# Patient Record
Sex: MALE | Race: WHITE | Employment: OTHER | URBAN - METROPOLITAN AREA
[De-identification: names, ages, dates, MRNs, and addresses within clinical notes are randomized per-mention and may not be internally consistent; named-entity substitution may affect disease eponyms.]

---

## 2017-01-04 ENCOUNTER — TELEPHONE (OUTPATIENT)
Dept: INTERNAL MEDICINE CLINIC | Age: 77
End: 2017-01-04

## 2017-01-05 ENCOUNTER — CLINICAL SUPPORT (OUTPATIENT)
Dept: CARDIOLOGY CLINIC | Age: 77
End: 2017-01-05

## 2017-01-05 DIAGNOSIS — Z95.810 AICD (AUTOMATIC CARDIOVERTER/DEFIBRILLATOR) PRESENT: ICD-10-CM

## 2017-01-05 DIAGNOSIS — I47.29 PAROXYSMAL VT: Primary | ICD-10-CM

## 2017-01-05 DIAGNOSIS — I48.91 ATRIAL FIBRILLATION, UNSPECIFIED TYPE (HCC): ICD-10-CM

## 2017-01-05 NOTE — MR AVS SNAPSHOT
Visit Information Date & Time Provider Department Dept. Phone Encounter #  
 1/5/2017  9:45  [facility] [address] [phone] [id number] Your Appointments 1/11/2017 10:45 AM  
Office Visit with George Cabezas MD  
[facility] INTERNAL MEDICINE OF [facility]) Appt Note: 1 year f/u  
 [address], [address]  
  
   
 [address], [address] [place] [address] 4/12/2017  3:20 PM  
CARELINK with Mona Copeland [facility] [address] ([facility]) Appt Note: 3 mth carelink [facility] [address] [address] [address] [phone] 2300 [facility] [address] [address]  
  
    
 7/10/2017 11:20 AM  
Follow Up with Anamaria Babcock DO [facility] [address] [facility] ([facility]) Appt Note: Return in about 8 months [facility] [address] [address] [address] [phone] 2300 [facility] [address] Upcoming Health Maintenance Date Due DTaP/Tdap/Td series (1 - Tdap) 3/16/1963 FOBT Q 1 YEAR AGE 50-75 3/16/1992 ZOSTER VACCINE AGE 60> 3/16/2002 GLAUCOMA SCREENING Q2Y 3/16/2007 MEDICARE YEARLY EXAM 3/16/2007 Pneumococcal 65+ Low/Medium Risk (2 of 2 - PCV13) 10/30/2010 INFLUENZA AGE 9 TO ADULT 8/1/2016 Allergies as of 1/5/2017  Review Complete On: 11/14/2016 By: Anamaria Babcock DO Severity Noted Reaction Type Reactions Zetia [Ezetimibe] Medium 10/25/2016   Side Effect Other (comments) Back pain resolved off Zetia Lipitor [Atorvastatin]  10/12/2015    Myalgia Livalo [Pitavastatin]  09/17/2013    Other (comments) Extreme fatigue 5/29/14   PATIENT DENIES Zocor [Simvastatin]  09/17/2013    Myalgia 5/29/14 PATIENT DENIES Current Immunizations  Never Reviewed Name Date Pneumococcal Polysaccharide (PPSV-23) 10/30/2009 Not reviewed this visit Vitals Smoking Status Never Smoker Your Updated Medication List  
  
   
This list is accurate as of: 1/5/17  9:55 AM.  Always use your most recent med list. amLODIPine 5 mg tablet Commonly known as:  Alberto Alstrom Take 1 Tab by mouth daily. AZOPT 1 % ophthalmic suspension Generic drug:  brinzolamide Administer 1 Drop to both eyes two (2) times a day. BETIMOL OP Apply 1 Drop to eye two (2) times a day. cyanocobalamin 1,000 mcg/mL injection Commonly known as:  VITAMIN B12  
inject 1 milliliters intramuscularly every month  
  
 eplerenone 25 mg tablet Commonly known as:  Dior Pain TAKE 1 TABLET DAILY  
  
 furosemide 40 mg tablet Commonly known as:  LASIX TAKE 1 TABLET DAILY  
  
 metoprolol tartrate 50 mg tablet Commonly known as:  LOPRESSOR Take 1 Tab by mouth two (2) times a day. multivitamin tablet Commonly known as:  ONE A DAY Take 1 Tab by mouth daily. triamcinolone acetonide 0.1 % topical cream  
Commonly known as:  KENALOG Apply  to affected area two (2) times daily as needed. use thin layer  
  
 warfarin 5 mg tablet Commonly known as:  COUMADIN  
1 tablet daily Please provide this summary of care documentation to your next provider. Your primary care clinician is listed as Prabhakar Gerber. If you have any questions after today's visit, please call [phone].

## 2017-01-05 NOTE — TELEPHONE ENCOUNTER
I called patient to find out what dose of coumadin he is on; 5mg 4 days and 2.5mg 3 days. His INR is 1.8. I talked to Dr. Franklyn Gutierrez as Dr. Anastasiya Delarosa was out of the office. Per Dr. Franklyn Gutierrez; continue same dose and repeat PT/INR in one week.

## 2017-01-09 NOTE — PROGRESS NOTES
I have personally seen and evaluated the device findings. Interrogation reviewed and I agree with assessment.     Deepa De Souza

## 2017-01-13 ENCOUNTER — HOSPITAL ENCOUNTER (OUTPATIENT)
Dept: LAB | Age: 77
Discharge: HOME | End: 2017-01-13
Payer: MEDICARE

## 2017-01-13 ENCOUNTER — LAB ONLY (OUTPATIENT)
Dept: INTERNAL MEDICINE CLINIC | Age: 77
End: 2017-01-13

## 2017-01-13 DIAGNOSIS — Z12.5 PROSTATE CANCER SCREENING: ICD-10-CM

## 2017-01-13 DIAGNOSIS — E78.00 PURE HYPERCHOLESTEROLEMIA: ICD-10-CM

## 2017-01-13 DIAGNOSIS — I10 ESSENTIAL HYPERTENSION: ICD-10-CM

## 2017-01-13 DIAGNOSIS — I10 ESSENTIAL HYPERTENSION: Primary | ICD-10-CM

## 2017-01-13 LAB
ALBUMIN SERPL BCP-MCNC: 3.6 G/DL (ref 3.4–5)
ALBUMIN/GLOB SERPL: 1 {RATIO} (ref 0.8–1.7)
ALP SERPL-CCNC: 78 U/L (ref 45–117)
ALT SERPL-CCNC: 22 U/L (ref 16–61)
ANION GAP BLD CALC-SCNC: 9 MMOL/L (ref 3–18)
AST SERPL W P-5'-P-CCNC: 19 U/L (ref 15–37)
BASOPHILS # BLD AUTO: 0 K/UL (ref 0–0.06)
BASOPHILS # BLD: 0 % (ref 0–2)
BILIRUB SERPL-MCNC: 0.4 MG/DL (ref 0.2–1)
BUN SERPL-MCNC: 15 MG/DL (ref 7–18)
BUN/CREAT SERPL: 15 (ref 12–20)
CALCIUM SERPL-MCNC: 8.9 MG/DL (ref 8.5–10.1)
CHLORIDE SERPL-SCNC: 103 MMOL/L (ref 100–108)
CHOLEST SERPL-MCNC: 245 MG/DL
CO2 SERPL-SCNC: 26 MMOL/L (ref 21–32)
CREAT SERPL-MCNC: 1.01 MG/DL (ref 0.6–1.3)
DIFFERENTIAL METHOD BLD: ABNORMAL
EOSINOPHIL # BLD: 0.2 K/UL (ref 0–0.4)
EOSINOPHIL NFR BLD: 2 % (ref 0–5)
ERYTHROCYTE [DISTWIDTH] IN BLOOD BY AUTOMATED COUNT: 12.9 % (ref 11.6–14.5)
GLOBULIN SER CALC-MCNC: 3.7 G/DL (ref 2–4)
GLUCOSE SERPL-MCNC: 89 MG/DL (ref 74–99)
HCT VFR BLD AUTO: 46.8 % (ref 36–48)
HDLC SERPL-MCNC: 50 MG/DL (ref 40–60)
HDLC SERPL: 4.9 {RATIO} (ref 0–5)
HGB BLD-MCNC: 15.9 G/DL (ref 13–16)
LDLC SERPL CALC-MCNC: 164.4 MG/DL (ref 0–100)
LIPID PROFILE,FLP: ABNORMAL
LYMPHOCYTES # BLD AUTO: 24 % (ref 21–52)
LYMPHOCYTES # BLD: 1.7 K/UL (ref 0.9–3.6)
MCH RBC QN AUTO: 33.3 PG (ref 24–34)
MCHC RBC AUTO-ENTMCNC: 34 G/DL (ref 31–37)
MCV RBC AUTO: 97.9 FL (ref 74–97)
MONOCYTES # BLD: 0.9 K/UL (ref 0.05–1.2)
MONOCYTES NFR BLD AUTO: 12 % (ref 3–10)
NEUTS SEG # BLD: 4.4 K/UL (ref 1.8–8)
NEUTS SEG NFR BLD AUTO: 62 % (ref 40–73)
PLATELET # BLD AUTO: 274 K/UL (ref 135–420)
PMV BLD AUTO: 11.6 FL (ref 9.2–11.8)
POTASSIUM SERPL-SCNC: 4.4 MMOL/L (ref 3.5–5.5)
PROT SERPL-MCNC: 7.3 G/DL (ref 6.4–8.2)
PSA SERPL-MCNC: 0.5 NG/ML (ref 0–4)
RBC # BLD AUTO: 4.78 M/UL (ref 4.7–5.5)
SODIUM SERPL-SCNC: 138 MMOL/L (ref 136–145)
TRIGL SERPL-MCNC: 153 MG/DL (ref ?–150)
VLDLC SERPL CALC-MCNC: 30.6 MG/DL
WBC # BLD AUTO: 7.2 K/UL (ref 4.6–13.2)

## 2017-01-13 PROCEDURE — 80053 COMPREHEN METABOLIC PANEL: CPT | Performed by: INTERNAL MEDICINE

## 2017-01-13 PROCEDURE — 85025 COMPLETE CBC W/AUTO DIFF WBC: CPT | Performed by: INTERNAL MEDICINE

## 2017-01-13 PROCEDURE — 80061 LIPID PANEL: CPT | Performed by: INTERNAL MEDICINE

## 2017-01-13 PROCEDURE — 84153 ASSAY OF PSA TOTAL: CPT | Performed by: INTERNAL MEDICINE

## 2017-01-19 ENCOUNTER — OFFICE VISIT (OUTPATIENT)
Dept: INTERNAL MEDICINE CLINIC | Age: 77
End: 2017-01-19

## 2017-01-19 VITALS
BODY MASS INDEX: 28.99 KG/M2 | TEMPERATURE: 97.9 F | WEIGHT: 214 LBS | HEIGHT: 72 IN | DIASTOLIC BLOOD PRESSURE: 72 MMHG | RESPIRATION RATE: 16 BRPM | OXYGEN SATURATION: 99 % | HEART RATE: 76 BPM | SYSTOLIC BLOOD PRESSURE: 118 MMHG

## 2017-01-19 DIAGNOSIS — I10 ESSENTIAL HYPERTENSION: Primary | ICD-10-CM

## 2017-01-19 DIAGNOSIS — J06.9 VIRAL UPPER RESPIRATORY TRACT INFECTION: ICD-10-CM

## 2017-01-19 DIAGNOSIS — E78.00 PURE HYPERCHOLESTEROLEMIA: ICD-10-CM

## 2017-01-19 DIAGNOSIS — I47.29 PAROXYSMAL VT: ICD-10-CM

## 2017-01-19 DIAGNOSIS — I48.91 ATRIAL FIBRILLATION, UNSPECIFIED TYPE (HCC): ICD-10-CM

## 2017-01-19 RX ORDER — FLUTICASONE PROPIONATE 50 MCG
SPRAY, SUSPENSION (ML) NASAL
Qty: 3 BOTTLE | Refills: 0 | Status: SHIPPED | OUTPATIENT
Start: 2017-01-19 | End: 2017-07-13 | Stop reason: ALTCHOICE

## 2017-01-19 RX ORDER — WARFARIN SODIUM 5 MG/1
TABLET ORAL
Qty: 30 TAB | Refills: 6 | Status: SHIPPED | OUTPATIENT
Start: 2017-01-19 | End: 2017-10-31 | Stop reason: SDUPTHER

## 2017-01-19 RX ORDER — AMLODIPINE BESYLATE 5 MG/1
5 TABLET ORAL DAILY
Qty: 90 TAB | Refills: 3 | Status: SHIPPED | OUTPATIENT
Start: 2017-01-19 | End: 2017-04-28 | Stop reason: SDUPTHER

## 2017-01-19 RX ORDER — LORATADINE 10 MG/1
TABLET ORAL
Qty: 90 TAB | Refills: 0 | Status: SHIPPED | OUTPATIENT
Start: 2017-01-19 | End: 2017-12-27 | Stop reason: ALTCHOICE

## 2017-01-19 RX ORDER — PANTOPRAZOLE SODIUM 40 MG/1
TABLET, DELAYED RELEASE ORAL
Qty: 90 TAB | Refills: 3 | Status: SHIPPED | OUTPATIENT
Start: 2017-01-19 | End: 2019-01-07 | Stop reason: CLARIF

## 2017-01-19 NOTE — PROGRESS NOTES
The patient presents to the office today with the chief complaint of hypertension    HPI    The patient remains on medications for hypetension. he is tolerating The medications well. The patient also remains on medicatiions for hyperlipidemia and anticoagulation for atrial fibrillation. The patient has an AICD in place due to paroxysmal atrial fibrillation. The patient has complaints of congestion in his throat and mild hoarse. Review of Systems   HENT: Positive for congestion (Mild congestion). Respiratory: Negative for shortness of breath. Cardiovascular: Negative for chest pain and leg swelling. Allergies   Allergen Reactions    Zetia [Ezetimibe] Other (comments)     Back pain resolved off Zetia    Lipitor [Atorvastatin] Myalgia    Livalo [Pitavastatin] Other (comments)     Extreme fatigue    5/29/14   PATIENT DENIES    Zocor [Simvastatin] Myalgia     5/29/14 PATIENT DENIES        Current Outpatient Prescriptions   Medication Sig Dispense Refill    warfarin (COUMADIN) 5 mg tablet 1 tablet daily 30 Tab 6    amLODIPine (NORVASC) 5 mg tablet Take 1 Tab by mouth daily. 90 Tab 3    pantoprazole (PROTONIX) 40 mg tablet 1 tablet each AM 90 Tab 3    loratadine (CLARITIN) 10 mg tablet 1 tablet each evening 90 Tab 0    fluticasone (FLONASE) 50 mcg/actuation nasal spray 2 sprays up each nostril each night 3 Bottle 0    cyanocobalamin (VITAMIN B12) 1,000 mcg/mL injection inject 1 milliliters intramuscularly every month 1 mL 1    metoprolol tartrate (LOPRESSOR) 50 mg tablet Take 1 Tab by mouth two (2) times a day. 180 Tab 3    eplerenone (INSPRA) 25 mg tablet TAKE 1 TABLET DAILY 90 Tab 2    furosemide (LASIX) 40 mg tablet TAKE 1 TABLET DAILY 90 Tab 2    AZOPT 1 % ophthalmic suspension Administer 1 Drop to both eyes two (2) times a day. 3    triamcinolone acetonide (KENALOG) 0.1 % topical cream Apply  to affected area two (2) times daily as needed.  use thin layer       TIMOLOL (BETIMOL OP) Apply 1 Drop to eye two (2) times a day.  multivitamin (ONE A DAY) tablet Take 1 Tab by mouth daily. Past Medical History   Diagnosis Date    AICD (automatic cardioverter/defibrillator) present      [employer]tr[employer]  upgrade from pacer in 2007 due to VT    Atrial fibrillation ([facility] [address]     Cardiac cath 03/19/2007     LM 25% ostial.  Otherwise patent coronaries. LVEDP 20.  EF 50%. Dyssynch. mid anterior contraction. Pacemaker.  Cardiac echocardiogram 03/20/2014     A-fib. EF 65-70%. No RWMA. No RWMA. Mild conc LVH. Mild RVE. RVSP 40-45 mmHg. Mild LAE.  HERNANDEZ. Mild MR. Mod TR.  Cardiac nuclear imaging test 07/29/2014     No ischemia or prior infarction. EF 68%. Nondiagnostic EKG due to V pacing on pharm stress test.  Low risk.  Cardioversion 8/31/2011     Successful defibrillation threshold testing at 18 joules. Patient also had conversion to atrial ventricular pacing.      CHF (congestive heart failure) (HCC)     Cobalamin deficiency     Dupuytren contracture 02/08/2010     on the right ring finger     Edema     Hypertension     Hypertrophy of prostate with urinary obstruction and other lower urinary tract symptoms (LUTS)     Impotence of organic origin     Intolerance of drug      Intolerant high doses of sotalol due to prolonged QT    Mastodynia     Paroxysmal VT ([facility] [address]     Pure hypercholesterolemia     S/P ablation of atrial fibrillation 05/31/2011    S/P ablation of atrial fibrillation 12/18/2012     Dr. Nigel Delacruz at [facility] S/P ablation operation for arrhythmia      VT ablation by Dr. Armand Ortiz near [address] [place] [address] 2/3007    Sick sinus syndrome [facility])        Past Surgical History   Procedure Laterality Date    Hx cataract removal  02/08-03/08     bilateral cataract removed    Hx other surgical  6/2000     atrial lead placement    Hx tonsil and adenoidectomy      Hx pacemaker  1998     placement    Hx pacemaker  6/2000     DDD    Hx pacemaker 3/27/07     removal of pacemaker & placement of dual chamber defib generator and leads    Hx cataract removal  2/2008 & 3/2008     bilateral    Hx other surgical  6/5/2009     Cardioversion    Hx orthopaedic  2/8/10     release of Dupuytren's contraction on ring finger of right hand    Hx afib ablation  12/18/2012     [facility] by Dr. Oumou Meneses Hx other surgical  10/12/2012     cardioversion       Social History     Social History    Marital status:      Spouse name: N/A    Number of children: N/A    Years of education: N/A     Occupational History    Not on file. Social History Main Topics    Smoking status: Never Smoker    Smokeless tobacco: Never Used    Alcohol use Yes      Comment: 2-3 glasses of white wine frequently    Drug use: No    Sexual activity: Not on file     Other Topics Concern    Not on file     Social History Narrative       Patient does not have an advanced directive on file    Visit Vitals    /72 (BP 1 Location: Left arm, BP Patient Position: Sitting)    Pulse 76    Temp 97.9 °F (36.6 °C) (Tympanic)    Resp 16    Ht 6' (1.829 m)    Wt 214 lb (97.1 kg)    SpO2 99%    BMI 29.02 kg/m2       Physical Exam   HENT:   Mouth/Throat: No oropharyngeal exudate. Neck: Carotid bruit is not present. No thyromegaly present. Cardiovascular: Normal rate and regular rhythm. Exam reveals no gallop. No murmur heard. Pulmonary/Chest: He has no wheezes. He has no rales. Abdominal: Soft. Bowel sounds are normal. He exhibits no distension and no mass. There is no tenderness. Musculoskeletal: He exhibits no edema. Lymphadenopathy:     He has no cervical adenopathy.        Hospital Outpatient Visit on 01/13/2017   Component Date Value Ref Range Status    Sodium 01/13/2017 138  136 - 145 mmol/L Final    Potassium 01/13/2017 4.4  3.5 - 5.5 mmol/L Final    Chloride 01/13/2017 103  100 - 108 mmol/L Final    CO2 01/13/2017 26  21 - 32 mmol/L Final    Anion gap 01/13/2017 9  3.0 - 18 mmol/L Final    Glucose 01/13/2017 89  74 - 99 mg/dL Final    BUN 01/13/2017 15  7.0 - 18 MG/DL Final    Creatinine 01/13/2017 1.01  0.6 - 1.3 MG/DL Final    BUN/Creatinine ratio 01/13/2017 15  12 - 20   Final    GFR est AA 01/13/2017 >60  >60 ml/min/1.73m2 Final    GFR est non-AA 01/13/2017 >60  >60 ml/min/1.73m2 Final    Comment: (NOTE)  Estimated GFR is calculated using the Modification of Diet in Renal   Disease (MDRD) Study equation, reported for both  Americans   (GFRAA) and non- Americans (GFRNA), and normalized to 1.73m2   body surface area. The physician must decide which value applies to   the patient. The MDRD study equation should only be used in   individuals age 25 or older. It has not been validated for the   following: pregnant women, patients with serious comorbid conditions,   or on certain medications, or persons with extremes of body size,   muscle mass, or nutritional status.  Calcium 01/13/2017 8.9  8.5 - 10.1 MG/DL Final    Bilirubin, total 01/13/2017 0.4  0.2 - 1.0 MG/DL Final    ALT 01/13/2017 22  16 - 61 U/L Final    AST 01/13/2017 19  15 - 37 U/L Final    Alk. phosphatase 01/13/2017 78  45 - 117 U/L Final    Protein, total 01/13/2017 7.3  6.4 - 8.2 g/dL Final    Albumin 01/13/2017 3.6  3.4 - 5.0 g/dL Final    Globulin 01/13/2017 3.7  2.0 - 4.0 g/dL Final    A-G Ratio 01/13/2017 1.0  0.8 - 1.7   Final    LIPID PROFILE 01/13/2017        Final    Cholesterol, total 01/13/2017 245* <200 MG/DL Final    Triglyceride 01/13/2017 153* <150 MG/DL Final    Comment: The drugs N-acetylcysteine (NAC) and  Metamiszole have been found to cause falsely  low results in this chemical assay. Please  be sure to submit blood samples obtained  BEFORE administration of either of these  drugs to assure correct results.       HDL Cholesterol 01/13/2017 50  40 - 60 MG/DL Final    LDL, calculated 01/13/2017 164.4* 0 - 100 MG/DL Final    VLDL, calculated 01/13/2017 30.6  MG/DL Final    CHOL/HDL Ratio 01/13/2017 4.9  0 - 5.0   Final    WBC 01/13/2017 7.2  4.6 - 13.2 K/uL Final    RBC 01/13/2017 4.78  4.70 - 5.50 M/uL Final    HGB 01/13/2017 15.9  13.0 - 16.0 g/dL Final    HCT 01/13/2017 46.8  36.0 - 48.0 % Final    MCV 01/13/2017 97.9* 74.0 - 97.0 FL Final    MCH 01/13/2017 33.3  24.0 - 34.0 PG Final    MCHC 01/13/2017 34.0  31.0 - 37.0 g/dL Final    RDW 01/13/2017 12.9  11.6 - 14.5 % Final    PLATELET 02/08/1993 789  135 - 420 K/uL Final    MPV 01/13/2017 11.6  9.2 - 11.8 FL Final    NEUTROPHILS 01/13/2017 62  40 - 73 % Final    LYMPHOCYTES 01/13/2017 24  21 - 52 % Final    MONOCYTES 01/13/2017 12* 3 - 10 % Final    EOSINOPHILS 01/13/2017 2  0 - 5 % Final    BASOPHILS 01/13/2017 0  0 - 2 % Final    ABS. NEUTROPHILS 01/13/2017 4.4  1.8 - 8.0 K/UL Final    ABS. LYMPHOCYTES 01/13/2017 1.7  0.9 - 3.6 K/UL Final    ABS. MONOCYTES 01/13/2017 0.9  0.05 - 1.2 K/UL Final    ABS. EOSINOPHILS 01/13/2017 0.2  0.0 - 0.4 K/UL Final    ABS. BASOPHILS 01/13/2017 0.0  0.0 - 0.06 K/UL Final    DF 01/13/2017 AUTOMATED    Final    Prostate Specific Ag 01/13/2017 0.5  0.0 - 4.0 ng/mL Final       .No results found for any visits on 01/19/17. Assessment / Plan      ICD-10-CM ICD-9-CM    1. Essential hypertension I10 401.9    2. Atrial fibrillation, unspecified type ([facility] [address] I48.91 427.31    3. Pure hypercholesterolemia E78.00 272.0    4. [facility]ox[facility]mal [facility] ([facility] I47.2 427.1    5. Viral upper respiratory tract infection J06.9 465.9     B97.89       Claritin  he was advised to continue his maintenance medications  he is to call if symptoms persist over five days        I have reviewed/discussed the above normal BMI with the patient. I have recommended the following interventions: dietary management education, guidance, and counseling . The plan is as follows: Patient will cut back on calories and carbohydrates.  .        Follow-up Disposition:  Return in about 6 months (around 7/19/2017). I asked Carmelita Lea if he has any questions and I answered the questions.   Carmelita Lea states that he understands the treatment plan and agrees with the treatment plan

## 2017-01-19 NOTE — MR AVS SNAPSHOT
Visit Information Date & Time Provider Department Dept. Phone Encounter #  
 1/19/2017  8:15 AM Raheem Richards MD [facility] INTERNAL MEDICINE OF [place] [phone] [id number] Follow-up Instructions Return in about 6 months (around 7/19/2017). Your Appointments 4/12/2017  3:20 PM  
CARELINK with Pacer Hv [facility] ([facility]) Appt Note: [facility] [address] [address] [address] [phone] [address] [facility] [address] [address]  
  
    
 7/10/2017 11:20 AM  
Follow Up with Jose C Sol DO [facility] ([facility]) Appt Note: Return in about 8 months [facility] [address] [address] [address] [phone] [address] [facility] [address] 7/13/2017 10:30 AM  
Follow Up with Raheem Richards MD  
[address] [place] [address]) Appt Note: 6mo  
 [address], [address] [facility] [address]  
  
   
 [address] [place], [address] [facility] [address] Upcoming Health Maintenance Date Due DTaP/Tdap/Td series (1 - Tdap) 3/16/1961 FOBT Q 1 YEAR AGE 50-75 3/16/1992 ZOSTER VACCINE AGE 60> 3/16/2002 GLAUCOMA SCREENING Q2Y 3/16/2007 MEDICARE YEARLY EXAM 3/16/2007 Pneumococcal 65+ Low/Medium Risk (2 of 2 - PCV13) 10/30/2010 Allergies as of 1/19/2017  Review Complete On: 1/19/2017 By: Raheem Richards MD  
  
 Severity Noted Reaction Type Reactions Zetia [Ezetimibe] Medium 10/25/2016   Side Effect Other (comments) Back pain resolved off Zetia Lipitor [Atorvastatin]  10/12/2015    Myalgia Livalo [Pitavastatin]  09/17/2013    Other (comments) Extreme fatigue 5/29/14   PATIENT DENIES Zocor [Simvastatin]  09/17/2013    Myalgia 5/29/14 PATIENT DENIES Current Immunizations  Never Reviewed Name Date Pneumococcal Polysaccharide (PPSV-23) 10/30/2009 Not reviewed this visit You Were Diagnosed With   
  
 Codes Comments Atrial fibrillation, unspecified type ([facility]    -  Primary ICD-10-CM: I48.91 
ICD-9-CM: 427.31 Pure hypercholesterolemia     ICD-10-CM: E78.00 ICD-9-CM: 272.0 Essential hypertension     ICD-10-CM: I10 
ICD-9-CM: 401.9 Paroxysmal VT ([facility]     ICD-10-CM: I47.2 ICD-9-CM: 427.1 Vitals BP Pulse Temp Resp Height(growth percentile) 118/72 (BP 1 Location: Left arm, BP Patient Position: Sitting) 76 97.9 °F (36.6 °C) (Tympanic) 16 6' (1.829 m) Weight(growth percentile) SpO2 BMI Smoking Status 214 lb (97.1 kg) 99% 29.02 kg/m2 Never Smoker BMI and BSA Data Body Mass Index Body Surface Area  
 29.02 kg/m 2 2.22 m 2 Preferred Pharmacy Pharmacy Name Phone [address]  . [address] [place] [address], [address] [place], [place] [phone] Your Updated Medication List  
  
   
This list is accurate as of: 1/19/17  9:14 AM.  Always use your most recent med list. amLODIPine 5 mg tablet Commonly known as:  Karina Martinez Take 1 Tab by mouth daily. AZOPT 1 % ophthalmic suspension Generic drug:  brinzolamide Administer 1 Drop to both eyes two (2) times a day. BETIMOL OP Apply 1 Drop to eye two (2) times a day. cyanocobalamin 1,000 mcg/mL injection Commonly known as:  VITAMIN B12  
inject 1 milliliters intramuscularly every month  
  
 eplerenone 25 mg tablet Commonly known as:  Elwyn Primus TAKE 1 TABLET DAILY  
  
 fluticasone 50 mcg/actuation nasal spray Commonly known as:  FLONASE  
2 sprays up each nostril each night  
  
 furosemide 40 mg tablet Commonly known as:  LASIX TAKE 1 TABLET DAILY  
  
 loratadine 10 mg tablet Commonly known as:  CLARITIN  
1 tablet each evening  
  
 metoprolol tartrate 50 mg tablet Commonly known as:  LOPRESSOR Take 1 Tab by mouth two (2) times a day. multivitamin tablet Commonly known as:  ONE A DAY Take 1 Tab by mouth daily. pantoprazole 40 mg tablet Commonly known as:  PROTONIX  
1 tablet each AM  
  
 triamcinolone acetonide 0.1 % topical cream  
Commonly known as:  KENALOG Apply  to affected area two (2) times daily as needed. use thin layer  
  
 warfarin 5 mg tablet Commonly known as:  COUMADIN  
1 tablet daily Prescriptions Sent to Pharmacy Refills  
 warfarin (COUMADIN) 5 mg tablet 6 Si tablet daily Class: Normal  
 Pharmacy: [address] [place] [address] Ph #: [phone]  
 amLODIPine (NORVASC) 5 mg tablet 3 Sig: Take 1 Tab by mouth daily. Class: Normal  
 Pharmacy: [address] [place] [address] Ph #: [phone] Route: Oral  
 pantoprazole (PROTONIX) 40 mg tablet 3 Si tablet each AM  
 Class: Normal  
 Pharmacy: [address] [place] [address] Ph #: [phone]  
 loratadine (CLARITIN) 10 mg tablet 0 Si tablet each evening Class: Normal  
 Pharmacy: [address] [place] [address] Ph #: [phone]  
 fluticasone (FLONASE) 50 mcg/actuation nasal spray 0 Si sprays up each nostril each night Class: Normal  
 Pharmacy: [address] [place] [address] Ph #: [phone] Follow-up Instructions Return in about 6 months (around 2017). Patient Instructions Health Maintenance Due Topic Date Due  
 DTaP/Tdap/Td  (1 - Tdap) 1963  Stool testing for trace blood  1992  Shingles Vaccine  2002  Glaucoma Screening   2007 [employer] Annual Well Visit  2007  Pneumococcal Vaccine (2 of 2 - PCV13) 10/30/2010  Flu Vaccine  2016 Please provide this summary of care documentation to your next provider. Your primary care clinician is listed as Jay Breen. If you have any questions after today's visit, please call [phone].

## 2017-01-19 NOTE — PATIENT INSTRUCTIONS
Health Maintenance Due   Topic Date Due    DTaP/Tdap/Td  (1 - Tdap) 03/16/1963    Stool testing for trace blood  03/16/1992    Shingles Vaccine  03/16/2002    Glaucoma Screening   03/16/2007    Annual Well Visit  03/16/2007    Pneumococcal Vaccine (2 of 2 - PCV13) 10/30/2010    Flu Vaccine  08/01/2016

## 2017-01-19 NOTE — PROGRESS NOTES
1. Have you been to the ER, urgent care clinic since your last visit? Hospitalized since your last visit? No    2. Have you seen or consulted any other health care providers outside of the [address] [place] [address] since your last visit? Include any pap smears or colon screening.  No

## 2017-02-15 LAB — INR, EXTERNAL: 2 (ref 2–3)

## 2017-02-17 ENCOUNTER — DOCUMENTATION ONLY (OUTPATIENT)
Dept: INTERNAL MEDICINE CLINIC | Age: 77
End: 2017-02-17

## 2017-02-17 ENCOUNTER — TELEPHONE ANTICOAG (OUTPATIENT)
Dept: INTERNAL MEDICINE CLINIC | Age: 77
End: 2017-02-17

## 2017-02-17 DIAGNOSIS — I48.91 ATRIAL FIBRILLATION, UNSPECIFIED TYPE (HCC): ICD-10-CM

## 2017-02-20 RX ORDER — FUROSEMIDE 40 MG/1
TABLET ORAL
Qty: 90 TAB | Refills: 3 | Status: SHIPPED | OUTPATIENT
Start: 2017-02-20 | End: 2018-02-15 | Stop reason: SDUPTHER

## 2017-02-23 RX ORDER — CYANOCOBALAMIN 1000 UG/ML
INJECTION, SOLUTION INTRAMUSCULAR; SUBCUTANEOUS
Qty: 1 ML | Refills: 0 | Status: SHIPPED | OUTPATIENT
Start: 2017-02-23 | End: 2017-03-03 | Stop reason: SDUPTHER

## 2017-03-01 ENCOUNTER — TELEPHONE ANTICOAG (OUTPATIENT)
Dept: INTERNAL MEDICINE CLINIC | Age: 77
End: 2017-03-01

## 2017-03-01 DIAGNOSIS — I48.91 ATRIAL FIBRILLATION, UNSPECIFIED TYPE (HCC): ICD-10-CM

## 2017-03-01 LAB — INR, EXTERNAL: 3.5

## 2017-03-05 RX ORDER — CYANOCOBALAMIN 1000 UG/ML
INJECTION, SOLUTION INTRAMUSCULAR; SUBCUTANEOUS
Qty: 1 ML | Refills: 0 | Status: SHIPPED | OUTPATIENT
Start: 2017-03-05 | End: 2017-03-30 | Stop reason: SDUPTHER

## 2017-03-15 ENCOUNTER — TELEPHONE ANTICOAG (OUTPATIENT)
Dept: INTERNAL MEDICINE CLINIC | Age: 77
End: 2017-03-15

## 2017-03-15 DIAGNOSIS — I48.91 ATRIAL FIBRILLATION, UNSPECIFIED TYPE (HCC): ICD-10-CM

## 2017-03-15 LAB — INR, EXTERNAL: 3.2

## 2017-03-22 ENCOUNTER — TELEPHONE ANTICOAG (OUTPATIENT)
Dept: INTERNAL MEDICINE CLINIC | Age: 77
End: 2017-03-22

## 2017-03-22 DIAGNOSIS — I48.91 ATRIAL FIBRILLATION, UNSPECIFIED TYPE (HCC): ICD-10-CM

## 2017-03-22 LAB — INR, EXTERNAL: 2.4

## 2017-03-24 RX ORDER — TRAMADOL HYDROCHLORIDE 50 MG/1
TABLET ORAL
Qty: 90 TAB | Refills: 1 | Status: SHIPPED | OUTPATIENT
Start: 2017-03-24 | End: 2017-07-13 | Stop reason: ALTCHOICE

## 2017-03-24 RX ORDER — CHLORZOXAZONE 500 MG/1
TABLET ORAL
Qty: 30 TAB | Refills: 0 | Status: SHIPPED | OUTPATIENT
Start: 2017-03-24 | End: 2017-04-28 | Stop reason: ALTCHOICE

## 2017-03-24 RX ORDER — PREDNISONE 20 MG/1
TABLET ORAL
Qty: 20 TAB | Refills: 0 | Status: SHIPPED | OUTPATIENT
Start: 2017-03-24 | End: 2017-04-12 | Stop reason: ALTCHOICE

## 2017-03-29 ENCOUNTER — TELEPHONE ANTICOAG (OUTPATIENT)
Dept: INTERNAL MEDICINE CLINIC | Age: 77
End: 2017-03-29

## 2017-03-29 DIAGNOSIS — I48.91 ATRIAL FIBRILLATION, UNSPECIFIED TYPE (HCC): ICD-10-CM

## 2017-03-29 LAB — INR, EXTERNAL: 2.9 (ref 2–3)

## 2017-03-30 RX ORDER — CYANOCOBALAMIN 1000 UG/ML
INJECTION, SOLUTION INTRAMUSCULAR; SUBCUTANEOUS
Qty: 1 ML | Refills: 0 | Status: SHIPPED | OUTPATIENT
Start: 2017-03-30 | End: 2017-05-02 | Stop reason: SDUPTHER

## 2017-04-12 ENCOUNTER — OFFICE VISIT (OUTPATIENT)
Dept: INTERNAL MEDICINE CLINIC | Age: 77
End: 2017-04-12

## 2017-04-12 ENCOUNTER — OFFICE VISIT (OUTPATIENT)
Dept: CARDIOLOGY CLINIC | Age: 77
End: 2017-04-12

## 2017-04-12 VITALS
RESPIRATION RATE: 16 BRPM | SYSTOLIC BLOOD PRESSURE: 122 MMHG | BODY MASS INDEX: 28.85 KG/M2 | WEIGHT: 213 LBS | OXYGEN SATURATION: 99 % | DIASTOLIC BLOOD PRESSURE: 68 MMHG | HEIGHT: 72 IN | TEMPERATURE: 97.7 F | HEART RATE: 85 BPM

## 2017-04-12 DIAGNOSIS — I10 ESSENTIAL HYPERTENSION: Primary | ICD-10-CM

## 2017-04-12 DIAGNOSIS — Z95.810 AICD (AUTOMATIC CARDIOVERTER/DEFIBRILLATOR) PRESENT: ICD-10-CM

## 2017-04-12 DIAGNOSIS — I47.29 PAROXYSMAL VT: Primary | ICD-10-CM

## 2017-04-12 DIAGNOSIS — Z00.00 MEDICARE ANNUAL WELLNESS VISIT, INITIAL: ICD-10-CM

## 2017-04-12 DIAGNOSIS — S80.12XA CONTUSION OF LEFT LEG, INITIAL ENCOUNTER: ICD-10-CM

## 2017-04-12 DIAGNOSIS — Z23 ENCOUNTER FOR IMMUNIZATION: ICD-10-CM

## 2017-04-12 RX ORDER — CLOBETASOL PROPIONATE 0.5 MG/G
CREAM TOPICAL
COMMUNITY
End: 2019-02-11 | Stop reason: ALTCHOICE

## 2017-04-12 RX ORDER — CEPHALEXIN 500 MG/1
500 CAPSULE ORAL 4 TIMES DAILY
Qty: 40 CAP | Refills: 0 | Status: SHIPPED | OUTPATIENT
Start: 2017-04-12 | End: 2017-04-22

## 2017-04-12 RX ORDER — DICLOFENAC SODIUM 10 MG/G
4 GEL TOPICAL 4 TIMES DAILY
Qty: 1 EACH | Refills: 0 | Status: SHIPPED | OUTPATIENT
Start: 2017-04-12 | End: 2019-01-07

## 2017-04-12 NOTE — PROGRESS NOTES
This is an Initial Medicare Annual Wellness Exam (AWV) (Performed 12 months after IPPE or effective date of Medicare Part B enrollment, Once in a lifetime)    I have reviewed the patient's medical history in detail and updated the computerized patient record. History     Past Medical History:   Diagnosis Date    AICD (automatic cardioverter/defibrillator) present     Medtronic  upgrade from pacer in 2007 due to VT    Atrial fibrillation ([facility] [address]     Cardiac cath 03/19/2007    LM 25% ostial.  Otherwise patent coronaries. LVEDP 20.  EF 50%. Dyssynch. mid anterior contraction. Pacemaker.  Cardiac echocardiogram 03/20/2014    A-fib. EF 65-70%. No RWMA. No RWMA. Mild conc LVH. Mild RVE. RVSP 40-45 mmHg. Mild LAE.  HERNANDEZ. Mild MR. Mod TR.  Cardiac nuclear imaging test 07/29/2014    No ischemia or prior infarction. EF 68%. Nondiagnostic EKG due to V pacing on pharm stress test.  Low risk.  Cardioversion 8/31/2011    Successful defibrillation threshold testing at 18 joules. Patient also had conversion to atrial ventricular pacing.      CHF (congestive heart failure) (HCC)     Cobalamin deficiency     Dupuytren contracture 02/08/2010    on the right ring finger     Edema     Hypertension     Hypertrophy of prostate with urinary obstruction and other lower urinary tract symptoms (LUTS)     Impotence of organic origin     Intolerance of drug     Intolerant high doses of sotalol due to prolonged QT    Mastodynia     Paroxysmal VT ([facility] [address]     Pure hypercholesterolemia     S/P ablation of atrial fibrillation 05/31/2011    S/P ablation of atrial fibrillation 12/18/2012    Dr. Jones  at [facility] S/P ablation operation for arrhythmia     VT ablation by Dr. Izabel Sheriff near [address] [place] [address] 2/3007    Sick sinus syndrome [facility])       Past Surgical History:   Procedure Laterality Date    HX AFIB ABLATION  12/18/2012    [facility] Garcia by Dr. Stew Tapia  02/08-03/08 bilateral cataract removed    HX CATARACT REMOVAL  2/2008 & 3/2008    bilateral    HX ORTHOPAEDIC  2/8/10    release of Dupuytren's contraction on ring finger of right hand    HX OTHER SURGICAL  6/2000    atrial lead placement    HX OTHER SURGICAL  6/5/2009    Cardioversion    HX OTHER SURGICAL  10/12/2012    cardioversion    HX PACEMAKER  1998    placement    HX PACEMAKER  6/2000    DDD    HX PACEMAKER  3/27/07    removal of pacemaker & placement of dual chamber defib generator and leads    HX TONSIL AND ADENOIDECTOMY       Current Outpatient Prescriptions   Medication Sig Dispense Refill    clobetasol (TEMOVATE) 0.05 % topical cream Apply  to affected area two (2) times a day.  white petrolatum-mineral oil (ABSORBASE) oint Apply  to affected area as needed.  cephALEXin (KEFLEX) 500 mg capsule Take 1 Cap by mouth four (4) times daily for 10 days. 40 Cap 0    diclofenac (VOLTAREN) 1 % gel Apply 4 g to affected area four (4) times daily. 1 Each 0    cyanocobalamin (VITAMIN B12) 1,000 mcg/mL injection INJECT 1 ML INTRAMUSCULARLY ONCE A MONTH 1 mL 0    traMADol (ULTRAM) 50 mg tablet Take 1 - 2 tablets three times per day. 90 Tab 1    chlorzoxazone (PARAFON FORTE) 500 mg tablet 1 tablet three times per day 30 Tab 0    furosemide (LASIX) 40 mg tablet TAKE 1 TABLET DAILY 90 Tab 3    warfarin (COUMADIN) 5 mg tablet 1 tablet daily 30 Tab 6    amLODIPine (NORVASC) 5 mg tablet Take 1 Tab by mouth daily. 90 Tab 3    pantoprazole (PROTONIX) 40 mg tablet 1 tablet each AM 90 Tab 3    loratadine (CLARITIN) 10 mg tablet 1 tablet each evening 90 Tab 0    fluticasone (FLONASE) 50 mcg/actuation nasal spray 2 sprays up each nostril each night 3 Bottle 0    metoprolol tartrate (LOPRESSOR) 50 mg tablet Take 1 Tab by mouth two (2) times a day. 180 Tab 3    eplerenone (INSPRA) 25 mg tablet TAKE 1 TABLET DAILY 90 Tab 2    AZOPT 1 % ophthalmic suspension Administer 1 Drop to both eyes two (2) times a day.   3  TIMOLOL (BETIMOL OP) Apply 1 Drop to eye two (2) times a day.  multivitamin (ONE A DAY) tablet Take 1 Tab by mouth daily.  triamcinolone acetonide (KENALOG) 0.1 % topical cream Apply  to affected area two (2) times daily as needed. use thin layer        Allergies   Allergen Reactions    Zetia [Ezetimibe] Other (comments)     Back pain resolved off Zetia    Lipitor [Atorvastatin] Myalgia    Livalo [Pitavastatin] Other (comments)     Extreme fatigue    5/29/14   PATIENT DENIES    Zocor [Simvastatin] Myalgia     5/29/14 PATIENT DENIES      Family History   Problem Relation Age of Onset    No Known Problems Mother     COPD Father     Hypertension Other      Social History   Substance Use Topics    Smoking status: Never Smoker    Smokeless tobacco: Never Used    Alcohol use Yes      Comment: 2-3 glasses of white wine frequently     Patient Active Problem List   Diagnosis Code    Essential hypertension I10    Atrial fibrillation (HCC) I48.91    Dupuytren contracture M72.0    Medtronic AICD, upgrade from pacer in 2007 due to VT Z95.810    Paroxysmal VT ([address] I47.2    S/P ablation operation for arrhythmia Z98.890, Z86.79    Follow-up exam Z09    Impotence of organic origin N52.9    Hypertrophy of prostate with urinary obstruction and other lower urinary tract symptoms (LUTS) N40.1    Erectile dysfunction N52.9    Palpitations R00.2    Other dysphagia R13.19    Cobalamin deficiency E53.8    Edema R60.9    Pure hypercholesterolemia E78.00    Mastodynia N64.4    AICD at end of battery life Z45.02    CHF (congestive heart failure) (HCC) I50.9         Depression Risk Factor Screening:     PHQ 2 / 9, over the last two weeks 1/19/2017   Little interest or pleasure in doing things Not at all   Feeling down, depressed or hopeless Not at all   Total Score PHQ 2 0     Alcohol Risk Factor Screening: On any occasion during the past 3 months, have you had more than 4 drinks containing alcohol? Yes    Do you average more than 14 drinks per week? Yes    Functional Ability and Level of Safety:     Hearing Loss   normal-to-mild    Activities of Daily Living   Self-care. Requires assistance with: no ADLs    Fall Risk     Fall Risk Assessment, last 12 mths 1/19/2017   Able to walk? Yes   Fall in past 12 months? No     Abuse Screen   Patient is not abused    Review of Systems   Pertinent items are noted in HPI. Physical Examination     No exam data present    Evaluation of Cognitive Function:  Mood/affect:  happy  Appearance: age appropriate and casually dressed  Family member/caregiver input: None present    Visit Vitals    /68 (BP 1 Location: Left arm, BP Patient Position: Sitting)    Pulse 85    Temp 97.7 °F (36.5 °C) (Tympanic)    Resp 16    Ht 6' (1.829 m)    Wt 213 lb (96.6 kg)    SpO2 99%    BMI 28.89 kg/m2     General appearance: alert, cooperative, no distress, appears stated age  Head: Normocephalic, without obvious abnormality, atraumatic  Eyes: conjunctivae/corneas clear. PERRL, EOM's intact. Fundi benign  Ears: normal TM's and external ear canals AU  Nose: Nares normal. Septum midline. Mucosa normal. No drainage or sinus tenderness. Throat: Lips, mucosa, and tongue normal. Teeth and gums normal  Neck: supple, symmetrical, trachea midline, no adenopathy, thyroid: not enlarged, symmetric, no tenderness/mass/nodules, no carotid bruit and no JVD  Back: symmetric, no curvature. ROM normal. No CVA tenderness. Lungs: clear to auscultation bilaterally  Chest wall: no tenderness  Heart: regular rate and rhythm, S1, S2 normal, no murmur, click, rub or gallop  Abdomen: soft, non-tender. Bowel sounds normal. No masses,  no organomegaly  Extremities: edema-pitting in lower extremities, contusion to left shin, tender to touch.  Denied any known trauma  Pulses: 2+ and symmetric  Skin: Skin color, texture, turgor normal. No rashes or lesions  Lymph nodes: Cervical, supraclavicular, and axillary nodes normal.  Neurologic: Grossly normal    Patient Care Team:  Darin Vega MD as PCP - General (Internal Medicine)    Advice/Referrals/Counseling   Education and counseling provided:  Are appropriate based on today's review and evaluation  Pneumococcal Vaccine      Assessment/Plan       ICD-10-CM ICD-9-CM    1. Contusion of left leg, initial encounter S80.12XA 924.5 cephALEXin (KEFLEX) 500 mg capsule      diclofenac (VOLTAREN) 1 % gel   2. Encounter for immunization Z23 V03.89 PNEUMOCOCCAL CONJ VACCINE 13 VALENT IM      ADMIN PNEUMOCOCCAL VACCINE   3. Essential hypertension I10 401.9      current treatment plan is effective, no change in therapy. Francesco Pal is a 78 y.o. male presenting today for Annual Wellness Visit and Leg Swelling (left shin x4 days)  . HPI:  Francesco Pal presents to the office today for hypertension and left shin edema. Patient states he noticed the bruising/edema to the left shin area about 3 days ago. He denied any inciting factors. Review of Systems   Constitutional: Negative for chills and fever. Respiratory: Negative for cough and sputum production. Cardiovascular: Positive for leg swelling. Negative for chest pain and palpitations. Musculoskeletal: Negative for myalgias. Neurological: Negative for headaches. Allergies   Allergen Reactions    Zetia [Ezetimibe] Other (comments)     Back pain resolved off Zetia    Lipitor [Atorvastatin] Myalgia    Livalo [Pitavastatin] Other (comments)     Extreme fatigue    5/29/14   PATIENT DENIES    Zocor [Simvastatin] Myalgia     5/29/14 PATIENT DENIES        Current Outpatient Prescriptions   Medication Sig Dispense Refill    clobetasol (TEMOVATE) 0.05 % topical cream Apply  to affected area two (2) times a day.  white petrolatum-mineral oil (ABSORBASE) oint Apply  to affected area as needed.  cephALEXin (KEFLEX) 500 mg capsule Take 1 Cap by mouth four (4) times daily for 10 days.  40 Cap 0    diclofenac (VOLTAREN) 1 % gel Apply 4 g to affected area four (4) times daily. 1 Each 0    cyanocobalamin (VITAMIN B12) 1,000 mcg/mL injection INJECT 1 ML INTRAMUSCULARLY ONCE A MONTH 1 mL 0    traMADol (ULTRAM) 50 mg tablet Take 1 - 2 tablets three times per day. 90 Tab 1    chlorzoxazone (PARAFON FORTE) 500 mg tablet 1 tablet three times per day 30 Tab 0    furosemide (LASIX) 40 mg tablet TAKE 1 TABLET DAILY 90 Tab 3    warfarin (COUMADIN) 5 mg tablet 1 tablet daily 30 Tab 6    amLODIPine (NORVASC) 5 mg tablet Take 1 Tab by mouth daily. 90 Tab 3    pantoprazole (PROTONIX) 40 mg tablet 1 tablet each AM 90 Tab 3    loratadine (CLARITIN) 10 mg tablet 1 tablet each evening 90 Tab 0    fluticasone (FLONASE) 50 mcg/actuation nasal spray 2 sprays up each nostril each night 3 Bottle 0    metoprolol tartrate (LOPRESSOR) 50 mg tablet Take 1 Tab by mouth two (2) times a day. 180 Tab 3    eplerenone (INSPRA) 25 mg tablet TAKE 1 TABLET DAILY 90 Tab 2    AZOPT 1 % ophthalmic suspension Administer 1 Drop to both eyes two (2) times a day. 3    TIMOLOL (BETIMOL OP) Apply 1 Drop to eye two (2) times a day.  multivitamin (ONE A DAY) tablet Take 1 Tab by mouth daily.  triamcinolone acetonide (KENALOG) 0.1 % topical cream Apply  to affected area two (2) times daily as needed. use thin layer          Past Medical History:   Diagnosis Date    AICD (automatic cardioverter/defibrillator) present     Medtronic  upgrade from pacer in 2007 due to VT    [facility] [address]     Cardiac cath 03/19/2007    LM 25% ostial.  Otherwise patent coronaries. LVEDP 20.  EF 50%. Dyssynch. mid anterior contraction. Pacemaker.  Cardiac echocardiogram 03/20/2014    A-fib. EF 65-70%. No RWMA. No RWMA. Mild conc LVH. Mild RVE. RVSP 40-45 mmHg. Mild LAE.  HERNANDEZ. Mild MR. Mod TR.  Cardiac nuclear imaging test 07/29/2014    No ischemia or prior infarction. EF 68%.   Nondiagnostic EKG due to V pacing on pharm stress test.  Low risk.  Cardioversion 8/31/2011    Successful defibrillation threshold testing at 18 joules. Patient also had conversion to atrial ventricular pacing.  CHF (congestive heart failure) (HCC)     Cobalamin deficiency     Dupuytren contracture 02/08/2010    on the right ring finger     Edema     Hypertension     Hypertrophy of prostate with urinary obstruction and other lower urinary tract symptoms (LUTS)     Impotence of organic origin     Intolerance of drug     Intolerant high doses of sotalol due to prolonged QT    Mastodynia     Paroxysmal VT ([address]     Pure hypercholesterolemia     S/P ablation of atrial fibrillation 05/31/2011    S/P ablation of atrial fibrillation 12/18/2012    Dr. Herlinda Amaya at [facility] S/P ablation operation for arrhythmia     VT ablation by Dr. Conception Call near [address] [place] [address] 2/3007    Sick sinus syndrome [facility])        Past Surgical History:   Procedure Laterality Date    HX AFIB ABLATION  12/18/2012    [facility] by Dr. Arielle Durán  02/08-03/08    bilateral cataract removed    HX CATARACT REMOVAL  2/2008 & 3/2008    bilateral    HX ORTHOPAEDIC  2/8/10    release of Dupuytren's contraction on ring finger of right hand    HX OTHER SURGICAL  6/2000    atrial lead placement    HX OTHER SURGICAL  6/5/2009    Cardioversion    HX OTHER SURGICAL  10/12/2012    cardioversion    HX PACEMAKER  1998    placement    HX PACEMAKER  6/2000    DDD    HX PACEMAKER  3/27/07    removal of pacemaker & placement of dual chamber defib generator and leads    HX TONSIL AND ADENOIDECTOMY         Social History     Social History    Marital status:      Spouse name: N/A    Number of children: N/A    Years of education: N/A     Occupational History    Not on file.      Social History Main Topics    Smoking status: Never Smoker    Smokeless tobacco: Never Used    Alcohol use Yes      Comment: 2-3 glasses of white wine frequently    Drug use: No    Sexual activity: Not Currently     Partners: Female     Other Topics Concern    Not on file     Social History Narrative       Patient does not have an advanced directive on file    Vitals:    04/12/17 0903   BP: 122/68   Pulse: 85   Resp: 16   Temp: 97.7 °F (36.5 °C)   TempSrc: Tympanic   SpO2: 99%   Weight: 213 lb (96.6 kg)   Height: 6' (1.829 m)   PainSc:   0 - No pain       Physical Exam   Constitutional: No distress. Cardiovascular: Normal rate, regular rhythm and normal heart sounds. Implantable AICD     Pulmonary/Chest: Effort normal and breath sounds normal.   Musculoskeletal: He exhibits edema and tenderness. 4-5 cm contusion to the left shin area   Neurological: He is alert. Nursing note and vitals reviewed.       Telephone Anticoag on 03/29/2017   Component Date Value Ref Range Status    INR, External 03/29/2017 2.9  2.0 - 3.0 Final   Telephone Anticoag on 03/22/2017   Component Date Value Ref Range Status    INR, External 03/22/2017 2.4   Final   Telephone Anticoag on 03/15/2017   Component Date Value Ref Range Status    INR, External 03/15/2017 3.2   Final   Telephone Anticoag on 03/01/2017   Component Date Value Ref Range Status    INR, External 03/01/2017 3.5   Final   Telephone Anticoag on 02/17/2017   Component Date Value Ref Range Status    INR, External 02/15/2017 2.0  2.0 - 3.0 Final   Hospital Outpatient Visit on 01/13/2017   Component Date Value Ref Range Status    Sodium 01/13/2017 138  136 - 145 mmol/L Final    Potassium 01/13/2017 4.4  3.5 - 5.5 mmol/L Final    Chloride 01/13/2017 103  100 - 108 mmol/L Final    CO2 01/13/2017 26  21 - 32 mmol/L Final    Anion gap 01/13/2017 9  3.0 - 18 mmol/L Final    Glucose 01/13/2017 89  74 - 99 mg/dL Final    BUN 01/13/2017 15  7.0 - 18 MG/DL Final    Creatinine 01/13/2017 1.01  0.6 - 1.3 MG/DL Final    BUN/Creatinine ratio 01/13/2017 15  12 - 20   Final    GFR est AA 01/13/2017 >60  >60 ml/min/1.73m2 Final    GFR est non-AA 01/13/2017 >60  >60 ml/min/1.73m2 Final    Comment: (NOTE)  Estimated GFR is calculated using the Modification of Diet in Renal   Disease (MDRD) Study equation, reported for both  Americans   (GFRAA) and non- Americans (GFRNA), and normalized to 1.73m2   body surface area. The physician must decide which value applies to   the patient. The MDRD study equation should only be used in   individuals age 25 or older. It has not been validated for the   following: pregnant women, patients with serious comorbid conditions,   or on certain medications, or persons with extremes of body size,   muscle mass, or nutritional status.  Calcium 01/13/2017 8.9  8.5 - 10.1 MG/DL Final    Bilirubin, total 01/13/2017 0.4  0.2 - 1.0 MG/DL Final    ALT (SGPT) 01/13/2017 22  16 - 61 U/L Final    AST (SGOT) 01/13/2017 19  15 - 37 U/L Final    Alk. phosphatase 01/13/2017 78  45 - 117 U/L Final    Protein, total 01/13/2017 7.3  6.4 - 8.2 g/dL Final    Albumin 01/13/2017 3.6  3.4 - 5.0 g/dL Final    Globulin 01/13/2017 3.7  2.0 - 4.0 g/dL Final    A-G Ratio 01/13/2017 1.0  0.8 - 1.7   Final    LIPID PROFILE 01/13/2017        Final    Cholesterol, total 01/13/2017 245* <200 MG/DL Final    Triglyceride 01/13/2017 153* <150 MG/DL Final    Comment: The drugs N-acetylcysteine (NAC) and  Metamiszole have been found to cause falsely  low results in this chemical assay. Please  be sure to submit blood samples obtained  BEFORE administration of either of these  drugs to assure correct results.       HDL Cholesterol 01/13/2017 50  40 - 60 MG/DL Final    LDL, calculated 01/13/2017 164.4* 0 - 100 MG/DL Final    VLDL, calculated 01/13/2017 30.6  MG/DL Final    CHOL/HDL Ratio 01/13/2017 4.9  0 - 5.0   Final    WBC 01/13/2017 7.2  4.6 - 13.2 K/uL Final    RBC 01/13/2017 4.78  4.70 - 5.50 M/uL Final    HGB 01/13/2017 15.9  13.0 - 16.0 g/dL Final    HCT 01/13/2017 46.8  36.0 - 48.0 % Final    MCV 01/13/2017 97.9* 74.0 - 97.0 FL Final    MCH 01/13/2017 33.3  24.0 - 34.0 PG Final    MCHC 01/13/2017 34.0  31.0 - 37.0 g/dL Final    RDW 01/13/2017 12.9  11.6 - 14.5 % Final    PLATELET 69/14/2197 680  135 - 420 K/uL Final    MPV 01/13/2017 11.6  9.2 - 11.8 FL Final    NEUTROPHILS 01/13/2017 62  40 - 73 % Final    LYMPHOCYTES 01/13/2017 24  21 - 52 % Final    MONOCYTES 01/13/2017 12* 3 - 10 % Final    EOSINOPHILS 01/13/2017 2  0 - 5 % Final    BASOPHILS 01/13/2017 0  0 - 2 % Final    ABS. NEUTROPHILS 01/13/2017 4.4  1.8 - 8.0 K/UL Final    ABS. LYMPHOCYTES 01/13/2017 1.7  0.9 - 3.6 K/UL Final    ABS. MONOCYTES 01/13/2017 0.9  0.05 - 1.2 K/UL Final    ABS. EOSINOPHILS 01/13/2017 0.2  0.0 - 0.4 K/UL Final    ABS. BASOPHILS 01/13/2017 0.0  0.0 - 0.06 K/UL Final    DF 01/13/2017 AUTOMATED    Final    Prostate Specific Ag 01/13/2017 0.5  0.0 - 4.0 ng/mL Final       .No results found for any visits on 04/12/17. Assessment / Plan:      ICD-10-CM ICD-9-CM    1. Contusion of left leg, initial encounter S80.12XA 924.5 cephALEXin (KEFLEX) 500 mg capsule      diclofenac (VOLTAREN) 1 % gel   2. Encounter for immunization Z23 V03.89 PNEUMOCOCCAL CONJ VACCINE 13 VALENT IM      ADMIN PNEUMOCOCCAL VACCINE   3. Essential hypertension I10 401.9        B/p controlled  Keflex rx  Voltaren rx  Prevnar vaccine today  F/u if no improvement in 1 week    Follow-up Disposition:  Return if symptoms worsen or fail to improve. I asked the patient if he  had any questions and answered his  questions.   The patient stated that he understands the treatment plan and agrees with the treatment plan

## 2017-04-12 NOTE — PATIENT INSTRUCTIONS
Vaccine Information Statement     Pneumococcal Conjugate Vaccine (PCV13): What You Need to Know    Many Vaccine Information Statements are available in Urdu and other languages. See www.immunize.org/vis. Hojas de información Sobre Vacunas están disponibles en español y en muchos otros idiomas. Visite www.immunize.org/vis. 1. Why get vaccinated? Vaccination can protect both children and adults from pneumococcal disease. Pneumococcal disease is caused by bacteria that can spread from person to person through close contact. It can cause ear infections, and it can also lead to more serious infections of the:   Lungs (pneumonia),   Blood (bacteremia), and   Covering of the brain and spinal cord (meningitis). Pneumococcal pneumonia is most common among adults. Pneumococcal meningitis can cause deafness and brain damage, and it kills about 1 child in 10 who get it. Anyone can get pneumococcal disease, but children under 3years of age and adults 72 years and older, people with certain medical conditions, and cigarette smokers are at the highest risk. Before there was a vaccine, the [facility] saw:   more than 700 cases of meningitis,   about 13,000 blood infections,   about 5 million ear infections, and   about 200 deaths  in children under 5 each year from pneumococcal disease. Since vaccine became available, severe pneumococcal disease in these children has fallen by 88%. About 18,000 older adults die of pneumococcal disease each year in the [place]. Treatment of pneumococcal infections with penicillin and other drugs is not as effective as it used to be, because some strains of the disease have become resistant to these drugs. This makes prevention of the disease, through vaccination, even more important. 2. PCV13 vaccine    Pneumococcal conjugate vaccine (called PCV13) protects against 13 types of pneumococcal bacteria.       PCV13 is routinely given to children at 2, 4, 6, and 1515 months of age. It is also recommended for children and adults 3to 59years of age with certain health conditions, and for all adults 72years of age and older. Your doctor can give you details. 3. Some people should not get this vaccine    Anyone who has ever had a life-threatening allergic reaction to a dose of this vaccine, to an earlier pneumococcal vaccine called PCV7, or to any vaccine containing diphtheria toxoid (for example, DTaP), should not get PCV13. Anyone with a severe allergy to any component of PCV13 should not get the vaccine. Tell your doctor if the person being vaccinated has any severe allergies. If the person scheduled for vaccination is not feeling well, your healthcare provider might decide to reschedule the shot on another day. 4. Risks of a vaccine reaction    With any medicine, including vaccines, there is a chance of reactions. These are usually mild and go away on their own, but serious reactions are also possible. Problems reported following PCV13 varied by age and dose in the series. The most common problems reported among children were:    About half became drowsy after the shot, had a temporary loss of appetite, or had redness or tenderness where the shot was given.  About 1 out of 3 had swelling where the shot was given.  About 1 out of 3 had a mild fever, and about 1 in 20 had a fever over 102.2°F.   Up to about 8 out of 10 became fussy or irritable. Adults have reported pain, redness, and swelling where the shot was given; also mild fever, fatigue, headache, chills, or muscle pain. [facility] children who get PCV13 along with inactivated flu vaccine at the same time may be at increased risk for seizures caused by fever. Ask your doctor for more information. Problems that could happen after any vaccine:     People sometimes faint after a medical procedure, including vaccination.  Sitting or lying down for about 15 minutes can help prevent fainting, and injuries caused by a fall. Tell your doctor if you feel dizzy, or have vision changes or ringing in the ears.  Some older children and adults get severe pain in the shoulder and have difficulty moving the arm where a shot was given. This happens very rarely.  Any medication can cause a severe allergic reaction. Such reactions from a vaccine are very rare, estimated at about 1 in a million doses, and would happen within a few minutes to a few hours after the vaccination. As with any medicine, there is a very small chance of a vaccine causing a serious injury or death. The safety of vaccines is always being monitored. For more information, visit: www.cdc.gov/vaccinesafety/     5. What if there is a serious reaction? What should I look for?  Look for anything that concerns you, such as signs of a severe allergic reaction, very high fever, or unusual behavior. Signs of a severe allergic reaction can include hives, swelling of the face and throat, difficulty breathing, a fast heartbeat, dizziness, and weakness - usually within a few minutes to a few hours after the vaccination. What should I do?  If you think it is a severe allergic reaction or other emergency that cant wait, call 9-1-1 or get the person to the nearest hospital. Otherwise, call your doctor. Reactions should be reported to the Vaccine Adverse Event Reporting System (VAERS). Your doctor should file this report, or you can do it yourself through the VAERS web site at www.vaers. hhs.gov, or by calling 9-948.562.3875. VAERS does not give medical advice. 6. The National Vaccine Injury Compensation Program    The [facility] Vaccine Injury Compensation Program (VICP) is a federal program that was created to compensate people who may have been injured by certain vaccines.     Persons who believe they may have been injured by a vaccine can learn about the program and about filing a claim by calling 1-511.649.6494 or visiting the [facility] [address] website at www.[facility].gov/vaccinecompensation. There is a time limit to file a claim for compensation. 7. How can I learn more?  Ask your healthcare provider. He or she can give you the vaccine package insert or suggest other sources of information.  Call your local or state health department.  Contact the Centers for Disease Control and Prevention (CDC):  - Call 1-435.842.8380 (0-809-REN-INFO) or  - Visit CDCs website at www.cdc.gov/vaccines    Vaccine Information Statement   PCV13 Vaccine   11/5/2015   42 PORFIRIO Sommers [id number]    Department of Health and Human Services  Centers for Disease Control and Prevention    Office Use Only

## 2017-04-12 NOTE — PROGRESS NOTES
Patient presents for   Chief Complaint   Patient presents with    Annual Wellness Visit    Leg Swelling     left shin x4 days     Fall risk assessment was not indicated. Depression screening was not indicated Follow up questions were not indicated. 1. Have you been to the ER, urgent care clinic since your last visit? Hospitalized since your last visit? No    2. Have you seen or consulted any other health care providers outside of the [address] [place] [address] since your last visit? Include any pap smears or colon screening. Kelin Dutton denies any symptoms , reactions or allergies that would exclude them from being immunized today. Prevnar 13 administered per order. Risks and adverse reactions were discussed and the VIS was given to them. All questions were addressed. He was observed for 15 min post injection. There were no reactions observed. Patient left office ambulatory.

## 2017-04-12 NOTE — MR AVS SNAPSHOT
Visit Information Date & Time Provider Department Dept. Phone Encounter #  
 4/12/2017  8:45 AM Yudy Kerns NP [facility] OF [place] [phone] [id number] Your Appointments 4/12/2017  3:20 PM  
CARELINK with Pacer [facility] ([facility]) Appt Note: 3 mth carelink [place] [phone]  
[phone] [address] [address]  
  
    
 7/10/2017 11:20 AM  
Follow Up with Javed Peterson DO [facility] ([facility]) Appt Note: Return in about 8 months [place] [phone]  
[phone] [address] [facility] [address] 7/13/2017 10:30 AM  
Follow Up with Kiersten Kay MD  
[address] [place] [address]) Appt Note: 6mo  
 [address], [address]  
  
   
 [address], [address] [facility] [address] Upcoming Health Maintenance Date Due FOBT Q 1 YEAR AGE 50-75 3/16/1992 ZOSTER VACCINE AGE 60> 3/16/2002 GLAUCOMA SCREENING Q2Y 3/16/2007 MEDICARE YEARLY EXAM 3/16/2007 Pneumococcal 65+ Low/Medium Risk (2 of 2 - PCV13) 10/30/2010 DTaP/Tdap/Td series (2 - Td) 4/12/2027 Allergies as of 4/12/2017  Review Complete On: 4/12/2017 By: Soledad Frazier LPN Severity Noted Reaction Type Reactions Zetia [Ezetimibe] Medium 10/25/2016   Side Effect Other (comments) Back pain resolved off Zetia Lipitor [Atorvastatin]  10/12/2015    Myalgia Livalo [Pitavastatin]  09/17/2013    Other (comments) Extreme fatigue 5/29/14   PATIENT DENIES Zocor [Simvastatin]  09/17/2013    Myalgia 5/29/14 PATIENT DENIES Current Immunizations  Never Reviewed Name Date Pneumococcal Conjugate (PCV-13)  Incomplete Pneumococcal Polysaccharide (PPSV-23) 10/30/2009 Not reviewed this visit You Were Diagnosed With   
  
 Codes Comments Contusion of left leg, initial encounter    -  Primary ICD-10-CM: H35.21MT ICD-9-CM: 924.5 Encounter for immunization     ICD-10-CM: B23 ICD-9-CM: V03.89 Essential hypertension     ICD-10-CM: I10 
ICD-9-CM: 401.9 Vitals BP Pulse Temp Resp Height(growth percentile) 122/68 (BP 1 Location: Left arm, BP Patient Position: Sitting) 85 97.7 °F (36.5 °C) (Tympanic) 16 6' (1.829 m) Weight(growth percentile) SpO2 BMI Smoking Status 213 lb (96.6 kg) 99% 28.89 kg/m2 Never Smoker BMI and BSA Data Body Mass Index Body Surface Area  
 28.89 kg/m 2 2.22 m 2 Preferred Pharmacy Pharmacy Name [id number] [address] [phone] Your Updated Medication List  
  
   
This list is accurate as of: 4/12/17 10:20 AM.  Always use your most recent med list.  
  
  
  
  
 Merlin Linger Generic drug:  white petrolatum-mineral oil Apply  to affected area as needed. amLODIPine 5 mg tablet Commonly known as:  Corinne Juice Take 1 Tab by mouth daily. AZOPT 1 % ophthalmic suspension Generic drug:  brinzolamide Administer 1 Drop to both eyes two (2) times a day. BETIMOL OP Apply 1 Drop to eye two (2) times a day. cephALEXin 500 mg capsule Commonly known as:  Uma Dinning Take 1 Cap by mouth four (4) times daily for 10 days. chlorzoxazone 500 mg tablet Commonly known as:  PARAFON FORTE  
1 tablet three times per day  
  
 clobetasol 0.05 % topical cream  
Commonly known as:  Surprise Diones Apply  to affected area two (2) times a day. cyanocobalamin 1,000 mcg/mL injection Commonly known as:  VITAMIN B12 INJECT 1 ML INTRAMUSCULARLY ONCE A MONTH  
  
 diclofenac 1 % Gel Commonly known as:  VOLTAREN Apply 4 g to affected area four (4) times daily. eplerenone 25 mg tablet Commonly known as:  Fredda Ismael TAKE 1 TABLET DAILY  
  
 fluticasone 50 mcg/actuation nasal spray Commonly known as:  FLONASE  
2 sprays up each nostril each night  
  
 furosemide 40 mg tablet Commonly known as:  LASIX TAKE 1 TABLET DAILY  
  
 loratadine 10 mg tablet Commonly known as:  CLARITIN  
1 tablet each evening  
  
 metoprolol tartrate 50 mg tablet Commonly known as:  LOPRESSOR Take 1 Tab by mouth two (2) times a day. multivitamin tablet Commonly known as:  ONE A DAY Take 1 Tab by mouth daily. pantoprazole 40 mg tablet Commonly known as:  PROTONIX  
1 tablet each AM  
  
 traMADol 50 mg tablet Commonly known as:  ULTRAM  
Take 1 - 2 tablets three times per day. triamcinolone acetonide 0.1 % topical cream  
Commonly known as:  KENALOG Apply  to affected area two (2) times daily as needed. use thin layer  
  
 warfarin 5 mg tablet Commonly known as:  COUMADIN  
1 tablet daily Prescriptions Sent to Pharmacy Refills  
 cephALEXin (KEFLEX) 500 mg capsule 0 Sig: Take 1 Cap by mouth four (4) times daily for 10 days. Class: Normal  
 Pharmacy: [address], [address] Ph #: [phone] Route: Oral  
 diclofenac (VOLTAREN) 1 % gel 0 Sig: Apply 4 g to affected area four (4) times daily. Class: Normal  
 Pharmacy: [address], [address] Ph #: [phone] Route: Topical  
  
We Performed the Following ADMIN PNEUMOCOCCAL VACCINE [ [facility]] PNEUMOCOCCAL CONJ VACCINE 13 VALENT IM [id number] CPT(R)] Patient Instructions Vaccine Information Statement Pneumococcal Conjugate Vaccine (PCV13): What You Need to Know Many Vaccine Information Statements are available in Setswana and other languages. See www.immunize.org/vis.  
Hojas de información Sobre Vacunas están disponibles en español y en karan otros idiomas. Visite www.immunize.org/vis. 1. Why get vaccinated? Vaccination can protect both children and adults from pneumococcal disease. Pneumococcal disease is caused by bacteria that can spread from person to person through close contact. It can cause ear infections, and it can also lead to more serious infections of the: 
 Lungs (pneumonia),  Blood (bacteremia), and 
 Covering of the brain and spinal cord (meningitis). Pneumococcal pneumonia is most common among adults. Pneumococcal meningitis can cause deafness and brain damage, and it kills about 1 child in 10 who get it. Anyone can get pneumococcal disease, but children under 3years of age and adults 72 years and older, people with certain medical conditions, and cigarette smokers are at the highest risk. Before there was a vaccine, the [facility] saw: 
 more than 700 cases of meningitis, 
 about 13,000 blood infections, 
 about 5 million ear infections, and 
 about 200 deaths 
in children under 5 each year from pneumococcal disease. Since vaccine became available, severe pneumococcal disease in these children has fallen by 88%. About 18,000 older adults die of pneumococcal disease each year in the [place]. Treatment of pneumococcal infections with penicillin and other drugs is not as effective as it used to be, because some strains of the disease have become resistant to these drugs. This makes prevention of the disease, through vaccination, even more important. 2. PCV13 vaccine Pneumococcal conjugate vaccine (called PCV13) protects against 13 types of pneumococcal bacteria. PCV13 is routinely given to children at 2, 4, 6, and 1515 months of age. It is also recommended for children and adults 3to 59years of age with certain health conditions, and for all adults 72years of age and older. Your doctor can give you details. 3. Some people should not get this vaccine Anyone who has ever had a life-threatening allergic reaction to a dose of this vaccine, to an earlier pneumococcal vaccine called PCV7, or to any vaccine containing diphtheria toxoid (for example, DTaP), should not get PCV13. Anyone with a severe allergy to any component of PCV13 should not get the vaccine. Tell your doctor if the person being vaccinated has any severe allergies. If the person scheduled for vaccination is not feeling well, your healthcare provider might decide to reschedule the shot on another day. 4. Risks of a vaccine reaction With any medicine, including vaccines, there is a chance of reactions. These are usually mild and go away on their own, but serious reactions are also possible. Problems reported following PCV13 varied by age and dose in the series. The most common problems reported among children were:  About half became drowsy after the shot, had a temporary loss of appetite, or had redness or tenderness where the shot was given.  About 1 out of 3 had swelling where the shot was given.  About 1 out of 3 had a mild fever, and about 1 in 20 had a fever over 102.2°F. 
 Up to about 8 out of 10 became fussy or irritable. Adults have reported pain, redness, and swelling where the shot was given; also mild fever, fatigue, headache, chills, or muscle pain. Jerry Curet children who get PCV13 along with inactivated flu vaccine at the same time may be at increased risk for seizures caused by fever. Ask your doctor for more information. Problems that could happen after any vaccine:  People sometimes faint after a medical procedure, including vaccination. Sitting or lying down for about 15 minutes can help prevent fainting, and injuries caused by a fall. Tell your doctor if you feel dizzy, or have vision changes or ringing in the ears.  
 
 Some older children and adults get severe pain in the shoulder and have difficulty moving the arm where a shot was given. This happens very rarely.  Any medication can cause a severe allergic reaction. Such reactions from a vaccine are very rare, estimated at about 1 in a million doses, and would happen within a few minutes to a few hours after the vaccination. As with any medicine, there is a very small chance of a vaccine causing a serious injury or death. The safety of vaccines is always being monitored. For more information, visit: www.cdc.gov/vaccinesafety/  
 
5. What if there is a serious reaction? What should I look for?  Look for anything that concerns you, such as signs of a severe allergic reaction, very high fever, or unusual behavior. Signs of a severe allergic reaction can include hives, swelling of the face and throat, difficulty breathing, a fast heartbeat, dizziness, and weakness  usually within a few minutes to a few hours after the vaccination. What should I do?  If you think it is a severe allergic reaction or other emergency that cant wait, call 9-1-1 or get the person to the nearest hospital. Otherwise, call your doctor. Reactions should be reported to the Vaccine Adverse Event Reporting System (VAERS). Your doctor should file this report, or you can do it yourself through the VAERS web site at www.vaers. [facility].gov, or by calling 7-226.867.1548. VAERS does not give medical advice. 6. The National Vaccine Injury Compensation Program 
 
The [facility] Vaccine Injury Compensation Program (VICP) is a federal program that was created to compensate people who may have been injured by certain vaccines. Persons who believe they may have been injured by a vaccine can learn about the program and about filing a claim by calling 6-667.549.5329 or visiting the [employer] website at www.[facility].gov/vaccinecompensation. There is a time limit to file a claim for compensation. 7. How can I learn more?  Ask your healthcare provider. He or she can give you the vaccine package insert or suggest other sources of information.  Call your local or state health department.  Contact the Centers for Disease Control and Prevention (CDC): 
- Call 7-736.876.8558 (1-800-CDC-INFO) or 
- Visit CDCs website at www.cdc.gov/vaccines Vaccine Information Statement PCV13 Vaccine 11/5/2015  
[address] Sommers [id number] [facility] [employer] Centers for Disease Control and Prevention Office Use Only Please provide this summary of care documentation to your next provider. Your primary care clinician is listed as Archana Galeas. If you have any questions after today's visit, please call [phone].

## 2017-04-14 RX ORDER — EPLERENONE 25 MG/1
TABLET, FILM COATED ORAL
Qty: 90 TAB | Refills: 3 | Status: SHIPPED | OUTPATIENT
Start: 2017-04-14 | End: 2017-07-13 | Stop reason: ALTCHOICE

## 2017-04-26 ENCOUNTER — TELEPHONE ANTICOAG (OUTPATIENT)
Dept: INTERNAL MEDICINE CLINIC | Age: 77
End: 2017-04-26

## 2017-04-26 DIAGNOSIS — I48.91 ATRIAL FIBRILLATION, UNSPECIFIED TYPE (HCC): ICD-10-CM

## 2017-04-26 LAB — INR, EXTERNAL: 2.3 (ref 2–3)

## 2017-04-26 NOTE — PROGRESS NOTES
I have personally seen and evaluated the device findings. Interrogation reviewed and I agree with assessment.     Pat Loza

## 2017-04-28 ENCOUNTER — OFFICE VISIT (OUTPATIENT)
Dept: INTERNAL MEDICINE CLINIC | Age: 77
End: 2017-04-28

## 2017-04-28 VITALS
BODY MASS INDEX: 28.85 KG/M2 | HEART RATE: 70 BPM | WEIGHT: 213 LBS | DIASTOLIC BLOOD PRESSURE: 68 MMHG | OXYGEN SATURATION: 98 % | SYSTOLIC BLOOD PRESSURE: 112 MMHG | TEMPERATURE: 99.2 F | HEIGHT: 72 IN

## 2017-04-28 DIAGNOSIS — J40 BRONCHITIS: ICD-10-CM

## 2017-04-28 DIAGNOSIS — I48.91 ATRIAL FIBRILLATION, UNSPECIFIED TYPE (HCC): Primary | ICD-10-CM

## 2017-04-28 DIAGNOSIS — I10 ESSENTIAL HYPERTENSION: ICD-10-CM

## 2017-04-28 RX ORDER — AMOXICILLIN AND CLAVULANATE POTASSIUM 875; 125 MG/1; MG/1
TABLET, FILM COATED ORAL
Qty: 14 TAB | Refills: 0 | Status: SHIPPED | OUTPATIENT
Start: 2017-04-28 | End: 2017-05-02 | Stop reason: SDUPTHER

## 2017-04-28 RX ORDER — AMLODIPINE BESYLATE 5 MG/1
5 TABLET ORAL DAILY
Qty: 90 TAB | Refills: 3 | Status: SHIPPED | OUTPATIENT
Start: 2017-04-28 | End: 2017-07-13

## 2017-04-28 NOTE — PROGRESS NOTES
1. Have you been to the ER, urgent care clinic since your last visit? Hospitalized since your last visit? No    2. Have you seen or consulted any other health care providers outside of the [address] [place] [address] since your last visit? Include any pap smears or colon screening.  No

## 2017-04-28 NOTE — PATIENT INSTRUCTIONS
Health Maintenance Due   Topic Date Due    Shingles Vaccine  03/16/2002    Glaucoma Screening   03/16/2007

## 2017-04-29 NOTE — PROGRESS NOTES
The patient presents to the office today with the chief complaint of chest congestion    HPI    The patient complains of chest congestion with cough. The cough is non productive. The patient feels that the phlegm gets stuck in his throat. The patient denies fever. The patient denies dyspnea      Review of Systems   Constitutional: Negative for fever. Respiratory: Positive for cough. Negative for shortness of breath. Cardiovascular: Negative for chest pain and leg swelling. Allergies   Allergen Reactions    Zetia [Ezetimibe] Other (comments)     Back pain resolved off Zetia    Lipitor [Atorvastatin] Myalgia    Livalo [Pitavastatin] Other (comments)     Extreme fatigue    5/29/14   PATIENT DENIES    Zocor [Simvastatin] Myalgia     5/29/14 PATIENT DENIES        Current Outpatient Prescriptions   Medication Sig Dispense Refill    amoxicillin-clavulanate (AUGMENTIN) 875-125 mg per tablet 1 tablet twice per day with food 14 Tab 0    amLODIPine (NORVASC) 5 mg tablet Take 1 Tab by mouth daily. 90 Tab 3    eplerenone (INSPRA) 25 mg tablet TAKE 1 TABLET DAILY 90 Tab 3    clobetasol (TEMOVATE) 0.05 % topical cream Apply  to affected area two (2) times a day.  white petrolatum-mineral oil (ABSORBASE) oint Apply  to affected area as needed.  diclofenac (VOLTAREN) 1 % gel Apply 4 g to affected area four (4) times daily. 1 Each 0    cyanocobalamin (VITAMIN B12) 1,000 mcg/mL injection INJECT 1 ML INTRAMUSCULARLY ONCE A MONTH 1 mL 0    traMADol (ULTRAM) 50 mg tablet Take 1 - 2 tablets three times per day.  90 Tab 1    furosemide (LASIX) 40 mg tablet TAKE 1 TABLET DAILY 90 Tab 3    warfarin (COUMADIN) 5 mg tablet 1 tablet daily 30 Tab 6    pantoprazole (PROTONIX) 40 mg tablet 1 tablet each AM 90 Tab 3    loratadine (CLARITIN) 10 mg tablet 1 tablet each evening 90 Tab 0    fluticasone (FLONASE) 50 mcg/actuation nasal spray 2 sprays up each nostril each night 3 Bottle 0    metoprolol tartrate (LOPRESSOR) 50 mg tablet Take 1 Tab by mouth two (2) times a day. 180 Tab 3    AZOPT 1 % ophthalmic suspension Administer 1 Drop to both eyes two (2) times a day. 3    triamcinolone acetonide (KENALOG) 0.1 % topical cream Apply  to affected area two (2) times daily as needed. use thin layer       TIMOLOL (BETIMOL OP) Apply 1 Drop to eye two (2) times a day.  multivitamin (ONE A DAY) tablet Take 1 Tab by mouth daily. Past Medical History:   Diagnosis Date    AICD (automatic cardioverter/defibrillator) present     Medtronic  upgrade from pacer in 2007 due to VT    Atrial fibrillation ([facility]     Cardiac cath 03/19/2007    LM 25% ostial.  Otherwise patent coronaries. LVEDP 20.  EF 50%. Dyssynch. mid anterior contraction. Pacemaker.  Cardiac echocardiogram 03/20/2014    A-fib. EF 65-70%. No RWMA. No RWMA. Mild conc LVH. Mild RVE. RVSP 40-45 mmHg. Mild LAE.  HERNANDEZ. Mild MR. Mod TR.  Cardiac nuclear imaging test 07/29/2014    No ischemia or prior infarction. EF 68%. Nondiagnostic EKG due to V pacing on pharm stress test.  Low risk.  Cardioversion 8/31/2011    Successful defibrillation threshold testing at 18 joules. Patient also had conversion to atrial ventricular pacing.      CHF (congestive heart failure) (HCC)     Cobalamin deficiency     Dupuytren contracture 02/08/2010    on the right ring finger     Edema     Hypertension     Hypertrophy of prostate with urinary obstruction and other lower urinary tract symptoms (LUTS)     Impotence of organic origin     Intolerance of drug     Intolerant high doses of sotalol due to prolonged QT    Mastodynia     Paroxysmal VT ([facility]     Pure hypercholesterolemia     S/P ablation of atrial fibrillation 05/31/2011    S/P ablation of atrial fibrillation 12/18/2012    Dr. Jodee Ribeiro at [facility] S/P ablation operation for arrhythmia     VT ablation by Dr. Ilsa Kovacs near [place] 4/0044    Sick sinus syndrome ([facility] Past Surgical History:   Procedure Laterality Date    HX AFIB ABLATION  12/18/2012    Elinro Gave by Dr. Nicho Gann  02/08-03/08    bilateral cataract removed    HX CATARACT REMOVAL  2/2008 & 3/2008    bilateral    HX ORTHOPAEDIC  2/8/10    release of Dupuytren's contraction on ring finger of right hand    HX OTHER SURGICAL  6/2000    atrial lead placement    HX OTHER SURGICAL  6/5/2009    Cardioversion    HX OTHER SURGICAL  10/12/2012    cardioversion    HX PACEMAKER  1998    placement    HX PACEMAKER  6/2000    DDD    HX PACEMAKER  3/27/07    removal of pacemaker & placement of dual chamber defib generator and leads    HX TONSIL AND ADENOIDECTOMY         Social History     Social History    Marital status:      Spouse name: N/A    Number of children: N/A    Years of education: N/A     Occupational History    Not on file. Social History Main Topics    Smoking status: Never Smoker    Smokeless tobacco: Never Used    Alcohol use Yes      Comment: 2-3 glasses of white wine frequently    Drug use: No    Sexual activity: Not Currently     Partners: Female     Other Topics Concern    Not on file     Social History Narrative       Patient does have an advanced directive on file    Visit Vitals    /68 (BP 1 Location: Left arm, BP Patient Position: Sitting)    Pulse 70    Temp 99.2 °F (37.3 °C) (Tympanic)    Ht 6' (1.829 m)    Wt 213 lb (96.6 kg)    SpO2 98%    BMI 28.89 kg/m2       Physical Exam   HENT:   Ears:  Normal TM bilaterally  Oral pharynx:  Normal   Cardiovascular: Normal rate and regular rhythm. Exam reveals no gallop. No murmur heard. Pulmonary/Chest: He has no wheezes. He has no rales. Abdominal: Soft. Bowel sounds are normal.   Musculoskeletal: He exhibits no edema.        BMI:  BMI is high but it was not addressed during this visit due to an acute illness      Telephone Anticoag on 04/26/2017   Component Date Value Ref Range Status  INR, External 04/26/2017 2.3  2.0 - 3.0 Final   Telephone Anticoag on 03/29/2017   Component Date Value Ref Range Status    INR, External 03/29/2017 2.9  2.0 - 3.0 Final   Telephone Anticoag on 03/22/2017   Component Date Value Ref Range Status    INR, External 03/22/2017 2.4   Final   Telephone Anticoag on 03/15/2017   Component Date Value Ref Range Status    INR, External 03/15/2017 3.2   Final   Telephone Anticoag on 03/01/2017   Component Date Value Ref Range Status    INR, External 03/01/2017 3.5   Final   Telephone Anticoag on 02/17/2017   Component Date Value Ref Range Status    INR, External 02/15/2017 2.0  2.0 - 3.0 Final       .No results found for any visits on 04/28/17. Assessment / Plan      ICD-10-CM ICD-9-CM    1. Atrial fibrillation, unspecified type ([facility] I48.91 427.31    2. Essential hypertension I10 401.9    3. Bronchitis J40 490      Augmentin  Robitussin DM    Follow-up Disposition:  Return in about 6 months (around 10/28/2017). I asked Randee Gaitan if he has any questions and I answered the questions.   Randee Gaitan states that he understands the treatment plan and agrees with the treatment plan

## 2017-05-02 ENCOUNTER — OFFICE VISIT (OUTPATIENT)
Dept: INTERNAL MEDICINE CLINIC | Age: 77
End: 2017-05-02

## 2017-05-02 ENCOUNTER — HOSPITAL ENCOUNTER (OUTPATIENT)
Dept: GENERAL RADIOLOGY | Age: 77
Discharge: HOME | End: 2017-05-02
Payer: MEDICARE

## 2017-05-02 VITALS
RESPIRATION RATE: 16 BRPM | TEMPERATURE: 98.1 F | DIASTOLIC BLOOD PRESSURE: 70 MMHG | BODY MASS INDEX: 28.85 KG/M2 | HEART RATE: 78 BPM | OXYGEN SATURATION: 97 % | HEIGHT: 72 IN | SYSTOLIC BLOOD PRESSURE: 120 MMHG | WEIGHT: 213 LBS

## 2017-05-02 DIAGNOSIS — J40 BRONCHITIS: ICD-10-CM

## 2017-05-02 DIAGNOSIS — I10 ESSENTIAL HYPERTENSION: ICD-10-CM

## 2017-05-02 DIAGNOSIS — I48.91 ATRIAL FIBRILLATION, UNSPECIFIED TYPE (HCC): Primary | ICD-10-CM

## 2017-05-02 PROCEDURE — 71020 XR CHEST PA LAT: CPT

## 2017-05-02 RX ORDER — AMOXICILLIN AND CLAVULANATE POTASSIUM 875; 125 MG/1; MG/1
TABLET, FILM COATED ORAL
Qty: 10 TAB | Refills: 0 | Status: SHIPPED | OUTPATIENT
Start: 2017-05-02 | End: 2017-06-07 | Stop reason: ALTCHOICE

## 2017-05-02 RX ORDER — PREDNISONE 20 MG/1
TABLET ORAL
Qty: 20 TAB | Refills: 0 | Status: SHIPPED | OUTPATIENT
Start: 2017-05-02 | End: 2017-07-13 | Stop reason: ALTCHOICE

## 2017-05-02 RX ORDER — CODEINE PHOSPHATE AND GUAIFENESIN 10; 100 MG/5ML; MG/5ML
SOLUTION ORAL
Qty: 240 ML | Refills: 1 | Status: SHIPPED | OUTPATIENT
Start: 2017-05-02 | End: 2017-06-07 | Stop reason: ALTCHOICE

## 2017-05-02 RX ORDER — PREDNISONE 20 MG/1
TABLET ORAL
Qty: 20 TAB | Refills: 0 | Status: SHIPPED | OUTPATIENT
Start: 2017-05-02 | End: 2017-05-02 | Stop reason: SDUPTHER

## 2017-05-02 RX ORDER — AMOXICILLIN AND CLAVULANATE POTASSIUM 875; 125 MG/1; MG/1
TABLET, FILM COATED ORAL
Qty: 10 TAB | Refills: 0 | Status: SHIPPED | OUTPATIENT
Start: 2017-05-02 | End: 2017-05-02 | Stop reason: SDUPTHER

## 2017-05-02 RX ORDER — CYANOCOBALAMIN 1000 UG/ML
INJECTION, SOLUTION INTRAMUSCULAR; SUBCUTANEOUS
Qty: 1 ML | Refills: 0 | Status: SHIPPED | OUTPATIENT
Start: 2017-05-02 | End: 2017-06-14 | Stop reason: SDUPTHER

## 2017-05-02 NOTE — PROGRESS NOTES
1. Have you been to the ER, urgent care clinic since your last visit? Hospitalized since your last visit? No    2. Have you seen or consulted any other health care providers outside of the [address] [place] [address] since your last visit? Include any pap smears or colon screening.  No

## 2017-05-03 ENCOUNTER — TELEPHONE (OUTPATIENT)
Dept: INTERNAL MEDICINE CLINIC | Age: 77
End: 2017-05-03

## 2017-05-03 LAB — INR, EXTERNAL: 2.8 (ref 2–3)

## 2017-05-03 NOTE — TELEPHONE ENCOUNTER
Patient called for results of his chest xray. He is heading out of town in the morning and needs to know results of test before he can go. Please call him in the morning with results at [phone].

## 2017-05-04 ENCOUNTER — TELEPHONE ANTICOAG (OUTPATIENT)
Dept: INTERNAL MEDICINE CLINIC | Age: 77
End: 2017-05-04

## 2017-05-04 DIAGNOSIS — I48.91 ATRIAL FIBRILLATION, UNSPECIFIED TYPE (HCC): ICD-10-CM

## 2017-05-04 NOTE — TELEPHONE ENCOUNTER
Talked with patient per Dr. Emi Recio, Stable chest with no acute cardiopulmonary findings. Patient verbalized understanding.

## 2017-05-04 NOTE — PROGRESS NOTES
The patient presents to the office today with the chief complaint of chest congestion    HPI    The patient complains of chest congestion with cough. The cough is minimally  productive of whitish phlegm. The patient denies fever. The patient denies dyspnea. te      Review of Systems   Respiratory: Positive for cough. Negative for shortness of breath. Cardiovascular: Negative for chest pain and leg swelling. Allergies   Allergen Reactions    Zetia [Ezetimibe] Other (comments)     Back pain resolved off Zetia    Lipitor [Atorvastatin] Myalgia    Livalo [Pitavastatin] Other (comments)     Extreme fatigue    5/29/14   PATIENT DENIES    Zocor [Simvastatin] Myalgia     5/29/14 PATIENT DENIES        Current Outpatient Prescriptions   Medication Sig Dispense Refill    guaiFENesin-codeine (CHERATUSSIN AC) 100-10 mg/5 mL solution 2 teaspoons every six hours as needed for cough 240 mL 1    predniSONE (DELTASONE) 20 mg tablet 3 tabs daily x 3 days, 2 tabs daily x 3 days, 1 tab daily x 3 days, 1/2 tab daily x 3 days 20 Tab 0    amoxicillin-clavulanate (AUGMENTIN) 875-125 mg per tablet 1 tablet twice per day with food for five more days 10 Tab 0    cyanocobalamin (VITAMIN B12) 1,000 mcg/mL injection inject 1ml into the muscle monthly  1 mL 0    amLODIPine (NORVASC) 5 mg tablet Take 1 Tab by mouth daily. 90 Tab 3    eplerenone (INSPRA) 25 mg tablet TAKE 1 TABLET DAILY 90 Tab 3    clobetasol (TEMOVATE) 0.05 % topical cream Apply  to affected area two (2) times a day.  white petrolatum-mineral oil (ABSORBASE) oint Apply  to affected area as needed.  diclofenac (VOLTAREN) 1 % gel Apply 4 g to affected area four (4) times daily. 1 Each 0    traMADol (ULTRAM) 50 mg tablet Take 1 - 2 tablets three times per day.  90 Tab 1    furosemide (LASIX) 40 mg tablet TAKE 1 TABLET DAILY 90 Tab 3    warfarin (COUMADIN) 5 mg tablet 1 tablet daily 30 Tab 6    pantoprazole (PROTONIX) 40 mg tablet 1 tablet each AM 90 Tab 3    loratadine (CLARITIN) 10 mg tablet 1 tablet each evening 90 Tab 0    fluticasone (FLONASE) 50 mcg/actuation nasal spray 2 sprays up each nostril each night 3 Bottle 0    metoprolol tartrate (LOPRESSOR) 50 mg tablet Take 1 Tab by mouth two (2) times a day. 180 Tab 3    AZOPT 1 % ophthalmic suspension Administer 1 Drop to both eyes two (2) times a day. 3    triamcinolone acetonide (KENALOG) 0.1 % topical cream Apply  to affected area two (2) times daily as needed. use thin layer       TIMOLOL (BETIMOL OP) Apply 1 Drop to eye two (2) times a day.  multivitamin (ONE A DAY) tablet Take 1 Tab by mouth daily. Past Medical History:   Diagnosis Date    AICD (automatic cardioverter/defibrillator) present     Medtronic  upgrade from pacer in 2007 due to VT    Atrial fibrillation ([address]     Cardiac cath 03/19/2007    LM 25% ostial.  Otherwise patent coronaries. LVEDP 20.  EF 50%. Dyssynch. mid anterior contraction. Pacemaker.  Cardiac echocardiogram 03/20/2014    A-fib. EF 65-70%. No RWMA. No RWMA. Mild conc LVH. Mild RVE. RVSP 40-45 mmHg. Mild LAE.  HERNANDEZ. Mild MR. Mod TR.  Cardiac nuclear imaging test 07/29/2014    No ischemia or prior infarction. EF 68%. Nondiagnostic EKG due to V pacing on pharm stress test.  Low risk.  Cardioversion 8/31/2011    Successful defibrillation threshold testing at 18 joules. Patient also had conversion to atrial ventricular pacing.      CHF (congestive heart failure) (HCC)     Cobalamin deficiency     Dupuytren contracture 02/08/2010    on the right ring finger     Edema     Hypertension     Hypertrophy of prostate with urinary obstruction and other lower urinary tract symptoms (LUTS)     Impotence of organic origin     Intolerance of drug     Intolerant high doses of sotalol due to prolonged QT    Mastodynia     Paroxysmal VT ([address]     Pure hypercholesterolemia     S/P ablation of atrial fibrillation 05/31/2011    S/P ablation of atrial fibrillation 12/18/2012    [facility]. Apple Gann at [facility] S/P ablation operation for arrhythmia     VT ablation by Dr. Laila Wheeler near [address] [place] [address] 2/3007    Sick sinus syndrome [facility])        Past Surgical History:   Procedure Laterality Date    HX AFIB ABLATION  12/18/2012    Karie Yoo by Dr. Thalia Martin  02/08-03/08    bilateral cataract removed    HX CATARACT REMOVAL  2/2008 & 3/2008    bilateral    HX ORTHOPAEDIC  2/8/10    release of Dupuytren's contraction on ring finger of right hand    HX OTHER SURGICAL  6/2000    atrial lead placement    HX OTHER SURGICAL  6/5/2009    Cardioversion    HX OTHER SURGICAL  10/12/2012    cardioversion    HX PACEMAKER  1998    placement    HX PACEMAKER  6/2000    DDD    HX PACEMAKER  3/27/07    removal of pacemaker & placement of dual chamber defib generator and leads    HX TONSIL AND ADENOIDECTOMY         Social History     Social History    Marital status:      Spouse name: N/A    Number of children: N/A    Years of education: N/A     Occupational History    Not on file. Social History Main Topics    Smoking status: Never Smoker    Smokeless tobacco: Never Used    Alcohol use Yes      Comment: 2-3 glasses of white wine frequently    Drug use: No    Sexual activity: Not Currently     Partners: Female     Other Topics Concern    Not on file     Social History Narrative       Patient does not have an advanced directive on file    Visit Vitals    /70 (BP 1 Location: Left arm, BP Patient Position: Sitting)    Pulse 78    Temp 98.1 °F (36.7 °C) (Tympanic)    Resp 16    Ht 6' (1.829 m)    Wt 213 lb (96.6 kg)    SpO2 97%    BMI 28.89 kg/m2       Physical Exam   Cardiovascular: Normal rate and regular rhythm. Exam reveals no gallop. No murmur heard. Pulmonary/Chest: He has no wheezes (Prolonged expiratory phase). He has rales (Scattered rhonchi). Musculoskeletal: He exhibits no edema. BMI:  BMI is high but it was not addressed during this visit due to an acute illness        Telephone Anticoag on 04/26/2017   Component Date Value Ref Range Status    INR, External 04/26/2017 2.3  2.0 - 3.0 Final   Telephone Anticoag on 03/29/2017   Component Date Value Ref Range Status    INR, External 03/29/2017 2.9  2.0 - 3.0 Final   Telephone Anticoag on 03/22/2017   Component Date Value Ref Range Status    INR, External 03/22/2017 2.4   Final   Telephone Anticoag on 03/15/2017   Component Date Value Ref Range Status    INR, External 03/15/2017 3.2   Final   Telephone Anticoag on 03/01/2017   Component Date Value Ref Range Status    INR, External 03/01/2017 3.5   Final   Telephone Anticoag on 02/17/2017   Component Date Value Ref Range Status    INR, External 02/15/2017 2.0  2.0 - 3.0 Final       .  Results for orders placed or performed during the hospital encounter of 05/02/17   XR CHEST PA LAT    Narrative    CHEST PA AND LATERAL:    INDICATIONS: Productive cough and congestion    COMPARISONS: 6/1/2011    FINDINGS:     Lungs: Clear. Cardiac silhouette and mediastinal contours: Disconnected pacemaker leads  overlie the right hemithorax. Left-sided AICD/pacemaker with lead tips overlying  the RA and RV apex. Contours are within normal limits. Pleural spaces: No pneumothorax or pleural effusion evident. Bones and soft tissues: Mild degenerative change at the thoracic spine. Support lines/catheters: None. Impression    Impression:        Stable chest with no acute cardiopulmonary findings. Assessment / Plan      ICD-10-CM ICD-9-CM    1. Atrial fibrillation, unspecified type ([facility] I48.91 427.31    2. Essential hypertension I10 401.9    3. Bronchitis J40 490 XR CHEST PA LAT     Continue Augmentin  Add Prednisone  Add Cheratussin  he was advised to continue his maintenance medications  I asked Yocasta Myrick if he has any questions and I answered the questions.   Robin Avalos Loraine Martinez states that he understands the treatment plan and agrees with the treatment plan      Follow-up Disposition:  Return in about 6 months (around 11/2/2017). I asked Nila Bauman if he has any questions and I answered the questions.   Nila Bauman states that he understands the treatment plan and agrees with the treatment plan

## 2017-05-10 ENCOUNTER — TELEPHONE ANTICOAG (OUTPATIENT)
Dept: INTERNAL MEDICINE CLINIC | Age: 77
End: 2017-05-10

## 2017-05-10 DIAGNOSIS — I48.91 ATRIAL FIBRILLATION, UNSPECIFIED TYPE (HCC): ICD-10-CM

## 2017-05-10 LAB — INR, EXTERNAL: 5.2 (ref 2–3)

## 2017-05-10 NOTE — PROGRESS NOTES
Called and spoke with patient this morning in regards to INR result received. Patient stated that he is still on antibiotics but will be finished in a few days. Per Dr Jaky Lorenzo recommendation patient was informed to hold coumadin for 3 days the to resume dosage of 5 mg for 3 days and 2.5 mg for 4 days a week. Patient voiced understanding and will recheck as scheduled next week.

## 2017-05-17 LAB — INR, EXTERNAL: 2.9 (ref 2–3)

## 2017-05-22 RX ORDER — ATORVASTATIN CALCIUM 10 MG/1
10 TABLET, FILM COATED ORAL DAILY
Qty: 90 TAB | Refills: 3 | Status: SHIPPED | OUTPATIENT
Start: 2017-05-22 | End: 2017-05-24 | Stop reason: SDUPTHER

## 2017-05-24 ENCOUNTER — TELEPHONE ANTICOAG (OUTPATIENT)
Dept: INTERNAL MEDICINE CLINIC | Age: 77
End: 2017-05-24

## 2017-05-24 DIAGNOSIS — I48.91 ATRIAL FIBRILLATION, UNSPECIFIED TYPE (HCC): ICD-10-CM

## 2017-05-24 LAB — INR, EXTERNAL: 2.8 (ref 2–3)

## 2017-05-24 NOTE — TELEPHONE ENCOUNTER
Dr Maryjane Espinoza just filled   Requested Prescriptions     Pending Prescriptions Disp Refills    atorvastatin (LIPITOR) 10 mg tablet 90 Tab 3     Sig: Take 1 Tab by mouth daily.     but it was sent to [facility] the patient is going to pick it up from [facility] but would like the refills sent to [facility]

## 2017-05-25 RX ORDER — ATORVASTATIN CALCIUM 10 MG/1
10 TABLET, FILM COATED ORAL DAILY
Qty: 90 TAB | Refills: 3 | Status: SHIPPED | OUTPATIENT
Start: 2017-05-25 | End: 2017-10-31 | Stop reason: SDUPTHER

## 2017-06-07 ENCOUNTER — OFFICE VISIT (OUTPATIENT)
Dept: CARDIOLOGY CLINIC | Age: 77
End: 2017-06-07

## 2017-06-07 VITALS
WEIGHT: 215 LBS | SYSTOLIC BLOOD PRESSURE: 98 MMHG | HEIGHT: 72 IN | HEART RATE: 72 BPM | OXYGEN SATURATION: 97 % | DIASTOLIC BLOOD PRESSURE: 70 MMHG | BODY MASS INDEX: 29.12 KG/M2

## 2017-06-07 DIAGNOSIS — E78.00 PURE HYPERCHOLESTEROLEMIA: ICD-10-CM

## 2017-06-07 DIAGNOSIS — R00.2 PALPITATIONS: ICD-10-CM

## 2017-06-07 DIAGNOSIS — I48.91 ATRIAL FIBRILLATION, UNSPECIFIED TYPE (HCC): ICD-10-CM

## 2017-06-07 DIAGNOSIS — I50.9 ACUTE ON CHRONIC CONGESTIVE HEART FAILURE, UNSPECIFIED CONGESTIVE HEART FAILURE TYPE: Primary | ICD-10-CM

## 2017-06-07 DIAGNOSIS — I10 ESSENTIAL HYPERTENSION: ICD-10-CM

## 2017-06-07 DIAGNOSIS — I47.29 PAROXYSMAL VT: ICD-10-CM

## 2017-06-07 DIAGNOSIS — R06.02 SOBOE (SHORTNESS OF BREATH ON EXERTION): ICD-10-CM

## 2017-06-07 LAB — INR, EXTERNAL: 2.3 (ref 2–3)

## 2017-06-07 RX ORDER — METOPROLOL TARTRATE 50 MG/1
50 TABLET ORAL 2 TIMES DAILY
Qty: 180 TAB | Refills: 3 | Status: SHIPPED | OUTPATIENT
Start: 2017-06-07 | End: 2018-06-06 | Stop reason: SDUPTHER

## 2017-06-07 NOTE — MR AVS SNAPSHOT
Visit Information Date & Time Provider Department Dept. Phone Encounter #  
 6/7/2017 11:20 AM Thomas Anaya DO [facility] [id number] Follow-up Instructions Return in about 6 months (around 12/7/2017), or if symptoms worsen or fail to improve. Your Appointments 7/5/2017  2:40 PM  
CARELINK with Mona Copeland [facility] ([address]) Appt Note: Aura Trent [address] [id number] [address] [address] [phone] 2300 [facility] 111 [facility] [address] 7/13/2017 10:30 AM  
Follow Up with Maximiliano Pardo MD  
[address] [place] [address] 3651 [facility]) Appt Note: 6mo  
 [address] [place], [address] [facility] [address]  
  
   
 [address] [place], [address] [facility] [address] Upcoming Health Maintenance Date Due ZOSTER VACCINE AGE 60> 3/16/2002 GLAUCOMA SCREENING Q2Y 3/16/2007 INFLUENZA AGE 9 TO ADULT 8/1/2017 MEDICARE YEARLY EXAM 4/13/2018 DTaP/Tdap/Td series (2 - Td) 4/12/2027 Allergies as of 6/7/2017  Review Complete On: 6/7/2017 By: Thomas Anaya DO Severity Noted Reaction Type Reactions Zetia [Ezetimibe] Medium 10/25/2016   Side Effect Other (comments) Back pain resolved off Zetia Lipitor [Atorvastatin]  10/12/2015    Myalgia Livalo [Pitavastatin]  09/17/2013    Other (comments) Extreme fatigue 5/29/14   PATIENT DENIES Zocor [Simvastatin]  09/17/2013    Myalgia 5/29/14 PATIENT DENIES Current Immunizations  Reviewed on 4/12/2017 Name Date Pneumococcal Conjugate (PCV-13) 4/12/2017 Pneumococcal Polysaccharide (PPSV-23) 10/30/2009 Not reviewed this visit You Were Diagnosed With   
  
 Codes Comments Acute on chronic congestive heart failure, unspecified congestive heart failure type ([facility]    -  Primary ICD-10-CM: I50.9 ICD-9-CM: 428.0 SOBOE (shortness of breath on exertion)     ICD-10-CM: R06.02 
ICD-9-CM: 786.05   
 Pure hypercholesterolemia     ICD-10-CM: E78.00 ICD-9-CM: 272.0 Essential hypertension     ICD-10-CM: I10 
ICD-9-CM: 401.9 Atrial fibrillation, unspecified type ([facility] [address]     ICD-10-CM: I48.91 
ICD-9-CM: 427.31 Paroxysmal VT ([facility]     ICD-10-CM: I47.2 ICD-9-CM: 427.1 Palpitations     ICD-10-CM: R00.2 ICD-9-CM: 785.1 Vitals BP Pulse Height(growth percentile) Weight(growth percentile) SpO2 BMI  
 98/70 72 6' (1.829 m) 215 lb (97.5 kg) 97% 29.16 kg/m2 Smoking Status Never Smoker Vitals History BMI and BSA Data Body Mass Index Body Surface Area  
 29.16 kg/m 2 2.23 m 2 Preferred Pharmacy Pharmacy Name Phone [address] [place] [address], [address] [place] [address] [phone] Your Updated Medication List  
  
   
This list is accurate as of: 6/7/17 12:42 PM.  Always use your most recent med list.  
  
  
  
  
 Tyrell Gustafson Generic drug:  white petrolatum-mineral oil Apply  to affected area as needed. amLODIPine 5 mg tablet Commonly known as:  [employer] Take 1 Tab by mouth daily. atorvastatin 10 mg tablet Commonly known as:  LIPITOR Take 1 Tab by mouth daily. AZOPT 1 % ophthalmic suspension Generic drug:  brinzolamide Administer 1 Drop to both eyes two (2) times a day. BETIMOL OP Apply 1 Drop to eye two (2) times a day. clobetasol 0.05 % topical cream  
Commonly known as:  Dheeraj Álvaro Apply  to affected area two (2) times a day. cyanocobalamin 1,000 mcg/mL injection Commonly known as:  VITAMIN B12  
inject 1ml into the muscle monthly  
  
 diclofenac 1 % Gel Commonly known as:  VOLTAREN Apply 4 g to affected area four (4) times daily. eplerenone 25 mg tablet Commonly known as:  Aris Humphreys TAKE 1 TABLET DAILY  
  
 fluticasone 50 mcg/actuation nasal spray Commonly known as:  FLONASE  
2 sprays up each nostril each night  
  
 furosemide 40 mg tablet Commonly known as:  LASIX TAKE 1 TABLET DAILY  
  
 loratadine 10 mg tablet Commonly known as:  CLARITIN  
1 tablet each evening  
  
 metoprolol tartrate 50 mg tablet Commonly known as:  LOPRESSOR Take 1 Tab by mouth two (2) times a day. multivitamin tablet Commonly known as:  ONE A DAY Take 1 Tab by mouth daily. pantoprazole 40 mg tablet Commonly known as:  PROTONIX  
1 tablet each AM  
  
 predniSONE 20 mg tablet Commonly known as:  DELTASONE  
3 tabs daily x 3 days, 2 tabs daily x 3 days, 1 tab daily x 3 days, 1/2 tab daily x 3 days  
  
 traMADol 50 mg tablet Commonly known as:  ULTRAM  
Take 1 - 2 tablets three times per day. triamcinolone acetonide 0.1 % topical cream  
Commonly known as:  KENALOG Apply  to affected area two (2) times daily as needed. use thin layer  
  
 warfarin 5 mg tablet Commonly known as:  COUMADIN  
1 tablet daily Prescriptions Sent to Pharmacy Refills  
 metoprolol tartrate (LOPRESSOR) 50 mg tablet 3 Sig: Take 1 Tab by mouth two (2) times a day. Class: Normal  
 Pharmacy: [address] [place] [address] Ph #: [phone] Route: Oral  
  
We Performed the Following AMB POC EKG ROUTINE W/ 12 LEADS, INTER & REP [13899 CPT(R)] Follow-up Instructions Return in about 6 months (around 12/7/2017), or if symptoms worsen or fail to improve. To-Do List   
 06/07/2017 ECG:  CARDIAC SPECT REST AND STRESS   
  
 06/07/2017 ECG:  NUCLEAR STRESS TEST   
  
 06/12/2017 7:30 AM  
  Appointment with HBV NUC CARD ROOM at [facility] NON-INVASIVE CARD ([phone]) This is a 2-part test which takes approximately 4 hours to complete. Please see part 2 of exam below for full instructions  06/12/2017 8:00 AM  
  Appointment with HBV NUC CARD ROOM at [facility] NONINVASIVE [facility] ([phone]) 1-No eating or coffee after midnight  2-Do not take diabetic meds (bring with) 3-Please take all other meds unless specified by cardiology Introducing [facility]! Nhi Cheatham introduces [employer] patient portal. Now you can access parts of your medical record, email your doctor's office, and request medication refills online. 1. In your internet browser, go to [email or web address] 2. Click on the First Time User? Click Here link in the Sign In box. You will see the New Member Sign Up page. 3. Enter your [employer]b Access Code exactly as it appears below. You will not need to use this code after youve completed the sign-up process. If you do not sign up before the expiration date, you must request a new code. · [employer] Access Code: [email or web address] Expires: 7/25/2017  2:01 PM 
 
4. Enter the last four digits of your Social Security Number (xxxx) and Date of Birth (mm/dd/yyyy) as indicated and click Submit. You will be taken to the next sign-up page. 5. Create a [employer]b ID. This will be your [employer]b login ID and cannot be changed, so think of one that is secure and easy to remember. 6. Create a [employer]b password. You can change your password at any time. 7. Enter your Password Reset Question and Answer. This can be used at a later time if you forget your password. 8. Enter your e-mail address. You will receive e-mail notification when new information is available in [address] 9. Click Sign Up. You can now view and download portions of your medical record. 10. Click the Download Summary menu link to download a portable copy of your medical information. If you have questions, please visit the Frequently Asked Questions section of the [employer] website. Remember, [employer] is NOT to be used for urgent needs. For medical emergencies, dial 911. Now available from your iPhone and Android! Please provide this summary of care documentation to your next provider. Your primary care clinician is listed as Mary Britton. If you have any questions after today's visit, please call [phone].

## 2017-06-07 NOTE — PROGRESS NOTES
Review of Systems   Constitutional: Negative. Respiratory: Positive for sputum production and shortness of breath. Negative for cough, hemoptysis and wheezing. Cardiovascular: Positive for chest pain, palpitations and leg swelling. Negative for orthopnea, claudication and PND. Gastrointestinal: Negative. Musculoskeletal: Negative.

## 2017-06-07 NOTE — PROGRESS NOTES
1. Have you been to the ER, urgent care clinic since your last visit? Hospitalized since your last visit? No     2. Have you seen or consulted any other health care providers outside of the [address] [place] [address] since your last visit? Include any pap smears or colon screening.  No

## 2017-06-07 NOTE — PROGRESS NOTES
HPI: I saw Nicho Stewart in my office today in cardiovascular evaluation regarding his chronic atrial fibrillation and some diastolic failure issues in the past. Mr. Camila Pedroza is a very pleasant 78year old white male with a rather complex cardiovascular history, including problems with atrial fibrillation and ventricular tachycardia. He's had a very long and complicated past cardiovascular course in terms of management of his ventricular tachycardia and atrial fibrillation, which is well outlined in his chart and my [facility] note of 4/24/13.      He ultimately has gone into atrial fibrillation after numerous atrial fibrillation ablations and the last of which was by Dr. Roderick Kang at [facility] in December of 2012, and we have now been managing him with rate control and the use of Lopressor and Coumadin for anticoagulation. He comes in today with his wife and relates to me that over the past few months he is noted some increased shortness of breath with exertion and over the past few days he is also notable chest fullness with exertion as well. He has occasional palpitations but these are not frequent and he has a little peripheral edema but this is only mild and he really denies any problems with paroxysmal nocturnal dyspnea or orthopnea. Encounter Diagnoses   Name Primary?  SOBOE (shortness of breath on exertion)     Acute on chronic congestive heart failure, unspecified congestive heart failure type (HCC) Yes    Pure hypercholesterolemia     Essential hypertension     Atrial fibrillation, unspecified type (HCC)     Paroxysmal VT ([address]     Palpitations        Discussion:  This gentleman has been having problems with shortness of breath with exertion for a few months and now some chest fullness with exertion that certainly is suggestive of angina and he has not had any testing to rule out coronary ischemia in several years so I am going to go ahead and do a pharmacologic myocardial perfusion study on him with further recommendations after reviewing that study. We did check his AICD today and he has 8 years left on his battery and the Optivol was on the low side suggesting that there is no evidence of fluid overload which is also clinically the case. He did have one episode of VT which she was placed out of and is overall rate in atrial fibrillation has been predominantly in the 70s. His latest lipid profile which was completed on January 13, 2017 was quite abnormal with total cholesterol of 245, triglycerides 153, HDL 50, LDL of 164.4, and VLDL 30.6. However, unfortunately he cannot take statin drugs very well although he is taking a very small dose of Lipitor which he seems to be tolerating fairly well. I do not think he at this point would be a candidate for a PCSK9 inhibitor, so all we can do is encourage diet along with his small dose of Lipitor. His blood pressure is actually on the low side of normal and he is otherwise doing reasonably well so I am simply going to get the above nuclear myocardial perfusion study with further recommendations pending review of that study. PCP: Rand Prasad MD      Past Medical History:   Diagnosis Date    AICD (automatic cardioverter/defibrillator) present     [employer]tr[employer]  upgrade from pacer in 2007 due to VT    Atrial fibrillation ([address]     Cardiac cath 03/19/2007    LM 25% ostial.  Otherwise patent coronaries. LVEDP 20.  EF 50%. Dyssynch. mid anterior contraction. Pacemaker.  Cardiac echocardiogram 03/20/2014    A-fib. EF 65-70%. No RWMA. No RWMA. Mild conc LVH. Mild RVE. RVSP 40-45 mmHg. Mild LAE.  HERNANDEZ. Mild MR. Mod TR.  Cardiac nuclear imaging test 07/29/2014    No ischemia or prior infarction. EF 68%. Nondiagnostic EKG due to V pacing on pharm stress test.  Low risk.  Cardioversion 8/31/2011    Successful defibrillation threshold testing at 18 joules. Patient also had conversion to atrial ventricular pacing.  CHF (congestive heart failure) (HCC)     Cobalamin deficiency     Dupuytren contracture 02/08/2010    on the right ring finger     Edema     Hypertension     Hypertrophy of prostate with urinary obstruction and other lower urinary tract symptoms (LUTS)     Impotence of organic origin     Intolerance of drug     Intolerant high doses of sotalol due to prolonged QT    Mastodynia     Paroxysmal VT ([address]     Pure hypercholesterolemia     S/P ablation of atrial fibrillation 05/31/2011    S/P ablation of atrial fibrillation 12/18/2012    Dr. Jacinta Kirkland at [facility] S/P ablation operation for arrhythmia     VT ablation by Dr. Burak Guerrero near [address] [place] [address] 2/3007    Sick sinus syndrome [facility])          Past Surgical History:   Procedure Laterality Date    HX [facility]IB [facility]BL[facility]  [facility]18/2012    [facility] Kerri Wilkerson HX CATARACT REMOVAL  02/08-03/08    bilateral cataract removed    HX CATARACT REMOVAL  2/2008 & 3/2008    bilateral    HX ORTHOPAEDIC  2/8/10    release of Dupuytren's contraction on ring finger of right hand    HX OTHER SURGICAL  6/2000    atrial lead placement    HX OTHER SURGICAL  6/5/2009    Cardioversion    HX OTHER SURGICAL  10/12/2012    cardioversion    HX PACEMAKER  1998    placement    HX PACEMAKER  6/2000    DDD    HX PACEMAKER  3/27/07    removal of pacemaker & placement of dual chamber defib generator and leads    HX TONSIL AND ADENOIDECTOMY           Current Outpatient Rx   Name  Route  Sig  Dispense  Refill    eplerenone (INSPRA) 25 mg tablet        TAKE 1 TABLET DAILY    90 Tab    2      furosemide (LASIX) 40 mg tablet        TAKE 1 TABLET DAILY    90 Tab    2      cyanocobalamin (VITAMIN B12) 1,000 mcg/mL injection        INJECT 1 ML BY INTRAMUSCULAR ROUTE EVERY MONTH    1 mL    3      AZOPT 1 % ophthalmic suspension    Both Eyes    Administer 1 Drop to both eyes two (2) times a day.         3      metoprolol (LOPRESSOR) 50 mg tablet    Oral    Take 1 Tab by mouth two (2) times a day. 180 Tab    3      amLODIPine (NORVASC) 5 mg tablet    Oral    Take 1 Tab by mouth daily. 90 Tab    3      triamcinolone acetonide (KENALOG) 0.1 % topical cream    Topical    Apply  to affected area two (2) times daily as needed. use thin layer               warfarin (COUMADIN) 5 mg tablet    Oral    Take 5 mg by mouth daily. Followed by PCP              TIMOLOL (BETIMOL OP)    Ophthalmic    Apply 1 Drop to eye two (2) times a day.  multivitamin (ONE A DAY) tablet    Oral    Take 1 Tab by mouth daily. Allergies   Allergen Reactions    Zetia [Ezetimibe] Other (comments)     Back pain resolved off Zetia    Lipitor [Atorvastatin] Myalgia    Livalo [Pitavastatin] Other (comments)     Extreme fatigue    5/29/14   PATIENT DENIES    Zocor [Simvastatin] Myalgia     5/29/14 PATIENT DENIES        Social History:   Social History   Substance Use Topics    Smoking status: Never Smoker    Smokeless tobacco: Never Used    Alcohol use Yes      Comment: 2-3 glasses of white wine frequently           Family history: family history includes COPD in his father; Hypertension in an other family member; No Known Problems in his mother. Review of Systems:   Review of Systems   Constitutional: Negative. Respiratory: Positive for sputum production and shortness of breath. Negative for cough, hemoptysis and wheezing. Cardiovascular: Positive for chest pain, palpitations and leg swelling. Negative for orthopnea, claudication and PND. Gastrointestinal: Negative. Musculoskeletal: Negative. Physical Exam:   The patient is an alert, oriented, well developed, well nourished 78 y.o.  male who was in no acute distress at the time of my examination.   Visit Vitals    BP 98/70    Pulse 72    Ht 6' (1.829 m)    Wt 97.5 kg (215 lb)    SpO2 97%    BMI 29.16 kg/m2      BP Readings from Last 3 Encounters:   06/07/17 98/70   05/02/17 120/70 04/28/17 112/68        Wt Readings from Last 3 Encounters:   06/07/17 97.5 kg (215 lb)   05/02/17 96.6 kg (213 lb)   04/28/17 96.6 kg (213 lb)       HEENT: Conjunctivae pink, sclera clear and white, symmetrical with no lag. Neck: Supple without masses, tenderness or thyromegaly. No jugular venous distention. Carotid upstrokes are full bilaterally, without bruits. Cardiovascular: Normal pacer site in left upper chest.  Chest is symmetrical with good excursion. [place] is not displaced. No lifts, heaves or thrills. S1 and S2 are normal, without appreciable murmurs, rubs, clicks or gallops. Lungs: Clear to auscultation bilaterally. Abdomen: Soft and non-tender. Extremities: No edema with full peripheral pulses     Review of Data: See PMH and Cardiology and Imaging sections for cardiac testing      Results for orders placed or performed in visit on 04/18/16   AMB POC EKG ROUTINE W/ 12 LEADS, INTER & REP     Status: None    Narrative    Ventricularly paced rhythm with a rate of 72 and underlying  atrial fibrillation. This EKG is similar to EKG of October 12, 2015. Robinson Mayes D.O., F.A.C.C. [facility]  [facility]  [address] [place] [address]. [address]    PLEASE NOTE:  This document has been produced using voice recognition software. Unrecognized errors in transcription may be present.

## 2017-06-08 ENCOUNTER — TELEPHONE ANTICOAG (OUTPATIENT)
Dept: INTERNAL MEDICINE CLINIC | Age: 77
End: 2017-06-08

## 2017-06-08 DIAGNOSIS — I48.91 ATRIAL FIBRILLATION, UNSPECIFIED TYPE (HCC): ICD-10-CM

## 2017-06-12 ENCOUNTER — HOSPITAL ENCOUNTER (OUTPATIENT)
Dept: NON INVASIVE DIAGNOSTICS | Age: 77
Discharge: HOME | End: 2017-06-12
Attending: INTERNAL MEDICINE
Payer: MEDICARE

## 2017-06-12 DIAGNOSIS — R06.02 SOBOE (SHORTNESS OF BREATH ON EXERTION): ICD-10-CM

## 2017-06-12 LAB
ATTENDING PHYSICIAN, CST07: NORMAL
DIAGNOSIS, 93000: NORMAL
DUKE TM SCORE RESULT, CST14: NORMAL
DUKE TREADMILL SCORE, CST13: NORMAL
ECG INTERP BEFORE EX, CST11: NORMAL
ECG INTERP DURING EX, CST12: NORMAL
FUNCTIONAL CAPACITY, CST17: NORMAL
KNOWN CARDIAC CONDITION, CST08: NORMAL
MAX. DIASTOLIC BP, CST04: 60 MMHG
MAX. HEART RATE, CST05: 74 BPM
MAX. SYSTOLIC BP, CST03: 130 MMHG
OVERALL BP RESPONSE TO EXERCISE, CST16: NORMAL
OVERALL HR RESPONSE TO EXERCISE, CST15: NORMAL
PEAK EX METS, CST10: 1 METS
PROTOCOL NAME, CST01: NORMAL
TEST INDICATION, CST09: NORMAL

## 2017-06-12 PROCEDURE — 74011000258 HC RX REV CODE- 258: Performed by: INTERNAL MEDICINE

## 2017-06-12 PROCEDURE — 74011250636 HC RX REV CODE- 250/636: Performed by: INTERNAL MEDICINE

## 2017-06-12 PROCEDURE — 93017 CV STRESS TEST TRACING ONLY: CPT

## 2017-06-12 PROCEDURE — 78452 HT MUSCLE IMAGE SPECT MULT: CPT

## 2017-06-12 PROCEDURE — A9500 TC99M SESTAMIBI: HCPCS

## 2017-06-12 RX ORDER — SODIUM CHLORIDE 9 MG/ML
100 INJECTION, SOLUTION INTRAVENOUS ONCE
Status: COMPLETED | OUTPATIENT
Start: 2017-06-12 | End: 2017-06-12

## 2017-06-12 RX ADMIN — REGADENOSON 0.4 MG: 0.08 INJECTION, SOLUTION INTRAVENOUS at 09:10

## 2017-06-12 RX ADMIN — SODIUM CHLORIDE 100 ML/HR: 900 INJECTION, SOLUTION INTRAVENOUS at 09:10

## 2017-06-12 NOTE — PROGRESS NOTES
Patient was given 10.60 milliCuries of 99mTc-Sestamibi for the resting images. Patient was also given 33.0 milliCuries of 99mTc-Sestamibi for the stress images. Prone images were acquired. Injected with 0.4mg Lexiscan. Patient's armband was discarded and shredded.

## 2017-06-13 ENCOUNTER — TELEPHONE (OUTPATIENT)
Dept: CARDIOLOGY CLINIC | Age: 77
End: 2017-06-13

## 2017-06-13 NOTE — PROCEDURES
[address] STRESS    Name:  Erna Trent  MR#:  [id number]  :  1940  Account #:  [de-identified]  Date of Adm:  2017  Date of Service:  2017      PROCEDURE PERFORMED: Nuclear myocardial perfusion study. ORDERING PHYSICIAN: Dr. Zachary Smith. PRIMARY CARE PHYSICIAN: Dr. Liliam Vizcarra. REASON FOR STUDY: Dyspnea on exertion, possible anginal  equivalent. EKG RESTING: Ventricularly paced rhythm with a rate of 71. DESCRIPTION OF PROCEDURE: The patient received 10.60 mCi of  technetium-99m sestamibi intravenously via left arm IV and had a  resting myocardial perfusion study completed. He subsequently  received Lexiscan 0.4 mg intravenously followed by a 5 mL saline flush  and was asked to walk at 0.8 miles an hour at 0 grade for three  minutes. He complained of shortness of breath with Lexiscan, but with  no diagnostic electrocardiographic changes due to underlying pacing. He subsequently received 33 mCi of technetium-99m sestamibi  intravenously and had a stress myocardial perfusion study completed  and compared with the resting study. FINDINGS: There appeared to be a small discrete fixed defect at the  apex, and this was felt to be most likely related to apical thinning. The  remainder of the myocardium was well perfused without signs of  ischemia or infarction. Gated SPECT imaging did demonstrate some  apical septal hypokinesia related presumably to right ventricular pacing  with otherwise normal wall motion and normal overall left ventricular  function, ejection fraction of 72%. There was a transient ischemic  dilatation index at 1.05, but this is still within normal limits for a  pharmacologic myocardial perfusion study. SUMMARY  1. Presumed normal Lexiscan Cardiolite myocardial perfusion study.   2. Small degree discrete apical defect, most likely related to apical  thinning and/or right ventricular pacing with otherwise normal perfusion  of the left ventricular myocardium without signs of ischemia or  infarction. 3. Discrete area of apical septal hypokinesia, most likely related to right  ventricular pacing with otherwise normal wall motion and normal  overall left ventricular function, ejection fraction of 72% on gated  SPECT imaging. 4. Nondiagnostic pharmacologic stress test electrocardiographically in  view of underlying ventricular pacing. 5. Compared to the study of 7/29/2014 there was little interval change  with normal perfusion and normal LV function at that time with EF 68%. 6. This was a low risk [facility] Cardiolite myocardial perfusion study.         Ramon Maloney DO    ES / KOSTA  D:  06/12/2017   17:34  T:  06/13/2017   04:55  Job #:  [id number]

## 2017-06-13 NOTE — PROGRESS NOTES
Per your last office visit, \"His blood pressure is actually on the low side of normal and he is otherwise doing reasonably well so I am simply going to get the above nuclear myocardial perfusion study with further recommendations pending review of that study. \"

## 2017-06-13 NOTE — TELEPHONE ENCOUNTER
I called and discussed the results of the patient's nuclear myocardial perfusion study with him. The study looks essentially normal and I told him to follow up with Dr. Maria Luisa Fisher and if he can't find anything and the problem worsens to let me know and we may just have to cath to be sure.  ES

## 2017-06-14 ENCOUNTER — OFFICE VISIT (OUTPATIENT)
Dept: INTERNAL MEDICINE CLINIC | Age: 77
End: 2017-06-14

## 2017-06-14 VITALS
TEMPERATURE: 97.8 F | WEIGHT: 215 LBS | DIASTOLIC BLOOD PRESSURE: 78 MMHG | RESPIRATION RATE: 16 BRPM | HEIGHT: 72 IN | OXYGEN SATURATION: 98 % | SYSTOLIC BLOOD PRESSURE: 120 MMHG | HEART RATE: 88 BPM | BODY MASS INDEX: 29.12 KG/M2

## 2017-06-14 DIAGNOSIS — E78.00 PURE HYPERCHOLESTEROLEMIA: ICD-10-CM

## 2017-06-14 DIAGNOSIS — I48.91 ATRIAL FIBRILLATION, UNSPECIFIED TYPE (HCC): ICD-10-CM

## 2017-06-14 DIAGNOSIS — I10 ESSENTIAL HYPERTENSION: ICD-10-CM

## 2017-06-14 DIAGNOSIS — R06.09 DYSPNEA ON EXERTION: Primary | ICD-10-CM

## 2017-06-14 DIAGNOSIS — R07.89 OTHER CHEST PAIN: ICD-10-CM

## 2017-06-14 LAB — INR, EXTERNAL: 2.4

## 2017-06-14 NOTE — PROGRESS NOTES
1. Have you been to the ER, urgent care clinic since your last visit? Hospitalized since your last visit? No    2. Have you seen or consulted any other health care providers outside of the [address] [place] [address] since your last visit? Include any pap smears or colon screening.  No

## 2017-06-15 ENCOUNTER — LAB ONLY (OUTPATIENT)
Dept: INTERNAL MEDICINE CLINIC | Age: 77
End: 2017-06-15

## 2017-06-15 ENCOUNTER — TELEPHONE ANTICOAG (OUTPATIENT)
Dept: INTERNAL MEDICINE CLINIC | Age: 77
End: 2017-06-15

## 2017-06-15 ENCOUNTER — HOSPITAL ENCOUNTER (OUTPATIENT)
Dept: LAB | Age: 77
Discharge: HOME | End: 2017-06-15
Payer: MEDICARE

## 2017-06-15 DIAGNOSIS — E53.8 B12 DEFICIENCY: Primary | ICD-10-CM

## 2017-06-15 DIAGNOSIS — R07.89 OTHER CHEST PAIN: ICD-10-CM

## 2017-06-15 DIAGNOSIS — I10 ESSENTIAL HYPERTENSION: ICD-10-CM

## 2017-06-15 DIAGNOSIS — R06.09 DYSPNEA ON EXERTION: ICD-10-CM

## 2017-06-15 DIAGNOSIS — I48.91 ATRIAL FIBRILLATION, UNSPECIFIED TYPE (HCC): ICD-10-CM

## 2017-06-15 DIAGNOSIS — E78.00 PURE HYPERCHOLESTEROLEMIA: ICD-10-CM

## 2017-06-15 LAB
ALBUMIN SERPL BCP-MCNC: 3.9 G/DL (ref 3.4–5)
ALBUMIN/GLOB SERPL: 1 {RATIO} (ref 0.8–1.7)
ALP SERPL-CCNC: 84 U/L (ref 45–117)
ALT SERPL-CCNC: 25 U/L (ref 16–61)
ANION GAP BLD CALC-SCNC: 8 MMOL/L (ref 3–18)
AST SERPL W P-5'-P-CCNC: 20 U/L (ref 15–37)
BASOPHILS # BLD AUTO: 0 K/UL (ref 0–0.06)
BASOPHILS # BLD: 1 % (ref 0–2)
BILIRUB SERPL-MCNC: 0.7 MG/DL (ref 0.2–1)
BUN SERPL-MCNC: 14 MG/DL (ref 7–18)
BUN/CREAT SERPL: 14 (ref 12–20)
CALCIUM SERPL-MCNC: 9.1 MG/DL (ref 8.5–10.1)
CHLORIDE SERPL-SCNC: 100 MMOL/L (ref 100–108)
CO2 SERPL-SCNC: 27 MMOL/L (ref 21–32)
CREAT SERPL-MCNC: 0.97 MG/DL (ref 0.6–1.3)
DIFFERENTIAL METHOD BLD: ABNORMAL
EOSINOPHIL # BLD: 0.2 K/UL (ref 0–0.4)
EOSINOPHIL NFR BLD: 2 % (ref 0–5)
ERYTHROCYTE [DISTWIDTH] IN BLOOD BY AUTOMATED COUNT: 13.7 % (ref 11.6–14.5)
GLOBULIN SER CALC-MCNC: 3.8 G/DL (ref 2–4)
GLUCOSE SERPL-MCNC: 75 MG/DL (ref 74–99)
HCT VFR BLD AUTO: 44.9 % (ref 36–48)
HGB BLD-MCNC: 15.1 G/DL (ref 13–16)
LYMPHOCYTES # BLD AUTO: 28 % (ref 21–52)
LYMPHOCYTES # BLD: 2.2 K/UL (ref 0.9–3.6)
MCH RBC QN AUTO: 32.2 PG (ref 24–34)
MCHC RBC AUTO-ENTMCNC: 33.6 G/DL (ref 31–37)
MCV RBC AUTO: 95.7 FL (ref 74–97)
MONOCYTES # BLD: 1 K/UL (ref 0.05–1.2)
MONOCYTES NFR BLD AUTO: 13 % (ref 3–10)
NEUTS SEG # BLD: 4.4 K/UL (ref 1.8–8)
NEUTS SEG NFR BLD AUTO: 56 % (ref 40–73)
PLATELET # BLD AUTO: 287 K/UL (ref 135–420)
PMV BLD AUTO: 11.9 FL (ref 9.2–11.8)
POTASSIUM SERPL-SCNC: 4.1 MMOL/L (ref 3.5–5.5)
PROT SERPL-MCNC: 7.7 G/DL (ref 6.4–8.2)
RBC # BLD AUTO: 4.69 M/UL (ref 4.7–5.5)
SODIUM SERPL-SCNC: 135 MMOL/L (ref 136–145)
WBC # BLD AUTO: 7.8 K/UL (ref 4.6–13.2)

## 2017-06-15 PROCEDURE — 85025 COMPLETE CBC W/AUTO DIFF WBC: CPT | Performed by: INTERNAL MEDICINE

## 2017-06-15 PROCEDURE — 80053 COMPREHEN METABOLIC PANEL: CPT | Performed by: INTERNAL MEDICINE

## 2017-06-15 PROCEDURE — 36415 COLL VENOUS BLD VENIPUNCTURE: CPT | Performed by: INTERNAL MEDICINE

## 2017-06-15 RX ORDER — CYANOCOBALAMIN 1000 UG/ML
INJECTION, SOLUTION INTRAMUSCULAR; SUBCUTANEOUS
Qty: 1 ML | Refills: 5
Start: 2017-06-15 | End: 2017-12-22 | Stop reason: SDUPTHER

## 2017-06-15 NOTE — PROGRESS NOTES
The patient presents to the office today with the chief complaint of dyspnea on exertion    HPI    The patient notes approximately 4 months of dyspnea on exertion. It is gradually worsening. It is enough to make him stop. There is associated mild chest tightness. The problem resolves with rest. There is no orthopnea. Recently nuclear heart testing was essentially normal.  The patient remains on anticoagulation due to atrial fibrillation. Review of Systems   Respiratory: Positive for shortness of breath. Cardiovascular: Positive for chest pain. Negative for palpitations and leg swelling. Allergies   Allergen Reactions    Zetia [Ezetimibe] Other (comments)     Back pain resolved off Zetia    Lipitor [Atorvastatin] Myalgia    Livalo [Pitavastatin] Other (comments)     Extreme fatigue    5/29/14   PATIENT DENIES    Zocor [Simvastatin] Myalgia     5/29/14 PATIENT DENIES        Current Outpatient Prescriptions   Medication Sig Dispense Refill    cyanocobalamin (VITAMIN B12) 1,000 mcg/mL injection inject 1ml into the muscle monthly 1 mL 5    metoprolol tartrate (LOPRESSOR) 50 mg tablet Take 1 Tab by mouth two (2) times a day. 180 Tab 3    atorvastatin (LIPITOR) 10 mg tablet Take 1 Tab by mouth daily. 90 Tab 3    predniSONE (DELTASONE) 20 mg tablet 3 tabs daily x 3 days, 2 tabs daily x 3 days, 1 tab daily x 3 days, 1/2 tab daily x 3 days 20 Tab 0    amLODIPine (NORVASC) 5 mg tablet Take 1 Tab by mouth daily. 90 Tab 3    eplerenone (INSPRA) 25 mg tablet TAKE 1 TABLET DAILY 90 Tab 3    clobetasol (TEMOVATE) 0.05 % topical cream Apply  to affected area two (2) times a day.  white petrolatum-mineral oil (ABSORBASE) oint Apply  to affected area as needed.  diclofenac (VOLTAREN) 1 % gel Apply 4 g to affected area four (4) times daily. 1 Each 0    traMADol (ULTRAM) 50 mg tablet Take 1 - 2 tablets three times per day.  90 Tab 1    furosemide (LASIX) 40 mg tablet TAKE 1 TABLET DAILY 90 Tab 3  warfarin (COUMADIN) 5 mg tablet 1 tablet daily 30 Tab 6    pantoprazole (PROTONIX) 40 mg tablet 1 tablet each AM 90 Tab 3    loratadine (CLARITIN) 10 mg tablet 1 tablet each evening 90 Tab 0    fluticasone (FLONASE) 50 mcg/actuation nasal spray 2 sprays up each nostril each night 3 Bottle 0    AZOPT 1 % ophthalmic suspension Administer 1 Drop to both eyes two (2) times a day. 3    triamcinolone acetonide (KENALOG) 0.1 % topical cream Apply  to affected area two (2) times daily as needed. use thin layer       TIMOLOL (BETIMOL OP) Apply 1 Drop to eye two (2) times a day.  multivitamin (ONE A DAY) tablet Take 1 Tab by mouth daily. Past Medical History:   Diagnosis Date    AICD (automatic cardioverter/defibrillator) present     Medtronic  upgrade from pacer in 2007 due to VT    Atrial fibrillation ([address]     Cardiac cath 03/19/2007    LM 25% ostial.  Otherwise patent coronaries. LVEDP 20.  EF 50%. Dyssynch. mid anterior contraction. Pacemaker.  Cardiac echocardiogram 03/20/2014    A-fib. EF 65-70%. No RWMA. No RWMA. Mild conc LVH. Mild RVE. RVSP 40-45 mmHg. Mild LAE.  HERNANDEZ. Mild MR. Mod TR.  Cardiac nuclear imaging test 07/29/2014    No ischemia or prior infarction. EF 68%. Nondiagnostic EKG due to V pacing on pharm stress test.  Low risk.  Cardioversion 8/31/2011    Successful defibrillation threshold testing at 18 joules. Patient also had conversion to atrial ventricular pacing.      CHF (congestive heart failure) (HCC)     Cobalamin deficiency     Dupuytren contracture 02/08/2010    on the right ring finger     Edema     Hypertension     Hypertrophy of prostate with urinary obstruction and other lower urinary tract symptoms (LUTS)     Impotence of organic origin     Intolerance of drug     Intolerant high doses of sotalol due to prolonged QT    Mastodynia     Paroxysmal VT (HCC)     Pure hypercholesterolemia     S/P ablation of atrial fibrillation 05/31/2011    S/P ablation of atrial fibrillation 12/18/2012    Dr. Chichi Bustos at [address] S/P ablation operation for arrhythmia     VT ablation by Dr. Cartagena [facility] near [address] [place] [address] Street 2/3007    Sick sinus syndrome [facility])        Past Surgical History:   Procedure Laterality Date    HX AFIB ABLATION  12/18/2012    Daisha Correa by Dr. Ronan Raphael  02/08-03/08    bilateral cataract removed    HX CATARACT REMOVAL  2/2008 & 3/2008    bilateral    HX ORTHOPAEDIC  2/8/10    release of Dupuytren's contraction on ring finger of right hand    HX OTHER SURGICAL  6/2000    atrial lead placement    HX OTHER SURGICAL  6/5/2009    Cardioversion    HX OTHER SURGICAL  10/12/2012    cardioversion    HX PACEMAKER  1998    placement    HX PACEMAKER  6/2000    DDD    HX PACEMAKER  3/27/07    removal of pacemaker & placement of dual chamber defib generator and leads    HX TONSIL AND ADENOIDECTOMY         Social History     Social History    Marital status:      Spouse name: N/A    Number of children: N/A    Years of education: N/A     Occupational History    Not on file.      Social History Main Topics    Smoking status: Never Smoker    Smokeless tobacco: Never Used    Alcohol use Yes      Comment: 2-3 glasses of white wine frequently    Drug use: No    Sexual activity: Not Currently     Partners: Female     Other Topics Concern    Not on file     Social History Narrative       Patient does have an advanced directive on file    Visit Vitals    /78 (BP 1 Location: Left arm, BP Patient Position: Sitting)    Pulse 88    Temp 97.8 °F (36.6 °C) (Tympanic)    Resp 16    Ht 6' (1.829 m)    Wt 215 lb (97.5 kg)    SpO2 98%    BMI 29.16 kg/m2       Physical Exam   No Cervical Lymphadenopathy  No Supraclavicular Lymphadenopathy  Thyroid is Normal  Lungs are clear to ausculation and percussion  Heart:  S1 S2 are normal, No gallops, No mummers  No Carotid Bruits  Abdomen:  Normal Bowel Sounds. No tenderness. No masses. No Hepatomegaly or Splenomegly  LE:  Strong Pedal Pulses. No Edema      BMI:  I have reviewed/discussed the above normal BMI with the patient. I have recommended the following interventions: dietary management education, guidance, and counseling . Brianne Hinds Patient will limit his exercise until his current problems are resolved      Hospital Outpatient Visit on 06/12/2017   Component Date Value Ref Range Status    Test indication 06/12/2017 Dyspnea on Exertion   Preliminary    Functional capacity 06/12/2017 Could Not Be Adequately Assessed   Preliminary    ECG Interp. Before Exercise 06/12/2017 Paced rhythm   Preliminary    ECG Interp. During Exercise 06/12/2017 none   Preliminary    Max. Systolic BP 86/94/6897 410  mmHg Preliminary    Max. Diastolic BP 75/16/7645 60  mmHg Preliminary    Max.  Heart rate 06/12/2017 74  BPM Preliminary    Peak Ex METs 06/12/2017 1.0  METS Preliminary    Protocol name 06/12/2017 [facility]           Preliminary   Telephone Anticoag on 06/08/2017   Component Date Value Ref Range Status    INR, External 06/07/2017 2.3  2.0 - 3.0 Final   Telephone Anticoag on 05/24/2017   Component Date Value Ref Range Status    INR, External 05/24/2017 2.8  2.0 - 3.0 Final   Telephone Anticoag on 05/10/2017   Component Date Value Ref Range Status    INR, External 05/10/2017 5.2* 2.0 - 3.0 Final   Telephone Anticoag on 05/04/2017   Component Date Value Ref Range Status    INR, External 05/03/2017 2.8  2.0 - 3.0 Final   Refill on 05/02/2017   Component Date Value Ref Range Status    INR, External 05/17/2017 2.9  2.0 - 3.0 Final   Telephone Anticoag on 04/26/2017   Component Date Value Ref Range Status    INR, External 04/26/2017 2.3  2.0 - 3.0 Final   Telephone Anticoag on 03/29/2017   Component Date Value Ref Range Status    INR, External 03/29/2017 2.9  2.0 - 3.0 Final   Telephone Anticoag on 03/22/2017   Component Date Value Ref Range Status    INR, External 03/22/2017 2.4   Final   Telephone Anticoag on 03/15/2017   Component Date Value Ref Range Status    INR, External 03/15/2017 3.2   Final   There may be more visits with results that are not included. .No results found for any visits on 06/14/17. Assessment / Plan      ICD-10-CM ICD-9-CM    1. Dyspnea on exertion R06.09 786.09 cyanocobalamin (VITAMIN B12) 1,000 mcg/mL injection      CBC WITH AUTOMATED DIFF      METABOLIC PANEL, COMPREHENSIVE      PULMONARY FUNCTION TEST   2. Atrial fibrillation, unspecified type (HCC) I48.91 427.31 cyanocobalamin (VITAMIN B12) 1,000 mcg/mL injection      CBC WITH AUTOMATED DIFF      METABOLIC PANEL, COMPREHENSIVE      PULMONARY FUNCTION TEST   3. Essential hypertension I10 401.9 cyanocobalamin (VITAMIN B12) 1,000 mcg/mL injection      CBC WITH AUTOMATED DIFF      METABOLIC PANEL, COMPREHENSIVE      PULMONARY FUNCTION TEST   4. Pure hypercholesterolemia E78.00 272.0 cyanocobalamin (VITAMIN B12) 1,000 mcg/mL injection      CBC WITH AUTOMATED DIFF      METABOLIC PANEL, COMPREHENSIVE      PULMONARY FUNCTION TEST   5. Other chest pain R07.89 786.59 cyanocobalamin (VITAMIN B12) 1,000 mcg/mL injection      CBC WITH AUTOMATED DIFF      METABOLIC PANEL, COMPREHENSIVE      PULMONARY FUNCTION TEST     Labs  PFT  If no other clear source of symptoms will arrange for stress test  he was advised to continue his maintenance medications for now  To ER if symptoms don't resolve with rest    Follow-up Disposition:  Return in about 6 months (around 12/14/2017). I asked Aleks Kemp if he has any questions and I answered the questions.   Aleks Kemp states that he understands the treatment plan and agrees with the treatment plan

## 2017-06-16 RX ORDER — SPIRONOLACTONE 25 MG/1
25 TABLET ORAL DAILY
Qty: 30 TAB | Refills: 3 | Status: SHIPPED | OUTPATIENT
Start: 2017-06-16 | End: 2017-08-11 | Stop reason: SDUPTHER

## 2017-06-16 NOTE — TELEPHONE ENCOUNTER
Left message for patient to return call to the office--per Dr Albert Robledo he did check with Caroline Sage pharm-D about the Inspra which could very likely be causing his symptoms. Dr Albert Robledo recommends he discontinue medication and begin aldactone 25 mg daily.

## 2017-06-18 RX ORDER — CYANOCOBALAMIN 1000 UG/ML
INJECTION, SOLUTION INTRAMUSCULAR; SUBCUTANEOUS
Qty: 1 ML | Refills: 0 | Status: SHIPPED | OUTPATIENT
Start: 2017-06-18 | End: 2017-07-13 | Stop reason: SDUPTHER

## 2017-06-22 ENCOUNTER — TELEPHONE ANTICOAG (OUTPATIENT)
Dept: INTERNAL MEDICINE CLINIC | Age: 77
End: 2017-06-22

## 2017-06-22 DIAGNOSIS — I48.91 ATRIAL FIBRILLATION, UNSPECIFIED TYPE (HCC): ICD-10-CM

## 2017-06-22 LAB — INR, EXTERNAL: 2.6

## 2017-06-28 ENCOUNTER — TELEPHONE ANTICOAG (OUTPATIENT)
Dept: INTERNAL MEDICINE CLINIC | Age: 77
End: 2017-06-28

## 2017-06-28 DIAGNOSIS — I48.91 ATRIAL FIBRILLATION, UNSPECIFIED TYPE (HCC): ICD-10-CM

## 2017-06-28 LAB — INR, EXTERNAL: 2.3 (ref 2–3)

## 2017-07-05 ENCOUNTER — OFFICE VISIT (OUTPATIENT)
Dept: CARDIOLOGY CLINIC | Age: 77
End: 2017-07-05

## 2017-07-05 ENCOUNTER — HOSPITAL ENCOUNTER (OUTPATIENT)
Dept: RESPIRATORY THERAPY | Age: 77
Discharge: HOME | End: 2017-07-05
Attending: INTERNAL MEDICINE
Payer: MEDICARE

## 2017-07-05 DIAGNOSIS — E78.00 PURE HYPERCHOLESTEROLEMIA: ICD-10-CM

## 2017-07-05 DIAGNOSIS — R07.89 OTHER CHEST PAIN: ICD-10-CM

## 2017-07-05 DIAGNOSIS — I10 ESSENTIAL HYPERTENSION: ICD-10-CM

## 2017-07-05 DIAGNOSIS — R06.09 DYSPNEA ON EXERTION: ICD-10-CM

## 2017-07-05 DIAGNOSIS — I50.9 ACUTE ON CHRONIC CONGESTIVE HEART FAILURE, UNSPECIFIED CONGESTIVE HEART FAILURE TYPE: Primary | ICD-10-CM

## 2017-07-05 DIAGNOSIS — I48.91 ATRIAL FIBRILLATION, UNSPECIFIED TYPE (HCC): ICD-10-CM

## 2017-07-05 DIAGNOSIS — Z95.810 AICD (AUTOMATIC CARDIOVERTER/DEFIBRILLATOR) PRESENT: ICD-10-CM

## 2017-07-05 PROCEDURE — 94726 PLETHYSMOGRAPHY LUNG VOLUMES: CPT

## 2017-07-05 PROCEDURE — 94060 EVALUATION OF WHEEZING: CPT

## 2017-07-05 PROCEDURE — 94729 DIFFUSING CAPACITY: CPT

## 2017-07-06 ENCOUNTER — TELEPHONE (OUTPATIENT)
Dept: INTERNAL MEDICINE CLINIC | Age: 77
End: 2017-07-06

## 2017-07-06 DIAGNOSIS — I48.91 ATRIAL FIBRILLATION, UNSPECIFIED TYPE (HCC): ICD-10-CM

## 2017-07-06 LAB — INR, EXTERNAL: 1.7

## 2017-07-06 NOTE — TELEPHONE ENCOUNTER
Per Dr. Kennedy Romeo, instructed patient to take 5mg 5 days and 2.5 2 days and recheck in one week. Patient verbalized instruction.

## 2017-07-13 ENCOUNTER — OFFICE VISIT (OUTPATIENT)
Dept: INTERNAL MEDICINE CLINIC | Age: 77
End: 2017-07-13

## 2017-07-13 ENCOUNTER — HOSPITAL ENCOUNTER (OUTPATIENT)
Dept: LAB | Age: 77
Discharge: HOME | End: 2017-07-13
Payer: MEDICARE

## 2017-07-13 VITALS
TEMPERATURE: 98.2 F | WEIGHT: 217 LBS | SYSTOLIC BLOOD PRESSURE: 104 MMHG | BODY MASS INDEX: 29.39 KG/M2 | DIASTOLIC BLOOD PRESSURE: 60 MMHG | HEART RATE: 70 BPM | OXYGEN SATURATION: 96 % | RESPIRATION RATE: 16 BRPM | HEIGHT: 72 IN

## 2017-07-13 DIAGNOSIS — I48.91 ATRIAL FIBRILLATION, UNSPECIFIED TYPE (HCC): ICD-10-CM

## 2017-07-13 DIAGNOSIS — E53.8 B12 DEFICIENCY: ICD-10-CM

## 2017-07-13 DIAGNOSIS — R06.09 DYSPNEA ON EXERTION: Primary | ICD-10-CM

## 2017-07-13 PROCEDURE — 83921 ORGANIC ACID SINGLE QUANT: CPT | Performed by: INTERNAL MEDICINE

## 2017-07-13 NOTE — PROGRESS NOTES
I have personally seen and evaluated the device findings. Interrogation reviewed and I agree with assessment.     Lisa Macedo

## 2017-07-13 NOTE — PROGRESS NOTES
1. Have you been to the ER, urgent care clinic since your last visit? Hospitalized since your last visit? No    2. Have you seen or consulted any other health care providers outside of the [address] [place] [address] since your last visit? Include any pap smears or colon screening.  No

## 2017-07-15 NOTE — PROGRESS NOTES
The patient presents to the office today with the chief complaint of dyspnea    HPI    The patient persists with dyspnea. PFT reveal a decrease in diffusion capacity. The patient remains on anticoagulation due to atrial fibrillation - status post AV node ablation. The patient remains on B12 replacement      Review of Systems   Respiratory: Positive for shortness of breath. Negative for cough. Cardiovascular: Negative for leg swelling. Musculoskeletal: Positive for neck pain. Allergies   Allergen Reactions    Zetia [Ezetimibe] Other (comments)     Back pain resolved off Zetia    Lipitor [Atorvastatin] Myalgia    Livalo [Pitavastatin] Other (comments)     Extreme fatigue    5/29/14   PATIENT DENIES    Zocor [Simvastatin] Myalgia     5/29/14 PATIENT DENIES        Current Outpatient Prescriptions   Medication Sig Dispense Refill    spironolactone (ALDACTONE) 25 mg tablet Take 1 Tab by mouth daily. 30 Tab 3    cyanocobalamin (VITAMIN B12) 1,000 mcg/mL injection inject 1ml into the muscle monthly 1 mL 5    metoprolol tartrate (LOPRESSOR) 50 mg tablet Take 1 Tab by mouth two (2) times a day. 180 Tab 3    atorvastatin (LIPITOR) 10 mg tablet Take 1 Tab by mouth daily. 90 Tab 3    clobetasol (TEMOVATE) 0.05 % topical cream Apply  to affected area two (2) times a day.  white petrolatum-mineral oil (ABSORBASE) oint Apply  to affected area as needed.  diclofenac (VOLTAREN) 1 % gel Apply 4 g to affected area four (4) times daily. 1 Each 0    furosemide (LASIX) 40 mg tablet TAKE 1 TABLET DAILY 90 Tab 3    warfarin (COUMADIN) 5 mg tablet 1 tablet daily 30 Tab 6    pantoprazole (PROTONIX) 40 mg tablet 1 tablet each AM 90 Tab 3    loratadine (CLARITIN) 10 mg tablet 1 tablet each evening 90 Tab 0    AZOPT 1 % ophthalmic suspension Administer 1 Drop to both eyes two (2) times a day.   3    triamcinolone acetonide (KENALOG) 0.1 % topical cream Apply  to affected area two (2) times daily as needed. use thin layer       TIMOLOL (BETIMOL OP) Apply 1 Drop to eye two (2) times a day.  multivitamin (ONE A DAY) tablet Take 1 Tab by mouth daily. Past Medical History:   Diagnosis Date    AICD (automatic cardioverter/defibrillator) present     Medtronic  upgrade from pacer in 2007 due to VT    Atrial fibrillation ([facility] [address]     Cardiac cath 03/19/2007    LM 25% ostial.  Otherwise patent coronaries. LVEDP 20.  EF 50%. Dyssynch. mid anterior contraction. Pacemaker.  Cardiac echocardiogram 03/20/2014    A-fib. EF 65-70%. No RWMA. No RWMA. Mild conc LVH. Mild RVE. RVSP 40-45 mmHg. Mild LAE.  HERNANDEZ. Mild MR. Mod TR.  Cardiac nuclear imaging test 07/29/2014    No ischemia or prior infarction. EF 68%. Nondiagnostic EKG due to V pacing on pharm stress test.  Low risk.  Cardioversion 8/31/2011    Successful defibrillation threshold testing at 18 joules. Patient also had conversion to atrial ventricular pacing.      CHF (congestive heart failure) (HCC)     Cobalamin deficiency     Dupuytren contracture 02/08/2010    on the right ring finger     Edema     Hypertension     Hypertrophy of prostate with urinary obstruction and other lower urinary tract symptoms (LUTS)     Impotence of organic origin     Intolerance of drug     Intolerant high doses of sotalol due to prolonged QT    Mastodynia     Paroxysmal VT ([facility]     Pure hypercholesterolemia     S/P ablation of atrial fibrillation 05/31/2011    S/P ablation of atrial fibrillation 12/18/2012    Dr. Latasha Brooks at [address] [facility] S/P ablation operation for arrhythmia     VT ablation by Dr. Aletha Lamb near [address] [place] [address] 2/3007    Sick sinus syndrome [facility])        Past Surgical History:   Procedure Laterality Date    HX AFIB ABLATION  12/18/2012    [facility] by Dr. Mendoza [place]  02/08-03/08    bilateral cataract removed    HX CATARACT REMOVAL  2/2008 & 3/2008    bilateral    HX ORTHOPAEDIC  2/8/10 release of Dupuytren's contraction on ring finger of right hand    HX OTHER SURGICAL  6/2000    atrial lead placement    HX OTHER SURGICAL  6/5/2009    Cardioversion    HX OTHER SURGICAL  10/12/2012    cardioversion    HX PACEMAKER  1998    placement    HX PACEMAKER  6/2000    DDD    HX PACEMAKER  3/27/07    removal of pacemaker & placement of dual chamber defib generator and leads    HX TONSIL AND ADENOIDECTOMY         Social History     Social History    Marital status:      Spouse name: N/A    Number of children: N/A    Years of education: N/A     Occupational History    Not on file. Social History Main Topics    Smoking status: Never Smoker    Smokeless tobacco: Never Used    Alcohol use Yes      Comment: 2-3 glasses of white wine frequently    Drug use: No    Sexual activity: Not Currently     Partners: Female     Other Topics Concern    Not on file     Social History Narrative       Patient does have an advanced directive on file    Visit Vitals    /60 (BP 1 Location: Left arm, BP Patient Position: Sitting)    Pulse 70    Temp 98.2 °F (36.8 °C) (Tympanic)    Resp 16    Ht 6' (1.829 m)    Wt 217 lb (98.4 kg)    SpO2 96%    BMI 29.43 kg/m2       Physical Exam   No Cervical Lymphadenopathy  No Supraclavicular Lymphadenopathy  Thyroid is Normal  Lungs are clear to ausculation and percussion  Heart:  S1 S2 are normal, No gallops, No mummers  No Carotid Bruits  Abdomen:  Normal Bowel Sounds. No tenderness. No masses. No Hepatomegaly or Splenomegly  LE:  Strong Pedal Pulses. No Edema      BMI:  I have reviewed/discussed the above normal BMI with the patient. I have recommended the following interventions: dietary management education, guidance, and counseling . Jayme Rodriguez         Telephone on 07/06/2017   Component Date Value Ref Range Status    INR, External 07/06/2017 1.7   Final   Telephone Anticoag on 06/28/2017   Component Date Value Ref Range Status    INR, External 06/28/2017 2.3  2.0 - 3.0 Final   Telephone Anticoag on 06/22/2017   Component Date Value Ref Range Status    INR, External 06/22/2017 2.6   Final   Hospital Outpatient Visit on 06/15/2017   Component Date Value Ref Range Status    WBC 06/15/2017 7.8  4.6 - 13.2 K/uL Final    RBC 06/15/2017 4.69* 4.70 - 5.50 M/uL Final    HGB 06/15/2017 15.1  13.0 - 16.0 g/dL Final    HCT 06/15/2017 44.9  36.0 - 48.0 % Final    MCV 06/15/2017 95.7  74.0 - 97.0 FL Final    MCH 06/15/2017 32.2  24.0 - 34.0 PG Final    MCHC 06/15/2017 33.6  31.0 - 37.0 g/dL Final    RDW 06/15/2017 13.7  11.6 - 14.5 % Final    PLATELET 69/43/7329 804  135 - 420 K/uL Final    MPV 06/15/2017 11.9* 9.2 - 11.8 FL Final    NEUTROPHILS 06/15/2017 56  40 - 73 % Final    LYMPHOCYTES 06/15/2017 28  21 - 52 % Final    MONOCYTES 06/15/2017 13* 3 - 10 % Final    EOSINOPHILS 06/15/2017 2  0 - 5 % Final    BASOPHILS 06/15/2017 1  0 - 2 % Final    ABS. NEUTROPHILS 06/15/2017 4.4  1.8 - 8.0 K/UL Final    ABS. LYMPHOCYTES 06/15/2017 2.2  0.9 - 3.6 K/UL Final    ABS. MONOCYTES 06/15/2017 1.0  0.05 - 1.2 K/UL Final    ABS. EOSINOPHILS 06/15/2017 0.2  0.0 - 0.4 K/UL Final    ABS.  BASOPHILS 06/15/2017 0.0  0.0 - 0.06 K/UL Final    DF 06/15/2017 AUTOMATED    Final    Sodium 06/15/2017 135* 136 - 145 mmol/L Final    Potassium 06/15/2017 4.1  3.5 - 5.5 mmol/L Final    Chloride 06/15/2017 100  100 - 108 mmol/L Final    CO2 06/15/2017 27  21 - 32 mmol/L Final    Anion gap 06/15/2017 8  3.0 - 18 mmol/L Final    Glucose 06/15/2017 75  74 - 99 mg/dL Final    BUN 06/15/2017 14  7.0 - 18 MG/DL Final    Creatinine 06/15/2017 0.97  0.6 - 1.3 MG/DL Final    BUN/Creatinine ratio 06/15/2017 14  12 - 20   Final    GFR est AA 06/15/2017 >60  >60 ml/min/1.73m2 Final    GFR est non-AA 06/15/2017 >60  >60 ml/min/1.73m2 Final    Comment: (NOTE)  Estimated GFR is calculated using the Modification of Diet in Renal   Disease (MDRD) Study equation, reported for both  Americans   (GFRAA) and non- Americans (GFRNA), and normalized to 1.73m2   body surface area. The physician must decide which value applies to   the patient. The MDRD study equation should only be used in   individuals age 25 or older. It has not been validated for the   following: pregnant women, patients with serious comorbid conditions,   or on certain medications, or persons with extremes of body size,   muscle mass, or nutritional status.  Calcium 06/15/2017 9.1  8.5 - 10.1 MG/DL Final    Bilirubin, total 06/15/2017 0.7  0.2 - 1.0 MG/DL Final    ALT (SGPT) 06/15/2017 25  16 - 61 U/L Final    AST (SGOT) 06/15/2017 20  15 - 37 U/L Final    Alk. phosphatase 06/15/2017 84  45 - 117 U/L Final    Protein, total 06/15/2017 7.7  6.4 - 8.2 g/dL Final    Albumin 06/15/2017 3.9  3.4 - 5.0 g/dL Final    Globulin 06/15/2017 3.8  2.0 - 4.0 g/dL Final    A-G Ratio 06/15/2017 1.0  0.8 - 1.7   Final   Telephone Anticoag on 06/15/2017   Component Date Value Ref Range Status    INR, External 06/14/2017 2.4   Final   Hospital Outpatient Visit on 06/12/2017   Component Date Value Ref Range Status    Test indication 06/12/2017 Dyspnea on Exertion   Preliminary    Functional capacity 06/12/2017 Could Not Be Adequately Assessed   Preliminary    ECG Interp. Before Exercise 06/12/2017 Paced rhythm   Preliminary    ECG Interp. During Exercise 06/12/2017 none   Preliminary    Max. Systolic BP 95/05/9357 557  mmHg Preliminary    Max. Diastolic BP 97/05/6754 60  mmHg Preliminary    Max.  Heart rate 06/12/2017 74  BPM Preliminary    Peak Ex METs 06/12/2017 1.0  METS Preliminary    Protocol [facility] 06/12/2017 [facility]           Preliminary   Telephone Anticoag on 06/08/2017   Component Date Value Ref Range Status    INR, External 06/07/2017 2.3  2.0 - 3.0 Final   Telephone Anticoag on 05/24/2017   Component Date Value Ref Range Status    INR, External 05/24/2017 2.8  2.0 - 3.0 Final   Telephone Anticoag on 05/10/2017   Component Date Value Ref Range Status    INR, External 05/10/2017 5.2* 2.0 - 3.0 Final   Telephone Anticoag on 05/04/2017   Component Date Value Ref Range Status    INR, External 05/03/2017 2.8  2.0 - 3.0 Final   There may be more visits with results that are not included. .No results found for any visits on 07/13/17. Assessment / Plan      ICD-10-CM ICD-9-CM    1. Dyspnea on exertion R06.09 786.09    2. B12 deficiency E53.8 266.2 METHYLMALONIC ACID   3. Atrial fibrillation, unspecified type [facility]) I48.91 427.31      Labs  he was advised to continue his maintenance medications  Consider Pulmonary    Follow-up Disposition:  Return in about 4 months (around 11/13/2017). I asked Ronald Dasilva if he has any questions and I answered the questions.   Ronald Dasilva states that he understands the treatment plan and agrees with the treatment plan

## 2017-07-17 LAB — METHYLMALONATE SERPL-SCNC: 387 NMOL/L (ref 0–378)

## 2017-07-23 RX ORDER — CYANOCOBALAMIN 1000 UG/ML
INJECTION, SOLUTION INTRAMUSCULAR; SUBCUTANEOUS
Qty: 1 ML | Refills: 0 | Status: SHIPPED | OUTPATIENT
Start: 2017-07-23 | End: 2017-09-21 | Stop reason: SDUPTHER

## 2017-07-26 ENCOUNTER — TELEPHONE ANTICOAG (OUTPATIENT)
Dept: INTERNAL MEDICINE CLINIC | Age: 77
End: 2017-07-26

## 2017-07-26 DIAGNOSIS — I48.91 ATRIAL FIBRILLATION, UNSPECIFIED TYPE (HCC): ICD-10-CM

## 2017-07-26 LAB — INR, EXTERNAL: 3.6 (ref 2–3)

## 2017-07-27 ENCOUNTER — TELEPHONE (OUTPATIENT)
Dept: CARDIOLOGY CLINIC | Age: 77
End: 2017-07-27

## 2017-07-27 NOTE — TELEPHONE ENCOUNTER
Patient called stating symptoms of SOB has worsen and concerned, denies CP, dizziness. Would like to speak with doctor regarding symptoms. Patient is scheduled to see Dr. Nba Carrion tomorrow for further evaluation.

## 2017-07-28 ENCOUNTER — OFFICE VISIT (OUTPATIENT)
Dept: CARDIOLOGY CLINIC | Age: 77
End: 2017-07-28

## 2017-07-28 VITALS
OXYGEN SATURATION: 97 % | DIASTOLIC BLOOD PRESSURE: 72 MMHG | BODY MASS INDEX: 28.71 KG/M2 | SYSTOLIC BLOOD PRESSURE: 110 MMHG | HEIGHT: 72 IN | HEART RATE: 71 BPM | WEIGHT: 212 LBS

## 2017-07-28 DIAGNOSIS — R06.02 SOB (SHORTNESS OF BREATH): Primary | ICD-10-CM

## 2017-07-28 RX ORDER — AMLODIPINE BESYLATE 5 MG/1
TABLET ORAL
COMMUNITY
Start: 2017-07-23 | End: 2018-04-24 | Stop reason: SDUPTHER

## 2017-07-28 NOTE — MR AVS SNAPSHOT
Visit Information Date & Time Provider Department Dept. Phone Encounter #  
 7/28/2017  2:20 PM Dianne Sellers MD [facility] [address] [id number] Your Appointments 9/7/2017 10:00 AM  
Follow Up with Lyla Padilla MD  
[address]) Appt Note: 2mo  
 [address] [place] [address]  
  
   
 [address] [place] [address]  
  
    
 10/11/2017  3:40 PM  
CARELINK with Mona  [facility] [address] (Tiana Grewal) Appt Note: [address] [address] [phone] [address] [place] [address] Upcoming Health Maintenance Date Due ZOSTER VACCINE AGE 60> 1/16/2002 GLAUCOMA SCREENING Q2Y 3/16/2007 INFLUENZA AGE 9 TO ADULT 8/1/2017 MEDICARE YEARLY EXAM 4/13/2018 DTaP/Tdap/Td series (2 - Td) 4/12/2027 Allergies as of 7/28/2017  Review Complete On: 7/28/2017 By: Johann Veliz Severity Noted Reaction Type Reactions Zetia [Ezetimibe] Medium 10/25/2016   Side Effect Other (comments) Back pain resolved off Zetia Lipitor [Atorvastatin]  10/12/2015    Myalgia Livalo [Pitavastatin]  09/17/2013    Other (comments) Extreme fatigue 5/29/14   PATIENT DENIES Zocor [Simvastatin]  09/17/2013    Myalgia 5/29/14 PATIENT DENIES Current Immunizations  Reviewed on 7/12/2017 Name Date Influenza High Dose Vaccine PF 10/1/2016 Pneumococcal Conjugate (PCV-13) 4/12/2017 Pneumococcal Polysaccharide (PPSV-23) 10/30/2009 Not reviewed this visit You Were Diagnosed With   
  
 Codes Comments SOB (shortness of breath)    -  Primary ICD-10-CM: R06.02 
ICD-9-CM: 786.05 Vitals BP Pulse Height(growth percentile) Weight(growth percentile) SpO2 BMI 110/72 (BP 1 Location: Left arm, BP Patient Position: Sitting) 71 6' (1.829 m) 212 lb (96.2 kg) 97% 28.75 kg/m2 Smoking Status Never Smoker Vitals History BMI and BSA Data Body Mass Index Body Surface Area 28.75 kg/m 2 2.21 m 2 Preferred Pharmacy Pharmacy Name Phone [id number], [address] [place] [address] Andriy Hernandez. [phone] Your Updated Medication List  
  
   
This list is accurate as of: 7/28/17  3:17 PM.  Always use your most recent med list.  
  
  
  
  
 Shakeel Precious Generic drug:  white petrolatum-mineral oil Apply  to affected area as needed. amLODIPine 5 mg tablet Commonly known as:  NORVASC  
  
 atorvastatin 10 mg tablet Commonly known as:  LIPITOR Take 1 Tab by mouth daily. AZOPT 1 % ophthalmic suspension Generic drug:  brinzolamide Administer 1 Drop to both eyes two (2) times a day. BETIMOL OP Apply 1 Drop to eye two (2) times a day. clobetasol 0.05 % topical cream  
Commonly known as:  Anice Plough Apply  to affected area two (2) times a day. * cyanocobalamin 1,000 mcg/mL injection Commonly known as:  VITAMIN B12  
inject 1ml into the muscle monthly * cyanocobalamin 1,000 mcg/mL injection Commonly known as:  VITAMIN B12 INJECT 1ML INTO THE MUSCLE ONCE A MONTH  
  
 diclofenac 1 % Gel Commonly known as:  VOLTAREN Apply 4 g to affected area four (4) times daily. furosemide 40 mg tablet Commonly known as:  LASIX TAKE 1 TABLET DAILY  
  
 loratadine 10 mg tablet Commonly known as:  CLARITIN  
1 tablet each evening  
  
 metoprolol tartrate 50 mg tablet Commonly known as:  LOPRESSOR Take 1 Tab by mouth two (2) times a day. multivitamin tablet Commonly known as:  ONE A DAY Take 1 Tab by mouth daily. pantoprazole 40 mg tablet Commonly known as:  PROTONIX  
1 tablet each AM  
  
 spironolactone 25 mg tablet Commonly known as:  ALDACTONE Take 1 Tab by mouth daily. triamcinolone acetonide 0.1 % topical cream  
Commonly known as:  KENALOG Apply  to affected area two (2) times daily as needed. use thin layer  
  
 warfarin 5 mg tablet Commonly known as:  COUMADIN  
1 tablet daily * Notice: This list has 2 medication(s) that are the same as other medications prescribed for you. Read the directions carefully, and ask your doctor or other care provider to review them with you. We Performed the Following AMB POC EKG ROUTINE W/ 12 LEADS, INTER & REP [30836 CPT(R)] Please provide this summary of care documentation to your next provider. Your primary care clinician is listed as Avery Payne. If you have any questions after today's visit, please call [phone].

## 2017-07-28 NOTE — PROGRESS NOTES
1. Have you been to the ER, urgent care clinic since your last visit? Hospitalized since your last visit? no  2. Have you seen or consulted any other health care providers outside of the [address] [place] [address] since your last visit? Include any pap smears or colon screening.   no

## 2017-08-02 LAB — INR, EXTERNAL: 2.3

## 2017-08-03 ENCOUNTER — TELEPHONE (OUTPATIENT)
Dept: INTERNAL MEDICINE CLINIC | Age: 77
End: 2017-08-03

## 2017-08-03 ENCOUNTER — TELEPHONE ANTICOAG (OUTPATIENT)
Dept: INTERNAL MEDICINE CLINIC | Age: 77
End: 2017-08-03

## 2017-08-03 DIAGNOSIS — I48.91 ATRIAL FIBRILLATION, UNSPECIFIED TYPE (HCC): ICD-10-CM

## 2017-08-03 NOTE — PROGRESS NOTES
Mr. Sydnee Herrmann is here today for anticoagulation monitoring for the diagnosis of Atrial Fibrillation. His INR goal is 2.0-3.0 and his current Coumadin dose is 5 mg daily    Today's findings include an INR of 2.3 (normal INR range 0.8-1.2)    Considering Mr. Madhav George past history, todays findings, and per the coumadin policy/protocol, Mr. Iwona Garcia was instructed to take Coumadin as follows,  Continue same dose . He was also instructed to schedule an appointment in 2 weeks prior to leaving for an INR check. A full discussion of the nature of anticoagulants has been carried out. A full discussion of the need for frequent and regular monitoring, precise dosage adjustment and compliance was stressed. Side effects of potential bleeding were discussed and Mr. Iwona Garcia was instructed to call [phone] if there are any signs of abnormal bleeding. Mr. Iwona Garcia was instructed to avoid any OTC items containing aspirin or ibuprofen and prior to starting any new OTC products to consult with his physician or pharmacist to ensure no drug interactions are present. Mr. Iwona Garcia was instructed to avoid any major changes in his general diet and to avoid alcohol consumption. .    Mr. Iwona Garcia verbalized his understanding of all instructions and will call the office with any questions, concerns, or signs of abnormal bleeding or blood clot.

## 2017-08-03 NOTE — TELEPHONE ENCOUNTER
Patient called, left message that INR came back WNL continue same dose of coumadin and recheck in 2 weeks

## 2017-08-10 ENCOUNTER — TELEPHONE (OUTPATIENT)
Dept: INTERNAL MEDICINE CLINIC | Age: 77
End: 2017-08-10

## 2017-08-10 DIAGNOSIS — R06.02 SHORTNESS OF BREATH: Primary | ICD-10-CM

## 2017-08-11 NOTE — TELEPHONE ENCOUNTER
Requested Prescriptions     Pending Prescriptions Disp Refills    spironolactone (ALDACTONE) 25 mg tablet 90 Tab 3     Sig: Take 1 Tab by mouth daily.

## 2017-08-12 RX ORDER — SPIRONOLACTONE 25 MG/1
25 TABLET ORAL DAILY
Qty: 90 TAB | Refills: 3 | Status: SHIPPED | OUTPATIENT
Start: 2017-08-12 | End: 2019-02-11 | Stop reason: ALTCHOICE

## 2017-08-16 LAB — INR, EXTERNAL: 3.4

## 2017-08-17 ENCOUNTER — TELEPHONE ANTICOAG (OUTPATIENT)
Dept: INTERNAL MEDICINE CLINIC | Age: 77
End: 2017-08-17

## 2017-08-17 DIAGNOSIS — I48.91 ATRIAL FIBRILLATION, UNSPECIFIED TYPE (HCC): ICD-10-CM

## 2017-08-23 LAB — INR, EXTERNAL: 2.3

## 2017-08-24 ENCOUNTER — TELEPHONE ANTICOAG (OUTPATIENT)
Dept: INTERNAL MEDICINE CLINIC | Age: 77
End: 2017-08-24

## 2017-08-24 DIAGNOSIS — I48.91 ATRIAL FIBRILLATION, UNSPECIFIED TYPE (HCC): ICD-10-CM

## 2017-08-30 LAB — INR, EXTERNAL: 2.3

## 2017-09-06 ENCOUNTER — TELEPHONE ANTICOAG (OUTPATIENT)
Dept: INTERNAL MEDICINE CLINIC | Age: 77
End: 2017-09-06

## 2017-09-06 DIAGNOSIS — I48.91 ATRIAL FIBRILLATION, UNSPECIFIED TYPE (HCC): ICD-10-CM

## 2017-09-06 LAB — INR, EXTERNAL: 2.8 (ref 2–3)

## 2017-09-12 ENCOUNTER — OFFICE VISIT (OUTPATIENT)
Dept: PULMONOLOGY | Age: 77
End: 2017-09-12

## 2017-09-12 VITALS
BODY MASS INDEX: 28.99 KG/M2 | DIASTOLIC BLOOD PRESSURE: 72 MMHG | OXYGEN SATURATION: 96 % | WEIGHT: 214 LBS | SYSTOLIC BLOOD PRESSURE: 112 MMHG | HEART RATE: 71 BPM | HEIGHT: 72 IN | RESPIRATION RATE: 16 BRPM | TEMPERATURE: 97.4 F

## 2017-09-12 DIAGNOSIS — R06.02 SOB (SHORTNESS OF BREATH) ON EXERTION: Primary | ICD-10-CM

## 2017-09-12 NOTE — PROGRESS NOTES
[facility]    [address] [place], [place] [id number]      SIMPLE PULMONARY STRESS TEST - 6 MINUTE WALK    PATIENT NAME: Sweetie Rosso: 9/12/2017     YOB: 1940     AGE: 78 y.o. DIAGNOSIS:   Encounter Diagnosis   Name Primary?  SOB (shortness of breath) on exertion Yes         TECHNICIAN: Reyes Richardson RN         PHYSICIAN:  Ksenia Conley MD      Visit Vitals    /72 (BP 1 Location: Left arm, BP Patient Position: Sitting)    Pulse 71    Temp 97.4 °F (36.3 °C) (Oral)    Resp 16    Ht 6' (1.829 m)    Wt 97.1 kg (214 lb)    SpO2 96%    BMI 29.02 kg/m2        RESTING DATA:  Dyspnea Scale (1-10):    1 SOB    EXERCISE DATA:   6 MINUTE WALK - HALLWAY (34 METERS)      1   RA    97 % SAT   109 HR   1 SOB    1.5 Laps x 34m = 51m  2   RA    97 % SAT   111 HR   1 SOB    1.5 Laps x 34m = 51m  3   RA    97 % SAT   112 HR   1 SOB    1.5 Laps x 34m = 51m  4   RA    96 % SAT   111 HR   1 SOB    1.5 Laps x 34m = 51m  5   RA    96 % SAT   111 HR   1 SOB    1.5 Laps x 34m = 51m  6   RA    97 % SAT   111 HR   1 SOB    1.5 Laps x 34m = 51m    TOTAL DISTANCE:   306M    RECOVERY DATA:      1   RA    97 % SAT   97 HR   20 RR   140/91 BP   1 SOB  2   RA    94 % SAT   86 HR   16 RR   118/78 BP   1 SOB      TECHNICIAN COMMENTS: tolerated walk without symptoms of SOB or chest pain.

## 2017-09-12 NOTE — MR AVS SNAPSHOT
Visit Information Date & Time Provider Department Dept. Phone Encounter #  
 9/12/2017  2:30 PM [facility] [id number] Follow-up Instructions Return in about 2 months (around 11/12/2017). Your Appointments 9/21/2017  9:30 AM  
Follow Up with Ethan Dick MD  
[facility] [place] [address]) Appt Note: f/u  
 [address], [address] [address]  
  
   
 [address], [address] [place] [address]  
  
    
 10/11/2017  3:40 PM  
CARELINK with Mona Copeland [facility] ([address] [place] [address]) Appt Note: [address] [address]  
[phone] [address] [facility] [address] [address]  
  
    
 12/13/2017 12:40 PM  
Follow Up with Shannon Erazo DO [facility] ([address] [place] [address] Road) Appt Note: 6 month anyday but Tues around [address] [address] [phone] [address] [facility] [address] Upcoming Health Maintenance Date Due ZOSTER VACCINE AGE 60> 1/16/2002 INFLUENZA AGE 9 TO ADULT 10/12/2017* MEDICARE YEARLY EXAM 4/13/2018 GLAUCOMA SCREENING Q2Y 8/16/2019 DTaP/Tdap/Td series (2 - Td) 4/12/2027 *Topic was postponed. The date shown is not the original due date. Allergies as of 9/12/2017  Review Complete On: 9/12/2017 By: Lupe Will MD  
  
 Severity Noted Reaction Type Reactions Zetia [Ezetimibe] Medium 10/25/2016   Side Effect Other (comments) Back pain resolved off Zetia Lipitor [Atorvastatin]  10/12/2015    Myalgia Livalo [Pitavastatin]  09/17/2013    Other (comments) Extreme fatigue 5/29/14   PATIENT DENIES Zocor [Simvastatin]  09/17/2013    Myalgia 5/29/14 PATIENT DENIES Current Immunizations  Reviewed on 9/5/2017 Name Date Influenza High Dose Vaccine PF 10/1/2016 Pneumococcal Conjugate (PCV-13) 4/12/2017 Pneumococcal Polysaccharide (PPSV-23) 10/30/2009 Not reviewed this visit You Were Diagnosed With   
  
 Codes Comments SOB (shortness of breath) on exertion    -  Primary ICD-10-CM: R06.02 
ICD-9-CM: 786.05 Vitals BP Pulse Temp Resp Height(growth percentile) Weight(growth percentile) 112/72 (BP 1 Location: Left arm, BP Patient Position: Sitting) 71 97.4 °F (36.3 °C) (Oral) 16 6' (1.829 m) 214 lb (97.1 kg) SpO2 BMI Smoking Status 96% 29.02 kg/m2 Never Smoker Vitals History BMI and BSA Data Body Mass Index Body Surface Area  
 29.02 kg/m 2 2.22 m 2 Preferred Pharmacy Pharmacy Name Phone [address] [facility] [phone] Your Updated Medication List  
  
   
This list is accurate as of: 9/12/17  3:47 PM.  Always use your most recent med list.  
  
  
  
  
 Sruthi Fernando Generic drug:  white petrolatum-mineral oil Apply  to affected area as needed. amLODIPine 5 mg tablet Commonly known as:  NORVASC  
  
 atorvastatin 10 mg tablet Commonly known as:  LIPITOR Take 1 Tab by mouth daily. AZOPT 1 % ophthalmic suspension Generic drug:  brinzolamide Administer 1 Drop to both eyes two (2) times a day. BETIMOL OP Apply 1 Drop to eye two (2) times a day. clobetasol 0.05 % topical cream  
Commonly known as:  Mae Cocking Apply  to affected area two (2) times a day. * cyanocobalamin 1,000 mcg/mL injection Commonly known as:  VITAMIN B12  
inject 1ml into the muscle monthly * cyanocobalamin 1,000 mcg/mL injection Commonly known as:  VITAMIN B12 INJECT 1ML INTO THE MUSCLE ONCE A MONTH  
  
 diclofenac 1 % Gel Commonly known as:  VOLTAREN Apply 4 g to affected area four (4) times daily. furosemide 40 mg tablet Commonly known as:  LASIX TAKE 1 TABLET DAILY  
  
 loratadine 10 mg tablet Commonly known as:  CLARITIN  
1 tablet each evening  
  
 metoprolol tartrate 50 mg tablet Commonly known as:  LOPRESSOR Take 1 Tab by mouth two (2) times a day. multivitamin tablet Commonly known as:  ONE A DAY Take 1 Tab by mouth daily. pantoprazole 40 mg tablet Commonly known as:  PROTONIX  
1 tablet each AM  
  
 spironolactone 25 mg tablet Commonly known as:  ALDACTONE Take 1 Tab by mouth daily. warfarin 5 mg tablet Commonly known as:  COUMADIN  
1 tablet daily * Notice: This list has 2 medication(s) that are the same as other medications prescribed for you. Read the directions carefully, and ask your doctor or other care provider to review them with you. Follow-up Instructions Return in about 2 months (around 11/12/2017). To-Do List   
 09/12/2017 ECHO:  2D ECHO COMPLETE ADULT (TTE) W OR WO CONTR Please provide this summary of care documentation to your next provider. Your primary care clinician is listed as Doris Bhat. If you have any questions after today's visit, please call [phone].

## 2017-09-12 NOTE — PROGRESS NOTES
[facility]  Pulmonary, Critical Care, and Sleep Medicine      Pulmonary Office Initial Consultation    Name: Mayer Cockayne     : 1940     Date: 2017        Subjective:   Patient has been referred for evaluation of: SOB on exertion    Patient is a 78 y.o. male who has experienced increased SOB with certain activities over the past year. Climbing stairs, carrying weights are noticeable. He mentions having swelling of Lower extremities off and on. Denies any other changes. He was told that his timolol could be causing some setback so he had Dr. Velazquez Backbone change the medications. After stopping timolol and having some adjustments made on his pacemaker he feels better. He denies any chest pain, cough. Denies fever, chills, sinus congestion. Denies any Sleep related issues. Wakes up fresh, no headaches. SOB:   Symptoms occur on exertion. Cannot climb stairs. Activities of daily living not affected  Denies orthopnea/ paroxysmal nocturnal dyspnea  SOB relieved after medications. No Associated wheezing, chest pain, fever, chills, night sweats dyspepsia, reflux. Treatment so far- Pacemaker adjustment and discontinuing timolol  Imaging studies- CXR  Other testing- PFT's  Comorbid conditions include- HTN, Atrial fibrillation  Nonsmoker  Occupational exposure-none to any inhalation toxins. Retired . Past Medical History:   Diagnosis Date    AICD (automatic cardioverter/defibrillator) present     Medtronic  upgrade from pacer in  due to VT    Atrial fibrillation ([facility] [address]     Cardiac cath 2007    LM 25% ostial.  Otherwise patent coronaries. LVEDP 20.  EF 50%. Dyssynch. mid anterior contraction. Pacemaker.  Cardiac echocardiogram 2014    A-fib. EF 65-70%. No RWMA. No RWMA. Mild conc LVH. Mild RVE. RVSP 40-45 mmHg. Mild LAE.  HERNANDEZ. Mild MR. Mod TR.  Cardiac nuclear imaging test 2014    No ischemia or prior infarction. EF 68%. Nondiagnostic EKG due to V pacing on pharm stress test.  Low risk.  Cardioversion 8/31/2011    Successful defibrillation threshold testing at 18 joules. Patient also had conversion to atrial ventricular pacing.      CHF (congestive heart failure) (HCC)     Cobalamin deficiency     Dupuytren contracture 02/08/2010    on the right ring finger     Edema     Hypertension     Hypertrophy of prostate with urinary obstruction and other lower urinary tract symptoms (LUTS)     Impotence of organic origin     Intolerance of drug     Intolerant high doses of sotalol due to prolonged QT    Mastodynia     Paroxysmal VT ([address]     Pure hypercholesterolemia     S/P ablation of atrial fibrillation 05/31/2011    S/P ablation of atrial fibrillation 12/18/2012    Dr. Mima Barajas at [facility] S/P ablation operation for arrhythmia     VT ablation by Dr. Ck Smith near [address] [place] [address] 2/3007    Sick sinus syndrome [facility])        Past Surgical History:   Procedure Laterality Date    HX AFIB ABLATION  12/18/2012    [place] by Dr. Shanel Landry  02/08-03/08    bilateral cataract removed    HX CATARACT REMOVAL  2/2008 & 3/2008    bilateral    HX ORTHOPAEDIC  2/8/10    release of Dupuytren's contraction on ring finger of right hand    HX OTHER SURGICAL  6/2000    atrial lead placement    HX OTHER SURGICAL  6/5/2009    Cardioversion    HX OTHER SURGICAL  10/12/2012    cardioversion    HX PACEMAKER  1998    placement    HX PACEMAKER  6/2000    DDD    HX PACEMAKER  3/27/07    removal of pacemaker & placement of dual chamber defib generator and leads    HX TONSIL AND ADENOIDECTOMY         Social History     Social History    Marital status:      Spouse name: N/A    Number of children: N/A    Years of education: N/A     Social History Main Topics    Smoking status: Never Smoker    Smokeless tobacco: Never Used    Alcohol use Yes      Comment: 2-3 glasses of white wine frequently    Drug use: No    Sexual activity: Not Currently     Partners: Female     Other Topics Concern    None     Social History Narrative       Family History   Problem Relation Age of Onset    No Known Problems Mother     COPD Father     Hypertension Other        Allergies   Allergen Reactions    Zetia [Ezetimibe] Other (comments)     Back pain resolved off Zetia    Lipitor [Atorvastatin] Myalgia    Livalo [Pitavastatin] Other (comments)     Extreme fatigue    5/29/14   PATIENT DENIES    Zocor [Simvastatin] Myalgia     5/29/14 PATIENT DENIES      Current Outpatient Prescriptions   Medication Sig Dispense Refill    spironolactone (ALDACTONE) 25 mg tablet Take 1 Tab by mouth daily. 90 Tab 3    amLODIPine (NORVASC) 5 mg tablet       metoprolol tartrate (LOPRESSOR) 50 mg tablet Take 1 Tab by mouth two (2) times a day. 180 Tab 3    atorvastatin (LIPITOR) 10 mg tablet Take 1 Tab by mouth daily. 90 Tab 3    clobetasol (TEMOVATE) 0.05 % topical cream Apply  to affected area two (2) times a day.  furosemide (LASIX) 40 mg tablet TAKE 1 TABLET DAILY 90 Tab 3    warfarin (COUMADIN) 5 mg tablet 1 tablet daily 30 Tab 6    pantoprazole (PROTONIX) 40 mg tablet 1 tablet each AM 90 Tab 3    loratadine (CLARITIN) 10 mg tablet 1 tablet each evening 90 Tab 0    AZOPT 1 % ophthalmic suspension Administer 1 Drop to both eyes two (2) times a day. 3    TIMOLOL (BETIMOL OP) Apply 1 Drop to eye two (2) times a day.  multivitamin (ONE A DAY) tablet Take 1 Tab by mouth daily.  cyanocobalamin (VITAMIN B12) 1,000 mcg/mL injection INJECT 1ML INTO THE MUSCLE ONCE A MONTH 1 mL 0    cyanocobalamin (VITAMIN B12) 1,000 mcg/mL injection inject 1ml into the muscle monthly 1 mL 5    white petrolatum-mineral oil (ABSORBASE) oint Apply  to affected area as needed.  diclofenac (VOLTAREN) 1 % gel Apply 4 g to affected area four (4) times daily.  1 Each 0         Review of Systems:  HEENT: No epistaxis, no nasal drainage, no difficulty in swallowing, no redness in eyes  Respiratory: as above  Cardiovascular: no chest pain, no palpitations, no chronic leg edema, no syncope  Gastrointestinal: no abd pain, no vomiting, no diarrhea, no bleeding symptoms  Genitourinary: No urinary symptoms or hematuria  Integument/breast: No ulcers or rashes  Musculoskeletal:Neg  Neurological: No focal weakness, no seizures, no headaches  Behvioral/Psych: No anxiety, no depression  Constitutional: No fever, no chills, no weight loss, no night sweats     Objective:     Visit Vitals    /72 (BP 1 Location: Left arm, BP Patient Position: Sitting)    Pulse 71    Temp 97.4 °F (36.3 °C) (Oral)    Resp 16    Ht 6' (1.829 m)    Wt 97.1 kg (214 lb)    SpO2 96%    BMI 29.02 kg/m2        Physical Exam:   General: comfortable, no acute distress  HEENT: pupils reactive, sclera anicteric, EOM intact  Neck: No adenopathy or thyroid swelling, no lymphadenopathy or JVD, supple  CVS: S1S2 no murmurs  RS: Mod AE bilaterally, no tactile fremitus or egophony, no accessory muscle use  Abd: soft, non tender, no hepatosplenomegaly  Neuro: non focal, awake, alert  Extrm: no leg edema, clubbing or cyanosis  Skin: no rash    Data review:   Pertinent labs: CBC, BMP, LFT's      PFT:    Date FVC FEV1  FEV1/FVC MAX20-82 TLC RV RV/TLC VC DLCO   7/2017 80 84 76 97 81 81   65%                                           6 min walk test;completed in clinic today. No O2 desaturation or change in dyspnea index on level ground walking. HR increase- insignificant. [id number]  Left ventricle: Size was normal. Systolic function was normal by EF  (biplane method of disks). Ejection fraction was estimated in the range of  65 % to 70 %. There were no regional wall motion abnormalities. Wall  thickness was mildly increased. There was mild concentric hypertrophy. Right ventricle: The ventricle was mildly dilated. Systolic pressure was  moderately increased.  Estimated peak pressure was in the range of 40 mmHg  to 45 mmHg. A pacing wire was present. Left atrium: The atrium was mildly dilated. Right atrium: The atrium was dilated. Mitral valve: There was mild annular calcification. There was mild  regurgitation. Aortic valve: The valve was trileaflet. Leaflets exhibited mildly  increased thickness, mild calcification with increased echodensity on the  right and non coronary cusp, normal cuspal separation, and sclerosis. Tricuspid valve: There was moderate regurgitation. Pulmonary arteries: Systolic pressure was moderately increased. Imaging:  I have personally reviewed the patients radiographs and have reviewed the reports:  CXR 5/2017:  Lungs: Clear. Cardiac silhouette and mediastinal contours: Disconnected pacemaker leads  overlie the right hemithorax. Left-sided AICD/pacemaker with lead tips overlying  the RA and RV apex. Contours are within normal limits.   Pleural spaces: No pneumothorax or pleural effusion evident. Bones and soft tissues: Mild degenerative change at the thoracic spine.   Support lines/catheters: None.     Impression:    Stable chest with no acute cardiopulmonary findings.        Patient Active Problem List   Diagnosis Code    Essential hypertension I10    Atrial fibrillation ([facility]) I48.91    Dupuytren contracture M72.0    Medtronic AICD, upgrade from pacer in 2007 due to VT Z95.810    Paroxysmal VT ([address] I47.2    S/P ablation operation for arrhythmia Z98.890, Z86.79    Follow-up exam Z09    Impotence of organic origin N52.9    Hypertrophy of prostate with urinary obstruction and other lower urinary tract symptoms (LUTS) N40.1    Erectile dysfunction N52.9    Palpitations R00.2    Other dysphagia R13.19    Cobalamin deficiency E53.8    Edema R60.9    Pure hypercholesterolemia E78.00    Mastodynia N64.4    AICD at end of battery life Z36.200    CHF (congestive heart failure) ([facility]) I50.9    Bronchitis J40    B12 deficiency E53.8 IMPRESSION:   · Dyspnea on exertion- progressive symptoms over 1 year with improvement after Pacemaker adjustments and discontinuing Timolol. CXR- clear and PFT with isolated reduced DLCO- 65% predicted. Discussed d/d with patient . Has had Pulm HTN 40-45 mm Hg range on echo from 2014. c/o pedal edema off and on: ? Fluid retention and /or diastolic heart failure. VTE less likely as she is on anticoagulation. Cannot rule out Sleep apnea. · Atrial fibrillation  · HTN       RECOMMENDATIONS:   · Continue current treatment optimization- atrial fib rate control, watch salt and fluid intake, fine tune medications  · Will obtain 2-D echo to evaluate for any progression of Pulm HTN  · Will consider Sleep evaluation  · Discussed need to reintroduce physical activities to improve exercise tolerance and symptoms of SOB  · Will follow up      Health maintenance screens deferred to Primary care provider.      Balbir Nails MD

## 2017-09-13 ENCOUNTER — TELEPHONE ANTICOAG (OUTPATIENT)
Dept: INTERNAL MEDICINE CLINIC | Age: 77
End: 2017-09-13

## 2017-09-13 DIAGNOSIS — I48.91 ATRIAL FIBRILLATION, UNSPECIFIED TYPE (HCC): ICD-10-CM

## 2017-09-13 LAB — INR, EXTERNAL: 3.3 (ref 2–3)

## 2017-09-20 ENCOUNTER — TELEPHONE (OUTPATIENT)
Dept: INTERNAL MEDICINE CLINIC | Age: 77
End: 2017-09-20

## 2017-09-20 ENCOUNTER — HOSPITAL ENCOUNTER (OUTPATIENT)
Dept: NON INVASIVE DIAGNOSTICS | Age: 77
Discharge: HOME | End: 2017-09-20
Attending: INTERNAL MEDICINE
Payer: MEDICARE

## 2017-09-20 DIAGNOSIS — R06.02 SOB (SHORTNESS OF BREATH) ON EXERTION: ICD-10-CM

## 2017-09-20 DIAGNOSIS — I48.91 ATRIAL FIBRILLATION, UNSPECIFIED TYPE (HCC): ICD-10-CM

## 2017-09-20 LAB — INR, EXTERNAL: 4.1 (ref 2–3)

## 2017-09-20 PROCEDURE — 93306 TTE W/DOPPLER COMPLETE: CPT

## 2017-09-20 NOTE — PROGRESS NOTES
Completed echocardiogram. Report to follow. I removed the band off and placed in shred box.        JACE Hawley, RDCS

## 2017-09-21 ENCOUNTER — OFFICE VISIT (OUTPATIENT)
Dept: INTERNAL MEDICINE CLINIC | Age: 77
End: 2017-09-21

## 2017-09-21 VITALS
HEIGHT: 72 IN | SYSTOLIC BLOOD PRESSURE: 122 MMHG | WEIGHT: 214 LBS | OXYGEN SATURATION: 98 % | TEMPERATURE: 97.5 F | HEART RATE: 86 BPM | BODY MASS INDEX: 28.99 KG/M2 | RESPIRATION RATE: 16 BRPM | DIASTOLIC BLOOD PRESSURE: 76 MMHG

## 2017-09-21 DIAGNOSIS — E78.00 PURE HYPERCHOLESTEROLEMIA: ICD-10-CM

## 2017-09-21 DIAGNOSIS — I10 ESSENTIAL HYPERTENSION: Primary | ICD-10-CM

## 2017-09-21 DIAGNOSIS — I48.91 ATRIAL FIBRILLATION, UNSPECIFIED TYPE (HCC): ICD-10-CM

## 2017-09-21 DIAGNOSIS — E53.8 B12 DEFICIENCY: ICD-10-CM

## 2017-09-21 DIAGNOSIS — Z12.5 SCREENING FOR PROSTATE CANCER: ICD-10-CM

## 2017-09-21 DIAGNOSIS — Z23 ENCOUNTER FOR IMMUNIZATION: ICD-10-CM

## 2017-09-21 NOTE — PROGRESS NOTES
1. Have you been to the ER, urgent care clinic since your last visit? Hospitalized since your last visit? No    2. Have you seen or consulted any other health care providers outside of the [address] [place] [address] since your last visit? Include any pap smears or colon screening.  No

## 2017-09-21 NOTE — MR AVS SNAPSHOT
Visit Information Date & Time Provider Department Dept. Phone Encounter #  
 9/21/2017  9:30 AM Rupert Gonzalez MD [facility] INTERNAL MEDICINE OF [address] [place] [address] Drive [phone] [id number] Your Appointments 10/11/2017  3:40 PM  
CARELINK with Pacer Hv C[facility] ([address]) Appt Note: [address] [facility] [address]  
[phone] [address] [address]  
  
    
 12/13/2017 12:40 PM  
Follow Up with Charlena Schaumann, DO [facility] ([address]) Appt Note: 6 month anyday but Tues around [address] [facility] [address] [phone] [address] Upcoming Health Maintenance Date Due ZOSTER VACCINE AGE 60> 1/16/2002 MEDICARE YEARLY EXAM 4/13/2018 GLAUCOMA SCREENING Q2Y 8/16/2019 DTaP/Tdap/Td series (2 - Td) 4/12/2027 Allergies as of 9/21/2017  Review Complete On: 9/21/2017 By: Malorie Cross LPN Severity Noted Reaction Type Reactions Zetia [Ezetimibe] Medium 10/25/2016   Side Effect Other (comments) Back pain resolved off Zetia Lipitor [Atorvastatin]  10/12/2015    Myalgia Livalo [Pitavastatin]  09/17/2013    Other (comments) Extreme fatigue 5/29/14   PATIENT DENIES Zocor [Simvastatin]  09/17/2013    Myalgia 5/29/14 PATIENT DENIES Current Immunizations  Reviewed on 9/19/2017 Name Date Influenza High Dose Vaccine PF 9/21/2017 11:54 AM, 10/1/2016 Pneumococcal Conjugate (PCV-13) 4/12/2017 Pneumococcal Polysaccharide (PPSV-23) 10/30/2009, 6/9/2009 12:00 AM  
  
 Not reviewed this visit You Were Diagnosed With   
  
 Codes Comments Encounter for immunization     ICD-10-CM: U30 ICD-9-CM: V03.89 Vitals BP Pulse Temp Resp Height(growth percentile) 122/76 (BP 1 Location: Left arm, BP Patient Position: Sitting) 86 97.5 °F (36.4 °C) (Tympanic) 16 6' (1.829 m) Weight(growth percentile) SpO2 BMI Smoking Status 214 lb (97.1 kg) 98% 29.02 kg/m2 Never Smoker Vitals History BMI and BSA Data Body Mass Index Body Surface Area  
 29.02 kg/m 2 2.22 m 2 Preferred Pharmacy Pharmacy Name Phone [address] [facility] [phone] Your Updated Medication List  
  
   
This list is accurate as of: 9/21/17 12:08 PM.  Always use your most recent med list.  
  
  
  
  
 Marquise Rolbedo Generic drug:  white petrolatum-mineral oil Apply  to affected area as needed. amLODIPine 5 mg tablet Commonly known as:  NORVASC  
  
 atorvastatin 10 mg tablet Commonly known as:  LIPITOR Take 1 Tab by mouth daily. AZOPT 1 % ophthalmic suspension Generic drug:  brinzolamide Administer 1 Drop to both eyes two (2) times a day. clobetasol 0.05 % topical cream  
Commonly known as:  Juan Foil Apply  to affected area two (2) times a day. cyanocobalamin 1,000 mcg/mL injection Commonly known as:  VITAMIN B12  
inject 1ml into the muscle monthly  
  
 diclofenac 1 % Gel Commonly known as:  VOLTAREN Apply 4 g to affected area four (4) times daily. furosemide 40 mg tablet Commonly known as:  LASIX TAKE 1 TABLET DAILY  
  
 loratadine 10 mg tablet Commonly known as:  CLARITIN  
1 tablet each evening  
  
 metoprolol tartrate 50 mg tablet Commonly known as:  LOPRESSOR Take 1 Tab by mouth two (2) times a day. multivitamin tablet Commonly known as:  ONE A DAY Take 1 Tab by mouth daily. pantoprazole 40 mg tablet Commonly known as:  PROTONIX  
1 tablet each AM  
  
 spironolactone 25 mg tablet Commonly known as:  ALDACTONE Take 1 Tab by mouth daily. warfarin 5 mg tablet Commonly known as:  COUMADIN  
1 tablet daily We Performed the Following ADMIN INFLUENZA VIRUS VAC [ [facility]] INFLUENZA VIRUS VACCINE, HIGH DOSE SEASONAL, PRESERVATIVE FREE [76292 CPT(R)] Patient Instructions Health Maintenance Due Topic  ZOSTER VACCINE AGE 60> Please provide this summary of care documentation to your next provider. Your primary care clinician is listed as Licha Quesada. If you have any questions after today's visit, please call [phone].

## 2017-09-23 NOTE — PROGRESS NOTES
The patient presents to the office today with the chief complaint of hypertension    HPI    The patient remains on medications for hypertension and hyperlipidemia. he is tolerating the medications well. The patient remains on anticoagulation for atrial fibrillation. Recently his protime has been on the high side   The patient has no complaints. The patient remains on B12 replacement. The patient is due for prostate cancer screening. .      Review of Systems   Respiratory: Negative for shortness of breath. Cardiovascular: Negative for chest pain and PND. Allergies   Allergen Reactions    Zetia [Ezetimibe] Other (comments)     Back pain resolved off Zetia    Lipitor [Atorvastatin] Myalgia    Livalo [Pitavastatin] Other (comments)     Extreme fatigue    5/29/14   PATIENT DENIES    Zocor [Simvastatin] Myalgia     5/29/14 PATIENT DENIES        Current Outpatient Prescriptions   Medication Sig Dispense Refill    spironolactone (ALDACTONE) 25 mg tablet Take 1 Tab by mouth daily. 90 Tab 3    amLODIPine (NORVASC) 5 mg tablet       cyanocobalamin (VITAMIN B12) 1,000 mcg/mL injection inject 1ml into the muscle monthly 1 mL 5    metoprolol tartrate (LOPRESSOR) 50 mg tablet Take 1 Tab by mouth two (2) times a day. 180 Tab 3    atorvastatin (LIPITOR) 10 mg tablet Take 1 Tab by mouth daily. 90 Tab 3    clobetasol (TEMOVATE) 0.05 % topical cream Apply  to affected area two (2) times a day.  white petrolatum-mineral oil (ABSORBASE) oint Apply  to affected area as needed.  diclofenac (VOLTAREN) 1 % gel Apply 4 g to affected area four (4) times daily. 1 Each 0    furosemide (LASIX) 40 mg tablet TAKE 1 TABLET DAILY 90 Tab 3    warfarin (COUMADIN) 5 mg tablet 1 tablet daily 30 Tab 6    pantoprazole (PROTONIX) 40 mg tablet 1 tablet each AM 90 Tab 3    loratadine (CLARITIN) 10 mg tablet 1 tablet each evening 90 Tab 0    AZOPT 1 % ophthalmic suspension Administer 1 Drop to both eyes two (2) times a day. 3    multivitamin (ONE A DAY) tablet Take 1 Tab by mouth daily. Past Medical History:   Diagnosis Date    AICD (automatic cardioverter/defibrillator) present     Medtronic  upgrade from pacer in 2007 due to VT    Atrial fibrillation ([address]     Cardiac cath 03/19/2007    LM 25% ostial.  Otherwise patent coronaries. LVEDP 20.  EF 50%. Dyssynch. mid anterior contraction. Pacemaker.  Cardiac echocardiogram 03/20/2014    A-fib. EF 65-70%. No RWMA. No RWMA. Mild conc LVH. Mild RVE. RVSP 40-45 mmHg. Mild LAE.  HERNANDEZ. Mild MR. Mod TR.  Cardiac nuclear imaging test 07/29/2014    No ischemia or prior infarction. EF 68%. Nondiagnostic EKG due to V pacing on pharm stress test.  Low risk.  Cardioversion 8/31/2011    Successful defibrillation threshold testing at 18 joules. Patient also had conversion to atrial ventricular pacing.      CHF (congestive heart failure) (HCC)     Cobalamin deficiency     Dupuytren contracture 02/08/2010    on the right ring finger     Edema     Hypertension     Hypertrophy of prostate with urinary obstruction and other lower urinary tract symptoms (LUTS)     Impotence of organic origin     Intolerance of drug     Intolerant high doses of sotalol due to prolonged QT    Mastodynia     Paroxysmal VT ([address]     Pure hypercholesterolemia     S/P ablation of atrial fibrillation 05/31/2011    S/P ablation of atrial fibrillation 12/18/2012    Dr. Jaclyn English at [facility] S/P ablation operation for arrhythmia     VT ablation by Dr. Severiano Bradley near [address] [place] [address] 2/3007    Sick sinus syndrome [facility])        Past Surgical History:   Procedure Laterality Date    HX AFIB ABLATION  12/18/2012    Juan Manuel Green by Dr. Ondina Oliver  02/08-03/08    bilateral cataract removed    HX CATARACT REMOVAL  2/2008 & 3/2008    bilateral    HX ORTHOPAEDIC  2/8/10    release of Dupuytren's contraction on ring finger of right hand    HX OTHER SURGICAL  6/2000 atrial lead placement    HX OTHER SURGICAL  6/5/2009    Cardioversion    HX OTHER SURGICAL  10/12/2012    cardioversion    HX PACEMAKER  1998    placement    HX PACEMAKER  6/2000    DDD    HX PACEMAKER  3/27/07    removal of pacemaker & placement of dual chamber defib generator and leads    HX TONSIL AND ADENOIDECTOMY         Social History     Social History    Marital status:      Spouse name: N/A    Number of children: N/A    Years of education: N/A     Occupational History    Not on file. Social History Main Topics    Smoking status: Never Smoker    Smokeless tobacco: Never Used    Alcohol use Yes      Comment: 2-3 glasses of white wine frequently    Drug use: No    Sexual activity: Not Currently     Partners: Female     Other Topics Concern    Not on file     Social History Narrative       Patient does have an advanced directive on file    Visit Vitals    /76 (BP 1 Location: Left arm, BP Patient Position: Sitting)    Pulse 86    Temp 97.5 °F (36.4 °C) (Tympanic)    Resp 16    Ht 6' (1.829 m)    Wt 214 lb (97.1 kg)    SpO2 98%    BMI 29.02 kg/m2       Physical Exam   No Cervical Lymphadenopathy  No Supraclavicular Lymphadenopathy  Thyroid is Normal  Lungs are clear to ausculation and percussion  Heart:  S1 S2 are normal, No gallops, No mummers  No Carotid Bruits  Abdomen:  Normal Bowel Sounds. No tenderness. No masses. No Hepatomegaly or Splenomegly  LE:  Strong Pedal Pulses. No Edema      BMI:  I have reviewed/discussed the above normal BMI with the patient. I have recommended the following interventions: dietary management education, guidance, and counseling . Claudine Paul         Telephone on 09/20/2017   Component Date Value Ref Range Status    INR, External 09/20/2017 4.1* 2.0 - 3.0 Final   Telephone Anticoag on 09/13/2017   Component Date Value Ref Range Status    INR, External 09/13/2017 3.3* 2.0 - 3.0 Final   Telephone Anticoag on 09/06/2017   Component Date Value Ref Range Status    INR, External 08/30/2017 2.3   Final   Telephone Anticoag on 09/06/2017   Component Date Value Ref Range Status    INR, External 09/06/2017 2.8  2.0 - 3.0 Final   Telephone Anticoag on 08/24/2017   Component Date Value Ref Range Status    INR, External 08/23/2017 2.3   Final   Telephone Anticoag on 08/17/2017   Component Date Value Ref Range Status    INR, External 08/16/2017 3.4   Final   Telephone Anticoag on 08/03/2017   Component Date Value Ref Range Status    INR, External 08/02/2017 2.3   Final   Telephone Anticoag on 07/26/2017   Component Date Value Ref Range Status    INR, External 07/26/2017 3.6* 2.0 - 3.0 Final   Hospital Outpatient Visit on 07/13/2017   Component Date Value Ref Range Status    Methylmalonic acid 07/13/2017 387* 0 - 378 nmol/L Final    Comment: (NOTE)  Performed At: [address] [id number]  Ramiro Fisher MD JULIO:[id number]     Telephone on 07/06/2017   Component Date Value Ref Range Status    INR, External 07/06/2017 1.7   Final   There may be more visits with results that are not included. .No results found for any visits on 09/21/17. Assessment / Plan      ICD-10-CM ICD-9-CM    1. Essential hypertension I10 401.9 INFLUENZA VIRUS VACCINE, HIGH DOSE SEASONAL, PRESERVATIVE FREE      ADMIN INFLUENZA VIRUS VAC      CBC WITH AUTOMATED DIFF      METABOLIC PANEL, COMPREHENSIVE      LIPID PANEL      PSA SCREENING (SCREENING)      METHYLMALONIC ACID   2. Encounter for immunization Z23 V03.89 INFLUENZA VIRUS VACCINE, HIGH DOSE SEASONAL, PRESERVATIVE FREE      ADMIN INFLUENZA VIRUS VAC      CBC WITH AUTOMATED DIFF      METABOLIC PANEL, COMPREHENSIVE      LIPID PANEL      PSA SCREENING (SCREENING)      METHYLMALONIC ACID   3.  Atrial fibrillation, unspecified type (HCC) I48.91 427.31 INFLUENZA VIRUS VACCINE, HIGH DOSE SEASONAL, PRESERVATIVE FREE      ADMIN INFLUENZA VIRUS VAC      CBC WITH AUTOMATED DIFF      METABOLIC PANEL, COMPREHENSIVE      LIPID PANEL      PSA SCREENING (SCREENING)      METHYLMALONIC ACID   4. Pure hypercholesterolemia E78.00 272.0 INFLUENZA VIRUS VACCINE, HIGH DOSE SEASONAL, PRESERVATIVE FREE      ADMIN INFLUENZA VIRUS VAC      CBC WITH AUTOMATED DIFF      METABOLIC PANEL, COMPREHENSIVE      LIPID PANEL      PSA SCREENING (SCREENING)      METHYLMALONIC ACID   5. Screening for prostate cancer Z12.5 V76.44 INFLUENZA VIRUS VACCINE, HIGH DOSE SEASONAL, PRESERVATIVE FREE      ADMIN INFLUENZA VIRUS VAC      CBC WITH AUTOMATED DIFF      METABOLIC PANEL, COMPREHENSIVE      LIPID PANEL      PSA SCREENING (SCREENING)      METHYLMALONIC ACID   6. B12 deficiency E53.8 266.2 INFLUENZA VIRUS VACCINE, HIGH DOSE SEASONAL, PRESERVATIVE FREE      ADMIN INFLUENZA VIRUS VAC      CBC WITH AUTOMATED DIFF      METABOLIC PANEL, COMPREHENSIVE      LIPID PANEL      PSA SCREENING (SCREENING)      METHYLMALONIC ACID     Labs  he was advised to continue his maintenance medications  Coumadin dose was adjusted      Follow-up Disposition:  Return in about 6 months (around 3/21/2018). I asked Alvarez Patrick if he has any questions and I answered the questions.   Alvarez Patrick states that he understands the treatment plan and agrees with the treatment plan

## 2017-09-25 RX ORDER — CYANOCOBALAMIN 1000 UG/ML
INJECTION, SOLUTION INTRAMUSCULAR; SUBCUTANEOUS
Qty: 1 ML | Refills: 0 | Status: SHIPPED | OUTPATIENT
Start: 2017-09-25 | End: 2017-10-26 | Stop reason: SDUPTHER

## 2017-09-27 ENCOUNTER — TELEPHONE ANTICOAG (OUTPATIENT)
Dept: INTERNAL MEDICINE CLINIC | Age: 77
End: 2017-09-27

## 2017-09-27 DIAGNOSIS — I48.91 ATRIAL FIBRILLATION, UNSPECIFIED TYPE (HCC): ICD-10-CM

## 2017-09-27 LAB — INR, EXTERNAL: 2.6 (ref 2–3)

## 2017-09-27 NOTE — PROGRESS NOTES
PATIENT NAME: Regla Valdes         78 y.o.      1940              DATE:7/28/2017    REASON FOR VISIT: Fatigue    HISTORY OF PRESENT ILLNESS: The patient is fatigued. He wonders if his pacemaker is programmed correctly. Denies chest pain and difficulty breathing. Denies palpitation, syncope, presyncope. PAST MEDICAL HISTORY:   Past Medical History:  No date: AICD (automatic cardioverter/defibrillator) pr*      Comment: Medtronic  upgrade from pacer in 2007 due to                VT  No date: Atrial fibrillation [address] [address]  03/19/2007: Cardiac cath      Comment: LM 25% ostial.  Otherwise patent coronaries. LVEDP 20.  EF 50%. Dyssynch. mid anterior                contraction. Pacemaker. 03/20/2014: Cardiac echocardiogram      Comment: A-fib. EF 65-70%. No RWMA. No RWMA. Mild                conc LVH. Mild RVE. RVSP 40-45 mmHg. Mild                LAE.  HERNANDEZ. Mild MR. Mod TR.    07/29/2014: Cardiac nuclear imaging test      Comment: No ischemia or prior infarction. EF 68%. Nondiagnostic EKG due to V pacing on pharm                stress test.  Low risk. 8/31/2011: Cardioversion      Comment: Successful defibrillation threshold testing at               18 joules. Patient also had conversion to                atrial ventricular pacing.    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 Di Bryan at [facility]  No date: S/P ablation operation for arrhythmia      Comment: VT ablation by Dr. Jason Black near [address] [place] [address] 2/3007  No date: Sick sinus syndrome [facility])    PAST SURGICAL HISTORY:   Past Surgical History:  12/18/2012: HX AFIB ABLATION      Comment: Edwardo Monday by Dr. Polly Cheema  02/08-03/08: HX CATARACT REMOVAL      Comment: bilateral cataract removed  2/2008 & 3/2008: HX CATARACT REMOVAL      Comment: bilateral  2/8/10: HX ORTHOPAEDIC      Comment: release of Dupuytren's contraction on ring                finger of right hand  6/2000: HX OTHER SURGICAL      Comment: atrial lead placement  6/5/2009: HX OTHER SURGICAL      Comment: Cardioversion  10/12/2012: HX OTHER SURGICAL      Comment: cardioversion  1998: HX PACEMAKER      Comment: placement  6/2000: HX PACEMAKER      Comment: DDD  3/27/07: HX PACEMAKER      Comment: removal of pacemaker & placement of dual                chamber defib generator and leads  No date: HX TONSIL AND ADENOIDECTOMY      SOCIAL HISTORY:  Social History    Marital status:              Spouse name:                       Years of education:                 Number of children:               Social History Main Topics    Smoking status: Never Smoker                                                                Smokeless status: Never Used                        Alcohol use: Yes                Comment: 2-3 glasses of white wine frequently    Drug use:  No              Sexual activity: Not Currently     Partners with: Female        ALLERGIES:    -- Zetia [Ezetimibe] -- Other (comments)    --  Back pain resolved off Zetia   -- Lipitor [Atorvastatin] -- Myalgia   -- Livalo [Pitavastatin] -- Other (comments)    --  Extreme fatigue             5/29/14   PATIENT DENIES   -- Zocor [Simvastatin] -- Myalgia    --  5/29/14 PATIENT DENIES     CURRENT MEDICATIONS:   Current Outpatient Prescriptions:  amLODIPine (NORVASC) 5 mg tablet,   cyanocobalamin (VITAMIN B12) 1,000 mcg/mL injection, inject 1ml into the muscle monthly  metoprolol tartrate (LOPRESSOR) 50 mg tablet, Take 1 Tab by mouth two (2) times a day. atorvastatin (LIPITOR) 10 mg tablet, Take 1 Tab by mouth daily. clobetasol (TEMOVATE) 0.05 % topical cream, Apply  to affected area two (2) times a day. white petrolatum-mineral oil (ABSORBASE) oint, Apply  to affected area as needed. diclofenac (VOLTAREN) 1 % gel, Apply 4 g to affected area four (4) times daily. furosemide (LASIX) 40 mg tablet, TAKE 1 TABLET DAILY  warfarin (COUMADIN) 5 mg tablet, 1 tablet daily  pantoprazole (PROTONIX) 40 mg tablet, 1 tablet each AM  loratadine (CLARITIN) 10 mg tablet, 1 tablet each evening  AZOPT 1 % ophthalmic suspension, Administer 1 Drop to both eyes two (2) times a day. multivitamin (ONE A DAY) tablet, Take 1 Tab by mouth daily. cyanocobalamin (VITAMIN B12) 1,000 mcg/mL injection, inject 1 ml into the muscle once a month  spironolactone (ALDACTONE) 25 mg tablet, Take 1 Tab by mouth daily. No current facility-administered medications for this visit. REVIEW of SYSTEMS:Cardiovascular ROS: See history of present illness      PHYSICAL EXAMINATION:   /72 (BP 1 Location: Left arm, BP Patient Position: Sitting)  Pulse 71  Ht 6' (1.829 m)  Wt 96.2 kg (212 lb)  SpO2 97%  BMI 28.75 kg/m2  BP Readings from Last 3 Encounters:  09/21/17 : 122/76  09/12/17 : 112/72  07/28/17 : 110/72    Pulse Readings from Last 3 Encounters:  09/21/17 : 86  09/12/17 : 71  07/28/17 : 71    Wt Readings from Last 3 Encounters:  09/21/17 : 97.1 kg (214 lb)  09/12/17 : 97.1 kg (214 lb)  07/28/17 : 96.2 kg (212 lb)    Neck: No jugular venous distention chest: Clear heart: Regular rhythm no gallop    EKG: Atrial fibrillation. Ventricular paced    IMPRESSION:   Fatigue unknown etiology    PLAN:  The pacemaker is interrogated and reprogrammed. Rate response has been made more aggressive. The diagnoses and plan were discussed with patient. All questions answered.   Plan of care agreed to by all concerned. Roque Martinez.  MD Jordan       ,

## 2017-10-11 ENCOUNTER — TELEPHONE ANTICOAG (OUTPATIENT)
Dept: INTERNAL MEDICINE CLINIC | Age: 77
End: 2017-10-11

## 2017-10-11 DIAGNOSIS — I48.91 ATRIAL FIBRILLATION, UNSPECIFIED TYPE (HCC): ICD-10-CM

## 2017-10-11 LAB — INR, EXTERNAL: 2.5 (ref 2–3)

## 2017-10-18 ENCOUNTER — TELEPHONE ANTICOAG (OUTPATIENT)
Dept: INTERNAL MEDICINE CLINIC | Age: 77
End: 2017-10-18

## 2017-10-18 DIAGNOSIS — I48.91 ATRIAL FIBRILLATION, UNSPECIFIED TYPE (HCC): ICD-10-CM

## 2017-10-18 LAB — INR, EXTERNAL: 2.1 (ref 2–3)

## 2017-10-25 ENCOUNTER — HOSPITAL ENCOUNTER (OUTPATIENT)
Dept: LAB | Age: 77
Discharge: HOME | End: 2017-10-25
Payer: MEDICARE

## 2017-10-25 ENCOUNTER — TELEPHONE (OUTPATIENT)
Dept: INTERNAL MEDICINE CLINIC | Age: 77
End: 2017-10-25

## 2017-10-25 ENCOUNTER — LAB ONLY (OUTPATIENT)
Dept: INTERNAL MEDICINE CLINIC | Age: 77
End: 2017-10-25

## 2017-10-25 DIAGNOSIS — Z23 ENCOUNTER FOR IMMUNIZATION: ICD-10-CM

## 2017-10-25 DIAGNOSIS — I48.91 ATRIAL FIBRILLATION, UNSPECIFIED TYPE (HCC): ICD-10-CM

## 2017-10-25 DIAGNOSIS — E78.00 PURE HYPERCHOLESTEROLEMIA: ICD-10-CM

## 2017-10-25 DIAGNOSIS — I48.91 ATRIAL FIBRILLATION, UNSPECIFIED TYPE (HCC): Primary | ICD-10-CM

## 2017-10-25 DIAGNOSIS — I10 ESSENTIAL HYPERTENSION: ICD-10-CM

## 2017-10-25 DIAGNOSIS — E53.8 B12 DEFICIENCY: ICD-10-CM

## 2017-10-25 DIAGNOSIS — Z12.5 SCREENING FOR PROSTATE CANCER: ICD-10-CM

## 2017-10-25 DIAGNOSIS — N52.9 IMPOTENCE OF ORGANIC ORIGIN: ICD-10-CM

## 2017-10-25 LAB
ALBUMIN SERPL-MCNC: 3.7 G/DL (ref 3.4–5)
ALBUMIN/GLOB SERPL: 1.1 {RATIO} (ref 0.8–1.7)
ALP SERPL-CCNC: 72 U/L (ref 45–117)
ALT SERPL-CCNC: 22 U/L (ref 16–61)
ANION GAP SERPL CALC-SCNC: 10 MMOL/L (ref 3–18)
AST SERPL-CCNC: 21 U/L (ref 15–37)
BASOPHILS # BLD: 0 K/UL (ref 0–0.06)
BASOPHILS NFR BLD: 0 % (ref 0–2)
BILIRUB SERPL-MCNC: 0.8 MG/DL (ref 0.2–1)
BUN SERPL-MCNC: 17 MG/DL (ref 7–18)
BUN/CREAT SERPL: 18 (ref 12–20)
CALCIUM SERPL-MCNC: 8.8 MG/DL (ref 8.5–10.1)
CHLORIDE SERPL-SCNC: 104 MMOL/L (ref 100–108)
CHOLEST SERPL-MCNC: 174 MG/DL
CO2 SERPL-SCNC: 23 MMOL/L (ref 21–32)
CREAT SERPL-MCNC: 0.97 MG/DL (ref 0.6–1.3)
DIFFERENTIAL METHOD BLD: ABNORMAL
EOSINOPHIL # BLD: 0.1 K/UL (ref 0–0.4)
EOSINOPHIL NFR BLD: 2 % (ref 0–5)
ERYTHROCYTE [DISTWIDTH] IN BLOOD BY AUTOMATED COUNT: 13.3 % (ref 11.6–14.5)
GLOBULIN SER CALC-MCNC: 3.3 G/DL (ref 2–4)
GLUCOSE SERPL-MCNC: 89 MG/DL (ref 74–99)
HCT VFR BLD AUTO: 44 % (ref 36–48)
HDLC SERPL-MCNC: 53 MG/DL (ref 40–60)
HDLC SERPL: 3.3 {RATIO} (ref 0–5)
HGB BLD-MCNC: 14.8 G/DL (ref 13–16)
INR, EXTERNAL: 1.8 (ref 2–3)
LDLC SERPL CALC-MCNC: 100 MG/DL (ref 0–100)
LIPID PROFILE,FLP: NORMAL
LYMPHOCYTES # BLD: 1.5 K/UL (ref 0.9–3.6)
LYMPHOCYTES NFR BLD: 22 % (ref 21–52)
MCH RBC QN AUTO: 32.5 PG (ref 24–34)
MCHC RBC AUTO-ENTMCNC: 33.6 G/DL (ref 31–37)
MCV RBC AUTO: 96.7 FL (ref 74–97)
MONOCYTES # BLD: 1 K/UL (ref 0.05–1.2)
MONOCYTES NFR BLD: 15 % (ref 3–10)
NEUTS SEG # BLD: 4.1 K/UL (ref 1.8–8)
NEUTS SEG NFR BLD: 61 % (ref 40–73)
PLATELET # BLD AUTO: 252 K/UL (ref 135–420)
PMV BLD AUTO: 11.9 FL (ref 9.2–11.8)
POTASSIUM SERPL-SCNC: 4.3 MMOL/L (ref 3.5–5.5)
PROT SERPL-MCNC: 7 G/DL (ref 6.4–8.2)
PSA SERPL-MCNC: 0.4 NG/ML (ref 0–4)
RBC # BLD AUTO: 4.55 M/UL (ref 4.7–5.5)
SODIUM SERPL-SCNC: 137 MMOL/L (ref 136–145)
TRIGL SERPL-MCNC: 105 MG/DL (ref ?–150)
VLDLC SERPL CALC-MCNC: 21 MG/DL
WBC # BLD AUTO: 6.7 K/UL (ref 4.6–13.2)

## 2017-10-25 PROCEDURE — 84153 ASSAY OF PSA TOTAL: CPT | Performed by: INTERNAL MEDICINE

## 2017-10-25 PROCEDURE — 80061 LIPID PANEL: CPT | Performed by: INTERNAL MEDICINE

## 2017-10-25 PROCEDURE — 83921 ORGANIC ACID SINGLE QUANT: CPT | Performed by: INTERNAL MEDICINE

## 2017-10-25 PROCEDURE — 85025 COMPLETE CBC W/AUTO DIFF WBC: CPT | Performed by: INTERNAL MEDICINE

## 2017-10-25 PROCEDURE — 80053 COMPREHEN METABOLIC PANEL: CPT | Performed by: INTERNAL MEDICINE

## 2017-10-25 NOTE — TELEPHONE ENCOUNTER
Marlena with MD/INR called in a out of range INR on patient of INR 1.8 Please call patient with instructions.

## 2017-10-27 RX ORDER — CYANOCOBALAMIN 1000 UG/ML
INJECTION, SOLUTION INTRAMUSCULAR; SUBCUTANEOUS
Qty: 1 ML | Refills: 0 | Status: SHIPPED | OUTPATIENT
Start: 2017-10-27 | End: 2017-11-24 | Stop reason: SDUPTHER

## 2017-10-28 LAB — METHYLMALONATE SERPL-SCNC: 390 NMOL/L (ref 0–378)

## 2017-10-31 NOTE — TELEPHONE ENCOUNTER
Requested Prescriptions     Pending Prescriptions Disp Refills    warfarin (COUMADIN) 5 mg tablet 30 Tab 6     Si tablet daily    atorvastatin (LIPITOR) 10 mg tablet 90 Tab 3     Sig: Take 1 Tab by mouth daily.         Also patient is requesting Coreg 12.5 mg which is not on med list

## 2017-11-01 ENCOUNTER — TELEPHONE ANTICOAG (OUTPATIENT)
Dept: INTERNAL MEDICINE CLINIC | Age: 77
End: 2017-11-01

## 2017-11-01 ENCOUNTER — OFFICE VISIT (OUTPATIENT)
Dept: PULMONOLOGY | Age: 77
End: 2017-11-01

## 2017-11-01 VITALS
HEART RATE: 94 BPM | TEMPERATURE: 97.1 F | WEIGHT: 215 LBS | SYSTOLIC BLOOD PRESSURE: 112 MMHG | DIASTOLIC BLOOD PRESSURE: 70 MMHG | HEIGHT: 72 IN | BODY MASS INDEX: 29.12 KG/M2 | OXYGEN SATURATION: 98 % | RESPIRATION RATE: 16 BRPM

## 2017-11-01 DIAGNOSIS — I48.91 ATRIAL FIBRILLATION, UNSPECIFIED TYPE (HCC): ICD-10-CM

## 2017-11-01 DIAGNOSIS — I27.20 PULMONARY HTN (HCC): ICD-10-CM

## 2017-11-01 DIAGNOSIS — R06.09 DOE (DYSPNEA ON EXERTION): Primary | ICD-10-CM

## 2017-11-01 LAB — INR, EXTERNAL: 2.2 (ref 2–3)

## 2017-11-01 RX ORDER — WARFARIN SODIUM 5 MG/1
TABLET ORAL
Qty: 30 TAB | Refills: 6 | Status: SHIPPED | OUTPATIENT
Start: 2017-11-01 | End: 2018-08-23 | Stop reason: SDUPTHER

## 2017-11-01 RX ORDER — ATORVASTATIN CALCIUM 10 MG/1
10 TABLET, FILM COATED ORAL DAILY
Qty: 90 TAB | Refills: 3 | Status: SHIPPED | OUTPATIENT
Start: 2017-11-01 | End: 2018-08-20 | Stop reason: ALTCHOICE

## 2017-11-01 NOTE — PROGRESS NOTES
Mr. Brad Spencer has a reminder for a \"due or due soon\" health maintenance. I have asked that he contact his primary care provider for follow-up on this health maintenance.

## 2017-11-01 NOTE — PROGRESS NOTES
[facility]  Pulmonary, Critical Care, and Sleep Medicine      Pulmonary Office Visit    Name: Keegan Waters     : 1940     Date: 2017        Subjective:   Patient has been referred for evaluation of: SOB on exertion  17   Feels significantly improved with SOB  Symptoms. No more exertional dyspnea since Timolol stopped and Pacemaker readjusted. Completed Echo as ordered and here to discuss. Denies any chest pain, cough  Occasional pedal edema. No orthopnea, PND  No wheezing    HPI:  Patient is a 78 y.o. male who has experienced increased SOB with certain activities over the past year. Climbing stairs, carrying weights are noticeable. He mentions having swelling of Lower extremities off and on. Denies any other changes. He was told that his timolol could be causing some setback so he had Dr. Ilya Bowie change the medications. After stopping timolol and having some adjustments made on his pacemaker he feels better. He denies any chest pain, cough. Denies fever, chills, sinus congestion. Denies any Sleep related issues. Wakes up fresh, no headaches. SOB:   Symptoms occur on exertion. Cannot climb stairs. Activities of daily living not affected  Denies orthopnea/ paroxysmal nocturnal dyspnea  SOB relieved after medications. No Associated wheezing, chest pain, fever, chills, night sweats dyspepsia, reflux. Treatment so far- Pacemaker adjustment and discontinuing timolol  Imaging studies- CXR  Other testing- PFT's  Comorbid conditions include- HTN, Atrial fibrillation  Nonsmoker  Occupational exposure-none to any inhalation toxins. Retired . Past Medical History:   Diagnosis Date    AICD (automatic cardioverter/defibrillator) present     Medtronic  upgrade from pacer in  due to VT    Atrial fibrillation ([facility]     Cardiac cath 2007    LM 25% ostial.  Otherwise patent coronaries. LVEDP 20.  EF 50%. Dyssynch. mid anterior contraction. Pacemaker.  Cardiac echocardiogram 03/20/2014    A-fib. EF 65-70%. No RWMA. No RWMA. Mild conc LVH. Mild RVE. RVSP 40-45 mmHg. Mild LAE.  HERNANDEZ. Mild MR. Mod TR.  Cardiac nuclear imaging test 07/29/2014    No ischemia or prior infarction. EF 68%. Nondiagnostic EKG due to V pacing on pharm stress test.  Low risk.  Cardioversion 8/31/2011    Successful defibrillation threshold testing at 18 joules. Patient also had conversion to atrial ventricular pacing.      CHF (congestive heart failure) (HCC)     Cobalamin deficiency     Dupuytren contracture 02/08/2010    on the right ring finger     Edema     Hypertension     Hypertrophy of prostate with urinary obstruction and other lower urinary tract symptoms (LUTS)     Impotence of organic origin     Intolerance of drug     Intolerant high doses of sotalol due to prolonged QT    Mastodynia     Paroxysmal VT ([address]     Pure hypercholesterolemia     S/P ablation of atrial fibrillation 05/31/2011    S/P ablation of atrial fibrillation 12/18/2012    Dr. Mallika Dawson at [facility] S/P ablation operation for arrhythmia     VT ablation by Dr. Lotus Trujillo near [address] [place] [address] 2/3007    Sick sinus syndrome [facility])        Past Surgical History:   Procedure Laterality Date    HX AFIB ABLATION  12/18/2012    Lucy Ip by Dr. Saúl Aden  02/08-03/08    bilateral cataract removed    HX CATARACT REMOVAL  2/2008 & 3/2008    bilateral    HX ORTHOPAEDIC  2/8/10    release of Dupuytren's contraction on ring finger of right hand    HX OTHER SURGICAL  6/2000    atrial lead placement    HX OTHER SURGICAL  6/5/2009    Cardioversion    HX OTHER SURGICAL  10/12/2012    cardioversion    HX PACEMAKER  1998    placement    HX PACEMAKER  6/2000    DDD    HX PACEMAKER  3/27/07    removal of pacemaker & placement of dual chamber defib generator and leads    HX TONSIL AND ADENOIDECTOMY       Allergies   Allergen Reactions    Zetia [Ezetimibe] Other (comments)     Back pain resolved off Zetia    Lipitor [Atorvastatin] Myalgia    Livalo [Pitavastatin] Other (comments)     Extreme fatigue    5/29/14   PATIENT DENIES    Zocor [Simvastatin] Myalgia     5/29/14 PATIENT DENIES      Current Outpatient Prescriptions   Medication Sig Dispense Refill    spironolactone (ALDACTONE) 25 mg tablet Take 1 Tab by mouth daily. 90 Tab 3    amLODIPine (NORVASC) 5 mg tablet       cyanocobalamin (VITAMIN B12) 1,000 mcg/mL injection inject 1ml into the muscle monthly 1 mL 5    metoprolol tartrate (LOPRESSOR) 50 mg tablet Take 1 Tab by mouth two (2) times a day. 180 Tab 3    clobetasol (TEMOVATE) 0.05 % topical cream Apply  to affected area two (2) times a day.  white petrolatum-mineral oil (ABSORBASE) oint Apply  to affected area as needed.  diclofenac (VOLTAREN) 1 % gel Apply 4 g to affected area four (4) times daily. 1 Each 0    furosemide (LASIX) 40 mg tablet TAKE 1 TABLET DAILY 90 Tab 3    warfarin (COUMADIN) 5 mg tablet 1 tablet daily 30 Tab 6    pantoprazole (PROTONIX) 40 mg tablet 1 tablet each AM 90 Tab 3    AZOPT 1 % ophthalmic suspension Administer 1 Drop to both eyes two (2) times a day. 3    multivitamin (ONE A DAY) tablet Take 1 Tab by mouth daily.  cyanocobalamin (VITAMIN B12) 1,000 mcg/mL injection inject 1 ml into the muscle once a month 1 mL 0    atorvastatin (LIPITOR) 10 mg tablet Take 1 Tab by mouth daily.  90 Tab 3    loratadine (CLARITIN) 10 mg tablet 1 tablet each evening 90 Tab 0     Review of Systems:  HEENT: No epistaxis, no nasal drainage, no difficulty in swallowing, no redness in eyes  Respiratory: as above  Cardiovascular: no chest pain, no palpitations, no chronic leg edema, no syncope  Gastrointestinal: no abd pain, no vomiting, no diarrhea, no bleeding symptoms  Genitourinary: No urinary symptoms or hematuria  Integument/breast: No ulcers or rashes  Musculoskeletal:Neg  Neurological: No focal weakness, no seizures, no headaches  Behvioral/Psych: No anxiety, no depression  Constitutional: No fever, no chills, no weight loss, no night sweats     Objective:     Visit Vitals    /70 (BP 1 Location: Left arm, BP Patient Position: Sitting)    Pulse 94    Temp 97.1 °F (36.2 °C) (Oral)    Resp 16    Ht 6' (1.829 m)    Wt 97.5 kg (215 lb)    SpO2 98%  Comment: RA Rest    BMI 29.16 kg/m2        Physical Exam:   General: comfortable, no acute distress  HEENT: pupils reactive, sclera anicteric, EOM intact  Neck: No adenopathy or thyroid swelling, no lymphadenopathy or JVD, supple  CVS: S1S2 no murmurs  RS: Mod AE bilaterally, no tactile fremitus or egophony, no accessory muscle use  Abd: soft, non tender, no hepatosplenomegaly  Neuro: non focal, awake, alert  Extrm: no leg edema, clubbing or cyanosis  Skin: no rash    Data review:   Pertinent labs: CBC, BMP, LFT's      PFT:    Date FVC FEV1  FEV1/FVC PVD23-36 TLC RV RV/TLC VC DLCO   7/2017 80 84 76 97 81 81   65%                                           6 min walk test;  No O2 desaturation or change in dyspnea index on level ground walking. HR increase- insignificant. Echo 9/2017:  Left ventricle: Systolic function was normal by visual assessment. Ejection  fraction was estimated in the range of 60 %  to 65 %. No obvious wall motion abnormalities identified in the views  obtained. Wall thickness was mildly increased. Right ventricle: The ventricle was mildly dilated. Left atrium: The atrium was severely dilated. Right atrium: The atrium was dilated. Aortic valve: There was mild to moderate regurgitation. COMPARISONS:  Comparison was made with the previous study of 20-Mar-2014. Aortic  regurgitation has worsened. Left atrium has increased in size. Pulmonary artery pressure has decreased. [id number]  Left ventricle: Size was normal. Systolic function was normal by EF  (biplane method of disks).  Ejection fraction was estimated in the range of  65 % to 70 %. There were no regional wall motion abnormalities. Wall  thickness was mildly increased. There was mild concentric hypertrophy. Right ventricle: The ventricle was mildly dilated. Systolic pressure was  moderately increased. Estimated peak pressure was in the range of 40 mmHg  to 45 mmHg. A pacing wire was present. Left atrium: The atrium was mildly dilated. Right atrium: The atrium was dilated. Mitral valve: There was mild annular calcification. There was mild  regurgitation. Aortic valve: The valve was trileaflet. Leaflets exhibited mildly  increased thickness, mild calcification with increased echodensity on the  right and non coronary cusp, normal cuspal separation, and sclerosis. Tricuspid valve: There was moderate regurgitation. Pulmonary arteries: Systolic pressure was moderately increased. Imaging:  I have personally reviewed the patients radiographs and have reviewed the reports:  CXR 5/2017:  Lungs: Clear. Cardiac silhouette and mediastinal contours: Disconnected pacemaker leads  overlie the right hemithorax. Left-sided AICD/pacemaker with lead tips overlying  the RA and RV apex. Contours are within normal limits.   Pleural spaces: No pneumothorax or pleural effusion evident. Bones and soft tissues: Mild degenerative change at the thoracic spine.   Support lines/catheters: None.     Impression:    Stable chest with no acute cardiopulmonary findings.        Patient Active Problem List   Diagnosis Code    Essential hypertension I10    Atrial fibrillation ([address] I48.91    Dupuytren contracture M72.0    Medtronic AICD, upgrade from pacer in 2007 due to VT Z95.810    Paroxysmal VT ([address] I47.2    S/P ablation operation for arrhythmia Z98.890, Z86.79    Follow-up exam Z09    Impotence of organic origin N52.9    Hypertrophy of prostate with urinary obstruction and other lower urinary tract symptoms (LUTS) N40.1    Erectile dysfunction N52.9    Palpitations R00.2    Other dysphagia R13.19    Cobalamin deficiency E53.8    Edema R60.9    Pure hypercholesterolemia E78.00    Mastodynia N64.4    AICD at end of battery life Z45.02    CHF [facility]congestive heart failure) ([facility]) I50.9    Bronchitis J40    B12 deficiency E53.8     IMPRESSION:   · Dyspnea on exertion- progressive symptoms over 1 year with improvement after Pacemaker adjustments and discontinuing Timolol. Echo with worsened AR .predominant cardiac component since CXR- clear and PFT with isolated reduced DLCO- 65% predicted. Discussed d/d with patient . Has had Pulm HTN 40-45 mm Hg range on echo from 2014. Current echo with some RA, RV dilatation but improved Pulm HTN per report. · Pedal edema off and on: ? Fluid retention and /or diastolic heart failure. · Atrial fibrillation  · HTN   · BMI- 29.16      RECOMMENDATIONS:   · Continue current treatment optimization- atrial fib rate control, watch salt and fluid intake, fine tune medications  · Discussed results of 2-D echo - improved Pulm HTN   · Discussed need to step up  physical activities to improve exercise tolerance and symptoms of SOB  · Healthy diet, weight  · Will follow up annually     Health maintenance screens deferred to Primary care provider.      Lali Eid MD

## 2017-11-01 NOTE — MR AVS SNAPSHOT
Visit Information Date & Time Provider Department Dept. Phone Encounter #  
 11/1/2017  9:45 AM Zac Lawton MD [facility] [id number] Follow-up Instructions Return in about 1 year (around 11/1/2018). Your Appointments 11/28/2017  1:00 PM  
Follow Up with Audra Flores MD  
[address] [place] [address] [address]) Appt Note: F/U FROM BLOOD WORK  
 [address] [facility], [address] [place] [address]  
[phone]  
  
   
 [address] [facility], [address] [place] [id number]  
  
    
 12/13/2017 12:40 PM  
Follow Up with Alexander Harry DO [facility] ([address]) Appt Note: 6 month anyday but Tues around [facility] [address]  
[phone] [address] Upcoming Health Maintenance Date Due ZOSTER VACCINE AGE 60> 1/16/2002 MEDICARE YEARLY EXAM 4/13/2018 GLAUCOMA SCREENING Q2Y 8/16/2019 DTaP/Tdap/Td series (2 - Td) 4/12/2027 Allergies as of 11/1/2017  Review Complete On: 11/1/2017 By: Zac Lawton MD  
  
 Severity Noted Reaction Type Reactions Zetia [Ezetimibe] Medium 10/25/2016   Side Effect Other (comments) Back pain resolved off Zetia Lipitor [Atorvastatin]  10/12/2015    Myalgia Livalo [Pitavastatin]  09/17/2013    Other (comments) Extreme fatigue 5/29/14   PATIENT DENIES Zocor [Simvastatin]  09/17/2013    Myalgia 5/29/14 PATIENT DENIES Current Immunizations  Reviewed on 11/1/2017 Name Date Influenza High Dose Vaccine PF 9/21/2017 11:54 AM, 10/1/2016 Pneumococcal Conjugate (PCV-13) 4/12/2017 Pneumococcal Polysaccharide (PPSV-23) 10/30/2009, 6/9/2009 12:00 AM  
  
 Reviewed by Deon Lake LPN on 77/7/1472 at  9:39 AM  
Vitals BP Pulse Temp Resp Height(growth percentile) Weight(growth percentile) 112/70 (BP 1 Location: Left arm, BP Patient Position: Sitting) 94 97.1 °F (36.2 °C) (Oral) 16 6' (1.829 m) 215 lb (97.5 kg) SpO2 BMI Smoking Status 98% 29.16 kg/m2 Never Smoker BMI and BSA Data Body Mass Index Body Surface Area  
 29.16 kg/m 2 2.23 m 2 Preferred Pharmacy Pharmacy Name Phone [address] [place] [address], [address] [place] [address] [phone] Your Updated Medication List  
  
   
This list is accurate as of: 11/1/17 10:00 AM.  Always use your most recent med list.  
  
  
  
  
 Anuja Burnett Generic drug:  white petrolatum-mineral oil Apply  to affected area as needed. amLODIPine 5 mg tablet Commonly known as:  NORVASC  
  
 atorvastatin 10 mg tablet Commonly known as:  LIPITOR Take 1 Tab by mouth daily. AZOPT 1 % ophthalmic suspension Generic drug:  brinzolamide Administer 1 Drop to both eyes two (2) times a day. clobetasol 0.05 % topical cream  
Commonly known as:  Marcha Sous Apply  to affected area two (2) times a day. * cyanocobalamin 1,000 mcg/mL injection Commonly known as:  VITAMIN B12  
inject 1ml into the muscle monthly * cyanocobalamin 1,000 mcg/mL injection Commonly known as:  VITAMIN B12  
inject 1 ml into the muscle once a month  
  
 diclofenac 1 % Gel Commonly known as:  VOLTAREN Apply 4 g to affected area four (4) times daily. furosemide 40 mg tablet Commonly known as:  LASIX TAKE 1 TABLET DAILY  
  
 loratadine 10 mg tablet Commonly known as:  CLARITIN  
1 tablet each evening  
  
 metoprolol tartrate 50 mg tablet Commonly known as:  LOPRESSOR Take 1 Tab by mouth two (2) times a day. multivitamin tablet Commonly known as:  ONE A DAY Take 1 Tab by mouth daily. pantoprazole 40 mg tablet Commonly known as:  PROTONIX  
1 tablet each AM  
  
 spironolactone 25 mg tablet Commonly known as:  ALDACTONE Take 1 Tab by mouth daily. warfarin 5 mg tablet Commonly known as:  COUMADIN  
1 tablet daily * Notice: This list has 2 medication(s) that are the same as other medications prescribed for you. Read the directions carefully, and ask your doctor or other care provider to review them with you. Follow-up Instructions Return in about 1 year (around 11/1/2018). Please provide this summary of care documentation to your next provider. Your primary care clinician is listed as Sabrina Mckeon. If you have any questions after today's visit, please call [phone].

## 2017-11-08 ENCOUNTER — TELEPHONE (OUTPATIENT)
Dept: INTERNAL MEDICINE CLINIC | Age: 77
End: 2017-11-08

## 2017-11-08 DIAGNOSIS — I48.20 CHRONIC ATRIAL FIBRILLATION (HCC): ICD-10-CM

## 2017-11-08 LAB — INR, EXTERNAL: 1.9

## 2017-11-08 NOTE — TELEPHONE ENCOUNTER
Mr. Chatterjee Aid called here today for anticoagulation monitoring for the diagnosis of Atrial Fibrillation. His INR goal is 2.0-3.0 and his current Coumadin dose is 5 mg Sun, Wed, and Fri then 2.5 mg all other days. Today's findings include an INR of 1.9 (normal INR range 0.8-1.2). Considering Mr. Kristyn Edward past history, todays findings, and per the coumadin policy/protocol,Message was left for Mr Daniel Hernandez to contact office to receive instructions to take Coumadin as follows,  2.5 mg Mon, tues, and sat then 5 mg all other days. He will also be instructed to schedule an appointment in 1 weeks prior to leaving for an INR check.

## 2017-11-15 ENCOUNTER — TELEPHONE ANTICOAG (OUTPATIENT)
Dept: INTERNAL MEDICINE CLINIC | Age: 77
End: 2017-11-15

## 2017-11-15 DIAGNOSIS — I48.20 CHRONIC ATRIAL FIBRILLATION (HCC): ICD-10-CM

## 2017-11-15 LAB — INR, EXTERNAL: 1.9 (ref 2–3)

## 2017-11-22 ENCOUNTER — TELEPHONE ANTICOAG (OUTPATIENT)
Dept: INTERNAL MEDICINE CLINIC | Age: 77
End: 2017-11-22

## 2017-11-22 DIAGNOSIS — I48.20 CHRONIC ATRIAL FIBRILLATION (HCC): ICD-10-CM

## 2017-11-22 LAB — INR, EXTERNAL: 3.1 (ref 2–3)

## 2017-11-24 RX ORDER — CYANOCOBALAMIN 1000 UG/ML
INJECTION, SOLUTION INTRAMUSCULAR; SUBCUTANEOUS
Qty: 1 ML | Refills: 0 | Status: SHIPPED | OUTPATIENT
Start: 2017-11-24 | End: 2017-12-13

## 2017-11-29 ENCOUNTER — TELEPHONE ANTICOAG (OUTPATIENT)
Dept: INTERNAL MEDICINE CLINIC | Age: 77
End: 2017-11-29

## 2017-11-29 DIAGNOSIS — I48.20 CHRONIC ATRIAL FIBRILLATION (HCC): ICD-10-CM

## 2017-11-29 LAB — INR, EXTERNAL: 3.1 (ref 2–3)

## 2017-12-06 ENCOUNTER — TELEPHONE ANTICOAG (OUTPATIENT)
Dept: INTERNAL MEDICINE CLINIC | Age: 77
End: 2017-12-06

## 2017-12-06 DIAGNOSIS — I48.20 CHRONIC ATRIAL FIBRILLATION (HCC): ICD-10-CM

## 2017-12-06 LAB — INR, EXTERNAL: 2.6 (ref 2–3)

## 2017-12-13 ENCOUNTER — TELEPHONE ANTICOAG (OUTPATIENT)
Dept: INTERNAL MEDICINE CLINIC | Age: 77
End: 2017-12-13

## 2017-12-13 ENCOUNTER — OFFICE VISIT (OUTPATIENT)
Dept: CARDIOLOGY CLINIC | Age: 77
End: 2017-12-13

## 2017-12-13 ENCOUNTER — CLINICAL SUPPORT (OUTPATIENT)
Dept: CARDIOLOGY CLINIC | Age: 77
End: 2017-12-13

## 2017-12-13 VITALS
HEART RATE: 84 BPM | WEIGHT: 214 LBS | BODY MASS INDEX: 28.99 KG/M2 | HEIGHT: 72 IN | DIASTOLIC BLOOD PRESSURE: 72 MMHG | OXYGEN SATURATION: 98 % | SYSTOLIC BLOOD PRESSURE: 118 MMHG

## 2017-12-13 DIAGNOSIS — Z45.02 AICD AT END OF BATTERY LIFE: ICD-10-CM

## 2017-12-13 DIAGNOSIS — I50.32 CHRONIC DIASTOLIC CONGESTIVE HEART FAILURE (HCC): ICD-10-CM

## 2017-12-13 DIAGNOSIS — R00.2 PALPITATIONS: ICD-10-CM

## 2017-12-13 DIAGNOSIS — I48.20 CHRONIC ATRIAL FIBRILLATION (HCC): Primary | ICD-10-CM

## 2017-12-13 DIAGNOSIS — I48.20 CHRONIC ATRIAL FIBRILLATION (HCC): ICD-10-CM

## 2017-12-13 DIAGNOSIS — E78.00 PURE HYPERCHOLESTEROLEMIA: ICD-10-CM

## 2017-12-13 DIAGNOSIS — I47.29 PAROXYSMAL VT: Primary | ICD-10-CM

## 2017-12-13 DIAGNOSIS — Z95.810 AICD (AUTOMATIC CARDIOVERTER/DEFIBRILLATOR) PRESENT: ICD-10-CM

## 2017-12-13 DIAGNOSIS — I10 ESSENTIAL HYPERTENSION: ICD-10-CM

## 2017-12-13 LAB — INR, EXTERNAL: 2.6 (ref 2–3)

## 2017-12-13 NOTE — MR AVS SNAPSHOT
Visit Information Date & Time Provider Department Dept. Phone Encounter #  
 12/13/2017 12:40 PM Tomas Jackson, [address] [facility] [address] [id number] Follow-up Instructions Return in about 6 months (around 6/13/2018), or if symptoms worsen or fail to improve. Follow-up and Disposition History Your Appointments 12/22/2017  3:00 PM  
Follow Up with Jann Forbes MD  
[address] [place] [address] Yanique Burrell) Appt Note: F/U FROM BLOOD WORK; PT R/S FROM 11/28/2017; r/s 12/26  
 [address] [facility], [address] [facility] [address] [address]  
  
   
 [address] [facility], [address] [facility] [address] Upcoming Health Maintenance Date Due ZOSTER VACCINE AGE 60> 1/16/2002 MEDICARE YEARLY EXAM 4/13/2018 GLAUCOMA SCREENING Q2Y 8/16/2019 DTaP/Tdap/Td series (2 - Td) 4/12/2027 Allergies as of 12/13/2017  Review Complete On: 12/13/2017 By: Tomas Jackson, DO Severity Noted Reaction Type Reactions Zetia [Ezetimibe] Medium 10/25/2016   Side Effect Other (comments) Back pain resolved off Zetia Lipitor [Atorvastatin]  10/12/2015    Myalgia Livalo [Pitavastatin]  09/17/2013    Other (comments) Extreme fatigue 5/29/14   PATIENT DENIES Zocor [Simvastatin]  09/17/2013    Myalgia 5/29/14 PATIENT DENIES Current Immunizations  Reviewed on 11/1/2017 Name Date Influenza High Dose Vaccine PF 9/21/2017 11:54 AM, 10/1/2016 Pneumococcal Conjugate (PCV-13) 4/12/2017 Pneumococcal Polysaccharide (PPSV-23) 10/30/2009, 6/9/2009 12:00 AM  
  
 Not reviewed this visit You Were Diagnosed With   
  
 Codes Comments Chronic atrial fibrillation (HCC)    -  Primary ICD-10-CM: J02.8 ICD-9-CM: 427.31 Chronic diastolic congestive heart failure (HCC)     ICD-10-CM: I50.32 
ICD-9-CM: 428.32, 428.0 Pure hypercholesterolemia     ICD-10-CM: E78.00 ICD-9-CM: 272.0 Essential hypertension     ICD-10-CM: I10 
ICD-9-CM: 401.9 AICD at end of battery life     ICD-10-CM: Z45.02 
ICD-9-CM: V53.32 Palpitations     ICD-10-CM: R00.2 ICD-9-CM: 785.1 Vitals BP Pulse Height(growth percentile) Weight(growth percentile) SpO2 BMI  
 118/72 84 6' (1.829 m) 214 lb (97.1 kg) 98% 29.02 kg/m2 Smoking Status Never Smoker BMI and BSA Data Body Mass Index Body Surface Area  
 29.02 kg/m 2 2.22 m 2 Preferred Pharmacy Pharmacy Name Phone [id number] [address] Andriy Hernandez. [phone] Your Updated Medication List  
  
   
This list is accurate as of: 12/13/17  1:27 PM.  Always use your most recent med list.  
  
  
  
  
 Marvin Cowdrey Generic drug:  white petrolatum-mineral oil Apply  to affected area as needed. amLODIPine 5 mg tablet Commonly known as:  NORVASC  
  
 atorvastatin 10 mg tablet Commonly known as:  LIPITOR Take 1 Tab by mouth daily. AZOPT 1 % ophthalmic suspension Generic drug:  brinzolamide Administer 1 Drop to both eyes two (2) times a day. clobetasol 0.05 % topical cream  
Commonly known as:  Noreen Washington Apply  to affected area two (2) times a day. cyanocobalamin 1,000 mcg/mL injection Commonly known as:  VITAMIN B12  
inject 1ml into the muscle monthly  
  
 diclofenac 1 % Gel Commonly known as:  VOLTAREN Apply 4 g to affected area four (4) times daily. furosemide 40 mg tablet Commonly known as:  LASIX TAKE 1 TABLET DAILY  
  
 loratadine 10 mg tablet Commonly known as:  CLARITIN  
1 tablet each evening  
  
 metoprolol tartrate 50 mg tablet Commonly known as:  LOPRESSOR Take 1 Tab by mouth two (2) times a day. multivitamin tablet Commonly known as:  ONE A DAY Take 1 Tab by mouth daily. pantoprazole 40 mg tablet Commonly known as:  PROTONIX  
1 tablet each AM  
  
 spironolactone 25 mg tablet Commonly known as:  ALDACTONE Take 1 Tab by mouth daily. warfarin 5 mg tablet Commonly known as:  COUMADIN  
1 tablet daily We Performed the Following AMB POC EKG ROUTINE W/ 12 LEADS, INTER & REP [11178 CPT(R)] Follow-up Instructions Return in about 6 months (around 6/13/2018), or if symptoms worsen or fail to improve. Please provide this summary of care documentation to your next provider. Your primary care clinician is listed as Ambrose Or. If you have any questions after today's visit, please call [phone].

## 2017-12-13 NOTE — PROGRESS NOTES
Review of Systems   Constitutional: Negative for chills, fever, malaise/fatigue and weight loss. Respiratory: Negative for cough, hemoptysis, shortness of breath and wheezing. Cardiovascular: Negative for chest pain, palpitations, orthopnea and leg swelling. Gastrointestinal: Negative. Musculoskeletal: Positive for joint pain. Negative for falls and myalgias. Neurological: Negative for dizziness.

## 2017-12-13 NOTE — PROGRESS NOTES
1. Have you been to the ER, urgent care clinic since your last visit? Hospitalized since your last visit?no    2. Have you seen or consulted any other health care providers outside of the [address] [place] [address] since your last visit? Include any pap smears or colon screening.  no

## 2017-12-13 NOTE — PROGRESS NOTES
HPI: I saw Carmelita Lea in my office today in cardiovascular evaluation regarding his chronic atrial fibrillation and some diastolic failure issues in the past. Mr. Shawn Almaguer is a very pleasant 78year old white male with a rather complex cardiovascular history, including problems with atrial fibrillation and ventricular tachycardia. He's had a very long and complicated past cardiovascular course in terms of management of his ventricular tachycardia and atrial fibrillation, which is well outlined in his chart and my [facility] note of 4/24/13.       He ultimately has gone into atrial fibrillation after numerous atrial fibrillation ablations and the last of which was by Dr. Mariajose Gaines at [facility] in December of 2012, and we have now been managing him with rate control and the use of Lopressor and Coumadin for anticoagulation. He comes in the office today and relates that he is continued to do quite well. He is remaining fairly active and denies any cardiovascular symptomatology at this time. Encounter Diagnoses   Name Primary?  Chronic atrial fibrillation (HCC) Yes    Chronic diastolic congestive heart failure (HCC)     Pure hypercholesterolemia     Essential hypertension     AICD at end of battery life     Palpitations        Discussion: This gentleman appears to be doing about as well as we could expect and I really of no recommendations for change at this time. He is not having any symptoms to suggest the development of symptomatic diastolic heart failure or any anginal type symptoms. We did check his AICD today and he has 7.7 years remaining on the battery and is Optivol was good suggesting no signs of fluid overload. His latest lipid profile which was completed on October 25, 2017 showed total cholesterol 174 with triglycerides 105, HDL 53, , and VLDL of 21 which I think is fair control on Lipitor 10 mg daily.   He has not been taking his Lipitor recently due to some pain in his knees and legs. He is considering restarting a medication and I suggested that he try to take it 3 times a week to begin with and continue to take his coenzyme Q 10 and then will repeat  his lipid profile again in a few months with further recommendations after reviewing that laboratory test.    His blood pressure is well-controlled today and is otherwise doing well so I am simply can plan to see him again in several months. PCP: Toni Denver, MD      Past Medical History:   Diagnosis Date    AICD (automatic cardioverter/defibrillator) present     Medtronic  upgrade from pacer in 2007 due to VT    Atrial fibrillation ([address]     Cardiac cath 03/19/2007    LM 25% ostial.  Otherwise patent coronaries. LVEDP 20.  EF 50%. Dyssynch. mid anterior contraction. Pacemaker.  Cardiac echocardiogram 03/20/2014    A-fib. EF 65-70%. No RWMA. No RWMA. Mild conc LVH. Mild RVE. RVSP 40-45 mmHg. Mild LAE.  HERNANDEZ. Mild MR. Mod TR.  Cardiac nuclear imaging test 07/29/2014    No ischemia or prior infarction. EF 68%. Nondiagnostic EKG due to V pacing on pharm stress test.  Low risk.  Cardioversion 8/31/2011    Successful defibrillation threshold testing at 18 joules. Patient also had conversion to atrial ventricular pacing.      CHF (congestive heart failure) (HCC)     Cobalamin deficiency     Dupuytren contracture 02/08/2010    on the right ring finger     Edema     Hypertension     Hypertrophy of prostate with urinary obstruction and other lower urinary tract symptoms (LUTS)     Impotence of organic origin     Intolerance of drug     Intolerant high doses of sotalol due to prolonged QT    Mastodynia     Paroxysmal VT ([address]     Pure hypercholesterolemia     S/P ablation of atrial fibrillation 05/31/2011    S/P ablation of atrial fibrillation 12/18/2012    Dr. Mouna Johnson at [facility] S/P ablation operation for arrhythmia     VT ablation by Dr. Poli Benitez near [address] [place] [address] [address]  Sick sinus syndrome [facility])          Past Surgical History:   Procedure Laterality Date    HX AFIB ABLATION  12/18/2012    Nadia Starch by Dr. Brittney Johnson HX CATARACT REMOVAL  02/08-03/08    bilateral cataract removed    HX CATARACT REMOVAL  2/2008 & 3/2008    bilateral    HX ORTHOPAEDIC  2/8/10    release of Dupuytren's contraction on ring finger of right hand    HX OTHER SURGICAL  6/2000    atrial lead placement    HX OTHER SURGICAL  6/5/2009    Cardioversion    HX OTHER SURGICAL  10/12/2012    cardioversion    HX PACEMAKER  1998    placement    HX PACEMAKER  6/2000    DDD    HX PACEMAKER  3/27/07    removal of pacemaker & placement of dual chamber defib generator and leads    HX TONSIL AND ADENOIDECTOMY           Current Outpatient Rx   Name  Route  Sig  Dispense  Refill    eplerenone (INSPRA) 25 mg tablet        TAKE 1 TABLET DAILY    90 Tab    2      furosemide (LASIX) 40 mg tablet        TAKE 1 TABLET DAILY    90 Tab    2      cyanocobalamin (VITAMIN B12) 1,000 mcg/mL injection        INJECT 1 ML BY INTRAMUSCULAR ROUTE EVERY MONTH    1 mL    3      AZOPT 1 % ophthalmic suspension    Both Eyes    Administer 1 Drop to both eyes two (2) times a day. 3      metoprolol (LOPRESSOR) 50 mg tablet    Oral    Take 1 Tab by mouth two (2) times a day. 180 Tab    3      amLODIPine (NORVASC) 5 mg tablet    Oral    Take 1 Tab by mouth daily. 90 Tab    3      triamcinolone acetonide (KENALOG) 0.1 % topical cream    Topical    Apply  to affected area two (2) times daily as needed. use thin layer               warfarin (COUMADIN) 5 mg tablet    Oral    Take 5 mg by mouth daily. Followed by PCP              TIMOLOL (BETIMOL OP)    Ophthalmic    Apply 1 Drop to eye two (2) times a day.  multivitamin (ONE A DAY) tablet    Oral    Take 1 Tab by mouth daily.                    Allergies   Allergen Reactions    Zetia [Ezetimibe] Other (comments)     Back pain resolved off Zetia    Lipitor [Atorvastatin] Myalgia    Livalo [Pitavastatin] Other (comments)     Extreme fatigue    5/29/14   PATIENT DENIES    Zocor [Simvastatin] Myalgia     5/29/14 PATIENT DENIES        Social History:   Social History   Substance Use Topics    Smoking status: Never Smoker    Smokeless tobacco: Never Used    Alcohol use Yes      Comment: 2-3 glasses of white wine frequently           Family history: family history includes COPD in his father; Hypertension in an other family member; No Known Problems in his mother. Review of Systems:   Constitutional: Negative for chills, fever, malaise/fatigue and weight loss. Respiratory: Negative for cough, hemoptysis, shortness of breath and wheezing. Cardiovascular: Negative for chest pain, palpitations, orthopnea and leg swelling. Gastrointestinal: Negative. Musculoskeletal: Positive for joint pain. Negative for falls and myalgias. Neurological: Negative for dizziness. Physical Exam:   The patient is an alert, oriented, well developed, well nourished 78 y.o.  male who was in no acute distress at the time of my examination. Visit Vitals    /72    Pulse 84    Ht 6' (1.829 m)    Wt 97.1 kg (214 lb)    SpO2 98%    BMI 29.02 kg/m2      BP Readings from Last 3 Encounters:   12/13/17 118/72   11/01/17 112/70   09/21/17 122/76        Wt Readings from Last 3 Encounters:   12/13/17 97.1 kg (214 lb)   11/01/17 97.5 kg (215 lb)   09/21/17 97.1 kg (214 lb)       HEENT: Conjunctivae pink, sclera clear and white, symmetrical with no lag. Neck: Supple without masses, tenderness or thyromegaly. No jugular venous distention. Carotid upstrokes are full bilaterally, without bruits. Cardiovascular: Normal pacer site in left upper chest.  Chest is symmetrical with good excursion. Marilla is not displaced. No lifts, heaves or thrills. S1 and S2 are normal, without appreciable murmurs, rubs, clicks or gallops.    Lungs: Clear to auscultation bilaterally. Abdomen: Soft and non-tender. Extremities: No edema with full peripheral pulses     Review of Data: See PMH and Cardiology and Imaging sections for cardiac testing      Results for orders placed or performed in visit on 12/13/17   AMB POC EKG ROUTINE W/ 12 LEADS, INTER & REP     Status: None    Narrative    Ventricularly paced rhythm with a rate of 84. This EKG is similar to the EKG of July 28, 2017         Tay Jaramillo D.O., F.A.C.C. [facility]  [facility]  [address] [place] [address]. [address]    PLEASE NOTE:  This document has been produced using voice recognition software. Unrecognized errors in transcription may be present.

## 2017-12-15 NOTE — PROGRESS NOTES
I have personally seen and evaluated the device findings. Interrogation reviewed and I agree with assessment.     Selam Simpson

## 2017-12-20 LAB — INR, EXTERNAL: 2.6

## 2017-12-22 ENCOUNTER — OFFICE VISIT (OUTPATIENT)
Dept: INTERNAL MEDICINE CLINIC | Age: 77
End: 2017-12-22

## 2017-12-22 VITALS
HEART RATE: 98 BPM | DIASTOLIC BLOOD PRESSURE: 70 MMHG | BODY MASS INDEX: 30.07 KG/M2 | TEMPERATURE: 97.4 F | WEIGHT: 222 LBS | RESPIRATION RATE: 16 BRPM | HEIGHT: 72 IN | OXYGEN SATURATION: 99 % | SYSTOLIC BLOOD PRESSURE: 120 MMHG

## 2017-12-22 DIAGNOSIS — I48.91 ATRIAL FIBRILLATION, UNSPECIFIED TYPE (HCC): ICD-10-CM

## 2017-12-22 DIAGNOSIS — I10 ESSENTIAL HYPERTENSION: ICD-10-CM

## 2017-12-22 DIAGNOSIS — E78.00 PURE HYPERCHOLESTEROLEMIA: ICD-10-CM

## 2017-12-22 DIAGNOSIS — E53.8 B12 DEFICIENCY: Primary | ICD-10-CM

## 2017-12-22 DIAGNOSIS — R06.09 DYSPNEA ON EXERTION: ICD-10-CM

## 2017-12-22 RX ORDER — CYANOCOBALAMIN 1000 UG/ML
INJECTION, SOLUTION INTRAMUSCULAR; SUBCUTANEOUS
Qty: 1 ML | Refills: 12
Start: 2017-12-22 | End: 2018-03-29 | Stop reason: SDUPTHER

## 2017-12-22 NOTE — PROGRESS NOTES
1. Have you been to the ER, urgent care clinic since your last visit? Hospitalized since your last visit? No    2. Have you seen or consulted any other health care providers outside of the [address] [place] [address] since your last visit? Include any pap smears or colon screening.  No

## 2017-12-22 NOTE — MR AVS SNAPSHOT
Visit Information Date & Time Provider Department Dept. Phone Encounter #  
 12/22/2017  3:00 PM Markos Alonso MD [facility] Isak Gonzalez [phone] [id number] Your Appointments 3/21/2018  2:00 PM  
CARELINK with 5 [facility] [address] (Thor Avi) Appt Note: 3 month carelink [facility] [address] [address] [address] [phone] [address] [place] [address] [address] Upcoming Health Maintenance Date Due ZOSTER VACCINE AGE 60> 1/16/2002 MEDICARE YEARLY EXAM 4/13/2018 GLAUCOMA SCREENING Q2Y 8/16/2019 DTaP/Tdap/Td series (2 - Td) 4/12/2027 Allergies as of 12/22/2017  Review Complete On: 12/22/2017 By: Laureen Johansen LPN Severity Noted Reaction Type Reactions Zetia [Ezetimibe] Medium 10/25/2016   Side Effect Other (comments) Back pain resolved off Zetia Lipitor [Atorvastatin]  10/12/2015    Myalgia Livalo [Pitavastatin]  09/17/2013    Other (comments) Extreme fatigue 5/29/14   PATIENT DENIES Zocor [Simvastatin]  09/17/2013    Myalgia 5/29/14 PATIENT DENIES Current Immunizations  Reviewed on 11/1/2017 Name Date Influenza High Dose Vaccine PF 9/21/2017 11:54 AM, 10/1/2016 Pneumococcal Conjugate (PCV-13) 4/12/2017 Pneumococcal Polysaccharide (PPSV-23) 10/30/2009, 6/9/2009 12:00 AM  
  
 Not reviewed this visit You Were Diagnosed With   
  
 Codes Comments Atrial fibrillation, unspecified type ([facility] [address] [facility]     ICD-10-CM: I48.91 
ICD-9-CM: 427.31 Essential hypertension     ICD-10-CM: I10 
ICD-9-CM: 401.9 Pure hypercholesterolemia     ICD-10-CM: E78.00 ICD-9-CM: 272.0 Other chest pain     ICD-10-CM: R07.89 ICD-9-CM: 786.59 Dyspnea on exertion     ICD-10-CM: R06.09 
ICD-9-CM: 786.09 Vitals BP Pulse Temp Resp Height(growth percentile) 120/70 (BP 1 Location: Left arm, BP Patient Position: Sitting) 98 97.4 °F (36.3 °C) (Tympanic) 16 6' (1.829 m) Weight(growth percentile) SpO2 BMI Smoking Status 222 lb (100.7 kg) 99% 30.11 kg/m2 Never Smoker Vitals History BMI and BSA Data Body Mass Index Body Surface Area  
 30.11 kg/m 2 2.26 m 2 Preferred Pharmacy Pharmacy Name [id number] [place] [id number] [facility] Azleasa Hernandez. [phone] Your Updated Medication List  
  
   
This list is accurate as of: 12/22/17  3:58 PM.  Always use your most recent med list.  
  
  
  
  
 Thao Redo Generic drug:  white petrolatum-mineral oil Apply  to affected area as needed. amLODIPine 5 mg tablet Commonly known as:  NORVASC  
  
 atorvastatin 10 mg tablet Commonly known as:  LIPITOR Take 1 Tab by mouth daily. AZOPT 1 % ophthalmic suspension Generic drug:  brinzolamide Administer 1 Drop to both eyes two (2) times a day. clobetasol 0.05 % topical cream  
Commonly known as:  Cirilo Blazer Apply  to affected area two (2) times a day. cyanocobalamin 1,000 mcg/mL injection Commonly known as:  VITAMIN B12  
inject 1ml into the muscle every 3 weeks  
  
 diclofenac 1 % Gel Commonly known as:  VOLTAREN Apply 4 g to affected area four (4) times daily. furosemide 40 mg tablet Commonly known as:  LASIX TAKE 1 TABLET DAILY  
  
 loratadine 10 mg tablet Commonly known as:  CLARITIN  
1 tablet each evening  
  
 metoprolol tartrate 50 mg tablet Commonly known as:  LOPRESSOR Take 1 Tab by mouth two (2) times a day. multivitamin tablet Commonly known as:  ONE A DAY Take 1 Tab by mouth daily. pantoprazole 40 mg tablet Commonly known as:  PROTONIX  
1 tablet each AM  
  
 spironolactone 25 mg tablet Commonly known as:  ALDACTONE Take 1 Tab by mouth daily. warfarin 5 mg tablet Commonly known as:  COUMADIN  
1 tablet daily Please provide this summary of care documentation to your next provider. Your primary care clinician is listed as Lela Merritt. If you have any questions after today's visit, please call [phone].

## 2017-12-27 LAB — INR, EXTERNAL: 2.7

## 2017-12-27 NOTE — PROGRESS NOTES
The patient presents to the office today with the chief complaint of dyspnea    HPI    The patient remains on medications for hypertension and hyperlipidemia. He is tolerating the medications well. The patient remains on Coumadin due to chronic atrial fibrillation. The patient is vitamin B12 replacement. A recent methylmelanonic acid level was slightly high. The patient complains of dyspnea on exertion. Review of Systems   Respiratory: Positive for shortness of breath. Negative for cough. Cardiovascular: Negative for chest pain and leg swelling. Allergies   Allergen Reactions    Zetia [Ezetimibe] Other (comments)     Back pain resolved off Zetia    Lipitor [Atorvastatin] Myalgia    Livalo [Pitavastatin] Other (comments)     Extreme fatigue    5/29/14   PATIENT DENIES    Zocor [Simvastatin] Myalgia     5/29/14 PATIENT DENIES        Current Outpatient Prescriptions   Medication Sig Dispense Refill    cyanocobalamin (VITAMIN B12) 1,000 mcg/mL injection inject 1ml into the muscle every 3 weeks 1 mL 12    warfarin (COUMADIN) 5 mg tablet 1 tablet daily 30 Tab 6    spironolactone (ALDACTONE) 25 mg tablet Take 1 Tab by mouth daily. 90 Tab 3    amLODIPine (NORVASC) 5 mg tablet       metoprolol tartrate (LOPRESSOR) 50 mg tablet Take 1 Tab by mouth two (2) times a day. 180 Tab 3    clobetasol (TEMOVATE) 0.05 % topical cream Apply  to affected area two (2) times a day.  white petrolatum-mineral oil (ABSORBASE) oint Apply  to affected area as needed.  diclofenac (VOLTAREN) 1 % gel Apply 4 g to affected area four (4) times daily. 1 Each 0    furosemide (LASIX) 40 mg tablet TAKE 1 TABLET DAILY 90 Tab 3    pantoprazole (PROTONIX) 40 mg tablet 1 tablet each AM 90 Tab 3    AZOPT 1 % ophthalmic suspension Administer 1 Drop to both eyes two (2) times a day. 3    multivitamin (ONE A DAY) tablet Take 1 Tab by mouth daily.  atorvastatin (LIPITOR) 10 mg tablet Take 1 Tab by mouth daily.  80 Tab 3       Past Medical History:   Diagnosis Date    AICD (automatic cardioverter/defibrillator) present     [employer]  upgrade from pacer in 2007 due to VT    Atrial fibrillation ([address]     Cardiac cath 03/19/2007    LM 25% ostial.  Otherwise patent coronaries. LVEDP 20.  EF 50%. Dyssynch. mid anterior contraction. Pacemaker.  Cardiac echocardiogram 03/20/2014    A-fib. EF 65-70%. No RWMA. No RWMA. Mild conc LVH. Mild RVE. RVSP 40-45 mmHg. Mild LAE.  HERNANDEZ. Mild MR. Mod TR.  Cardiac nuclear imaging test 07/29/2014    No ischemia or prior infarction. EF 68%. Nondiagnostic EKG due to V pacing on pharm stress test.  Low risk.  Cardioversion 8/31/2011    Successful defibrillation threshold testing at 18 joules. Patient also had conversion to atrial ventricular pacing.      CHF (congestive heart failure) ([facility])     Cobalamin deficiency     Dupuytren contracture 02/08/2010    on the right ring finger     Edema     Hypertension     Hypertrophy of prostate with urinary obstruction and other lower urinary tract symptoms (LUTS)     Impotence of organic origin     Intolerance of drug     Intolerant high doses of sotalol due to prolonged QT    Mastodynia     Paroxysmal VT ([address]     Pure hypercholesterolemia     S/P ablation of atrial fibrillation 05/31/2011    S/P ablation of atrial fibrillation 12/18/2012    Dr. Jorge Mann at [facility] S/P ablation operation for arrhythmia     VT ablation by Dr. Adryan Hartmann near [address] [place] [address] 2/3007    Sick sinus syndrome [facility])        Past Surgical History:   Procedure Laterality Date    HX AFIB ABLATION  12/18/2012    [facility] by Dr. Man Antonio  02/08-03/08    bilateral cataract removed    HX CATARACT REMOVAL  2/2008 & 3/2008    bilateral    HX ORTHOPAEDIC  2/8/10    release of Dupuytren's contraction on ring finger of right hand    HX OTHER SURGICAL  6/2000    atrial lead placement    HX OTHER SURGICAL  6/5/2009 Cardioversion    HX OTHER SURGICAL  10/12/2012    cardioversion    HX PACEMAKER  1998    placement    HX PACEMAKER  6/2000    DDD    HX PACEMAKER  3/27/07    removal of pacemaker & placement of dual chamber defib generator and leads    HX TONSIL AND ADENOIDECTOMY         Social History     Social History    Marital status:      Spouse name: N/A    Number of children: N/A    Years of education: N/A     Occupational History    Not on file. Social History Main Topics    Smoking status: Never Smoker    Smokeless tobacco: Never Used    Alcohol use Yes      Comment: 2-3 glasses of white wine frequently    Drug use: No    Sexual activity: Not Currently     Partners: Female     Other Topics Concern    Not on file     Social History Narrative       Patient does have an advanced directive on file    Visit Vitals    /70 (BP 1 Location: Left arm, BP Patient Position: Sitting)    Pulse 98    Temp 97.4 °F (36.3 °C) (Tympanic)    Resp 16    Ht 6' (1.829 m)    Wt 222 lb (100.7 kg)    SpO2 99%    BMI 30.11 kg/m2       Physical Exam   No Cervical Lymphadenopathy  No Supraclavicular Lymphadenopathy  Thyroid is Normal  Lungs are normal to percussion. Clear to auscultation   Heart:  S1 S2 are normal, No gallops, No mummers  No Carotid Bruits  Abdomen:  Normal Bowel Sounds. No tenderness. No masses. No Hepatomegaly or Splenomegly  LE:  Strong Pedal Pulses.   No Edema      BMI:  The patient was advised to limit fat to 20% of the calories - approximately 60 grams      Telephone Anticoag on 12/13/2017   Component Date Value Ref Range Status    INR, External 12/13/2017 2.6  2.0 - 3.0 Final   Telephone Anticoag on 12/06/2017   Component Date Value Ref Range Status    INR, External 12/06/2017 2.6  2.0 - 3.0 Final   Telephone Anticoag on 11/29/2017   Component Date Value Ref Range Status    INR, External 11/29/2017 3.1* 2.0 - 3.0 Final   Telephone Anticoag on 11/22/2017   Component Date Value Ref Range Status    INR, External 11/22/2017 3.1* 2.0 - 3.0 Final   Telephone Anticoag on 11/15/2017   Component Date Value Ref Range Status    INR, External 11/15/2017 1.9* 2.0 - 3.0 Final   Telephone on 11/08/2017   Component Date Value Ref Range Status    INR, External 11/08/2017 1.9   Final   Telephone Anticoag on 11/01/2017   Component Date Value Ref Range Status    INR, External 11/01/2017 2.2  2.0 - 3.0 Final   Hospital Outpatient Visit on 10/25/2017   Component Date Value Ref Range Status    WBC 10/25/2017 6.7  4.6 - 13.2 K/uL Final    RBC 10/25/2017 4.55* 4.70 - 5.50 M/uL Final    HGB 10/25/2017 14.8  13.0 - 16.0 g/dL Final    HCT 10/25/2017 44.0  36.0 - 48.0 % Final    MCV 10/25/2017 96.7  74.0 - 97.0 FL Final    MCH 10/25/2017 32.5  24.0 - 34.0 PG Final    MCHC 10/25/2017 33.6  31.0 - 37.0 g/dL Final    RDW 10/25/2017 13.3  11.6 - 14.5 % Final    PLATELET 14/92/5177 516  135 - 420 K/uL Final    MPV 10/25/2017 11.9* 9.2 - 11.8 FL Final    NEUTROPHILS 10/25/2017 61  40 - 73 % Final    LYMPHOCYTES 10/25/2017 22  21 - 52 % Final    MONOCYTES 10/25/2017 15* 3 - 10 % Final    EOSINOPHILS 10/25/2017 2  0 - 5 % Final    BASOPHILS 10/25/2017 0  0 - 2 % Final    ABS. NEUTROPHILS 10/25/2017 4.1  1.8 - 8.0 K/UL Final    ABS. LYMPHOCYTES 10/25/2017 1.5  0.9 - 3.6 K/UL Final    ABS. MONOCYTES 10/25/2017 1.0  0.05 - 1.2 K/UL Final    ABS. EOSINOPHILS 10/25/2017 0.1  0.0 - 0.4 K/UL Final    ABS.  BASOPHILS 10/25/2017 0.0  0.0 - 0.06 K/UL Final    DF 10/25/2017 AUTOMATED    Final    Sodium 10/25/2017 137  136 - 145 mmol/L Final    Potassium 10/25/2017 4.3  3.5 - 5.5 mmol/L Final    Chloride 10/25/2017 104  100 - 108 mmol/L Final    CO2 10/25/2017 23  21 - 32 mmol/L Final    Anion gap 10/25/2017 10  3.0 - 18 mmol/L Final    Glucose 10/25/2017 89  74 - 99 mg/dL Final    BUN 10/25/2017 17  7.0 - 18 MG/DL Final    Creatinine 10/25/2017 0.97  0.6 - 1.3 MG/DL Final    BUN/Creatinine ratio 10/25/2017 18  12 - 20   Final    GFR est AA 10/25/2017 >60  >60 ml/min/1.73m2 Final    GFR est non-AA 10/25/2017 >60  >60 ml/min/1.73m2 Final    Comment: (NOTE)  Estimated GFR is calculated using the Modification of Diet in Renal   Disease (MDRD) Study equation, reported for both  Americans   (GFRAA) and non- Americans (GFRNA), and normalized to 1.73m2   body surface area. The physician must decide which value applies to   the patient. The MDRD study equation should only be used in   individuals age 25 or older. It has not been validated for the   following: pregnant women, patients with serious comorbid conditions,   or on certain medications, or persons with extremes of body size,   muscle mass, or nutritional status.  Calcium 10/25/2017 8.8  8.5 - 10.1 MG/DL Final    Bilirubin, total 10/25/2017 0.8  0.2 - 1.0 MG/DL Final    ALT (SGPT) 10/25/2017 22  16 - 61 U/L Final    AST (SGOT) 10/25/2017 21  15 - 37 U/L Final    Alk. phosphatase 10/25/2017 72  45 - 117 U/L Final    Protein, total 10/25/2017 7.0  6.4 - 8.2 g/dL Final    Albumin 10/25/2017 3.7  3.4 - 5.0 g/dL Final    Globulin 10/25/2017 3.3  2.0 - 4.0 g/dL Final    A-G Ratio 10/25/2017 1.1  0.8 - 1.7   Final    LIPID PROFILE 10/25/2017        Final    Cholesterol, total 10/25/2017 174  <200 MG/DL Final    Triglyceride 10/25/2017 105  <150 MG/DL Final    Comment: The drugs N-acetylcysteine (NAC) and  Metamiszole have been found to cause falsely  low results in this chemical assay. Please  be sure to submit blood samples obtained  BEFORE administration of either of these  drugs to assure correct results.       HDL Cholesterol 10/25/2017 53  40 - 60 MG/DL Final    LDL, calculated 10/25/2017 100  0 - 100 MG/DL Final    VLDL, calculated 10/25/2017 21  MG/DL Final    CHOL/HDL Ratio 10/25/2017 3.3  0 - 5.0   Final    Prostate Specific Ag 10/25/2017 0.4  0.0 - 4.0 ng/mL Final    New method in use, please reestablish patient baseline    Methylmalonic acid 10/25/2017 390* 0 - 378 nmol/L Final    Comment: (NOTE)  Performed At: [address] [id number]  Joselito Neal MD [id number]     Telephone on 10/25/2017   Component Date Value Ref Range Status    INR, External 10/25/2017 1.8* 2.0 - 3.0 Final   Telephone Anticoag on 10/18/2017   Component Date Value Ref Range Status    INR, External 10/18/2017 2.1  2.0 - 3.0 Final   There may be more visits with results that are not included. .No results found for any visits on 12/22/17. Assessment / Plan      ICD-10-CM ICD-9-CM    1. B12 deficiency E53.8 266.2 cyanocobalamin (VITAMIN B12) 1,000 mcg/mL injection   2. Atrial fibrillation, unspecified type ([facility] [address] I48.91 427.31    3. Essential hypertension I10 401.9    4. Pure hypercholesterolemia E78.00 272.0    5. Dyspnea on exertion R06.09 786.09        Increase frequency of Vitamin B12 shots to q 3 weeks  he was advised to continue his maintenance medications      Follow-up Disposition:  Return in about 5 months (around 5/22/2018). I asked Ashok Weiner if he has any questions and I answered the questions.   Ashok Weiner states that he understands the treatment plan and agrees with the treatment plan

## 2017-12-28 ENCOUNTER — TELEPHONE ANTICOAG (OUTPATIENT)
Dept: INTERNAL MEDICINE CLINIC | Age: 77
End: 2017-12-28

## 2017-12-28 DIAGNOSIS — I48.20 CHRONIC ATRIAL FIBRILLATION (HCC): ICD-10-CM

## 2017-12-28 RX ORDER — CYANOCOBALAMIN 1000 UG/ML
INJECTION, SOLUTION INTRAMUSCULAR; SUBCUTANEOUS
Qty: 1 ML | Refills: 0 | Status: SHIPPED | OUTPATIENT
Start: 2017-12-28 | End: 2018-01-22 | Stop reason: ALTCHOICE

## 2018-01-03 LAB — INR, EXTERNAL: 2.8

## 2018-01-10 ENCOUNTER — TELEPHONE ANTICOAG (OUTPATIENT)
Dept: INTERNAL MEDICINE CLINIC | Age: 78
End: 2018-01-10

## 2018-01-10 DIAGNOSIS — I48.20 CHRONIC ATRIAL FIBRILLATION (HCC): ICD-10-CM

## 2018-01-10 LAB — INR, EXTERNAL: 2.9

## 2018-01-17 LAB — INR, EXTERNAL: 2.4

## 2018-01-18 ENCOUNTER — TELEPHONE ANTICOAG (OUTPATIENT)
Dept: INTERNAL MEDICINE CLINIC | Age: 78
End: 2018-01-18

## 2018-01-18 DIAGNOSIS — I48.20 CHRONIC ATRIAL FIBRILLATION (HCC): ICD-10-CM

## 2018-01-22 DIAGNOSIS — E53.8 B12 DEFICIENCY: ICD-10-CM

## 2018-01-22 RX ORDER — CYANOCOBALAMIN 1000 UG/ML
INJECTION, SOLUTION INTRAMUSCULAR; SUBCUTANEOUS
Qty: 1 ML | Refills: 12 | Status: SHIPPED | OUTPATIENT
Start: 2018-01-22 | End: 2018-10-19 | Stop reason: SDUPTHER

## 2018-01-24 DIAGNOSIS — I48.20 CHRONIC ATRIAL FIBRILLATION (HCC): ICD-10-CM

## 2018-01-24 LAB — INR, EXTERNAL: 1.7

## 2018-01-31 ENCOUNTER — TELEPHONE ANTICOAG (OUTPATIENT)
Dept: INTERNAL MEDICINE CLINIC | Age: 78
End: 2018-01-31

## 2018-01-31 DIAGNOSIS — I48.20 CHRONIC ATRIAL FIBRILLATION (HCC): ICD-10-CM

## 2018-01-31 LAB — INR, EXTERNAL: 2.4

## 2018-02-07 ENCOUNTER — TELEPHONE ANTICOAG (OUTPATIENT)
Dept: INTERNAL MEDICINE CLINIC | Age: 78
End: 2018-02-07

## 2018-02-07 DIAGNOSIS — I48.20 CHRONIC ATRIAL FIBRILLATION (HCC): ICD-10-CM

## 2018-02-07 LAB — INR, EXTERNAL: 2.7

## 2018-02-14 LAB — INR, EXTERNAL: 2.7

## 2018-02-15 ENCOUNTER — TELEPHONE ANTICOAG (OUTPATIENT)
Dept: INTERNAL MEDICINE CLINIC | Age: 78
End: 2018-02-15

## 2018-02-15 DIAGNOSIS — I48.20 CHRONIC ATRIAL FIBRILLATION (HCC): ICD-10-CM

## 2018-02-15 RX ORDER — FUROSEMIDE 40 MG/1
TABLET ORAL
Qty: 90 TAB | Refills: 3 | Status: SHIPPED | OUTPATIENT
Start: 2018-02-15 | End: 2018-06-06 | Stop reason: SDUPTHER

## 2018-02-22 LAB — INR, EXTERNAL: 3.1

## 2018-02-26 ENCOUNTER — TELEPHONE ANTICOAG (OUTPATIENT)
Dept: INTERNAL MEDICINE CLINIC | Age: 78
End: 2018-02-26

## 2018-02-26 DIAGNOSIS — I48.20 CHRONIC ATRIAL FIBRILLATION (HCC): ICD-10-CM

## 2018-02-28 ENCOUNTER — TELEPHONE ANTICOAG (OUTPATIENT)
Dept: INTERNAL MEDICINE CLINIC | Age: 78
End: 2018-02-28

## 2018-02-28 DIAGNOSIS — I48.20 CHRONIC ATRIAL FIBRILLATION (HCC): ICD-10-CM

## 2018-02-28 LAB — INR, EXTERNAL: 2.4

## 2018-03-08 ENCOUNTER — TELEPHONE ANTICOAG (OUTPATIENT)
Dept: INTERNAL MEDICINE CLINIC | Age: 78
End: 2018-03-08

## 2018-03-08 DIAGNOSIS — I48.20 CHRONIC ATRIAL FIBRILLATION (HCC): ICD-10-CM

## 2018-03-08 LAB — INR, EXTERNAL: 2.6

## 2018-03-14 LAB — INR, EXTERNAL: 3.4

## 2018-03-16 ENCOUNTER — TELEPHONE (OUTPATIENT)
Dept: INTERNAL MEDICINE CLINIC | Age: 78
End: 2018-03-16

## 2018-03-16 ENCOUNTER — TELEPHONE ANTICOAG (OUTPATIENT)
Dept: INTERNAL MEDICINE CLINIC | Age: 78
End: 2018-03-16

## 2018-03-16 DIAGNOSIS — I48.20 CHRONIC ATRIAL FIBRILLATION (HCC): ICD-10-CM

## 2018-03-16 NOTE — PROGRESS NOTES
Mr. Aracelis Tapia is here today for anticoagulation monitoring for the diagnosis of Atrial Fibrillation. His INR goal is 2.0-3.0 and his current Coumadin dose is 2.5 mg Monday thursday and Saturday . and 5 mg the rest of the days  Today's findings include an INR of 3.4 (normal INR range 0.8-1.2)    Considering Mr. Pam Chenws past history, todays findings, and per the coumadin policy/protocol, Mr. Ismael Leblanc was instructed to take Coumadin as follows,  Hold for one day then resume scheduled dosing . He was also instructed to schedule an appointment in 1 weeks prior to leaving for an INR check. A full discussion of the nature of anticoagulants has been carried out. A full discussion of the need for frequent and regular monitoring, precise dosage adjustment and compliance was stressed. Side effects of potential bleeding were discussed and Mr. Ismael Leblanc was instructed to call [phone] if there are any signs of abnormal bleeding. Mr. Ismael Leblanc was instructed to avoid any OTC items containing aspirin or ibuprofen and prior to starting any new OTC products to consult with his physician or pharmacist to ensure no drug interactions are present. Mr. Ismael Leblanc was instructed to avoid any major changes in his general diet and to avoid alcohol consumption. Mr. Ismael Leblanc verbalized his understanding of all instructions and will call the office with any questions, concerns, or signs of abnormal bleeding or blood clot.

## 2018-03-21 ENCOUNTER — TELEPHONE ANTICOAG (OUTPATIENT)
Dept: INTERNAL MEDICINE CLINIC | Age: 78
End: 2018-03-21

## 2018-03-21 ENCOUNTER — OFFICE VISIT (OUTPATIENT)
Dept: CARDIOLOGY CLINIC | Age: 78
End: 2018-03-21

## 2018-03-21 ENCOUNTER — TELEPHONE (OUTPATIENT)
Dept: INTERNAL MEDICINE CLINIC | Age: 78
End: 2018-03-21

## 2018-03-21 DIAGNOSIS — Z95.810 AICD (AUTOMATIC CARDIOVERTER/DEFIBRILLATOR) PRESENT: ICD-10-CM

## 2018-03-21 DIAGNOSIS — I48.20 CHRONIC ATRIAL FIBRILLATION (HCC): ICD-10-CM

## 2018-03-21 DIAGNOSIS — I47.29 PAROXYSMAL VT: Primary | ICD-10-CM

## 2018-03-21 LAB — INR, EXTERNAL: 2

## 2018-03-23 NOTE — PROGRESS NOTES
I have personally seen and evaluated the device findings. Interrogation reviewed and I agree with assessment.     Sherry Ortega

## 2018-03-28 LAB — INR, EXTERNAL: 2

## 2018-03-29 DIAGNOSIS — E53.8 B12 DEFICIENCY: ICD-10-CM

## 2018-03-29 RX ORDER — CYANOCOBALAMIN 1000 UG/ML
INJECTION, SOLUTION INTRAMUSCULAR; SUBCUTANEOUS
Qty: 1 ML | Refills: 12
Start: 2018-03-29 | End: 2018-08-19 | Stop reason: ALTCHOICE

## 2018-03-29 NOTE — TELEPHONE ENCOUNTER
Requested Prescriptions     Pending Prescriptions Disp Refills    cyanocobalamin (VITAMIN B12) 1,000 mcg/mL injection 1 mL 12     Sig: inject 1ml into the muscle every 3 weeks

## 2018-03-30 ENCOUNTER — TELEPHONE ANTICOAG (OUTPATIENT)
Dept: INTERNAL MEDICINE CLINIC | Age: 78
End: 2018-03-30

## 2018-03-30 DIAGNOSIS — I48.20 CHRONIC ATRIAL FIBRILLATION (HCC): ICD-10-CM

## 2018-04-04 ENCOUNTER — TELEPHONE (OUTPATIENT)
Dept: INTERNAL MEDICINE CLINIC | Age: 78
End: 2018-04-04

## 2018-04-04 ENCOUNTER — TELEPHONE ANTICOAG (OUTPATIENT)
Dept: INTERNAL MEDICINE CLINIC | Age: 78
End: 2018-04-04

## 2018-04-04 DIAGNOSIS — I48.20 CHRONIC ATRIAL FIBRILLATION (HCC): ICD-10-CM

## 2018-04-04 LAB — INR, EXTERNAL: 2.2

## 2018-04-04 NOTE — PROGRESS NOTES
External monitoring anticoagulation monitoring for the diagnosis of Atrial Fibrillation. His INR goal is 2.0-3.0 and his current Coumadin dose is 5 mg daily. Today's findings include an INR of 2.2 (normal INR range 0.8-1.2) . Considering Mr. Edward Walker past history, todays findings, and per the coumadin policy/protocol, Mr. Skinny Valencia was instructed to take Coumadin as follows,  Continue same dose . He was also instructed to schedule an appointment in 1 weeks prior to leaving for an INR check. A full discussion of the nature of anticoagulants has been carried out. A full discussion of the need for frequent and regular monitoring, precise dosage adjustment and compliance was stressed. Side effects of potential bleeding were discussed and Mr. Skinny Valencia was instructed to call [phone] if there are any signs of abnormal bleeding. Mr. Skinny Valencia was instructed to avoid any OTC items containing aspirin or ibuprofen and prior to starting any new OTC products to consult with his physician or pharmacist to ensure no drug interactions are present. Mr. Skinny Valencia was instructed to avoid any major changes in his general diet and to avoid alcohol consumption. .    Mr. Skinny Valencia verbalized his understanding of all instructions and will call the office with any questions, concerns, or signs of abnormal bleeding or blood clot.

## 2018-04-18 ENCOUNTER — TELEPHONE ANTICOAG (OUTPATIENT)
Dept: INTERNAL MEDICINE CLINIC | Age: 78
End: 2018-04-18

## 2018-04-18 DIAGNOSIS — I48.20 CHRONIC ATRIAL FIBRILLATION (HCC): ICD-10-CM

## 2018-04-18 LAB — INR, EXTERNAL: 2.2

## 2018-04-18 NOTE — PROGRESS NOTES
Patient called and notified that INR was WNL @2.2. No change in dosing and to recheck next week.  Voices understanding

## 2018-04-23 ENCOUNTER — TELEPHONE (OUTPATIENT)
Dept: INTERNAL MEDICINE CLINIC | Age: 78
End: 2018-04-23

## 2018-04-23 ENCOUNTER — CLINICAL SUPPORT (OUTPATIENT)
Dept: INTERNAL MEDICINE CLINIC | Age: 78
End: 2018-04-23

## 2018-04-23 DIAGNOSIS — E53.8 VITAMIN B 12 DEFICIENCY: Primary | ICD-10-CM

## 2018-04-23 RX ORDER — CYANOCOBALAMIN 1000 UG/ML
1000 INJECTION, SOLUTION INTRAMUSCULAR; SUBCUTANEOUS ONCE
Qty: 1 ML | Refills: 0 | Status: SHIPPED | COMMUNITY
Start: 2018-04-23 | End: 2018-04-23

## 2018-04-23 NOTE — TELEPHONE ENCOUNTER
Mr Denisse Rayo presented at the office for B12 injection and asked if Dr Cece Sam could substitute something for Spironolactone. It is causing breast tenderness and nipple pain.

## 2018-04-24 RX ORDER — AMLODIPINE BESYLATE 5 MG/1
5 TABLET ORAL DAILY
Qty: 90 TAB | Refills: 1 | Status: SHIPPED | OUTPATIENT
Start: 2018-04-24 | End: 2018-09-18 | Stop reason: SDUPTHER

## 2018-04-24 NOTE — TELEPHONE ENCOUNTER
Mr Giovanni Jackson contacted and given advise per Dr Alfred Otoole continue spironolactone and see him in office on Monday.  Appointment scheduled and Mr Giovanni Jackson expressed understanding

## 2018-04-25 ENCOUNTER — TELEPHONE ANTICOAG (OUTPATIENT)
Dept: INTERNAL MEDICINE CLINIC | Age: 78
End: 2018-04-25

## 2018-04-25 DIAGNOSIS — I48.20 CHRONIC ATRIAL FIBRILLATION (HCC): ICD-10-CM

## 2018-04-25 LAB — INR, EXTERNAL: 2

## 2018-04-26 NOTE — PROGRESS NOTES
Bartolome Maurer presents today for B12 injection. Allergies reviewed. Injection given in left arm per Dr Lisandra Arias order b12 100mcg/ml inj 1 every 3-4 weeks. Patient tolerated injection well and left office ambulatory.

## 2018-04-27 ENCOUNTER — OFFICE VISIT (OUTPATIENT)
Dept: INTERNAL MEDICINE CLINIC | Age: 78
End: 2018-04-27

## 2018-04-27 DIAGNOSIS — I10 ESSENTIAL HYPERTENSION: Primary | ICD-10-CM

## 2018-04-27 RX ORDER — POTASSIUM CHLORIDE 750 MG/1
TABLET, EXTENDED RELEASE ORAL
Qty: 60 TAB | Refills: 2 | Status: SHIPPED | OUTPATIENT
Start: 2018-04-27 | End: 2018-06-28 | Stop reason: ALTCHOICE

## 2018-04-27 NOTE — MR AVS SNAPSHOT
[address] [facility] 
 
 
 [address], [address] [place] [address] 
[phone] Patient: Dayan Morrissey MRN: [id number] Visit Information Date & Time Provider Department Dept. Phone Encounter #  
 4/27/2018  4:15 PM Nick Shepard MD [facility] INTERNAL MEDICINE OF [address] [id number] Your Appointments 6/27/2018  2:20 PM  
CARELINK with Mona Copeland [facility] (Katheryn Padron) Appt Note: Aura Brittaneychaimharvinder[address] [address] [address] [address] [phone] [place]  
  
    
 8/9/2018  3:45 PM  
Follow Up with Nick Shepard MD  
[address] [place] [address] Katheryn Padron) Appt Note: 5mo  
 [address], [address] [facility] [address]  
  
   
 [facility], [address] [facility] [address] Upcoming Health Maintenance Date Due ZOSTER VACCINE AGE 60> 1/16/2002 MEDICARE YEARLY EXAM 4/13/2018 Influenza Age 5 to Adult 8/1/2018 GLAUCOMA SCREENING Q2Y 8/16/2019 DTaP/Tdap/Td series (2 - Td) 4/12/2027 Allergies as of 4/27/2018  Review Complete On: 12/27/2017 By: Nick Shepard MD  
  
 Severity Noted Reaction Type Reactions Zetia [Ezetimibe] Medium 10/25/2016   Side Effect Other (comments) Back pain resolved off Zetia Lipitor [Atorvastatin]  10/12/2015    Myalgia Livalo [Pitavastatin]  09/17/2013    Other (comments) Extreme fatigue 5/29/14   PATIENT DENIES Zocor [Simvastatin]  09/17/2013    Myalgia 5/29/14 PATIENT DENIES Current Immunizations  Reviewed on 11/1/2017 Name Date Influenza High Dose Vaccine PF 9/21/2017 11:54 AM, 10/1/2016 Pneumococcal Conjugate (PCV-13) 4/12/2017 Pneumococcal Polysaccharide (PPSV-23) 10/30/2009, 6/9/2009 12:00 AM  
  
 Not reviewed this visit You Were Diagnosed With   
  
 Codes Comments Essential hypertension    -  Primary ICD-10-CM: I10 
ICD-9-CM: 401.9 Vitals Smoking Status Never Smoker Preferred Pharmacy Pharmacy Name Phone [address], [address] [place] [address] [phone] Your Updated Medication List  
  
   
This list is accurate as of 4/27/18  4:54 PM.  Always use your most recent med list.  
  
  
  
  
 Lilia Bourne Generic drug:  white petrolatum-mineral oil Apply  to affected area as needed. amLODIPine 5 mg tablet Commonly known as:  Karina Terry Take 1 Tab by mouth daily. atorvastatin 10 mg tablet Commonly known as:  LIPITOR Take 1 Tab by mouth daily. AZOPT 1 % ophthalmic suspension Generic drug:  brinzolamide Administer 1 Drop to both eyes two (2) times a day. clobetasol 0.05 % topical cream  
Commonly known as:  Alfredia Rubinstein Apply  to affected area two (2) times a day. * cyanocobalamin 1,000 mcg/mL injection Commonly known as:  VITAMIN B12  
inject 1 ml into the muscle once every three weeks * cyanocobalamin 1,000 mcg/mL injection Commonly known as:  VITAMIN B12  
inject 1ml into the muscle every 3 weeks  
  
 diclofenac 1 % Gel Commonly known as:  VOLTAREN Apply 4 g to affected area four (4) times daily. furosemide 40 mg tablet Commonly known as:  LASIX TAKE 1 TABLET DAILY  
  
 metoprolol tartrate 50 mg tablet Commonly known as:  LOPRESSOR Take 1 Tab by mouth two (2) times a day. multivitamin tablet Commonly known as:  ONE A DAY Take 1 Tab by mouth daily. pantoprazole 40 mg tablet Commonly known as:  PROTONIX  
1 tablet each AM  
  
 potassium chloride 10 mEq tablet Commonly known as:  KLOR-CON  
1 tablet twice per day (stop Spirolactone)  
  
 spironolactone 25 mg tablet Commonly known as:  ALDACTONE Take 1 Tab by mouth daily. warfarin 5 mg tablet Commonly known as:  COUMADIN  
1 tablet daily * Notice: This list has 2 medication(s) that are the same as other medications prescribed for you. Read the directions carefully, and ask your doctor or other care provider to review them with you. Prescriptions Sent to Pharmacy Refills  
 potassium chloride (KLOR-CON) 10 mEq tablet 2 Si tablet twice per day (stop Spirolactone) Class: Normal  
 Pharmacy: [facility], [address] [place] [address] #: [phone] To-Do List   
 2018 Lab:  METABOLIC PANEL, BASIC Please provide this summary of care documentation to your next provider. Your primary care clinician is listed as Beti Valente. If you have any questions after today's visit, please call [phone].

## 2018-05-02 ENCOUNTER — TELEPHONE ANTICOAG (OUTPATIENT)
Dept: INTERNAL MEDICINE CLINIC | Age: 78
End: 2018-05-02

## 2018-05-02 DIAGNOSIS — I48.20 CHRONIC ATRIAL FIBRILLATION (HCC): ICD-10-CM

## 2018-05-02 LAB — INR, EXTERNAL: 2.2

## 2018-05-07 NOTE — PROGRESS NOTES
Mr. Tam Chin was contacted for anticoagulation monitoring for the diagnosis of Atrial Fibrillation. His INR goal is 2.0-3.0 and his current Coumadin dose is 2.5 mg Mon, tues and Sat then 5 mg all other days. Today's findings include an INR of 2.2 (normal INR range 0.8-1.2). Considering Mr. Souza Ped past history, todays findings, and per the coumadin policy/protocol, Mr. Melissa Farrell was instructed to take Coumadin as follows,  2.5 mg Mon, Tues and Sat then 5 mg all other days. He was also instructed to schedule an appointment in 1 weeks for an INR check. A full discussion of the nature of anticoagulants has been carried out. A full discussion of the need for frequent and regular monitoring, precise dosage adjustment and compliance was stressed. Side effects of potential bleeding were discussed and Mr. Melissa Farrell was instructed to call [phone] if there are any signs of abnormal bleeding. Mr. Melissa Farrell was instructed to avoid any OTC items containing aspirin or ibuprofen and prior to starting any new OTC products to consult with his physician or pharmacist to ensure no drug interactions are present. Mr. Melissa Farrell was instructed to avoid any major changes in his general diet and to avoid alcohol consumption. .      Mr. Melissa Farrell verbalized his understanding of all instructions and will call the office with any questions, concerns, or signs of abnormal bleeding or blood clot.

## 2018-05-09 ENCOUNTER — TELEPHONE ANTICOAG (OUTPATIENT)
Dept: INTERNAL MEDICINE CLINIC | Age: 78
End: 2018-05-09

## 2018-05-09 ENCOUNTER — TELEPHONE (OUTPATIENT)
Dept: INTERNAL MEDICINE CLINIC | Age: 78
End: 2018-05-09

## 2018-05-09 DIAGNOSIS — I48.20 CHRONIC ATRIAL FIBRILLATION (HCC): ICD-10-CM

## 2018-05-10 ENCOUNTER — HOSPITAL ENCOUNTER (OUTPATIENT)
Dept: LAB | Age: 78
Discharge: HOME | End: 2018-05-10
Payer: MEDICARE

## 2018-05-10 DIAGNOSIS — E53.8 B12 DEFICIENCY: ICD-10-CM

## 2018-05-10 DIAGNOSIS — E53.8 COBALAMIN DEFICIENCY: ICD-10-CM

## 2018-05-10 DIAGNOSIS — E78.00 PURE HYPERCHOLESTEROLEMIA: ICD-10-CM

## 2018-05-10 DIAGNOSIS — I48.20 CHRONIC ATRIAL FIBRILLATION (HCC): ICD-10-CM

## 2018-05-10 DIAGNOSIS — I48.20 CHRONIC ATRIAL FIBRILLATION (HCC): Primary | ICD-10-CM

## 2018-05-10 LAB
ALBUMIN SERPL-MCNC: 3.5 G/DL (ref 3.4–5)
ALBUMIN/GLOB SERPL: 1 {RATIO} (ref 0.8–1.7)
ALP SERPL-CCNC: 78 U/L (ref 45–117)
ALT SERPL-CCNC: 20 U/L (ref 16–61)
ANION GAP SERPL CALC-SCNC: 11 MMOL/L (ref 3–18)
AST SERPL-CCNC: 17 U/L (ref 15–37)
BASOPHILS # BLD: 0 K/UL (ref 0–0.06)
BASOPHILS NFR BLD: 1 % (ref 0–2)
BILIRUB SERPL-MCNC: 0.6 MG/DL (ref 0.2–1)
BUN SERPL-MCNC: 17 MG/DL (ref 7–18)
BUN/CREAT SERPL: 16 (ref 12–20)
CALCIUM SERPL-MCNC: 8.5 MG/DL (ref 8.5–10.1)
CHLORIDE SERPL-SCNC: 107 MMOL/L (ref 100–108)
CHOLEST SERPL-MCNC: 193 MG/DL
CO2 SERPL-SCNC: 22 MMOL/L (ref 21–32)
CREAT SERPL-MCNC: 1.06 MG/DL (ref 0.6–1.3)
DIFFERENTIAL METHOD BLD: ABNORMAL
EOSINOPHIL # BLD: 0.2 K/UL (ref 0–0.4)
EOSINOPHIL NFR BLD: 3 % (ref 0–5)
ERYTHROCYTE [DISTWIDTH] IN BLOOD BY AUTOMATED COUNT: 13.2 % (ref 11.6–14.5)
GLOBULIN SER CALC-MCNC: 3.6 G/DL (ref 2–4)
GLUCOSE SERPL-MCNC: 117 MG/DL (ref 74–99)
HCT VFR BLD AUTO: 43.2 % (ref 36–48)
HDLC SERPL-MCNC: 49 MG/DL (ref 40–60)
HDLC SERPL: 3.9 {RATIO} (ref 0–5)
HGB BLD-MCNC: 14.7 G/DL (ref 13–16)
LDLC SERPL CALC-MCNC: 128 MG/DL (ref 0–100)
LIPID PROFILE,FLP: ABNORMAL
LYMPHOCYTES # BLD: 1.6 K/UL (ref 0.9–3.6)
LYMPHOCYTES NFR BLD: 24 % (ref 21–52)
MCH RBC QN AUTO: 33 PG (ref 24–34)
MCHC RBC AUTO-ENTMCNC: 34 G/DL (ref 31–37)
MCV RBC AUTO: 96.9 FL (ref 74–97)
MONOCYTES # BLD: 0.9 K/UL (ref 0.05–1.2)
MONOCYTES NFR BLD: 13 % (ref 3–10)
NEUTS SEG # BLD: 3.9 K/UL (ref 1.8–8)
NEUTS SEG NFR BLD: 59 % (ref 40–73)
PLATELET # BLD AUTO: 267 K/UL (ref 135–420)
PMV BLD AUTO: 11.6 FL (ref 9.2–11.8)
POTASSIUM SERPL-SCNC: 4 MMOL/L (ref 3.5–5.5)
PROT SERPL-MCNC: 7.1 G/DL (ref 6.4–8.2)
RBC # BLD AUTO: 4.46 M/UL (ref 4.7–5.5)
SODIUM SERPL-SCNC: 140 MMOL/L (ref 136–145)
TRIGL SERPL-MCNC: 80 MG/DL (ref ?–150)
VLDLC SERPL CALC-MCNC: 16 MG/DL
WBC # BLD AUTO: 6.6 K/UL (ref 4.6–13.2)

## 2018-05-10 PROCEDURE — 36415 COLL VENOUS BLD VENIPUNCTURE: CPT | Performed by: INTERNAL MEDICINE

## 2018-05-10 PROCEDURE — 85025 COMPLETE CBC W/AUTO DIFF WBC: CPT | Performed by: INTERNAL MEDICINE

## 2018-05-10 PROCEDURE — 80061 LIPID PANEL: CPT | Performed by: INTERNAL MEDICINE

## 2018-05-10 PROCEDURE — 80053 COMPREHEN METABOLIC PANEL: CPT | Performed by: INTERNAL MEDICINE

## 2018-05-14 ENCOUNTER — OFFICE VISIT (OUTPATIENT)
Dept: INTERNAL MEDICINE CLINIC | Age: 78
End: 2018-05-14

## 2018-05-14 VITALS
DIASTOLIC BLOOD PRESSURE: 60 MMHG | RESPIRATION RATE: 16 BRPM | HEART RATE: 94 BPM | OXYGEN SATURATION: 97 % | BODY MASS INDEX: 29.93 KG/M2 | TEMPERATURE: 97.8 F | HEIGHT: 72 IN | SYSTOLIC BLOOD PRESSURE: 118 MMHG | WEIGHT: 221 LBS

## 2018-05-14 DIAGNOSIS — S89.80XA OVERUSE INJURY OF LOWER LEG: ICD-10-CM

## 2018-05-14 DIAGNOSIS — M25.561 ACUTE PAIN OF RIGHT KNEE: Primary | ICD-10-CM

## 2018-05-14 DIAGNOSIS — E53.8 B12 DEFICIENCY: ICD-10-CM

## 2018-05-14 DIAGNOSIS — I10 ESSENTIAL HYPERTENSION: ICD-10-CM

## 2018-05-14 RX ORDER — PREDNISONE 20 MG/1
TABLET ORAL
Qty: 8 TAB | Refills: 0 | Status: SHIPPED | OUTPATIENT
Start: 2018-05-14 | End: 2018-08-15 | Stop reason: ALTCHOICE

## 2018-05-14 RX ORDER — CYANOCOBALAMIN 1000 UG/ML
1000 INJECTION, SOLUTION INTRAMUSCULAR; SUBCUTANEOUS ONCE
Qty: 1 ML | Refills: 0
Start: 2018-05-14 | End: 2018-05-14

## 2018-05-14 NOTE — MR AVS SNAPSHOT
[address] [facility] [address] 
 
 
 [address], [address] [place] [address] 
[phone] Patient: Mayer Cockayne MRN: [id number] TDD:9/86/8158 Visit Information Date & Time Provider Department Dept. Phone Encounter #  
 5/14/2018 10:30 AM Brandi Joseph NP [facility] INTERNAL MEDICINE OF Parth Miner [phone] [id number] Your Appointments 6/27/2018  2:20 PM  
CARELINK with Mona Copeland [facility] ([facility]) Appt Note: Aura Brittaneychaimharvinderkellyjorge Miley [address] [address] [address] [address] [phone] Naranjo Aleida  
  
    
 8/9/2018  3:45 PM  
Follow Up with Rupert Gonzalez MD  
[address] [place] [address] [facility]) Appt Note: 5mo  
 [address], [address] [place] [facility] [address]  
  
   
 [address] [place], [address] [facility] [address] Upcoming Health Maintenance Date Due ZOSTER VACCINE AGE 60> 1/16/2002 MEDICARE YEARLY EXAM 4/13/2018 Influenza Age 5 to Adult 8/1/2018 GLAUCOMA SCREENING Q2Y 8/16/2019 DTaP/Tdap/Td series (2 - Td) 4/12/2027 Allergies as of 5/14/2018  Review Complete On: 5/14/2018 By: Brandi Joseph NP Severity Noted Reaction Type Reactions Zetia [Ezetimibe] Medium 10/25/2016   Side Effect Other (comments) Back pain resolved off Zetia Lipitor [Atorvastatin]  10/12/2015    Myalgia Livalo [Pitavastatin]  09/17/2013    Other (comments) Extreme fatigue 5/29/14   PATIENT DENIES Zocor [Simvastatin]  09/17/2013    Myalgia 5/29/14 PATIENT DENIES Current Immunizations  Reviewed on 11/1/2017 Name Date Influenza High Dose Vaccine PF 9/21/2017 11:54 AM, 10/1/2016 Pneumococcal Conjugate (PCV-13) 4/12/2017 Pneumococcal Polysaccharide (PPSV-23) 10/30/2009, 6/9/2009 12:00 AM  
  
 Not reviewed this visit You Were Diagnosed With   
  
 Codes Comments Acute pain of right knee    -  Primary ICD-10-CM: M25.561 ICD-9-CM: 719.46 Overuse injury of lower leg     ICD-10-CM: P69.99IY ICD-9-CM: 959.7 Essential hypertension     ICD-10-CM: I10 
ICD-9-CM: 401.9 Vitals BP Pulse Temp Resp Height(growth percentile)  
 118/60 (BP 1 Location: Left arm, BP Patient Position: Sitting) (!) 102 97.8 °F (36.6 °C) (Tympanic) 16 6' (1.829 m) Weight(growth percentile) SpO2 BMI Smoking Status 221 lb (100.2 kg) 97% 29.97 kg/m2 Never Smoker BMI and BSA Data Body Mass Index Body Surface Area  
 29.97 kg/m 2 2.26 m 2 Preferred Pharmacy Pharmacy Name Phone [address], [address] [place] [address] [phone] Your Updated Medication List  
  
   
This list is accurate as of 5/14/18 11:26 AM.  Always use your most recent med list.  
  
  
  
  
 Lilia Bourne Generic drug:  white petrolatum-mineral oil Apply  to affected area as needed. amLODIPine 5 mg tablet Commonly known as:  Karina Terry Take 1 Tab by mouth daily. atorvastatin 10 mg tablet Commonly known as:  LIPITOR Take 1 Tab by mouth daily. AZOPT 1 % ophthalmic suspension Generic drug:  brinzolamide Administer 1 Drop to both eyes two (2) times a day. clobetasol 0.05 % topical cream  
Commonly known as:  Alfredia Rubinstein Apply  to affected area two (2) times a day. * cyanocobalamin 1,000 mcg/mL injection Commonly known as:  VITAMIN B12  
inject 1 ml into the muscle once every three weeks * cyanocobalamin 1,000 mcg/mL injection Commonly known as:  VITAMIN B12  
inject 1ml into the muscle every 3 weeks  
  
 diclofenac 1 % Gel Commonly known as:  VOLTAREN Apply 4 g to affected area four (4) times daily. furosemide 40 mg tablet Commonly known as:  LASIX TAKE 1 TABLET DAILY  
  
 metoprolol tartrate 50 mg tablet Commonly known as:  LOPRESSOR Take 1 Tab by mouth two (2) times a day. multivitamin tablet Commonly known as:  ONE A DAY Take 1 Tab by mouth daily. pantoprazole 40 mg tablet Commonly known as:  PROTONIX  
1 tablet each AM  
  
 potassium chloride 10 mEq tablet Commonly known as:  KLOR-CON  
1 tablet twice per day (stop Spirolactone) predniSONE 20 mg tablet Commonly known as:  DELTASONE  
2 tabs daily x 2 days, 1 tab daily x 2 days, 1/2 tab daily x 4 days  
  
 spironolactone 25 mg tablet Commonly known as:  ALDACTONE Take 1 Tab by mouth daily. warfarin 5 mg tablet Commonly known as:  COUMADIN  
1 tablet daily * Notice: This list has 2 medication(s) that are the same as other medications prescribed for you. Read the directions carefully, and ask your doctor or other care provider to review them with you. Prescriptions Sent to Pharmacy Refills  
 predniSONE (DELTASONE) 20 mg tablet 0 Si tabs daily x 2 days, 1 tab daily x 2 days, 1/2 tab daily x 4 days Class: Normal  
 Pharmacy: [facility], [address] [place] [address] #: [phone] Please provide this summary of care documentation to your next provider. Your primary care clinician is listed as Adryan Van. If you have any questions after today's visit, please call [phone].

## 2018-05-16 ENCOUNTER — TELEPHONE ANTICOAG (OUTPATIENT)
Dept: INTERNAL MEDICINE CLINIC | Age: 78
End: 2018-05-16

## 2018-05-16 DIAGNOSIS — I48.20 CHRONIC ATRIAL FIBRILLATION (HCC): ICD-10-CM

## 2018-05-16 LAB — INR, EXTERNAL: 2.3

## 2018-05-16 NOTE — PROGRESS NOTES
External INR 2.3 result reviewd by provider no change in dose and recheck next week Patient's wife notified

## 2018-05-23 ENCOUNTER — TELEPHONE ANTICOAG (OUTPATIENT)
Dept: INTERNAL MEDICINE CLINIC | Age: 78
End: 2018-05-23

## 2018-05-23 DIAGNOSIS — I48.20 CHRONIC ATRIAL FIBRILLATION (HCC): ICD-10-CM

## 2018-05-23 LAB — INR, EXTERNAL: 3.1

## 2018-05-23 NOTE — PROGRESS NOTES
Mr. Tam Chin called today for anticoagulation monitoring for the diagnosis of Atrial Fibrillation. His INR goal is 2.0-3.0 and his current Coumadin dose is 5 mg daily except 2.5 mg on Monday Tuesday and saturday. Today's findings include an INR of 3.1 (normal INR range 0.8-1.2) . Considering Mr. Souza Ped past history, todays findings, and per the coumadin policy/protocol, Mr. Melissa Farrell was instructed to take Coumadin as follows,  continue same dose. He was also instructed to schedule an appointment in 1 weeks prior to leaving for an INR check. A full discussion of the nature of anticoagulants has been carried out. A full discussion of the need for frequent and regular monitoring, precise dosage adjustment and compliance was stressed. Side effects of potential bleeding were discussed and Mr. Melissa Farrell was instructed to call [phone] if there are any signs of abnormal bleeding. Mr. Melissa Farrell was instructed to avoid any OTC items containing aspirin or ibuprofen and prior to starting any new OTC products to consult with his physician or pharmacist to ensure no drug interactions are present. Mr. Melissa Farrell was instructed to avoid any major changes in his general diet and to avoid alcohol consumption. .  Mr. Melissa Farrell verbalized his understanding of all instructions and will call the office with any questions, concerns, or signs of abnormal bleeding or blood clot.

## 2018-05-30 ENCOUNTER — TELEPHONE ANTICOAG (OUTPATIENT)
Dept: INTERNAL MEDICINE CLINIC | Age: 78
End: 2018-05-30

## 2018-05-30 DIAGNOSIS — I48.20 CHRONIC ATRIAL FIBRILLATION (HCC): ICD-10-CM

## 2018-05-30 LAB — INR, EXTERNAL: 3.5

## 2018-05-30 NOTE — PROGRESS NOTES
Mr. Stnaley Marie home monitoring report for  today for anticoagulation monitoring for the diagnosis of Atrial Fibrillation. His INR goal is 2.0-3.0 and his current Coumadin dose is 2.5 mg 3 days a week and 5 mg daily. Today's findings include an INR of 3.5 (normal INR range 0.8-1.2)     Considering Mr. Esteban Draper past history, todays findings, and per the coumadin policy/protocol, Mr. Laron Monique was instructed to take Coumadin as follows,  Hold next dose then resume with 5 mg three days a week and 2.5 mg garcia. He was also instructed to schedule an appointment in 1 weeks prior to leaving for an INR check. A full discussion of the nature of anticoagulants has been carried out. A full discussion of the need for frequent and regular monitoring, precise dosage adjustment and compliance was stressed. Side effects of potential bleeding were discussed and Mr. Laron Monique was instructed to call [phone] if there are any signs of abnormal bleeding. Mr. Laron Monique was instructed to avoid any OTC items containing aspirin or ibuprofen and prior to starting any new OTC products to consult with his physician or pharmacist to ensure no drug interactions are present. Mr. Laron Monique was instructed to avoid any major changes in his general diet and to avoid alcohol consumption. .      Mr. Laron Monique verbalized his understanding of all instructions and will call the office with any questions, concerns, or signs of abnormal bleeding or blood clot.

## 2018-06-05 ENCOUNTER — CLINICAL SUPPORT (OUTPATIENT)
Dept: INTERNAL MEDICINE CLINIC | Age: 78
End: 2018-06-05

## 2018-06-05 DIAGNOSIS — E53.8 B12 DEFICIENCY: Primary | ICD-10-CM

## 2018-06-05 RX ORDER — CYANOCOBALAMIN 1000 UG/ML
1000 INJECTION, SOLUTION INTRAMUSCULAR; SUBCUTANEOUS ONCE
Qty: 1 ML | Refills: 0
Start: 2018-06-05 | End: 2018-06-05

## 2018-06-05 NOTE — PROGRESS NOTES
Simeon Cordero presents today for B12 injection. Allergies reviewed. Injection given in left arm per Dr Param Mari order b12 100mcg/ml inj 1 every 3-4 weeks. Patient tolerated injection well and left office ambulatory.

## 2018-06-06 ENCOUNTER — TELEPHONE ANTICOAG (OUTPATIENT)
Dept: INTERNAL MEDICINE CLINIC | Age: 78
End: 2018-06-06

## 2018-06-06 DIAGNOSIS — I48.20 CHRONIC ATRIAL FIBRILLATION (HCC): ICD-10-CM

## 2018-06-07 RX ORDER — FUROSEMIDE 40 MG/1
TABLET ORAL
Qty: 90 TAB | Refills: 3 | Status: SHIPPED | OUTPATIENT
Start: 2018-06-07 | End: 2019-02-11 | Stop reason: SDUPTHER

## 2018-06-07 RX ORDER — METOPROLOL TARTRATE 50 MG/1
50 TABLET ORAL 2 TIMES DAILY
Qty: 180 TAB | Refills: 3 | Status: SHIPPED | OUTPATIENT
Start: 2018-06-07 | End: 2019-06-27 | Stop reason: SDUPTHER

## 2018-06-13 ENCOUNTER — TELEPHONE ANTICOAG (OUTPATIENT)
Dept: INTERNAL MEDICINE CLINIC | Age: 78
End: 2018-06-13

## 2018-06-13 DIAGNOSIS — I48.20 CHRONIC ATRIAL FIBRILLATION (HCC): ICD-10-CM

## 2018-06-13 LAB — INR, EXTERNAL: 2.8

## 2018-06-13 NOTE — PROGRESS NOTES
Mr. Angel Hawley home monitoring  today for anticoagulation monitoring for the diagnosis of Atrial Fibrillation. His INR goal is 2.0-3.0 and his current Coumadin dose is 5 mg Monday, Wednesday and fridays. Today's findings include an INR of 2.8 (normal INR range 0.8-1.2)Considering Mr. Esteban Draper past history, todays findings, and per the coumadin policy/protocol, Mr. Laron Monique was instructed to take Coumadin as follows,  Continue same dose . He was also instructed to recheck in 1 weeks prior to leaving for an INR check. A full discussion of the nature of anticoagulants has been carried out. A full discussion of the need for frequent and regular monitoring, precise dosage adjustment and compliance was stressed. Side effects of potential bleeding were discussed and Mr. Laron Monique was instructed to call [phone] if there are any signs of abnormal bleeding. Mr. Laron Monique was instructed to avoid any OTC items containing aspirin or ibuprofen and prior to starting any new OTC products to consult with his physician or pharmacist to ensure no drug interactions are present. Mr. Laron Monique was instructed to avoid any major changes in his general diet and to avoid alcohol consumption. .  Mr. Laron Monique verbalized his understanding of all instructions and will call the office with any questions, concerns, or signs of abnormal bleeding or blood clot.

## 2018-06-26 ENCOUNTER — CLINICAL SUPPORT (OUTPATIENT)
Dept: INTERNAL MEDICINE CLINIC | Age: 78
End: 2018-06-26

## 2018-06-26 DIAGNOSIS — E53.8 VITAMIN B 12 DEFICIENCY: Primary | ICD-10-CM

## 2018-06-26 RX ORDER — CYANOCOBALAMIN 1000 UG/ML
1000 INJECTION, SOLUTION INTRAMUSCULAR; SUBCUTANEOUS ONCE
Qty: 1 ML | Refills: 0 | Status: SHIPPED | COMMUNITY
Start: 2018-06-26 | End: 2018-06-26

## 2018-06-26 NOTE — PROGRESS NOTES
Per order patient provided b12 for injection .  Injected into left arm patient tolerated it well and ambulated out of office

## 2018-06-27 ENCOUNTER — TELEPHONE (OUTPATIENT)
Dept: INTERNAL MEDICINE CLINIC | Age: 78
End: 2018-06-27

## 2018-06-27 ENCOUNTER — OFFICE VISIT (OUTPATIENT)
Dept: CARDIOLOGY CLINIC | Age: 78
End: 2018-06-27

## 2018-06-27 DIAGNOSIS — I47.29 PAROXYSMAL VT: Primary | ICD-10-CM

## 2018-06-27 DIAGNOSIS — Z95.810 AICD (AUTOMATIC CARDIOVERTER/DEFIBRILLATOR) PRESENT: ICD-10-CM

## 2018-06-27 NOTE — TELEPHONE ENCOUNTER
I called the patient and ask about the potassium, and patient stated that the pill get hung up  Hard to swallow. I explained to him that we could give him liquid but it taste awfull. Patient ask if there is any other pill that can be called in. I told him that I will ask Dr. Pamela Ortiz in the morning and will call back.

## 2018-06-28 ENCOUNTER — OFFICE VISIT (OUTPATIENT)
Dept: INTERNAL MEDICINE CLINIC | Age: 78
End: 2018-06-28

## 2018-06-28 RX ORDER — PROPRANOLOL HYDROCHLORIDE 60 MG/1
TABLET ORAL
Qty: 180 TAB | Refills: 3 | Status: SHIPPED | OUTPATIENT
Start: 2018-06-28 | End: 2019-01-07 | Stop reason: DRUGHIGH

## 2018-06-28 RX ORDER — POTASSIUM CHLORIDE 600 MG/1
8 CAPSULE, EXTENDED RELEASE ORAL 2 TIMES DAILY
Qty: 180 CAP | Refills: 3 | Status: SHIPPED | OUTPATIENT
Start: 2018-06-28 | End: 2018-06-28 | Stop reason: ALTCHOICE

## 2018-06-28 RX ORDER — PROPRANOLOL HYDROCHLORIDE 60 MG/1
TABLET ORAL
Qty: 180 TAB | Refills: 3 | Status: SHIPPED | OUTPATIENT
Start: 2018-06-28 | End: 2018-06-28 | Stop reason: SDUPTHER

## 2018-06-28 NOTE — PROGRESS NOTES
I have personally seen and evaluated the device findings. Interrogation reviewed and I agree with assessment.     Severino Florez

## 2018-06-28 NOTE — TELEPHONE ENCOUNTER
I spoke to Dr. Cynthia Vidal regarding patient didn't want the Potassium liquid. Patient wanted to know if a different pill could be called in. Dr. Cynthia Vidal will find out for the patient but in mean time wanted to see if patient could stop by this afternoon to talk to him. Patient was notified and will stop by later this afternoon to talk. No further action needed at this time.

## 2018-07-05 ENCOUNTER — TELEPHONE (OUTPATIENT)
Dept: INTERNAL MEDICINE CLINIC | Age: 78
End: 2018-07-05

## 2018-07-05 NOTE — TELEPHONE ENCOUNTER
Dr Anastasiya Delarosa advised and patient called and advised that Dr Anastasiya Delarosa would like him to skip three days     Patient verbalized understanding

## 2018-07-16 ENCOUNTER — CLINICAL SUPPORT (OUTPATIENT)
Dept: INTERNAL MEDICINE CLINIC | Age: 78
End: 2018-07-16

## 2018-07-16 DIAGNOSIS — E53.8 VITAMIN B 12 DEFICIENCY: Primary | ICD-10-CM

## 2018-07-16 RX ORDER — CYANOCOBALAMIN 1000 UG/ML
1000 INJECTION, SOLUTION INTRAMUSCULAR; SUBCUTANEOUS ONCE
Qty: 1 ML | Refills: 0 | Status: SHIPPED | COMMUNITY
Start: 2018-07-16 | End: 2018-07-16

## 2018-07-16 NOTE — PROGRESS NOTES
Patient presented to office for b12 injection. Allergies noted. Patient well and consenting to injection. Injection given intramuscular in right deltoid. Patient tolerated injection well and left office ambulatory.

## 2018-07-19 ENCOUNTER — TELEPHONE ANTICOAG (OUTPATIENT)
Dept: INTERNAL MEDICINE CLINIC | Age: 78
End: 2018-07-19

## 2018-07-19 DIAGNOSIS — I48.20 CHRONIC ATRIAL FIBRILLATION (HCC): ICD-10-CM

## 2018-07-19 NOTE — PROGRESS NOTES
I called the patient per his PT results of 3.3 on 7/18/18  Per   Patient to skip one day and then go back on his regular dose. Patient stated he will take 1/2 the dose and then go back on his regular dose. I then re-innerated to the patient that Dr. Nathan Lopez stated. Patient stated I know but if I skip one dose and go back to the same dose then it will do the same.  Thank you for calling

## 2018-07-26 ENCOUNTER — TELEPHONE ANTICOAG (OUTPATIENT)
Dept: INTERNAL MEDICINE CLINIC | Age: 78
End: 2018-07-26

## 2018-07-26 DIAGNOSIS — I48.20 CHRONIC ATRIAL FIBRILLATION (HCC): ICD-10-CM

## 2018-07-26 NOTE — PROGRESS NOTES
Mr. Elvira Francisco home monitoring today for anticoagulation for the diagnosis of atrial fibulation. His INR is 2.4 and his current dose of Coumadin is 5mg 4 days a week and 2.5 mg 3 days a week. Mr. Elvira Francisco was instructed that is INR was fine and to continue taking the same dose of coumadin, Patient verbalized understanding of the instructions.

## 2018-08-06 ENCOUNTER — HOSPITAL ENCOUNTER (OUTPATIENT)
Dept: LAB | Age: 78
Discharge: HOME | End: 2018-08-06
Payer: MEDICARE

## 2018-08-06 ENCOUNTER — CLINICAL SUPPORT (OUTPATIENT)
Dept: INTERNAL MEDICINE CLINIC | Age: 78
End: 2018-08-06

## 2018-08-06 DIAGNOSIS — E53.8 VITAMIN B 12 DEFICIENCY: Primary | ICD-10-CM

## 2018-08-06 DIAGNOSIS — E78.00 PURE HYPERCHOLESTEROLEMIA: ICD-10-CM

## 2018-08-06 DIAGNOSIS — E53.8 COBALAMIN DEFICIENCY: ICD-10-CM

## 2018-08-06 DIAGNOSIS — I10 ESSENTIAL HYPERTENSION: ICD-10-CM

## 2018-08-06 LAB
ALBUMIN SERPL-MCNC: 3.6 G/DL (ref 3.4–5)
ALBUMIN/GLOB SERPL: 1 {RATIO} (ref 0.8–1.7)
ALP SERPL-CCNC: 88 U/L (ref 45–117)
ALT SERPL-CCNC: 23 U/L (ref 16–61)
ANION GAP SERPL CALC-SCNC: 10 MMOL/L (ref 3–18)
AST SERPL-CCNC: 24 U/L (ref 15–37)
BASOPHILS # BLD: 0 K/UL (ref 0–0.1)
BASOPHILS NFR BLD: 1 % (ref 0–2)
BILIRUB SERPL-MCNC: 0.6 MG/DL (ref 0.2–1)
BUN SERPL-MCNC: 17 MG/DL (ref 7–18)
BUN/CREAT SERPL: 16 (ref 12–20)
CALCIUM SERPL-MCNC: 8.5 MG/DL (ref 8.5–10.1)
CHLORIDE SERPL-SCNC: 106 MMOL/L (ref 100–108)
CHOLEST SERPL-MCNC: 221 MG/DL
CO2 SERPL-SCNC: 26 MMOL/L (ref 21–32)
CREAT SERPL-MCNC: 1.09 MG/DL (ref 0.6–1.3)
DIFFERENTIAL METHOD BLD: ABNORMAL
EOSINOPHIL # BLD: 0.2 K/UL (ref 0–0.4)
EOSINOPHIL NFR BLD: 2 % (ref 0–5)
ERYTHROCYTE [DISTWIDTH] IN BLOOD BY AUTOMATED COUNT: 13.7 % (ref 11.6–14.5)
GLOBULIN SER CALC-MCNC: 3.7 G/DL (ref 2–4)
GLUCOSE SERPL-MCNC: 90 MG/DL (ref 74–99)
HCT VFR BLD AUTO: 47.7 % (ref 36–48)
HDLC SERPL-MCNC: 51 MG/DL (ref 40–60)
HDLC SERPL: 4.3 {RATIO} (ref 0–5)
HGB BLD-MCNC: 15.5 G/DL (ref 13–16)
LDLC SERPL CALC-MCNC: 142 MG/DL (ref 0–100)
LIPID PROFILE,FLP: ABNORMAL
LYMPHOCYTES # BLD: 1.8 K/UL (ref 0.9–3.6)
LYMPHOCYTES NFR BLD: 25 % (ref 21–52)
MCH RBC QN AUTO: 32.1 PG (ref 24–34)
MCHC RBC AUTO-ENTMCNC: 32.5 G/DL (ref 31–37)
MCV RBC AUTO: 98.8 FL (ref 74–97)
MONOCYTES # BLD: 1 K/UL (ref 0.05–1.2)
MONOCYTES NFR BLD: 15 % (ref 3–10)
NEUTS SEG # BLD: 4.2 K/UL (ref 1.8–8)
NEUTS SEG NFR BLD: 57 % (ref 40–73)
PLATELET # BLD AUTO: 274 K/UL (ref 135–420)
PMV BLD AUTO: 11.8 FL (ref 9.2–11.8)
POTASSIUM SERPL-SCNC: 4.5 MMOL/L (ref 3.5–5.5)
PROT SERPL-MCNC: 7.3 G/DL (ref 6.4–8.2)
RBC # BLD AUTO: 4.83 M/UL (ref 4.7–5.5)
SODIUM SERPL-SCNC: 142 MMOL/L (ref 136–145)
TRIGL SERPL-MCNC: 140 MG/DL (ref ?–150)
VLDLC SERPL CALC-MCNC: 28 MG/DL
WBC # BLD AUTO: 7.2 K/UL (ref 4.6–13.2)

## 2018-08-06 PROCEDURE — 80053 COMPREHEN METABOLIC PANEL: CPT | Performed by: INTERNAL MEDICINE

## 2018-08-06 PROCEDURE — 36415 COLL VENOUS BLD VENIPUNCTURE: CPT | Performed by: INTERNAL MEDICINE

## 2018-08-06 PROCEDURE — 80061 LIPID PANEL: CPT | Performed by: INTERNAL MEDICINE

## 2018-08-06 PROCEDURE — 83921 ORGANIC ACID SINGLE QUANT: CPT | Performed by: INTERNAL MEDICINE

## 2018-08-06 PROCEDURE — 85025 COMPLETE CBC W/AUTO DIFF WBC: CPT | Performed by: INTERNAL MEDICINE

## 2018-08-06 RX ORDER — CYANOCOBALAMIN 1000 UG/ML
1000 INJECTION, SOLUTION INTRAMUSCULAR; SUBCUTANEOUS ONCE
Qty: 1 ML | Refills: 0 | Status: SHIPPED | COMMUNITY
Start: 2018-08-06 | End: 2018-08-06

## 2018-08-06 NOTE — PROGRESS NOTES
Sindhu Soler presents today for B12 injection. Allergies reviewed. Injection given in Left arm per Dr Melissa Biggs order B12 1000 mcg/1 ml SC injection every 4 weeks. Patient tolerated injection well and left office ambulatory.

## 2018-08-09 ENCOUNTER — TELEPHONE ANTICOAG (OUTPATIENT)
Dept: INTERNAL MEDICINE CLINIC | Age: 78
End: 2018-08-09

## 2018-08-09 DIAGNOSIS — I48.20 CHRONIC ATRIAL FIBRILLATION (HCC): ICD-10-CM

## 2018-08-09 LAB
Lab: ABNORMAL
METHYLMALONATE SERPL-SCNC: 538 NMOL/L (ref 0–378)

## 2018-08-15 ENCOUNTER — OFFICE VISIT (OUTPATIENT)
Dept: INTERNAL MEDICINE CLINIC | Age: 78
End: 2018-08-15

## 2018-08-15 VITALS
OXYGEN SATURATION: 95 % | TEMPERATURE: 98 F | HEART RATE: 102 BPM | SYSTOLIC BLOOD PRESSURE: 136 MMHG | DIASTOLIC BLOOD PRESSURE: 78 MMHG | HEIGHT: 72 IN

## 2018-08-15 DIAGNOSIS — E78.00 PURE HYPERCHOLESTEROLEMIA: ICD-10-CM

## 2018-08-15 DIAGNOSIS — I48.20 CHRONIC ATRIAL FIBRILLATION (HCC): Primary | ICD-10-CM

## 2018-08-15 DIAGNOSIS — I10 ESSENTIAL HYPERTENSION: ICD-10-CM

## 2018-08-15 DIAGNOSIS — E53.8 VITAMIN B 12 DEFICIENCY: ICD-10-CM

## 2018-08-15 RX ORDER — POTASSIUM CHLORIDE 750 MG/1
10 TABLET, EXTENDED RELEASE ORAL 2 TIMES DAILY
Qty: 180 TAB | Refills: 3 | Status: SHIPPED | OUTPATIENT
Start: 2018-08-15 | End: 2019-01-07 | Stop reason: DRUGHIGH

## 2018-08-15 NOTE — PROGRESS NOTES
Chief Complaint   Patient presents with    Well Male       Depression Screening:  PHQ over the last two weeks 5/14/2018   Little interest or pleasure in doing things Not at all   Feeling down, depressed, irritable, or hopeless Not at all   Total Score PHQ 2 0       Learning Assessment:  Learning Assessment 4/12/2017   PRIMARY LEARNER Patient   HIGHEST LEVEL OF EDUCATION - PRIMARY LEARNER  GRADUATED HIGH SCHOOL OR GED   BARRIERS PRIMARY LEARNER NONE   CO-LEARNER CAREGIVER No   PRIMARY LANGUAGE ENGLISH   LEARNER PREFERENCE PRIMARY DEMONSTRATION   ANSWERED BY arnoldo   RELATIONSHIP SELF       Abuse Screening:  Abuse Screening Questionnaire 4/12/2017   Do you ever feel afraid of your partner? N   Are you in a relationship with someone who physically or mentally threatens you? N   Is it safe for you to go home? Y       Fall Risk  Fall Risk Assessment, last 12 mths 5/14/2018   Able to walk? Yes   Fall in past 12 months? No         Advance Directive:  1. Do you have an advance directive in place? Patient Reply:on file         1. Have you been to the ER, urgent care clinic since your last visit? Hospitalized since your last visit? [facility]    2. Have you seen or consulted any other health care providers outside of the [address] [place] [address] since your last visit? Include any pap smears or colon screening.  shingles

## 2018-08-15 NOTE — MR AVS SNAPSHOT
[address] [facility] [address] 
 
 
 [address] [facility], [address] [facility] [address] 
[phone] Patient: Zeny Fuentes MRN: [id number] [facility]:2/94/8264 Visit Information Date & Time Provider Department Dept. Phone Encounter #  
 8/15/2018  8:30 AM Lizzette Harris MD [facility] INTERNAL MEDICINE OF Gualberto King [phone] [id number] Your Appointments 10/3/2018  2:20 PM  
CARELINK with Mona  [facility] ([facility]) Appt Note: [address] [address] [phone] [place] Aleida  
  
    
 10/22/2018  8:30 AM  
Follow Up with Lizzette Harris MD  
[address] [facility]) Appt Note: 2mo  
 [address] [facility] [address], [address] [facility] [address]  
  
   
 [address] [facility], [address] [facility] [address] Upcoming Health Maintenance Date Due ZOSTER VACCINE AGE 60> 1/16/2002 MEDICARE YEARLY EXAM 6/28/2018 Influenza Age 5 to Adult 8/1/2018 GLAUCOMA SCREENING Q2Y 8/16/2019 DTaP/Tdap/Td series (2 - Td) 4/12/2027 Allergies as of 8/15/2018  Review Complete On: 8/15/2018 By: Lizzette Harris MD  
  
 Severity Noted Reaction Type Reactions Zetia [Ezetimibe] Medium 10/25/2016   Side Effect Other (comments) Back pain resolved off Zetia Lipitor [Atorvastatin]  10/12/2015    Myalgia Livalo [Pitavastatin]  09/17/2013    Other (comments) Extreme fatigue 5/29/14   PATIENT DENIES Zocor [Simvastatin]  09/17/2013    Myalgia 5/29/14 PATIENT DENIES Current Immunizations  Reviewed on 8/15/2018 Name Date Influenza High Dose Vaccine PF 9/21/2017 11:54 AM, 10/1/2016 Pneumococcal Conjugate (PCV-13) 4/12/2017  Pneumococcal Polysaccharide (PPSV-23) 10/30/2009, 6/9/2009 12:00 AM  
  
 Reviewed by Lizzette Harris MD on 8/15/2018 at  9:02 AM  
 Reviewed by Adelita Calle MD on 8/15/2018 at  9:08 AM  
Vitals BP Pulse Temp Height(growth percentile) SpO2 Smoking Status 136/78 (BP 1 Location: Right arm, BP Patient Position: Sitting) (!) 102 98 °F (36.7 °C) (Tympanic) 6' (1.829 m) 95% Never Smoker Preferred Pharmacy Pharmacy Name Phone [address] [place] [address], [address] [place] [address] Road [phone] Your Updated Medication List  
  
   
This list is accurate as of 8/15/18  9:19 AM.  Always use your most recent med list.  
  
  
  
  
 Amish Hooker Generic drug:  white petrolatum-mineral oil Apply  to affected area as needed. amLODIPine 5 mg tablet Commonly known as:  Fela Gables Take 1 Tab by mouth daily. atorvastatin 10 mg tablet Commonly known as:  LIPITOR Take 1 Tab by mouth daily. AZOPT 1 % ophthalmic suspension Generic drug:  brinzolamide Administer 1 Drop to both eyes two (2) times a day. clobetasol 0.05 % topical cream  
Commonly known as:  Luetta Slimmer Apply  to affected area two (2) times a day. * cyanocobalamin 1,000 mcg/mL injection Commonly known as:  VITAMIN B12  
inject 1 ml into the muscle once every three weeks * cyanocobalamin 1,000 mcg/mL injection Commonly known as:  VITAMIN B12  
inject 1ml into the muscle every 3 weeks  
  
 diclofenac 1 % Gel Commonly known as:  VOLTAREN Apply 4 g to affected area four (4) times daily. furosemide 40 mg tablet Commonly known as:  LASIX TAKE 1 TABLET DAILY  
  
 metoprolol tartrate 50 mg tablet Commonly known as:  LOPRESSOR Take 1 Tab by mouth two (2) times a day. multivitamin tablet Commonly known as:  ONE A DAY Take 1 Tab by mouth daily. pantoprazole 40 mg tablet Commonly known as:  PROTONIX  
1 tablet each AM  
  
 potassium chloride 10 mEq tablet Commonly known as:  KLOR-CON Take 1 Tab by mouth two (2) times a day. propranolol 60 mg tablet Commonly known as:  INDERAL  
1 tablet twice per day  
  
 spironolactone 25 mg tablet Commonly known as:  ALDACTONE Take 1 Tab by mouth daily. warfarin 5 mg tablet Commonly known as:  COUMADIN  
1 tablet daily * Notice: This list has 2 medication(s) that are the same as other medications prescribed for you. Read the directions carefully, and ask your doctor or other care provider to review them with you. Prescriptions Printed Refills  
 potassium chloride (KLOR-CON) 10 mEq tablet 3 Sig: Take 1 Tab by mouth two (2) times a day. Class: Print Route: Oral  
  
Introducing [facility]! [employer] introduces "[employer] patient portal. Now you can access parts of your medical record, email your doctor's office, and request medication refills online. 1. In your internet browser, go to [email or web address] 2. Click on the First Time User? Click Here link in the Sign In box. You will see the New Member Sign Up page. 3. Enter your "[employer] Access Code exactly as it appears below. You will not need to use this code after youve completed the sign-up process. If you do not sign up before the expiration date, you must request a new code. · "[employer] Access Code: [email or web address] Expires: 10/10/2018  4:15 PM 
 
4. Enter the last four digits of your Social Security Number (xxxx) and Date of Birth (mm/dd/yyyy) as indicated and click Submit. You will be taken to the next sign-up page. 5. Create a [employer] ID. This will be your "[employer] login ID and cannot be changed, so think of one that is secure and easy to remember. 6. Create a "[employer]" password. You can change your password at any time. 7. Enter your Password Reset Question and Answer. This can be used at a later time if you forget your password. 8. Enter your e-mail address. You will receive e-mail notification when new information is available in [address] 9. Click Sign Up. You can now view and download portions of your medical record. 10. Click the Download Summary menu link to download a portable copy of your medical information. If you have questions, please visit the Frequently Asked Questions section of the [employer] website. Remember, [employer] is NOT to be used for urgent needs. For medical emergencies, dial 911. Now available from your iPhone and Android! Please provide this summary of care documentation to your next provider. Your primary care clinician is listed as Sara Zhong. If you have any questions after today's visit, please call [phone].

## 2018-08-16 ENCOUNTER — TELEPHONE ANTICOAG (OUTPATIENT)
Dept: INTERNAL MEDICINE CLINIC | Age: 78
End: 2018-08-16

## 2018-08-16 DIAGNOSIS — I48.20 CHRONIC ATRIAL FIBRILLATION (HCC): ICD-10-CM

## 2018-08-19 NOTE — PROGRESS NOTES
The patient presents to the office today with the chief complaint of vitamin B12 deficiency    HPI    The patient remains on B12 shots every three weeks. He is doing ok but his MMA level is increasing. The patient remains on medications for hyperlipidemia. He is doing well on the statin. The patient remains on anticoagulation for atrial fibrillation. The patient also has a pacemaker and an implanted automatic defibrillator. The patient is doing well from the standpoint of arrhythmia. Review of Systems   Respiratory: Negative for shortness of breath. Cardiovascular: Negative for chest pain and leg swelling. Allergies   Allergen Reactions    Zetia [Ezetimibe] Other (comments)     Back pain resolved off Zetia    Lipitor [Atorvastatin] Myalgia    Livalo [Pitavastatin] Other (comments)     Extreme fatigue    5/29/14   PATIENT DENIES    Zocor [Simvastatin] Myalgia     5/29/14 PATIENT DENIES        Current Outpatient Prescriptions   Medication Sig Dispense Refill    potassium chloride (KLOR-CON) 10 mEq tablet Take 1 Tab by mouth two (2) times a day. 180 Tab 3    propranolol (INDERAL) 60 mg tablet 1 tablet twice per day 180 Tab 3    furosemide (LASIX) 40 mg tablet TAKE 1 TABLET DAILY 90 Tab 3    metoprolol tartrate (LOPRESSOR) 50 mg tablet Take 1 Tab by mouth two (2) times a day. 180 Tab 3    amLODIPine (NORVASC) 5 mg tablet Take 1 Tab by mouth daily. 90 Tab 1    cyanocobalamin (VITAMIN B12) 1,000 mcg/mL injection inject 1 ml into the muscle once every three weeks 1 mL 12    warfarin (COUMADIN) 5 mg tablet 1 tablet daily 30 Tab 6    atorvastatin (LIPITOR) 10 mg tablet Take 1 Tab by mouth daily. 90 Tab 3    spironolactone (ALDACTONE) 25 mg tablet Take 1 Tab by mouth daily. 90 Tab 3    clobetasol (TEMOVATE) 0.05 % topical cream Apply  to affected area two (2) times a day.  white petrolatum-mineral oil (ABSORBASE) oint Apply  to affected area as needed.       diclofenac (VOLTAREN) 1 % gel Apply 4 g to affected area four (4) times daily. 1 Each 0    pantoprazole (PROTONIX) 40 mg tablet 1 tablet each AM 90 Tab 3    AZOPT 1 % ophthalmic suspension Administer 1 Drop to both eyes two (2) times a day. 3    multivitamin (ONE A DAY) tablet Take 1 Tab by mouth daily. Past Medical History:   Diagnosis Date    AICD (automatic cardioverter/defibrillator) present     Medtronic  upgrade from pacer in 2007 due to VT    Atrial fibrillation ([address]     Cardiac cath 03/19/2007    LM 25% ostial.  Otherwise patent coronaries. LVEDP 20.  EF 50%. Dyssynch. mid anterior contraction. Pacemaker.  Cardiac echocardiogram 03/20/2014    A-fib. EF 65-70%. No RWMA. No RWMA. Mild conc LVH. Mild RVE. RVSP 40-45 mmHg. Mild LAE.  HERNANDEZ. Mild MR. Mod TR.  Cardiac nuclear imaging test 07/29/2014    No ischemia or prior infarction. EF 68%. Nondiagnostic EKG due to V pacing on pharm stress test.  Low risk.  Cardioversion 8/31/2011    Successful defibrillation threshold testing at 18 joules. Patient also had conversion to atrial ventricular pacing.      CHF (congestive heart failure) (HCC)     Cobalamin deficiency     Dupuytren contracture 02/08/2010    on the right ring finger     Edema     Hypertension     Hypertrophy of prostate with urinary obstruction and other lower urinary tract symptoms (LUTS)     Impotence of organic origin     Intolerance of drug     Intolerant high doses of sotalol due to prolonged QT    Mastodynia     Paroxysmal VT ([address]     Pure hypercholesterolemia     S/P ablation of atrial fibrillation 05/31/2011    S/P ablation of atrial fibrillation 12/18/2012    Dr. Deepali Valencia at [facility] S/P ablation operation for arrhythmia     VT ablation by Dr. Willow Read near [address] [place] [address] 2/3007    Sick sinus syndrome [facility])        Past Surgical History:   Procedure Laterality Date    HX AFIB ABLATION  12/18/2012    [facility] Bauer by Dr. Nury Cartagena 02/08-03/08    bilateral cataract removed    HX CATARACT REMOVAL  2/2008 & 3/2008    bilateral    HX ORTHOPAEDIC  2/8/10    release of Dupuytren's contraction on ring finger of right hand    HX OTHER SURGICAL  6/2000    atrial lead placement    HX OTHER SURGICAL  6/5/2009    Cardioversion    HX OTHER SURGICAL  10/12/2012    cardioversion    HX PACEMAKER  1998    placement    HX PACEMAKER  6/2000    DDD    HX PACEMAKER  3/27/07    removal of pacemaker & placement of dual chamber defib generator and leads    HX TONSIL AND ADENOIDECTOMY         Social History     Social History    Marital status:      Spouse name: N/A    Number of children: N/A    Years of education: N/A     Occupational History    Not on file. Social History Main Topics    Smoking status: Never Smoker    Smokeless tobacco: Never Used    Alcohol use Yes      Comment: 2-3 glasses of white wine frequently    Drug use: No    Sexual activity: Not Currently     Partners: Female     Other Topics Concern    Not on file     Social History Narrative       Patient does have an advanced directive on file    Visit Vitals    /78 (BP 1 Location: Right arm, BP Patient Position: Sitting)    Pulse (!) 102    Temp 98 °F (36.7 °C) (Tympanic)    Ht 6' (1.829 m)    SpO2 95%       Physical Exam  No Cervical Lymphadenopathy  No Supraclavicular Lymphadenopathy  Thyroid is Normal  Lungs are normal to percussion. Clear to auscultation   Heart:  S1 S2 are normal, No gallops, No mummers  No Carotid Bruits  Abdomen:  Normal Bowel Sounds. No tenderness. No masses. No Hepatomegaly or Splenomegly  LE:  Strong Pedal Pulses.   No Edema      Hospital Outpatient Visit on 08/06/2018   Component Date Value Ref Range Status    WBC 08/06/2018 7.2  4.6 - 13.2 K/uL Final    RBC 08/06/2018 4.83  4.70 - 5.50 M/uL Final    HGB 08/06/2018 15.5  13.0 - 16.0 g/dL Final    HCT 08/06/2018 47.7  36.0 - 48.0 % Final    MCV 08/06/2018 98.8* 74.0 - 97.0 FL Final    MCH 08/06/2018 32.1  24.0 - 34.0 PG Final    MCHC 08/06/2018 32.5  31.0 - 37.0 g/dL Final    RDW 08/06/2018 13.7  11.6 - 14.5 % Final    PLATELET 58/94/1305 724  135 - 420 K/uL Final    MPV 08/06/2018 11.8  9.2 - 11.8 FL Final    NEUTROPHILS 08/06/2018 57  40 - 73 % Final    LYMPHOCYTES 08/06/2018 25  21 - 52 % Final    MONOCYTES 08/06/2018 15* 3 - 10 % Final    EOSINOPHILS 08/06/2018 2  0 - 5 % Final    BASOPHILS 08/06/2018 1  0 - 2 % Final    ABS. NEUTROPHILS 08/06/2018 4.2  1.8 - 8.0 K/UL Final    ABS. LYMPHOCYTES 08/06/2018 1.8  0.9 - 3.6 K/UL Final    ABS. MONOCYTES 08/06/2018 1.0  0.05 - 1.2 K/UL Final    ABS. EOSINOPHILS 08/06/2018 0.2  0.0 - 0.4 K/UL Final    ABS. BASOPHILS 08/06/2018 0.0  0.0 - 0.1 K/UL Final    DF 08/06/2018 AUTOMATED    Final    Sodium 08/06/2018 142  136 - 145 mmol/L Final    Potassium 08/06/2018 4.5  3.5 - 5.5 mmol/L Final    Chloride 08/06/2018 106  100 - 108 mmol/L Final    CO2 08/06/2018 26  21 - 32 mmol/L Final    Anion gap 08/06/2018 10  3.0 - 18 mmol/L Final    Glucose 08/06/2018 90  74 - 99 mg/dL Final    BUN 08/06/2018 17  7.0 - 18 MG/DL Final    Creatinine 08/06/2018 1.09  0.6 - 1.3 MG/DL Final    BUN/Creatinine ratio 08/06/2018 16  12 - 20   Final    GFR est AA 08/06/2018 >60  >60 ml/min/1.73m2 Final    GFR est non-AA 08/06/2018 >60  >60 ml/min/1.73m2 Final    Comment: (NOTE)  Estimated GFR is calculated using the Modification of Diet in Renal   Disease (MDRD) Study equation, reported for both  Americans   (GFRAA) and non- Americans (GFRNA), and normalized to 1.73m2   body surface area. The physician must decide which value applies to   the patient. The MDRD study equation should only be used in   individuals age 25 or older.  It has not been validated for the   following: pregnant women, patients with serious comorbid conditions,   or on certain medications, or persons with extremes of body size,   muscle mass, or nutritional status.  Calcium 08/06/2018 8.5  8.5 - 10.1 MG/DL Final    Bilirubin, total 08/06/2018 0.6  0.2 - 1.0 MG/DL Final    ALT (SGPT) 08/06/2018 23  16 - 61 U/L Final    AST (SGOT) 08/06/2018 24  15 - 37 U/L Final    Alk. phosphatase 08/06/2018 88  45 - 117 U/L Final    Protein, total 08/06/2018 7.3  6.4 - 8.2 g/dL Final    Albumin 08/06/2018 3.6  3.4 - 5.0 g/dL Final    Globulin 08/06/2018 3.7  2.0 - 4.0 g/dL Final    A-G Ratio 08/06/2018 1.0  0.8 - 1.7   Final    LIPID PROFILE 08/06/2018        Final    Cholesterol, total 08/06/2018 221* <200 MG/DL Final    Triglyceride 08/06/2018 140  <150 MG/DL Final    Comment: The drugs N-acetylcysteine (NAC) and  Metamiszole have been found to cause falsely  low results in this chemical assay. Please  be sure to submit blood samples obtained  BEFORE administration of either of these  drugs to assure correct results.  HDL Cholesterol 08/06/2018 51  40 - 60 MG/DL Final    LDL, calculated 08/06/2018 142* 0 - 100 MG/DL Final    VLDL, calculated 08/06/2018 28  MG/DL Final    CHOL/HDL Ratio 08/06/2018 4.3  0 - 5.0   Final    Methylmalonic acid 08/06/2018 538* 0 - 378 nmol/L Final    Disclaimer 08/06/2018 Comment    Final    Comment: (NOTE)  This test was developed and its performance characteristics  determined by [facility]. It has not been cleared or approved  by the Food and Drug Administration.   Performed At: [address] [id number]  Fernando Maguire MD VI:[id number]     Telephone Anticoag on 06/13/2018   Component Date Value Ref Range Status    INR, External 06/13/2018 2.8   Final   Telephone Anticoag on 05/30/2018   Component Date Value Ref Range Status    INR, External 05/30/2018 3.5   Final   Telephone Anticoag on 05/23/2018   Component Date Value Ref Range Status    INR, External 05/23/2018 3.1   Final   Telephone Anticoag on 05/16/2018   Component Date Value Ref Range Status    INR, External 05/16/2018 2.3 Final       .No results found for any visits on 08/15/18. Assessment / Plan      ICD-10-CM ICD-9-CM    1. Chronic atrial fibrillation (HCC) I48.2 427.31    2. Essential hypertension I10 401.9    3. Pure hypercholesterolemia E78.00 272.0    4. Vitamin B 12 deficiency E53.8 266.2        Add Vitamin B12 1500 ug daily  Follow MMA levels  he was advised to continue his maintenance medications      Follow-up Disposition:  Return in about 4 months (around 12/14/2018). I asked Gautam Carty if he has any questions and I answered the questions.   Gautam Carty states that he understands the treatment plan and agrees with the treatment plan

## 2018-08-20 ENCOUNTER — OFFICE VISIT (OUTPATIENT)
Dept: CARDIOLOGY CLINIC | Age: 78
End: 2018-08-20

## 2018-08-20 VITALS
SYSTOLIC BLOOD PRESSURE: 132 MMHG | BODY MASS INDEX: 29.53 KG/M2 | DIASTOLIC BLOOD PRESSURE: 80 MMHG | WEIGHT: 218 LBS | HEIGHT: 72 IN | OXYGEN SATURATION: 98 % | HEART RATE: 81 BPM

## 2018-08-20 DIAGNOSIS — I50.32 CHRONIC DIASTOLIC CONGESTIVE HEART FAILURE (HCC): ICD-10-CM

## 2018-08-20 DIAGNOSIS — R00.2 PALPITATIONS: ICD-10-CM

## 2018-08-20 DIAGNOSIS — I10 ESSENTIAL HYPERTENSION: ICD-10-CM

## 2018-08-20 DIAGNOSIS — I48.20 CHRONIC ATRIAL FIBRILLATION (HCC): Primary | ICD-10-CM

## 2018-08-20 DIAGNOSIS — Z95.810 AICD (AUTOMATIC CARDIOVERTER/DEFIBRILLATOR) PRESENT: ICD-10-CM

## 2018-08-20 RX ORDER — ROSUVASTATIN CALCIUM 5 MG/1
5 TABLET, COATED ORAL
Qty: 15 TAB | Refills: 3 | Status: SHIPPED | OUTPATIENT
Start: 2018-08-20 | End: 2019-01-28 | Stop reason: SDUPTHER

## 2018-08-20 NOTE — PROGRESS NOTES
HPI:  I saw Dean Cabrera in my office today in cardiovascular evaluation regarding his chronic atrial fibrillation and some diastolic failure issues in the past. Mr. Brad Spencer is a very pleasant 81 year old white male with a rather complex cardiovascular history, including problems with atrial fibrillation and ventricular tachycardia. He's had a very long and complicated past cardiovascular course in terms of management of his ventricular tachycardia and atrial fibrillation, which is well outlined in his chart and my [facility] note of 4/24/13.       He ultimately has gone into atrial fibrillation after numerous atrial fibrillation ablations and the last of which was by Dr. Patti Lobato at [facility] in December of 2012, and we have now been managing him with rate control and the use of Lopressor and Coumadin for anticoagulation. He comes in today relates that he is doing reasonably well. He does have some lower extremity edema, but this seems to come on throughout the day and go away overnight suggesting is from venous insufficiency. He is really not having any other cardiovascular complaints. Encounter Diagnoses   Name Primary?  Chronic atrial fibrillation (HCC) Yes    Chronic diastolic congestive heart failure ([address]     Medtronic AICD, upgrade from pacer in 2007 due to VT     Essential hypertension     Palpitations        Discussion: This gentleman appears to be doing about as well as we could expect and really have no recommendations for change at this time. Is not having any symptoms to suggest the development of symptomatic obstructive coronary disease or symptomatic diastolic heart failure. We did check on his AICD today and he has 6.7 years remaining on his battery. He had 2 short nonsustained ventricular tachycardic episodes in the last 6 months as optive all the showing good volume status suggesting no heart failure.   We turned his activity level from 3 down to 2 since the reason for his problems with being fatigued turned out to be related to his timolol eyedrops which were unchanged took care of this problem. His latest lipid profile which was completed on August 6, 2018 was abnormal showing total cholesterol 221 with triglycerides of 140, HDL 51, LDL of 142, and VLDL 28 which is obviously with poor control. He historically has been on 2 or 3 times a week and co-Q10 daily. I am going to change his Lipitor 10 mg to Crestor 5 mg 3 times a week since a water-based statin may be less likely to cause myalgias and if he is able to tolerate that medication would repeat his lipid profile in a couple of months. His blood pressure is well-controlled today and he otherwise seems to be doing well so I will simply plan to see him again in several months or sooner if any new cardiovascular symptoms surface.     PCP: Ho Gomez MD      Past Medical History:   Diagnosis Date    AICD (automatic cardioverter/defibrillator) present     Medtronic  upgrade from pacer in 2007 due to VT    Atrial fibrillation ([address]     Cardiac cath 03/19/2007    LM 25% ostial.  Otherwise patent coronaries. LVEDP 20.  EF 50%. Dyssynch. mid anterior contraction. Pacemaker.  Cardiac echocardiogram 03/20/2014    A-fib. EF 65-70%. No RWMA. No RWMA. Mild conc LVH. Mild RVE. RVSP 40-45 mmHg. Mild LAE.  HERNANDEZ. Mild MR. Mod TR.  Cardiac nuclear imaging test 07/29/2014    No ischemia or prior infarction. EF 68%. Nondiagnostic EKG due to V pacing on pharm stress test.  Low risk.  Cardioversion 8/31/2011    Successful defibrillation threshold testing at 18 joules. Patient also had conversion to atrial ventricular pacing.      CHF (congestive heart failure) (HCC)     Cobalamin deficiency     Dupuytren contracture 02/08/2010    on the right ring finger     Edema     Hypertension     Hypertrophy of prostate with urinary obstruction and other lower urinary tract symptoms (LUTS)     Impotence of organic origin     Intolerance of drug     Intolerant high doses of sotalol due to prolonged QT    Mastodynia     Paroxysmal VT ([address]     Pure hypercholesterolemia     S/P ablation of atrial fibrillation 05/31/2011    S/P ablation of atrial fibrillation 12/18/2012    Dr. Lowell Marte at [facility] S/P ablation operation for arrhythmia     VT ablation by Dr. Malka Jamison near [address] [place] [address] Street 2/3007    Sick sinus syndrome [facility])          Past Surgical History:   Procedure Laterality Date    HX AFIB ABLATION  12/18/2012    [facility] by Dr. Dorina Waters [facility]  02/08-03/08    bilateral cataract removed    HX CATARACT REMOVAL  2/2008 & 3/2008    bilateral    HX ORTHOPAEDIC  2/8/10    release of Dupuytren's contraction on ring finger of right hand    HX OTHER SURGICAL  6/2000    atrial lead placement    HX OTHER SURGICAL  6/5/2009    Cardioversion    HX OTHER SURGICAL  10/12/2012    cardioversion    HX PACEMAKER  1998    placement    HX PACEMAKER  6/2000    DDD    HX PACEMAKER  3/27/07    removal of pacemaker & placement of dual chamber defib generator and leads    HX TONSIL AND ADENOIDECTOMY           Current Outpatient Rx   Name  Route  Sig  Dispense  Refill    eplerenone (INSPRA) 25 mg tablet        TAKE 1 TABLET DAILY    90 Tab    2      furosemide (LASIX) 40 mg tablet        TAKE 1 TABLET DAILY    90 Tab    2      cyanocobalamin (VITAMIN B12) 1,000 mcg/mL injection        INJECT 1 ML BY INTRAMUSCULAR ROUTE EVERY MONTH    1 mL    3      AZOPT 1 % ophthalmic suspension    Both Eyes    Administer 1 Drop to both eyes two (2) times a day. 3      metoprolol (LOPRESSOR) 50 mg tablet    Oral    Take 1 Tab by mouth two (2) times a day. 180 Tab    3      amLODIPine (NORVASC) 5 mg tablet    Oral    Take 1 Tab by mouth daily. 90 Tab    3      triamcinolone acetonide (KENALOG) 0.1 % topical cream    Topical    Apply  to affected area two (2) times daily as needed.  use thin layer               warfarin (COUMADIN) 5 mg tablet    Oral    Take 5 mg by mouth daily. Followed by PCP              TIMOLOL (BETIMOL OP)    Ophthalmic    Apply 1 Drop to eye two (2) times a day.  multivitamin (ONE A DAY) tablet    Oral    Take 1 Tab by mouth daily. Allergies   Allergen Reactions    Zetia [Ezetimibe] Other (comments)     Back pain resolved off Zetia    Lipitor [Atorvastatin] Myalgia    Livalo [Pitavastatin] Other (comments)     Extreme fatigue    5/29/14   PATIENT DENIES    Zocor [Simvastatin] Myalgia     5/29/14 PATIENT DENIES        Social History:   Social History   Substance Use Topics    Smoking status: Never Smoker    Smokeless tobacco: Never Used    Alcohol use Yes      Comment: 2-3 glasses of white wine frequently           Family history: family history includes COPD in his father; Hypertension in an other family member; No Known Problems in his mother. Review of Systems:   Constitutional: Negative. Cardiovascular: Positive for leg swelling. Negative for chest pain, palpitations and orthopnea. Gastrointestinal: Negative. Musculoskeletal: Positive for joint pain. Negative for falls and myalgias. Neurological: Negative for dizziness. Physical Exam:   The patient is an alert, oriented, well developed, well nourished 70 y.o.  male who was in no acute distress at the time of my examination. Visit Vitals    /80    Pulse 81    Ht 6' (1.829 m)    Wt 98.9 kg (218 lb)    SpO2 98%    BMI 29.57 kg/m2      BP Readings from Last 3 Encounters:   08/20/18 132/80   08/15/18 136/78   05/14/18 118/60        Wt Readings from Last 3 Encounters:   08/20/18 98.9 kg (218 lb)   05/14/18 100.2 kg (221 lb)   12/22/17 100.7 kg (222 lb)       HEENT: Conjunctivae pink, sclera clear and white, symmetrical with no lag. Neck: Supple without masses, tenderness or thyromegaly. No jugular venous distention.   Carotid upstrokes are full bilaterally, without bruits. Cardiovascular: Normal pacer site in left upper chest.  Chest is symmetrical with good excursion. [place] is not displaced. No lifts, heaves or thrills. S1 and S2 are normal, without appreciable murmurs, rubs, clicks or gallops. Lungs: Clear to auscultation bilaterally. Abdomen: Soft and non-tender. Extremities: No edema with full peripheral pulses     Review of Data: See PMH and Cardiology and Imaging sections for cardiac testing      Results for orders placed or performed in visit on 08/20/18   AMB POC EKG ROUTINE W/ 12 LEADS, INTER & REP     Status: None    Narrative    Ventricularly paced rhythm with a rate of 81. This EKG is similar to his tracing of December 13, 2017. Gifty Chew D.O., F.A.C.C. [facility]  [facility] [place]  [facility] [address] [address]    PLEASE NOTE:  This document has been produced using voice recognition software. Unrecognized errors in transcription may be present.

## 2018-08-20 NOTE — PROGRESS NOTES
Review of Systems   Constitutional: Negative. Cardiovascular: Positive for leg swelling. Negative for chest pain, palpitations and orthopnea. Gastrointestinal: Negative. Musculoskeletal: Positive for joint pain. Negative for falls and myalgias. Neurological: Negative for dizziness.

## 2018-08-20 NOTE — MR AVS SNAPSHOT
[address] [address] [address] [id number] [phone] Patient: Cortney Greenwood MRN: [id number] MCV:[id number]/[id number] Visit Information Date & Time Provider Department Dept. Phone Encounter #  
 8/20/2018  8:40 AM Rafa Farah DO [facility] [id number] Follow-up Instructions Return in about 6 months (around 2/20/2019), or if symptoms worsen or fail to improve. Follow-up and Disposition History Your Appointments 10/22/2018  8:30 AM  
Follow Up with Renan Elizabeth MD  
[facility] Appt Note: 2mo  
 [address] [facility], [address] [facility] [address]  
  
   
 [address] [facility], [address] [facility] [address]  
  
    
 11/21/2018  3:20 PM  
CARELINK with [address] [place] [address] [facility] [place] ([facility]) Appt Note: 3; 3 month care[facility] [address] [address] [address]  
[phone] [address] [facility] [address] [address]  
  
    
 3/18/2019  8:20 AM  
Follow Up with Rafa Farah DO [facility] [place] [facility]) Appt Note: 6 month follow up [facility] [address] [address] [phone] [address] [place] [address] [address] Upcoming Health Maintenance Date Due ZOSTER VACCINE AGE 60> 1/16/2002 Influenza Age 5 to Adult 8/1/2018 MEDICARE YEARLY EXAM 8/16/2018 GLAUCOMA SCREENING Q2Y 8/16/2019 DTaP/Tdap/Td series (2 - Td) 4/12/2027 Allergies as of 8/20/2018  Review Complete On: 8/20/2018 By: Rafa Farah DO Severity Noted Reaction Type Reactions Zetia [Ezetimibe] Medium 10/25/2016   Side Effect Other (comments) Back pain resolved off Zetia Lipitor [Atorvastatin]  10/12/2015    Myalgia Livalo [Pitavastatin]  09/17/2013    Other (comments) Extreme fatigue 5/29/14   PATIENT DENIES Zocor [Simvastatin]  09/17/2013    Myalgia 5/29/14 PATIENT DENIES Current Immunizations  Reviewed on 8/15/2018 Name Date Influenza High Dose Vaccine PF 9/21/2017 11:54 AM, 10/1/2016 Pneumococcal Conjugate (PCV-13) 4/12/2017 Pneumococcal Polysaccharide (PPSV-23) 10/30/2009, 6/9/2009 12:00 AM  
  
 Not reviewed this visit You Were Diagnosed With   
  
 Codes Comments Chronic atrial fibrillation (HCC)    -  Primary ICD-10-CM: V13.1 ICD-9-CM: 427.31 Chronic diastolic congestive heart failure (HCC)     ICD-10-CM: I50.32 
ICD-9-CM: 428.32, 428.0 AICD (automatic cardioverter/defibrillator) present     ICD-10-CM: Z95.810 ICD-9-CM: V45.02 Essential hypertension     ICD-10-CM: I10 
ICD-9-CM: 401.9 Palpitations     ICD-10-CM: R00.2 ICD-9-CM: 785.1 Vitals BP Pulse Height(growth percentile) Weight(growth percentile) SpO2 BMI  
 132/80 81 6' (1.829 m) 218 lb (98.9 kg) 98% 29.57 kg/m2 Smoking Status Never Smoker Vitals History BMI and BSA Data Body Mass Index Body Surface Area  
 29.57 kg/m 2 2.24 m 2 Preferred Pharmacy Pharmacy Name Phone [facility] [address] [phone] Your Updated Medication List  
  
   
This list is accurate as of 8/20/18  9:39 AM.  Always use your most recent med list.  
  
  
  
  
 Cole Costa Generic drug:  white petrolatum-mineral oil Apply  to affected area as needed. amLODIPine 5 mg tablet Commonly known as:  Antonietta Monroe Take 1 Tab by mouth daily. AZOPT 1 % ophthalmic suspension Generic drug:  brinzolamide Administer 1 Drop to both eyes two (2) times a day. clobetasol 0.05 % topical cream  
Commonly known as:  Linzie Rubins Apply  to affected area two (2) times a day. cyanocobalamin 1,000 mcg/mL injection Commonly known as:  VITAMIN B12  
inject 1 ml into the muscle once every three weeks  
  
 diclofenac 1 % Gel Commonly known as:  VOLTAREN Apply 4 g to affected area four (4) times daily. furosemide 40 mg tablet Commonly known as:  LASIX TAKE 1 TABLET DAILY  
  
 metoprolol tartrate 50 mg tablet Commonly known as:  LOPRESSOR Take 1 Tab by mouth two (2) times a day. multivitamin tablet Commonly known as:  ONE A DAY Take 1 Tab by mouth daily. pantoprazole 40 mg tablet Commonly known as:  PROTONIX  
1 tablet each AM  
  
 potassium chloride 10 mEq tablet Commonly known as:  KLOR-CON Take 1 Tab by mouth two (2) times a day. propranolol 60 mg tablet Commonly known as:  INDERAL  
1 tablet twice per day  
  
 rosuvastatin 5 mg tablet Commonly known as:  CRESTOR Take 1 Tab by mouth every Monday, Wednesday, Friday. spironolactone 25 mg tablet Commonly known as:  ALDACTONE Take 1 Tab by mouth daily. warfarin 5 mg tablet Commonly known as:  COUMADIN  
1 tablet daily Prescriptions Sent to Pharmacy Refills  
 rosuvastatin (CRESTOR) 5 mg tablet 3 Sig: Take 1 Tab by mouth every Monday, Wednesday, Friday. Class: Normal  
 Pharmacy: [address] #: [phone] Route: Oral  
  
We Performed the Following AMB POC EKG ROUTINE W/ 12 LEADS, INTER & REP [34009 CPT(R)] Follow-up Instructions Return in about 6 months (around 2/20/2019), or if symptoms worsen or fail to improve. Patient Instructions Change lipitor to crestor Introducing [facility]! [facility] introduces Measurabl patient portal. Now you can access parts of your medical record, email your doctor's office, and request medication refills online. 1. In your internet browser, go to [email or web address] 2. Click on the First Time User? Click Here link in the Sign In box. You will see the New Member Sign Up page. 3. Enter your Dacuda Access Code exactly as it appears below. You will not need to use this code after youve completed the sign-up process. If you do not sign up before the expiration date, you must request a new code. · Dacuda Access Code: [email or web address] Expires: 10/10/2018  4:15 PM 
 
4. Enter the last four digits of your Social Security Number (xxxx) and Date of Birth (mm/dd/yyyy) as indicated and click Submit. You will be taken to the next sign-up page. 5. Create a Dacuda ID. This will be your Dacuda login ID and cannot be changed, so think of one that is secure and easy to remember. 6. Create a Dacuda password. You can change your password at any time. 7. Enter your Password Reset Question and Answer. This can be used at a later time if you forget your password. 8. Enter your e-mail address. You will receive e-mail notification when new information is available in [address] 9. Click Sign Up. You can now view and download portions of your medical record. 10. Click the Download Summary menu link to download a portable copy of your medical information. If you have questions, please visit the Frequently Asked Questions section of the Dacuda website. Remember, Dacuda is NOT to be used for urgent needs. For medical emergencies, dial 911. Now available from your iPhone and Android! Please provide this summary of care documentation to your next provider. Your primary care clinician is listed as Shelley Yi. If you have any questions after today's visit, please call [phone].

## 2018-08-20 NOTE — PROGRESS NOTES
1. Have you been to the ER, urgent care clinic since your last visit? Hospitalized since your last visit?no    2. Have you seen or consulted any other health care providers outside of the [address] [place] [address] since your last visit? Include any pap smears or colon screening.  no

## 2018-08-23 ENCOUNTER — TELEPHONE ANTICOAG (OUTPATIENT)
Dept: INTERNAL MEDICINE CLINIC | Age: 78
End: 2018-08-23

## 2018-08-23 DIAGNOSIS — I48.20 CHRONIC ATRIAL FIBRILLATION (HCC): ICD-10-CM

## 2018-08-23 RX ORDER — WARFARIN SODIUM 5 MG/1
TABLET ORAL
Qty: 30 TAB | Refills: 6 | Status: SHIPPED | OUTPATIENT
Start: 2018-08-23 | End: 2019-01-07 | Stop reason: DRUGHIGH

## 2018-08-23 NOTE — TELEPHONE ENCOUNTER
Requested Prescriptions     Pending Prescriptions Disp Refills    warfarin (COUMADIN) 5 mg tablet 30 Tab 6     Si tablet daily

## 2018-08-27 ENCOUNTER — CLINICAL SUPPORT (OUTPATIENT)
Dept: INTERNAL MEDICINE CLINIC | Age: 78
End: 2018-08-27

## 2018-08-27 DIAGNOSIS — E53.8 VITAMIN B 12 DEFICIENCY: Primary | ICD-10-CM

## 2018-08-27 RX ORDER — CYANOCOBALAMIN 1000 UG/ML
1000 INJECTION, SOLUTION INTRAMUSCULAR; SUBCUTANEOUS ONCE
Qty: 1 ML | Refills: 0 | Status: SHIPPED | COMMUNITY
Start: 2018-08-27 | End: 2018-08-27

## 2018-08-30 ENCOUNTER — TELEPHONE ANTICOAG (OUTPATIENT)
Dept: INTERNAL MEDICINE CLINIC | Age: 78
End: 2018-08-30

## 2018-08-30 DIAGNOSIS — I48.20 CHRONIC ATRIAL FIBRILLATION (HCC): ICD-10-CM

## 2018-08-30 NOTE — PROGRESS NOTES
Patient was notified per his INR home monitoring result of 2.3 . Per Dr. Chaitanya Dumont verbally stated the PT/INR is good and to stay on the same dose. Patient verbally understood.

## 2018-09-11 ENCOUNTER — ANESTHESIA EVENT (OUTPATIENT)
Dept: ENDOSCOPY | Age: 78
End: 2018-09-11
Payer: MEDICARE

## 2018-09-12 ENCOUNTER — TELEPHONE ANTICOAG (OUTPATIENT)
Dept: INTERNAL MEDICINE CLINIC | Age: 78
End: 2018-09-12

## 2018-09-12 ENCOUNTER — HOSPITAL ENCOUNTER (OUTPATIENT)
Age: 78
Setting detail: OUTPATIENT SURGERY
Discharge: HOME | End: 2018-09-12
Attending: INTERNAL MEDICINE | Admitting: INTERNAL MEDICINE
Payer: MEDICARE

## 2018-09-12 ENCOUNTER — ANESTHESIA (OUTPATIENT)
Dept: ENDOSCOPY | Age: 78
End: 2018-09-12
Payer: MEDICARE

## 2018-09-12 ENCOUNTER — TELEPHONE (OUTPATIENT)
Dept: INTERNAL MEDICINE CLINIC | Age: 78
End: 2018-09-12

## 2018-09-12 VITALS
TEMPERATURE: 97.2 F | HEART RATE: 71 BPM | HEIGHT: 72 IN | WEIGHT: 214 LBS | SYSTOLIC BLOOD PRESSURE: 107 MMHG | RESPIRATION RATE: 14 BRPM | OXYGEN SATURATION: 98 % | BODY MASS INDEX: 28.99 KG/M2 | DIASTOLIC BLOOD PRESSURE: 65 MMHG

## 2018-09-12 DIAGNOSIS — I48.20 CHRONIC ATRIAL FIBRILLATION (HCC): ICD-10-CM

## 2018-09-12 PROCEDURE — 76040000007: Performed by: INTERNAL MEDICINE

## 2018-09-12 PROCEDURE — 77030008565 HC TBNG SUC IRR ERBE -B: Performed by: INTERNAL MEDICINE

## 2018-09-12 PROCEDURE — 77030018846 HC SOL IRR STRL H20 ICUM -A: Performed by: INTERNAL MEDICINE

## 2018-09-12 PROCEDURE — 74011250637 HC RX REV CODE- 250/637: Performed by: NURSE ANESTHETIST, CERTIFIED REGISTERED

## 2018-09-12 PROCEDURE — 76060000032 HC ANESTHESIA 0.5 TO 1 HR: Performed by: INTERNAL MEDICINE

## 2018-09-12 PROCEDURE — 74011250636 HC RX REV CODE- 250/636

## 2018-09-12 PROCEDURE — 88305 TISSUE EXAM BY PATHOLOGIST: CPT | Performed by: INTERNAL MEDICINE

## 2018-09-12 PROCEDURE — 77030010936 HC CLP LIG BSC -C: Performed by: INTERNAL MEDICINE

## 2018-09-12 PROCEDURE — 74011250636 HC RX REV CODE- 250/636: Performed by: NURSE ANESTHETIST, CERTIFIED REGISTERED

## 2018-09-12 PROCEDURE — 77030009426 HC FCPS BIOP ENDOSC BSC -B: Performed by: INTERNAL MEDICINE

## 2018-09-12 RX ORDER — LIDOCAINE HYDROCHLORIDE 20 MG/ML
INJECTION, SOLUTION EPIDURAL; INFILTRATION; INTRACAUDAL; PERINEURAL AS NEEDED
Status: DISCONTINUED | OUTPATIENT
Start: 2018-09-12 | End: 2018-09-12 | Stop reason: HOSPADM

## 2018-09-12 RX ORDER — PROPOFOL 10 MG/ML
INJECTION, EMULSION INTRAVENOUS AS NEEDED
Status: DISCONTINUED | OUTPATIENT
Start: 2018-09-12 | End: 2018-09-12 | Stop reason: HOSPADM

## 2018-09-12 RX ORDER — FAMOTIDINE 20 MG/1
20 TABLET, FILM COATED ORAL ONCE
Status: COMPLETED | OUTPATIENT
Start: 2018-09-12 | End: 2018-09-12

## 2018-09-12 RX ORDER — LIDOCAINE HYDROCHLORIDE 10 MG/ML
0.1 INJECTION, SOLUTION EPIDURAL; INFILTRATION; INTRACAUDAL; PERINEURAL AS NEEDED
Status: DISCONTINUED | OUTPATIENT
Start: 2018-09-12 | End: 2018-09-12 | Stop reason: HOSPADM

## 2018-09-12 RX ORDER — SODIUM CHLORIDE 0.9 % (FLUSH) 0.9 %
5-10 SYRINGE (ML) INJECTION AS NEEDED
Status: DISCONTINUED | OUTPATIENT
Start: 2018-09-12 | End: 2018-09-12 | Stop reason: HOSPADM

## 2018-09-12 RX ORDER — PROPOFOL 10 MG/ML
INJECTION, EMULSION INTRAVENOUS
Status: DISCONTINUED | OUTPATIENT
Start: 2018-09-12 | End: 2018-09-12 | Stop reason: HOSPADM

## 2018-09-12 RX ORDER — SODIUM CHLORIDE, SODIUM LACTATE, POTASSIUM CHLORIDE, CALCIUM CHLORIDE 600; 310; 30; 20 MG/100ML; MG/100ML; MG/100ML; MG/100ML
75 INJECTION, SOLUTION INTRAVENOUS CONTINUOUS
Status: DISCONTINUED | OUTPATIENT
Start: 2018-09-12 | End: 2018-09-12 | Stop reason: HOSPADM

## 2018-09-12 RX ORDER — SODIUM CHLORIDE, SODIUM LACTATE, POTASSIUM CHLORIDE, CALCIUM CHLORIDE 600; 310; 30; 20 MG/100ML; MG/100ML; MG/100ML; MG/100ML
50 INJECTION, SOLUTION INTRAVENOUS CONTINUOUS
Status: DISCONTINUED | OUTPATIENT
Start: 2018-09-12 | End: 2018-09-12 | Stop reason: HOSPADM

## 2018-09-12 RX ADMIN — LIDOCAINE HYDROCHLORIDE 20 MG: 20 INJECTION, SOLUTION EPIDURAL; INFILTRATION; INTRACAUDAL; PERINEURAL at 08:33

## 2018-09-12 RX ADMIN — PROPOFOL 70 MG: 10 INJECTION, EMULSION INTRAVENOUS at 08:33

## 2018-09-12 RX ADMIN — PROPOFOL 160 MCG/KG/MIN: 10 INJECTION, EMULSION INTRAVENOUS at 08:33

## 2018-09-12 RX ADMIN — PROPOFOL 20 MG: 10 INJECTION, EMULSION INTRAVENOUS at 08:34

## 2018-09-12 RX ADMIN — SODIUM CHLORIDE, SODIUM LACTATE, POTASSIUM CHLORIDE, AND CALCIUM CHLORIDE 75 ML/HR: 600; 310; 30; 20 INJECTION, SOLUTION INTRAVENOUS at 08:10

## 2018-09-12 RX ADMIN — FAMOTIDINE 20 MG: 20 TABLET, FILM COATED ORAL at 08:10

## 2018-09-12 NOTE — PROCEDURES
[email or web address]  [phone]        Brief Procedure Note    Luz Zhou  1940  [id number]    Date of Procedure: 9/12/2018    Preoperative diagnosis: Gastroesophageal reflux disease, esophagitis presence not specified [K21.9]  Dysphagia, unspecified type [R13.10]  Schatzki's ring [K22.2]  Colon cancer screening [Z12.11]      Postoperative diagnosis: Endo: Gastritis- biopsied, Hiatal Hernia, Irregular Z Line- biopsied, Non Obstructive Dysphagia- dilated 50 Fr So  [place]: Diverticulosis, Descending Polyp, Sigmoid Polyps x 3 (one endoclip for hemostasis), Rectum Polyp x 1, internal hemorrhoids    Description of Findings: same    Sedation/Anesthesia: Monitored Anesthesia Care; See Anesthesia Note    Procedure: Procedure(s):  COLONOSCOPY with Polypectomies and Clip Placement  ENDOSCOPY with Bx's and Dilation 48Fr    :  Dr. Nica Pérez MD    Assistant(s): Endoscopy Technician-1: Roger Chris  Endoscopy Technician-2: Ankit Dallas  Endoscopy RN-1: Ana Sandoval RN  Endoscopy RN-2: Rebecca Barajas RN    EBL:None    Specimens:   ID Type Source Tests Collected by Time Destination   1 : Antrum Bx's Preservative Stomach, Antrum  Nica Pérez MD 9/12/2018 2363 Pathology   2 : Esophagus Bx's Preservative Esophagus  Nica Pérez MD 9/12/2018 7749 Pathology   3 : Descending Polyp Preservative Colon, Descending  Nica Pérez MD 9/12/2018 0846 Pathology   4 : Sigmoid Polyps Preservative Sigmoid  Nica Pérez MD 9/12/2018 0849 Pathology   5 : Rectum Polyp Preservative Rectum  Nica Pérez MD 9/12/2018 3030 Pathology       Findings: See printed and scanned procedure note    Complications: None    Dr. Nica Pérez MD  9/12/2018  9:10 AM    Nica Pérez MD  [facility] [address] - [phone]  [email or web address]

## 2018-09-12 NOTE — DISCHARGE INSTRUCTIONS
Esophageal Dilation: What to Expect at [address] [place] [address]  After you have esophageal dilation, you will stay at the hospital or surgery center for 1 to 2 hours. This will allow the medicine to wear off. You will be able to go home after your doctor or nurse checks to make sure you are not having any problems. This care sheet gives you a general idea about how long it will take for you to recover. But each person recovers at a different pace. Follow the steps below to get better as quickly as possible. How can you care for yourself at home? Activity    · Rest as much as you need to after you go home.     · You should be able to go back to your usual activities the day after the procedure. Diet    · Follow your doctor's directions for eating after the procedure.     · Drink plenty of fluids (unless your doctor has told you not to). Medicines    · Your doctor will tell you if and when you can restart your medicines. He or she will also give you instructions about taking any new medicines.     · If you take blood thinners, such as warfarin (Coumadin), clopidogrel (Plavix), or aspirin, be sure to talk to your doctor. He or she will tell you if and when to start taking those medicines again. Make sure that you understand exactly what your doctor wants you to do.     · If you have a sore throat the day after the procedure, use an over-the-counter spray to numb your throat. Sucking on throat lozenges and gargling with warm salt water may also help relieve your symptoms. Follow-up care is a key part of your treatment and safety. Be sure to make and go to all appointments, and call your doctor if you are having problems. It's also a good idea to know your test results and keep a list of the medicines you take. When should you call for help? Call 911 anytime you think you may need emergency care.  For example, call if:    · You passed out (lost consciousness).     · You have trouble breathing.     · Your stools are maroon or very bloody    Call your doctor now or seek immediate medical care if:    · You have new or worse belly pain.     · You have a fever.     · You have new or more blood in your stools.     · You are sick to your stomach and cannot drink fluids.     · You cannot pass stools or gas.     · You have pain that does not get better after you take pain medicine.    Watch closely for changes in your health, and be sure to contact your doctor if:    · Your throat still hurts after a day or two.     · You do not get better as expected. Where can you learn more? Go to http://lynda-noah.info/. Enter V364 in the search box to learn more about \"Esophageal Dilation: What to Expect at Home. \"  Current as of: May 12, 2017  Content Version: 11.7  © 9354-4364 [employer]. Care instructions adapted under license by [employer] (which disclaims liability or warranty for this information). If you have questions about a medical condition or this instruction, always ask your healthcare professional. [place] [address] any warranty or liability for your use of this information. Gastritis: Care Instructions  Your Care Instructions    Gastritis is a sore and upset stomach. It happens when something irritates the stomach lining. Many things can cause it. These include an infection such as the flu or something you ate or drank. Medicines or a sore on the lining of the stomach (ulcer) also can cause it. Your belly may bloat and ache. You may belch, vomit, and feel sick to your stomach. You should be able to relieve the problem by taking medicine. And it may help to change your diet. If gastritis lasts, your doctor may prescribe medicine. Follow-up care is a key part of your treatment and safety. Be sure to make and go to all appointments, and call your doctor if you are having problems.  It's also a good idea to know your test results and keep a list of the medicines you take.  How can you care for yourself at home? · If your doctor prescribed antibiotics, take them as directed. Do not stop taking them just because you feel better. You need to take the full course of antibiotics. · Be safe with medicines. If your doctor prescribed medicine to decrease stomach acid, take it as directed. Call your doctor if you think you are having a problem with your medicine. · Do not take any other medicine, including over-the-counter pain relievers, without talking to your doctor first.  · If your doctor recommends over-the-counter medicine to reduce stomach acid, such as Pepcid AC, Prilosec, Tagamet HB, or Zantac 75, follow the directions on the label. · Drink plenty of fluids (enough so that your urine is light yellow or clear like water) to prevent dehydration. Choose water and other caffeine-free clear liquids. If you have kidney, heart, or liver disease and have to limit fluids, talk with your doctor before you increase the amount of fluids you drink. · Limit how much alcohol you drink. · Avoid coffee, tea, cola drinks, chocolate, and other foods with caffeine. They increase stomach acid. When should you call for help? Call 911 anytime you think you may need emergency care. For example, call if:    · You vomit blood or what looks like coffee grounds.     · You pass maroon or very bloody stools.    Call your doctor now or seek immediate medical care if:    · You start breathing fast and have not produced urine in the last 8 hours.     · You cannot keep fluids down.    Watch closely for changes in your health, and be sure to contact your doctor if:    · You do not get better as expected. Where can you learn more? Go to http://lynda-noah.info/. Enter [id number] in the search box to learn more about \"Gastritis: Care Instructions. \"  Current as of: May 12, 2017  Content Version: 11.7  © 7157-9486 [facility], [employer].  Care instructions adapted under license by [facility] (which disclaims liability or warranty for this information). If you have questions about a medical condition or this instruction, always ask your healthcare professional. [place] [address] any warranty or liability for your use of this information. Hiatal Hernia: Care Instructions  Your Care Instructions  A hiatal hernia occurs when part of the stomach bulges into the chest cavity. A hiatal hernia may allow stomach acid and juices to back up into the esophagus (acid reflux). This can cause a feeling of burning, warmth, heat, or pain behind the breastbone. This feeling may often occur after you eat, soon after you lie down, or when you bend forward, and it may come and go. You also may have a sour taste in your mouth. These symptoms are commonly known as heartburn or reflux. But not all hiatal hernias cause symptoms. Follow-up care is a key part of your treatment and safety. Be sure to make and go to all appointments, and call your doctor if you are having problems. It's also a good idea to know your test results and keep a list of the medicines you take. How can you care for yourself at home? · Take your medicines exactly as prescribed. Call your doctor if you think you are having a problem with your medicine. · Do not take aspirin or other nonsteroidal anti-inflammatory drugs (NSAIDs), such as ibuprofen (Advil, Motrin) or naproxen (Aleve), unless your doctor says it is okay. Ask your doctor what you can take for pain. · Your doctor may recommend over-the-counter medicine. For mild or occasional indigestion, antacids such as Tums, Gaviscon, Maalox, or Mylanta may help. Your doctor also may recommend over-the-counter acid reducers, such as famotidine (Pepcid AC), cimetidine (Tagamet HB), ranitidine (Zantac 75 and Zantac 150), or omeprazole (Prilosec). Read and follow all instructions on the label. If you use these medicines often, talk with your doctor.   · Change your eating habits. ¨ It's best to eat several small meals instead of two or three large meals. ¨ After you eat, wait 2 to 3 hours before you lie down. Late-night snacks aren't a good idea. ¨ Chocolate, mint, and alcohol can make heartburn worse. They relax the valve between the esophagus and the stomach. ¨ Spicy foods, foods that have a lot of acid (like tomatoes and oranges), and coffee can make heartburn symptoms worse in some people. If your symptoms are worse after you eat a certain food, you may want to stop eating that food to see if your symptoms get better. · Do not smoke or chew tobacco.  · If you get heartburn at night, raise the head of your bed 6 to 8 inches by putting the frame on blocks or placing a foam wedge under the head of your mattress. (Adding extra pillows does not work.)  · Do not wear tight clothing around your middle. · Lose weight if you need to. Losing just 5 to 10 pounds can help. When should you call for help? Call your doctor now or seek immediate medical care if:    · You have new or worse belly pain.     · You are vomiting.    Watch closely for changes in your health, and be sure to contact your doctor if:    · You have new or worse symptoms of indigestion.     · You have trouble or pain swallowing.     · You are losing weight.     · You do not get better as expected. Where can you learn more? Go to http://lynda-noah.info/. Enter Z710 in the search box to learn more about \"Hiatal Hernia: Care Instructions. \"  Current as of: May 12, 2017  Content Version: 11.7  © 5222-6827 [employer]. Care instructions adapted under license by [employer] (which disclaims liability or warranty for this information). If you have questions about a medical condition or this instruction, always ask your healthcare professional. [address] any warranty or liability for your use of this information.   Colonoscopy: What to Expect at Home  Your Recovery  After you have a colonoscopy, you will stay at the clinic for 1 to 2 hours until the medicines wear off. Then you can go home. But you will need to arrange for a ride. Your doctor will tell you when you can eat and do your other usual activities. Your doctor will talk to you about when you will need your next colonoscopy. Your doctor can help you decide how often you need to be checked. This will depend on the results of your test and your risk for colorectal cancer. After the test, you may be bloated or have gas pains. You may need to pass gas. If a biopsy was done or a polyp was removed, you may have streaks of blood in your stool (feces) for a few days. Problems such as heavy rectal bleeding may not occur until several weeks after the test. This isn't common. But it can happen after polyps are removed. This care sheet gives you a general idea about how long it will take for you to recover. But each person recovers at a different pace. Follow the steps below to get better as quickly as possible. How can you care for yourself at home? Activity    · Rest when you feel tired.     · You can do your normal activities when it feels okay to do so. Diet    · Follow your doctor's directions for eating.     · Unless your doctor has told you not to, drink plenty of fluids. This helps to replace the fluids that were lost during the colon prep.     · Do not drink alcohol. Medicines    · Your doctor will tell you if and when you can restart your medicines. He or she will also give you instructions about taking any new medicines.     · If you take blood thinners, such as warfarin (Coumadin), clopidogrel (Plavix), or aspirin, be sure to talk to your doctor. He or she will tell you if and when to start taking those medicines again.  Make sure that you understand exactly what your doctor wants you to do.     · If polyps were removed or a biopsy was done during the test, your doctor may tell you not to take aspirin or other anti-inflammatory medicines for a few days. These include ibuprofen (Advil, Motrin) and naproxen (Aleve). Other instructions    · For your safety, do not drive or operate machinery until the medicine wears off and you can think clearly. Your doctor may tell you not to drive or operate machinery until the day after your test.     · Do not sign legal documents or make major decisions until the medicine wears off and you can think clearly. The anesthesia can make it hard for you to fully understand what you are agreeing to. Follow-up care is a key part of your treatment and safety. Be sure to make and go to all appointments, and call your doctor if you are having problems. It's also a good idea to know your test results and keep a list of the medicines you take. When should you call for help? Call 911 anytime you think you may need emergency care. For example, call if:    · You passed out (lost consciousness).     · You pass maroon or bloody stools.     · You have trouble breathing.    Call your doctor now or seek immediate medical care if:    · You have pain that does not get better after you take pain medicine.     · You are sick to your stomach or cannot drink fluids.     · You have new or worse belly pain.     · You have blood in your stools.     · You have a fever.     · You cannot pass stools or gas.    Watch closely for changes in your health, and be sure to contact your doctor if you have any problems. Where can you learn more? Go to http://lynda-noah.info/. Enter E264 in the search box to learn more about \"Colonoscopy: What to Expect at Home. \"  Current as of: May 12, 2017  Content Version: 11.7  © 5194-4913 [employer]. Care instructions adapted under license by [employer] (which disclaims liability or warranty for this information).  If you have questions about a medical condition or this instruction, always ask your healthcare professional. [address] any warranty or liability for your use of this information. Colon Polyps: Care Instructions  Your Care Instructions    Colon polyps are growths in the colon or the rectum. The cause of most colon polyps is not known, and most people who get them do not have any problems. But a certain kind can turn into cancer. For this reason, regular testing for colon polyps is important for people age 48 and older and anyone who has an increased risk for colon cancer. Polyps are usually found through routine colon cancer screening tests. Although most colon polyps are not cancerous, they are usually removed and then tested for cancer. Screening for colon cancer saves lives because the cancer can usually be cured if it is caught early. If you have a polyp that is the type that can turn into cancer, you may need more tests to examine your entire colon. The doctor will remove any other polyps that he or she finds, and you will be tested more often. Follow-up care is a key part of your treatment and safety. Be sure to make and go to all appointments, and call your doctor if you are having problems. It's also a good idea to know your test results and keep a list of the medicines you take. How can you care for yourself at home? Regular exams to look for colon polyps are the best way to prevent polyps from turning into colon cancer. These can include stool tests, sigmoidoscopy, colonoscopy, and CT colonography. Talk with your doctor about a testing schedule that is right for you. To prevent polyps  There is no home treatment that can prevent colon polyps. But these steps may help lower your risk for cancer. · Stay active. Being active can help you get to and stay at a healthy weight. Try to exercise on most days of the week. Walking is a good choice. · Eat well. Choose a variety of vegetables, fruits, legumes (such as peas and beans), fish, poultry, and whole grains.   · Do not smoke. If you need help quitting, talk to your doctor about stop-smoking programs and medicines. These can increase your chances of quitting for good. · If you drink alcohol, limit how much you drink. Limit alcohol to 2 drinks a day for men and 1 drink a day for women. When should you call for help? Call your doctor now or seek immediate medical care if:    · You have severe belly pain.     · Your stools are maroon or very bloody.    Watch closely for changes in your health, and be sure to contact your doctor if:    · You have a fever.     · You have nausea or vomiting.     · You have a change in bowel habits (new constipation or diarrhea).     · Your symptoms get worse or are not improving as expected. Where can you learn more? Go to http://lynda-noah.info/. Enter [id number] in the search box to learn more about \"Colon Polyps: Care Instructions. \"  Current as of: May 12, 2017  Content Version: 11.7  © 0656-1648 [employer]. Care instructions adapted under license by [employer] (which disclaims liability or warranty for this information). If you have questions about a medical condition or this instruction, always ask your healthcare professional. [address] any warranty or liability for your use of this information. Diverticulosis: Care Instructions  Your Care Instructions  In diverticulosis, pouches called diverticula form in the wall of the large intestine (colon). The pouches do not cause any pain or other symptoms. Most people who have diverticulosis do not know they have it. But the pouches sometimes bleed, and if they become infected, they can cause pain and other symptoms. When this happens, it is called diverticulitis. Diverticula form when pressure pushes the wall of the colon outward at certain weak points. A diet that is too low in fiber can cause diverticula. Follow-up care is a key part of your treatment and safety.  Be sure to make and go to all appointments, and call your doctor if you are having problems. It's also a good idea to know your test results and keep a list of the medicines you take. How can you care for yourself at home? · Include fruits, leafy green vegetables, beans, and whole grains in your diet each day. These foods are high in fiber. · Take a fiber supplement, such as Citrucel or Metamucil, every day if needed. Read and follow all instructions on the label. · Drink plenty of fluids, enough so that your urine is light yellow or clear like water. If you have kidney, heart, or liver disease and have to limit fluids, talk with your doctor before you increase the amount of fluids you drink. · Get at least 30 minutes of exercise on most days of the week. Walking is a good choice. You also may want to do other activities, such as running, swimming, cycling, or playing tennis or team sports. · Cut out foods that cause gas, pain, or other symptoms. When should you call for help? Call your doctor now or seek immediate medical care if:    · You have belly pain.     · You pass maroon or very bloody stools.     · You have a fever.     · You have nausea and vomiting.     · You have unusual changes in your bowel movements or abdominal swelling.     · You have burning pain when you urinate.     · You have abnormal vaginal discharge.     · You have shoulder pain.     · You have cramping pain that does not get better when you have a bowel movement or pass gas.     · You pass gas or stool from your urethra while urinating.    Watch closely for changes in your health, and be sure to contact your doctor if you have any problems. Where can you learn more? Go to http://lynda-noah.info/. Enter K058 in the search box to learn more about \"Diverticulosis: Care Instructions. \"  Current as of: May 12, 2017  Content Version: 11.7  © 1132-7054 [employer].  Care instructions adapted under license by [facility] (which disclaims liability or warranty for this information). If you have questions about a medical condition or this instruction, always ask your healthcare professional. [address] any warranty or liability for your use of this information. Hemorrhoids: Care Instructions  Your Care Instructions    Hemorrhoids are enlarged veins that develop in the anal canal. Bleeding during bowel movements, itching, swelling, and rectal pain are the most common symptoms. They can be uncomfortable at times, but hemorrhoids rarely are a serious problem. You can treat most hemorrhoids with simple changes to your diet and bowel habits. These changes include eating more fiber and not straining to pass stools. Most hemorrhoids do not need surgery or other treatment unless they are very large and painful or bleed a lot. Follow-up care is a key part of your treatment and safety. Be sure to make and go to all appointments, and call your doctor if you are having problems. It's also a good idea to know your test results and keep a list of the medicines you take. How can you care for yourself at home? · Sit in a few inches of warm water (sitz bath) 3 times a day and after bowel movements. The warm water helps with pain and itching. · Put ice on your anal area several times a day for 10 minutes at a time. Put a thin cloth between the ice and your skin. Follow this by placing a warm, wet towel on the area for another 10 to 20 minutes. · Take pain medicines exactly as directed. ¨ If the doctor gave you a prescription medicine for pain, take it as prescribed. ¨ If you are not taking a prescription pain medicine, ask your doctor if you can take an over-the-counter medicine. · Keep the anal area clean, but be gentle. Use water and a fragrance-free soap, such as [place], or use baby wipes or medicated pads, such as Tucks.   · Wear cotton underwear and loose clothing to decrease moisture in the anal area.  · Eat more fiber. Include foods such as whole-grain breads and cereals, raw vegetables, raw and dried fruits, and beans. · Drink plenty of fluids, enough so that your urine is light yellow or clear like water. If you have kidney, heart, or liver disease and have to limit fluids, talk with your doctor before you increase the amount of fluids you drink. · Use a stool softener that contains bran or psyllium. You can save money by buying bran or psyllium (available in bulk at most health food stores) and sprinkling it on foods or stirring it into fruit juice. Or you can use a product such as Metamucil or Hydrocil. · Practice healthy bowel habits. ¨ Go to the bathroom as soon as you have the urge. ¨ Avoid straining to pass stools. Relax and give yourself time to let things happen naturally. ¨ Do not hold your breath while passing stools. ¨ Do not read while sitting on the toilet. Get off the toilet as soon as you have finished. · Take your medicines exactly as prescribed. Call your doctor if you think you are having a problem with your medicine. When should you call for help? Call 911 anytime you think you may need emergency care. For example, call if:    · You pass maroon or very bloody stools.    Call your doctor now or seek immediate medical care if:    · You have increased pain.     · You have increased bleeding.    Watch closely for changes in your health, and be sure to contact your doctor if:    · Your symptoms have not improved after 3 or 4 days. Where can you learn more? Go to http://lynda-noah.info/. Enter F228 in the search box to learn more about \"Hemorrhoids: Care Instructions. \"  Current as of: May 12, 2017  Content Version: 11.7  © 7276-1650 [employer]. Care instructions adapted under license by [employer] (which disclaims liability or warranty for this information).  If you have questions about a medical condition or this instruction, always ask your healthcare professional. [place] [address] any warranty or liability for your use of this information. DISCHARGE SUMMARY from Nurse    PATIENT INSTRUCTIONS:    After general anesthesia or intravenous sedation, for 24 hours or while taking prescription Narcotics:  · Limit your activities  · Do not drive and operate hazardous machinery  · Do not make important personal or business decisions  · Do  not drink alcoholic beverages  · If you have not urinated within 8 hours after discharge, please contact your surgeon on call. Report the following to your surgeon:  · Excessive pain, swelling, redness or odor of or around the surgical area  · Temperature over 100.5  · Nausea and vomiting lasting longer than 4 hours or if unable to take medications  · Any signs of decreased circulation or nerve impairment to extremity: change in color, persistent  numbness, tingling, coldness or increase pain  · Any questions  *  Please give a list of your current medications to your Primary Care Provider. *  Please update this list whenever your medications are discontinued, doses are      changed, or new medications (including over-the-counter products) are added. *  Please carry medication information at all times in case of emergency situations. These are general instructions for a healthy lifestyle:    No smoking/ No tobacco products/ Avoid exposure to second hand smoke  Surgeon General's Warning:  Quitting smoking now greatly reduces serious risk to your health.     Obesity, smoking, and sedentary lifestyle greatly increases your risk for illness    A healthy diet, regular physical exercise & weight monitoring are important for maintaining a healthy lifestyle    You may be retaining fluid if you have a history of heart failure or if you experience any of the following symptoms:  Weight gain of 3 pounds or more overnight or 5 pounds in a week, increased swelling in our hands or feet or shortness of breath while lying flat in bed. Please call your doctor as soon as you notice any of these symptoms; do not wait until your next office visit. Recognize signs and symptoms of STROKE:    F-face looks uneven    A-arms unable to move or move unevenly    S-speech slurred or non-existent    T-time-call 911 as soon as signs and symptoms begin-DO NOT go       Back to bed or wait to see if you get better-TIME IS BRAIN. Warning Signs of HEART ATTACK     Call 911 if you have these symptoms:   Chest discomfort. Most heart attacks involve discomfort in the center of the chest that lasts more than a few minutes, or that goes away and comes back. It can feel like uncomfortable pressure, squeezing, fullness, or pain.  Discomfort in other areas of the upper body. Symptoms can include pain or discomfort in one or both arms, the back, neck, jaw, or stomach.  Shortness of breath with or without chest discomfort.  Other signs may include breaking out in a cold sweat, nausea, or lightheadedness. Don't wait more than five minutes to call 911 - MINUTES MATTER! Fast action can save your life. Calling 911 is almost always the fastest way to get lifesaving treatment. Emergency Medical Services staff can begin treatment when they arrive -- up to an hour sooner than if someone gets to the hospital by car. The discharge information has been reviewed with the patient and spouse. The patient and spouse verbalized understanding. Discharge medications reviewed with the patient and spouse and appropriate educational materials and side effects teaching were provided.   ___________________________________________________________________________________________________________________________________

## 2018-09-12 NOTE — H&P
[email or web address] 
[phone] History and Physical 
 
Patient: Sindhu Soler MRN: [id number]  SSN: [id number] YOB: 1940  Age: 70 y.o. Sex: male Subjective:  
  
Sindhu Soler is a 70 y.o. male who presents with recurrent dysphagia with history of Schatzki's Ring requiring dilation in the past, and chronic GERD. Patient due for screening colonoscopy. Past Medical History:  
Diagnosis Date  AICD (automatic cardioverter/defibrillator) present [employer]  upgrade from pacer in 2007 due to VT  Atrial fibrillation ([facility]  Cardiac cath 03/19/2007 LM 25% ostial.  Otherwise patent coronaries. LVEDP 20.  EF 50%. Dyssynch. mid anterior contraction. Pacemaker.  Cardiac echocardiogram 03/20/2014 A-fib. EF 65-70%. No RWMA. No RWMA. Mild conc LVH. Mild RVE. RVSP 40-45 mmHg. Mild LAE.  HERNANDEZ. Mild MR. Mod TR.  Cardiac nuclear imaging test 07/29/2014 No ischemia or prior infarction. EF 68%. Nondiagnostic EKG due to V pacing on pharm stress test.  Low risk.  Cardioversion 8/31/2011 Successful defibrillation threshold testing at 18 joules. Patient also had conversion to atrial ventricular pacing.  CHF (congestive heart failure) ([facility]  Cobalamin deficiency  Dupuytren contracture 02/08/2010  
 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  
 Mastodynia  Paroxysmal VT ([facility]  Pure hypercholesterolemia  S/P ablation of atrial fibrillation 05/31/2011  S/P ablation of atrial fibrillation 12/18/2012 [facility] S/P ablation operation for arrhythmia VT ablation by Dr. Mares Press near [address] [place] [address] [id number]  Sick sinus syndrome ([facility] Past Surgical History:  
Procedure Laterality Date  HX AFIB ABLATION  12/18/2012 [facility] by Dr. Francisco Carrera  HX CATARACT REMOVAL  02/08-03/08  
 bilateral cataract removed  HX CATARACT REMOVAL  2/2008 & 3/2008  
 bilateral  
 HX ORTHOPAEDIC  2/8/10  
 release of Dupuytren's contraction on ring finger of right hand  HX OTHER SURGICAL  6/2000  
 atrial lead placement  HX OTHER SURGICAL  6/5/2009 Cardioversion  HX OTHER SURGICAL  10/12/2012  
 cardioversion [address]  
 placement  HX PACEMAKER  6/2000 DDD  HX PACEMAKER  3/27/07  
 removal of pacemaker & placement of dual chamber defib generator and leads  HX TONSIL AND ADENOIDECTOMY Family History Problem Relation Age of Onset  No Known Problems Mother  COPD Father  Hypertension Other Social History Substance Use Topics  Smoking status: Never Smoker  Smokeless tobacco: Never Used  Alcohol use Yes Comment: 2-3 glasses of white wine daily Prior to Admission medications Medication Sig Start Date End Date Taking? Authorizing Provider  
warfarin (COUMADIN) 5 mg tablet 1 tablet daily 8/23/18  Yes Bob Cabezas MD  
rosuvastatin (CRESTOR) 5 mg tablet Take 1 Tab by mouth every Monday, Wednesday, Friday. 8/20/18  Yes Brandy Oliveira DO  
potassium chloride (KLOR-CON) 10 mEq tablet Take 1 Tab by mouth two (2) times a day. 8/15/18  Yes Bob Cabezas MD  
propranolol (INDERAL) 60 mg tablet 1 tablet twice per day 6/28/18  Yes Bob Cabezas MD  
furosemide (LASIX) 40 mg tablet TAKE 1 TABLET DAILY 6/7/18  Yes Brandy Oliveira DO  
metoprolol tartrate (LOPRESSOR) 50 mg tablet Take 1 Tab by mouth two (2) times a day. 6/7/18  Yes Brandy Oliveira DO  
amLODIPine (NORVASC) 5 mg tablet Take 1 Tab by mouth daily.  4/24/18  Yes Bob Cabezas MD  
cyanocobalamin (VITAMIN B12) 1,000 mcg/mL injection inject 1 ml into the muscle once every three weeks 1/22/18  Yes Bob Cabezas MD  
 spironolactone (ALDACTONE) 25 mg tablet Take 1 Tab by mouth daily. 8/12/17  Yes Noble Bryson MD  
clobetasol (TEMOVATE) 0.05 % topical cream Apply  to affected area two (2) times a day. Yes Historical Provider  
white petrolatum-mineral oil (ABSORBASE) oint Apply  to affected area as needed. Yes Historical Provider  
diclofenac (VOLTAREN) 1 % gel Apply 4 g to affected area four (4) times daily. 4/12/17  Yes Allen Andrade NP  
pantoprazole (PROTONIX) 40 mg tablet 1 tablet each AM 1/19/17  Yes Noble Bryson MD  
AZOPT 1 % ophthalmic suspension Administer 1 Drop to both eyes two (2) times a day. 9/21/15  Yes Historical Provider  
multivitamin (ONE A DAY) tablet Take 1 Tab by mouth daily. Yes Historical Provider Allergies Allergen Reactions  Zetia [Ezetimibe] Other (comments) Back pain resolved off Zetia  Lipitor [Atorvastatin] Myalgia  Livalo [Pitavastatin] Other (comments) Extreme fatigue 5/29/14   PATIENT DENIES  Zocor [Simvastatin] Myalgia 5/29/14 PATIENT DENIES Review of Systems: A comprehensive review of systems was negative except for that written in the History of Present Illness. Objective:  
 
Vitals:  
 09/06/18 1624 Weight: 92.5 kg (204 lb) Height: 6' (1.829 m) Physical Exam: 
GENERAL: alert, cooperative, no distress, appears stated age LUNG: clear to auscultation bilaterally HEART: regular rate and rhythm, S1, S2 normal, no murmur, click, rub or gallop ABDOMEN: soft, non-tender. Bowel sounds normal. No masses,  no organomegaly NEUROLOGIC: alert & oriented x 3 Assessment: 1. Dysphagia 2. Schatzki's Ring 3. GERD 4. CRCS Plan: 1. egd/colonoscopy Signed By: Myrtle Del Rio MD   
 September 12, 2018 Myrtle Del Rio MD 
[facility] Office/pager - [phone] Cell - [phone] 
[email or web address]

## 2018-09-12 NOTE — PROGRESS NOTES
Patient was called and advised to restart his medication on 09/13/2018  Patient verbalized understanding

## 2018-09-12 NOTE — ANESTHESIA POSTPROCEDURE EVALUATION
Post-Anesthesia Evaluation and Assessment Patient: Davidson Morrison MRN: [id number]  SSN: [id number] YOB: 1940  Age: 70 y.o. Sex: male VS from flow sheet Cardiovascular Function/Vital Signs Visit Vitals  /65 (BP 1 Location: Right arm, BP Patient Position: At rest)  Pulse 71  Temp 36.2 °C (97.2 °F)  Resp 14  
 Ht 6' (1.829 m)  Wt 97.1 kg (214 lb)  SpO2 98%  BMI 29.02 kg/m2 Patient is status post MAC anesthesia for Procedure(s): 
COLONOSCOPY with Polypectomies and Clip Placement ENDOSCOPY with Bx's and Dilation 48Fr. Nausea/Vomiting: None Postoperative hydration reviewed and adequate. Pain: 
Pain Scale 1: Numeric (0 - 10) (09/12/18 0932) Pain Intensity 1: 0 (09/12/18 0932) Managed Neurological Status: At baseline Mental Status and Level of Consciousness: Arousable Pulmonary Status:  
O2 Device: Room air (09/12/18 0932) Adequate oxygenation and airway patent Complications related to anesthesia: None Post-anesthesia assessment completed. No concerns Signed By: Celia Mcgee MD   
 September 12, 2018

## 2018-09-12 NOTE — PROGRESS NOTES
Patient may resume coumadin in 48 hours. Aron Costa MD 
[facility] Office/pager - [phone] Cell - [phone] 
[email or web address]

## 2018-09-12 NOTE — IP AVS SNAPSHOT
[address] [facility] 
 
 
 920 [facility] Patient: Michael Sanchez MRN: [id number] SEF:[id number] About your hospitalization You were admitted on:  September 12, 2018 You last received care in the:  [facility] PHASE 2 RECOVERY You were discharged on:  September 12, 2018 Why you were hospitalized Your primary diagnosis was:  Not on File Follow-up Information Follow up With Details Comments Contact Info Patti Cade MD   [address] [facility] [address] 
[phone] Neftaly Mac MD   [address] [place] [address] [address] 200 [facility] 
[phone] Your Scheduled Appointments Wednesday September 12, 2018 COLONOSCOPY, ENDOSCOPY with Neftaly Mac MD  
[facility] ENDOSCOPY [address] [place] [address]) [address] [facility] [address] Monday October 22, 2018  8:30 AM EDT Follow Up with Patti Cade MD  
[facility] [place] [address] [place] [address], [address] [facility] [address] [phone] Discharge Orders None A check arya indicates which time of day the medication should be taken. My Medications CONTINUE taking these medications Instructions Each Dose to Equal  
 Morning Noon Evening Bedtime Yunior Wills Generic drug:  white petrolatum-mineral oil Apply  to affected area as needed. amLODIPine 5 mg tablet Commonly known as:  Moraga Blade Take 1 Tab by mouth daily. 5 mg AZOPT 1 % ophthalmic suspension Generic drug:  brinzolamide Administer 1 Drop to both eyes two (2) times a day. 1 Drop  
    
   
   
   
  
 clobetasol 0.05 % topical cream  
Commonly known as:  Sherryll Stalling Apply  to affected area two (2) times a day. cyanocobalamin 1,000 mcg/mL injection Commonly known as:  VITAMIN B12  
   
 inject 1 ml into the muscle once every three weeks  
     
   
   
   
  
 diclofenac 1 % Gel Commonly known as:  VOLTAREN Apply 4 g to affected area four (4) times daily. 4 g  
    
   
   
   
  
 furosemide 40 mg tablet Commonly known as:  LASIX TAKE 1 TABLET DAILY  
     
   
   
   
  
 metoprolol tartrate 50 mg tablet Commonly known as:  LOPRESSOR Take 1 Tab by mouth two (2) times a day. 50 mg  
    
   
   
   
  
 multivitamin tablet Commonly known as:  ONE A DAY Take 1 Tab by mouth daily. 1 Tab  
    
   
   
   
  
 pantoprazole 40 mg tablet Commonly known as:  PROTONIX  
   
 1 tablet each AM  
     
   
   
   
  
 potassium chloride 10 mEq tablet Commonly known as:  KLOR-CON Take 1 Tab by mouth two (2) times a day. 10 mEq  
    
   
   
   
  
 propranolol 60 mg tablet Commonly known as:  INDERAL  
   
 1 tablet twice per day  
     
   
   
   
  
 rosuvastatin 5 mg tablet Commonly known as:  CRESTOR Take 1 Tab by mouth every Monday, Wednesday, Friday. 5 mg  
    
   
   
   
  
 spironolactone 25 mg tablet Commonly known as:  ALDACTONE Take 1 Tab by mouth daily. 25 mg  
    
   
   
   
  
 warfarin 5 mg tablet Commonly known as:  COUMADIN Notes to Patient:  Resume in 48 hours as instructed by Dr. Luz Frank. 1 tablet daily Discharge Instructions [facility]: [facility] Your Recovery After you have esophageal dilation, you will stay at the hospital or surgery center for 1 to 2 hours. This will allow the medicine to wear off. You will be able to go home after your doctor or nurse checks to make sure you are not having any problems. This care sheet gives you a general idea about how long it will take for you to recover. But each person recovers at a different pace.  Follow the steps below to get better as quickly as possible. How can you care for yourself at home? Activity 
  · Rest as much as you need to after you go home.  
  · You should be able to go back to your usual activities the day after the procedure. Diet 
  · Follow your doctor's directions for eating after the procedure.  
  · Drink plenty of fluids (unless your doctor has told you not to). Medicines 
  · Your doctor will tell you if and when you can restart your medicines. He or she will also give you instructions about taking any new medicines.  
  · If you take blood thinners, such as warfarin (Coumadin), clopidogrel (Plavix), or aspirin, be sure to talk to your doctor. He or she will tell you if and when to start taking those medicines again. Make sure that you understand exactly what your doctor wants you to do.  
  · If you have a sore throat the day after the procedure, use an over-the-counter spray to numb your throat. Sucking on throat lozenges and gargling with warm salt water may also help relieve your symptoms. Follow-up care is a key part of your treatment and safety. Be sure to make and go to all appointments, and call your doctor if you are having problems. It's also a good idea to know your test results and keep a list of the medicines you take. When should you call for help? Call 911 anytime you think you may need emergency care. For example, call if: 
  · You passed out (lost consciousness).  
  · You have trouble breathing.  
  · Your stools are maroon or very bloody  
 Call your doctor now or seek immediate medical care if: 
  · You have new or worse belly pain.  
  · You have a fever.  
  · You have new or more blood in your stools.  
  · You are sick to your stomach and cannot drink fluids.  
  · You cannot pass stools or gas.  
  · You have pain that does not get better after you take pain medicine.  
 Watch closely for changes in your health, and be sure to contact your doctor if:   · Your throat still hurts after a day or two.  
  · You do not get better as expected. Where can you learn more? Go to http://lynda-noah.info/. Enter D370 in the search box to learn more about \"Esophageal Dilation: What to Expect at Home. \" Current as of: May 12, 2017 Content Version: 11.7 © 6175-4671 [email or web address]. Care instructions adapted under license by [employer] (which disclaims liability or warranty for this information). If you have questions about a medical condition or this instruction, always ask your healthcare professional. [place] [address] any warranty or liability for your use of this information. Gastritis: Care Instructions Your Care Instructions Gastritis is a sore and upset stomach. It happens when something irritates the stomach lining. Many things can cause it. These include an infection such as the flu or something you ate or drank. Medicines or a sore on the lining of the stomach (ulcer) also can cause it. Your belly may bloat and ache. You may belch, vomit, and feel sick to your stomach. You should be able to relieve the problem by taking medicine. And it may help to change your diet. If gastritis lasts, your doctor may prescribe medicine. Follow-up care is a key part of your treatment and safety. Be sure to make and go to all appointments, and call your doctor if you are having problems. It's also a good idea to know your test results and keep a list of the medicines you take. How can you care for yourself at home? · If your doctor prescribed antibiotics, take them as directed. Do not stop taking them just because you feel better. You need to take the full course of antibiotics. · Be safe with medicines. If your doctor prescribed medicine to decrease stomach acid, take it as directed. Call your doctor if you think you are having a problem with your medicine. · Do not take any other medicine, including over-the-counter pain relievers, without talking to your doctor first. 
· If your doctor recommends over-the-counter medicine to reduce stomach acid, such as Pepcid AC, Prilosec, Tagamet HB, or Zantac 75, follow the directions on the label. · Drink plenty of fluids (enough so that your urine is light yellow or clear like water) to prevent dehydration. Choose water and other caffeine-free clear liquids. If you have kidney, heart, or liver disease and have to limit fluids, talk with your doctor before you increase the amount of fluids you drink. · Limit how much alcohol you drink. · Avoid coffee, tea, cola drinks, chocolate, and other foods with caffeine. They increase stomach acid. When should you call for help? Call 911 anytime you think you may need emergency care. For example, call if: 
  · You vomit blood or what looks like coffee grounds.  
  · You pass maroon or very bloody stools.  
 Call your doctor now or seek immediate medical care if: 
  · You start breathing fast and have not produced urine in the last 8 hours.  
  · You cannot keep fluids down.  
 Watch closely for changes in your health, and be sure to contact your doctor if: 
  · You do not get better as expected. Where can you learn more? Go to http://lynda-noah.info/. Enter [id number] in the search box to learn more about \"Gastritis: Care Instructions. \" Current as of: May 12, 2017 Content Version: 11.7 © 9983-6961 Desmos. Care instructions adapted under license by [employer] (which disclaims liability or warranty for this information). If you have questions about a medical condition or this instruction, always ask your healthcare professional. [place] [address] any warranty or liability for your use of this information. Hiatal Hernia: Care Instructions Your Care Instructions A hiatal hernia occurs when part of the stomach bulges into the chest cavity. A hiatal hernia may allow stomach acid and juices to back up into the esophagus (acid reflux). This can cause a feeling of burning, warmth, heat, or pain behind the breastbone. This feeling may often occur after you eat, soon after you lie down, or when you bend forward, and it may come and go. You also may have a sour taste in your mouth. These symptoms are commonly known as heartburn or reflux. But not all hiatal hernias cause symptoms. Follow-up care is a key part of your treatment and safety. Be sure to make and go to all appointments, and call your doctor if you are having problems. It's also a good idea to know your test results and keep a list of the medicines you take. How can you care for yourself at home? · Take your medicines exactly as prescribed. Call your doctor if you think you are having a problem with your medicine. · Do not take aspirin or other nonsteroidal anti-inflammatory drugs (NSAIDs), such as ibuprofen (Advil, Motrin) or naproxen (Aleve), unless your doctor says it is okay. Ask your doctor what you can take for pain. · Your doctor may recommend over-the-counter medicine. For mild or occasional indigestion, antacids such as Tums, Gaviscon, Maalox, or Mylanta may help. Your doctor also may recommend over-the-counter acid reducers, such as famotidine (Pepcid AC), cimetidine (Tagamet HB), ranitidine (Zantac 75 and Zantac 150), or omeprazole (Prilosec). Read and follow all instructions on the label. If you use these medicines often, talk with your doctor. · Change your eating habits. ¨ It's best to eat several small meals instead of two or three large meals. ¨ After you eat, wait 2 to 3 hours before you lie down. Late-night snacks aren't a good idea. ¨ Chocolate, mint, and alcohol can make heartburn worse. They relax the valve between the esophagus and the stomach. ¨ Spicy foods, foods that have a lot of acid (like tomatoes and oranges), and coffee can make heartburn symptoms worse in some people. If your symptoms are worse after you eat a certain food, you may want to stop eating that food to see if your symptoms get better. · Do not smoke or chew tobacco. 
· If you get heartburn at night, raise the head of your bed 6 to 8 inches by putting the frame on blocks or placing a foam wedge under the head of your mattress. (Adding extra pillows does not work.) · Do not wear tight clothing around your middle. · Lose weight if you need to. Losing just 5 to 10 pounds can help. When should you call for help? Call your doctor now or seek immediate medical care if: 
  · You have new or worse belly pain.  
  · You are vomiting.  
 Watch closely for changes in your health, and be sure to contact your doctor if: 
  · You have new or worse symptoms of indigestion.  
  · You have trouble or pain swallowing.  
  · You are losing weight.  
  · You do not get better as expected. Where can you learn more? Go to http://lynda-noah.info/. Enter K801 in the search box to learn more about \"Hiatal Hernia: Care Instructions. \" Current as of: May 12, 2017 Content Version: 11.7 © 8146-7671 Panda Graphics. Care instructions adapted under license by [employer] (which disclaims liability or warranty for this information). If you have questions about a medical condition or this instruction, always ask your healthcare professional. [place] [address] any warranty or liability for your use of this information. Colonoscopy: What to Expect at [facility] Your Recovery After you have a colonoscopy, you will stay at the clinic for 1 to 2 hours until the medicines wear off. Then you can go home. But you will need to arrange for a ride. Your doctor will tell you when you can eat and do your other usual activities. Your doctor will talk to you about when you will need your next colonoscopy. Your doctor can help you decide how often you need to be checked. This will depend on the results of your test and your risk for colorectal cancer. After the test, you may be bloated or have gas pains. You may need to pass gas. If a biopsy was done or a polyp was removed, you may have streaks of blood in your stool (feces) for a few days. Problems such as heavy rectal bleeding may not occur until several weeks after the test. This isn't common. But it can happen after polyps are removed. This care sheet gives you a general idea about how long it will take for you to recover. But each person recovers at a different pace. Follow the steps below to get better as quickly as possible. How can you care for yourself at home? Activity 
  · Rest when you feel tired.  
  · You can do your normal activities when it feels okay to do so. Diet 
  · Follow your doctor's directions for eating.  
  · Unless your doctor has told you not to, drink plenty of fluids. This helps to replace the fluids that were lost during the colon prep.  
  · Do not drink alcohol. Medicines 
  · Your doctor will tell you if and when you can restart your medicines. He or she will also give you instructions about taking any new medicines.  
  · If you take blood thinners, such as warfarin (Coumadin), clopidogrel (Plavix), or aspirin, be sure to talk to your doctor. He or she will tell you if and when to start taking those medicines again. Make sure that you understand exactly what your doctor wants you to do.  
  · If polyps were removed or a biopsy was done during the test, your doctor may tell you not to take aspirin or other anti-inflammatory medicines for a few days. These include ibuprofen (Advil, Motrin) and naproxen (Aleve).   
Other instructions 
  · For your safety, do not drive or operate machinery until the medicine wears off and you can think clearly. Your doctor may tell you not to drive or operate machinery until the day after your test.  
  · Do not sign legal documents or make major decisions until the medicine wears off and you can think clearly. The anesthesia can make it hard for you to fully understand what you are agreeing to. Follow-up care is a key part of your treatment and safety. Be sure to make and go to all appointments, and call your doctor if you are having problems. It's also a good idea to know your test results and keep a list of the medicines you take. When should you call for help? Call 911 anytime you think you may need emergency care. For example, call if: 
  · You passed out (lost consciousness).  
  · You pass maroon or bloody stools.  
  · You have trouble breathing.  
 Call your doctor now or seek immediate medical care if: 
  · You have pain that does not get better after you take pain medicine.  
  · You are sick to your stomach or cannot drink fluids.  
  · You have new or worse belly pain.  
  · You have blood in your stools.  
  · You have a fever.  
  · You cannot pass stools or gas.  
 Watch closely for changes in your health, and be sure to contact your doctor if you have any problems. Where can you learn more? Go to http://lynda-noah.info/. Enter E264 in the search box to learn more about \"Colonoscopy: What to Expect at Home. \" Current as of: May 12, 2017 Content Version: 11.7 © 9602-7656 "MedDiary, Inc.", Dignify Therapeutics. Care instructions adapted under license by Flipkart (which disclaims liability or warranty for this information). If you have questions about a medical condition or this instruction, always ask your healthcare professional. [place] [address] any warranty or liability for your use of this information. Colon Polyps: Care Instructions Your Care Instructions Colon polyps are growths in the colon or the rectum. The cause of most colon polyps is not known, and most people who get them do not have any problems. But a certain kind can turn into cancer. For this reason, regular testing for colon polyps is important for people age 48 and older and anyone who has an increased risk for colon cancer. Polyps are usually found through routine colon cancer screening tests. Although most colon polyps are not cancerous, they are usually removed and then tested for cancer. Screening for colon cancer saves lives because the cancer can usually be cured if it is caught early. If you have a polyp that is the type that can turn into cancer, you may need more tests to examine your entire colon. The doctor will remove any other polyps that he or she finds, and you will be tested more often. Follow-up care is a key part of your treatment and safety. Be sure to make and go to all appointments, and call your doctor if you are having problems. It's also a good idea to know your test results and keep a list of the medicines you take. How can you care for yourself at home? Regular exams to look for colon polyps are the best way to prevent polyps from turning into colon cancer. These can include stool tests, sigmoidoscopy, colonoscopy, and CT colonography. Talk with your doctor about a testing schedule that is right for you. To prevent polyps There is no home treatment that can prevent colon polyps. But these steps may help lower your risk for cancer. · Stay active. Being active can help you get to and stay at a healthy weight. Try to exercise on most days of the week. Walking is a good choice. · Eat well. Choose a variety of vegetables, fruits, legumes (such as peas and beans), fish, poultry, and whole grains. · Do not smoke. If you need help quitting, talk to your doctor about stop-smoking programs and medicines. These can increase your chances of quitting for good. · If you drink alcohol, limit how much you drink. Limit alcohol to 2 drinks a day for men and 1 drink a day for women. When should you call for help? Call your doctor now or seek immediate medical care if: 
  · You have severe belly pain.  
  · Your stools are maroon or very bloody.  
 Watch closely for changes in your health, and be sure to contact your doctor if: 
  · You have a fever.  
  · You have nausea or vomiting.  
  · You have a change in bowel habits (new constipation or diarrhea).  
  · Your symptoms get worse or are not improving as expected. Where can you learn more? Go to http://lynda-noah.info/. Enter 95 [id number] in the search box to learn more about \"Colon Polyps: Care Instructions. \" Current as of: May 12, 2017 Content Version: 11.7 © 0100-6886 Quack. Care instructions adapted under license by Modality (which disclaims liability or warranty for this information). If you have questions about a medical condition or this instruction, always ask your healthcare professional. [place] [address] any warranty or liability for your use of this information. Diverticulosis: Care Instructions Your Care Instructions In diverticulosis, pouches called diverticula form in the wall of the large intestine (colon). The pouches do not cause any pain or other symptoms. Most people who have diverticulosis do not know they have it. But the pouches sometimes bleed, and if they become infected, they can cause pain and other symptoms. When this happens, it is called diverticulitis. Diverticula form when pressure pushes the wall of the colon outward at certain weak points. A diet that is too low in fiber can cause diverticula. Follow-up care is a key part of your treatment and safety. Be sure to make and go to all appointments, and call your doctor if you are having problems.  It's also a good idea to know your test results and keep a list of the medicines you take. How can you care for yourself at home? · Include fruits, leafy green vegetables, beans, and whole grains in your diet each day. These foods are high in fiber. · Take a fiber supplement, such as Citrucel or Metamucil, every day if needed. Read and follow all instructions on the label. · Drink plenty of fluids, enough so that your urine is light yellow or clear like water. If you have kidney, heart, or liver disease and have to limit fluids, talk with your doctor before you increase the amount of fluids you drink. · Get at least 30 minutes of exercise on most days of the week. Walking is a good choice. You also may want to do other activities, such as running, swimming, cycling, or playing tennis or team sports. · Cut out foods that cause gas, pain, or other symptoms. When should you call for help? Call your doctor now or seek immediate medical care if: 
  · You have belly pain.  
  · You pass maroon or very bloody stools.  
  · You have a fever.  
  · You have nausea and vomiting.  
  · You have unusual changes in your bowel movements or abdominal swelling.  
  · You have burning pain when you urinate.  
  · You have abnormal vaginal discharge.  
  · You have shoulder pain.  
  · You have cramping pain that does not get better when you have a bowel movement or pass gas.  
  · You pass gas or stool from your urethra while urinating.  
 Watch closely for changes in your health, and be sure to contact your doctor if you have any problems. Where can you learn more? Go to http://lynda-noah.info/. Enter B691 in the search box to learn more about \"Diverticulosis: Care Instructions. \" Current as of: May 12, 2017 Content Version: 11.7 © 9703-8235 Cardiorobotics. Care instructions adapted under license by [employer] (which disclaims liability or warranty for this information).  If you have questions about a medical condition or this instruction, always ask your healthcare professional. [address] any warranty or liability for your use of this information. Hemorrhoids: Care Instructions Your Care Instructions Hemorrhoids are enlarged veins that develop in the anal canal. Bleeding during bowel movements, itching, swelling, and rectal pain are the most common symptoms. They can be uncomfortable at times, but hemorrhoids rarely are a serious problem. You can treat most hemorrhoids with simple changes to your diet and bowel habits. These changes include eating more fiber and not straining to pass stools. Most hemorrhoids do not need surgery or other treatment unless they are very large and painful or bleed a lot. Follow-up care is a key part of your treatment and safety. Be sure to make and go to all appointments, and call your doctor if you are having problems. It's also a good idea to know your test results and keep a list of the medicines you take. How can you care for yourself at home? · Sit in a few inches of warm water (sitz bath) 3 times a day and after bowel movements. The warm water helps with pain and itching. · Put ice on your anal area several times a day for 10 minutes at a time. Put a thin cloth between the ice and your skin. Follow this by placing a warm, wet towel on the area for another 10 to 20 minutes. · Take pain medicines exactly as directed. ¨ If the doctor gave you a prescription medicine for pain, take it as prescribed. ¨ If you are not taking a prescription pain medicine, ask your doctor if you can take an over-the-counter medicine. · Keep the anal area clean, but be gentle. Use water and a fragrance-free soap, such as [place], or use baby wipes or medicated pads, such as Tucks. · Wear cotton underwear and loose clothing to decrease moisture in the anal area. · Eat more fiber. Include foods such as whole-grain breads and cereals, raw vegetables, raw and dried fruits, and beans. · Drink plenty of fluids, enough so that your urine is light yellow or clear like water. If you have kidney, heart, or liver disease and have to limit fluids, talk with your doctor before you increase the amount of fluids you drink. · Use a stool softener that contains bran or psyllium. You can save money by buying bran or psyllium (available in bulk at most health food stores) and sprinkling it on foods or stirring it into fruit juice. Or you can use a product such as Metamucil or Hydrocil. · Practice healthy bowel habits. ¨ Go to the bathroom as soon as you have the urge. ¨ Avoid straining to pass stools. Relax and give yourself time to let things happen naturally. ¨ Do not hold your breath while passing stools. ¨ Do not read while sitting on the toilet. Get off the toilet as soon as you have finished. · Take your medicines exactly as prescribed. Call your doctor if you think you are having a problem with your medicine. When should you call for help? Call 911 anytime you think you may need emergency care. For example, call if: 
  · You pass maroon or very bloody stools.  
 Call your doctor now or seek immediate medical care if: 
  · You have increased pain.  
  · You have increased bleeding.  
 Watch closely for changes in your health, and be sure to contact your doctor if: 
  · Your symptoms have not improved after 3 or 4 days. Where can you learn more? Go to http://lynda-noah.info/. Enter F228 in the search box to learn more about \"Hemorrhoids: Care Instructions. \" Current as of: May 12, 2017 Content Version: 11.7 © 0601-6024 [employer]. Care instructions adapted under license by [employer] (which disclaims liability or warranty for this information).  If you have questions about a medical condition or this instruction, always ask your healthcare professional. [facility] any warranty or liability for your use of this information. DISCHARGE SUMMARY from Nurse PATIENT INSTRUCTIONS: 
 
 
F-face looks uneven A-arms unable to move or move unevenly S-speech slurred or non-existent T-time-call 911 as soon as signs and symptoms begin-DO NOT go Back to bed or wait to see if you get better-TIME IS BRAIN. Warning Signs of HEART ATTACK Call 911 if you have these symptoms: 
? Chest discomfort. Most heart attacks involve discomfort in the center of the chest that lasts more than a few minutes, or that goes away and comes back. It can feel like uncomfortable pressure, squeezing, fullness, or pain. ? Discomfort in other areas of the upper body. Symptoms can include pain or discomfort in one or both arms, the back, neck, jaw, or stomach. ? Shortness of breath with or without chest discomfort. ? Other signs may include breaking out in a cold sweat, nausea, or lightheadedness. Don't wait more than five minutes to call [address]! Fast action can save your life. Calling 911 is almost always the fastest way to get lifesaving treatment. Emergency Medical Services staff can begin treatment when they arrive  up to an hour sooner than if someone gets to the hospital by car. The discharge information has been reviewed with the patient and spouse. The patient and spouse verbalized understanding. Discharge medications reviewed with the patient and spouse and appropriate educational materials and side effects teaching were provided. ___________________________________________________________________________________________________________________________________ ACO Transitions of Care Introducing [employer] [address] offers a voluntary care coordination program to provide high quality service and care to Vanessa Flores fee-for-service beneficiaries. Enrique Junior was designed to help you enhance your health and well-being through the following services: ? Transitions of Care  support for individuals who are transitioning from one care setting to another (example: Hospital to home). ? Chronic and Complex Care Coordination  support for individuals and caregivers of those with serious or chronic illnesses or with more than one chronic (ongoing) condition and those who take a number of different medications. If you meet specific medical criteria, a [address] [place] [address] Rd may call you directly to coordinate your care with your primary care physician and your other care providers. For questions about the [facility] programs, please, contact your physicians office. For general questions or additional information about Accountable Care Organizations: 
Please visit www.medicare.gov/acos. html or call 1-800-MEDICARE (0-963.416.6914) TTY users should call 8-921.283.1637. Introducing [facility]! [facility] introduces RegulatoryBinder patient portal. Now you can access parts of your medical record, email your doctor's office, and request medication refills online. 1. In your internet browser, go to [email or web address]"t 2. Click on the First Time User? Click Here link in the Sign In box. You will see the New Member Sign Up page. 3. Enter your RegulatoryBinder Access Code exactly as it appears below. You will not need to use this code after youve completed the sign-up process. If you do not sign up before the expiration date, you must request a new code. · RegulatoryBinder Access Code: [email or web address] Expires: 10/10/2018  4:15 PM 
 
4. Enter the last four digits of your Social Security Number (xxxx) and Date of Birth (mm/dd/yyyy) as indicated and click Submit. You will be taken to the next sign-up page. 5. Create a RegulatoryBinder ID.  This will be your RegulatoryBinder login ID and cannot be changed, so think of one that is secure and easy to remember. 6. Create a Dealer.com password. You can change your password at any time. 7. Enter your Password Reset Question and Answer. This can be used at a later time if you forget your password. 8. Enter your e-mail address. You will receive e-mail notification when new information is available in [address] 9. Click Sign Up. You can now view and download portions of your medical record. 10. Click the Download Summary menu link to download a portable copy of your medical information. If you have questions, please visit the Frequently Asked Questions section of the Dealer.com website. Remember, Dealer.com is NOT to be used for urgent needs. For medical emergencies, dial 911. Now available from your iPhone and Android! Introducing Josh Marin As a [employer] patient, I wanted to make you aware of our electronic visit tool called Josh Marin. [employer] 24/7 allows you to connect within minutes with a medical provider 24 hours a day, seven days a week via a mobile device or tablet or logging into a secure website from your computer. You can access Josh Marin from anywhere in the [place]. A virtual visit might be right for you when you have a simple condition and feel like you just dont want to get out of bed, or cant get away from work for an appointment, when your regular [employer] provider is not available (evenings, weekends or holidays), or when youre out of town and need minor care. Electronic visits cost only $49 and if the [employer] 24/7 provider determines a prescription is needed to treat your condition, one can be electronically transmitted to a nearby pharmacy*. Please take a moment to enroll today if you have not already done so. The enrollment process is free and takes just a few minutes.   To enroll, please download the [employer] 24/7 cristian to your tablet or phone, or visit [email or web address] to enroll on your computer. And, as an [address] [place] [address] patient with a [employer] account, the results of your visits will be scanned into your electronic medical record and your primary care provider will be able to view the scanned results. We urge you to continue to see your regular Sruthi Ramos provider for your ongoing medical care. And while your primary care provider may not be the one available when you seek a [facility] virtual visit, the peace of mind you get from getting a real diagnosis real time can be priceless. For more information on eblizz, view our Frequently Asked Questions (FAQs) at [email or web address]. Sincerely, 
 
Luz Marina Chin MD 
Chief Medical Officer [address] [place] [address] *:  certain medications cannot be prescribed via eblizz Providers Seen During Your Hospitalization Provider Specialty Primary office phone Arti Duff MD Gastroenterology [phone] Your Primary Care Physician (PCP) Primary Care Physician Office Phone Office Fax [phone] [address] [phone] You are allergic to the following Allergen Reactions Zetia (Ezetimibe) Other (comments) Back pain resolved off Zetia Lipitor (Atorvastatin) Myalgia Livalo (Pitavastatin) Other (comments) Extreme fatigue 5/29/14   PATIENT DENIES Zocor (Simvastatin) Myalgia 5/29/14 PATIENT DENIES Recent Documentation Height Weight BMI Smoking Status 1.829 m 97.1 kg 29.02 kg/m2 Never Smoker Emergency Contacts Name Discharge Info Relation Home Work Mobile Doug Cline DISCHARGE CAREGIVER [3] Spouse [3] [phone] Patient Belongings The following personal items are in your possession at time of discharge: 
  Dental Appliances: Partials  Visual Aid: None Please provide this summary of care documentation to your next provider. Signatures-by signing, you are acknowledging that this After Visit Summary has been reviewed with you and you have received a copy. Patient Signature:  ____________________________________________________________ Date:  ____________________________________________________________  
  
[facility] Provider Signature:  ____________________________________________________________ Date:  ____________________________________________________________

## 2018-09-12 NOTE — PERIOP NOTES
Patient armband removed and shredded Patient confirmed by two identifiers with discharge instructions prior too being provided to patient and spouse.

## 2018-09-12 NOTE — ANESTHESIA PREPROCEDURE EVALUATION
Anesthetic History No history of anesthetic complications Review of Systems / Medical History Patient summary reviewed and pertinent labs reviewed Pulmonary Within defined limits Neuro/Psych Within defined limits Cardiovascular Hypertension: well controlled Dysrhythmias Pacemaker GI/Hepatic/Renal 
  
GERD: well controlled Endo/Other Within defined limits Other Findings Comments: Documentation of current medication Current medications obtained, documented and obtained? YES Risk Factors for Postoperative nausea/vomiting: 
     History of postoperative nausea/vomiting? NO Female? NO Motion sickness? NO Intended opioid administration for postoperative analgesia? NO Smoking Abstinence: 
Current Smoker? NO Elective Surgery? YES Seen preoperatively by anesthesiologist or proxy prior to day of surgery? YES Pt abstained from smoking 24 hours prior to anesthesia? N/A Preventive care/screening for High Blood Pressure: 
Aged 18 years and older: YES Screened for high blood pressure: YES Patients with high blood pressure referred to primary care provider 
 for BP management: YES Physical Exam 
 
Airway Mallampati: III 
TM Distance: 4 - 6 cm Neck ROM: decreased range of motion Mouth opening: Diminished (comment) Cardiovascular Rhythm: regular Rate: normal 
 
 
 
 Dental 
 
Dentition: Upper partial plate and Poor dentition Pulmonary Breath sounds clear to auscultation Abdominal 
GI exam deferred Other Findings Anesthetic Plan ASA: 3 Anesthesia type: MAC Anesthetic plan and risks discussed with: Patient

## 2018-09-17 ENCOUNTER — CLINICAL SUPPORT (OUTPATIENT)
Dept: INTERNAL MEDICINE CLINIC | Age: 78
End: 2018-09-17

## 2018-09-17 DIAGNOSIS — E53.8 VITAMIN B 12 DEFICIENCY: Primary | ICD-10-CM

## 2018-09-17 RX ORDER — CYANOCOBALAMIN 1000 UG/ML
1000 INJECTION, SOLUTION INTRAMUSCULAR; SUBCUTANEOUS ONCE
Qty: 1 ML | Refills: 0 | Status: SHIPPED | COMMUNITY
Start: 2018-09-17 | End: 2018-09-17

## 2018-09-18 RX ORDER — CEPHALEXIN 250 MG/1
CAPSULE ORAL
COMMUNITY
Start: 2018-08-24 | End: 2019-01-07 | Stop reason: ALTCHOICE

## 2018-09-18 RX ORDER — AMLODIPINE BESYLATE 5 MG/1
5 TABLET ORAL DAILY
Qty: 90 TAB | Refills: 1
Start: 2018-09-18 | End: 2018-10-31 | Stop reason: SDUPTHER

## 2018-09-18 RX ORDER — PREDNISONE 20 MG/1
TABLET ORAL
COMMUNITY
Start: 2018-06-30 | End: 2019-01-07 | Stop reason: ALTCHOICE

## 2018-09-18 RX ORDER — HYDROCODONE BITARTRATE AND ACETAMINOPHEN 5; 325 MG/1; MG/1
TABLET ORAL
COMMUNITY
Start: 2018-06-30 | End: 2019-01-07

## 2018-09-18 RX ORDER — BIMATOPROST 0.1 MG/ML
1 SOLUTION/ DROPS OPHTHALMIC
COMMUNITY
Start: 2018-08-23 | End: 2022-08-31

## 2018-09-18 RX ORDER — AMLODIPINE AND BENAZEPRIL HYDROCHLORIDE 5; 20 MG/1; MG/1
1 CAPSULE ORAL DAILY
Qty: 90 CAP | Refills: 3 | Status: SHIPPED | OUTPATIENT
Start: 2018-09-18 | End: 2018-09-18 | Stop reason: CLARIF

## 2018-09-18 RX ORDER — FAMCICLOVIR 500 MG/1
TABLET ORAL
COMMUNITY
Start: 2018-06-30 | End: 2019-01-07 | Stop reason: ALTCHOICE

## 2018-09-18 RX ORDER — OMEPRAZOLE 20 MG/1
CAPSULE, DELAYED RELEASE ORAL
COMMUNITY
Start: 2018-07-03 | End: 2018-09-18 | Stop reason: SDUPTHER

## 2018-09-20 ENCOUNTER — TELEPHONE (OUTPATIENT)
Dept: INTERNAL MEDICINE CLINIC | Age: 78
End: 2018-09-20

## 2018-09-20 ENCOUNTER — TELEPHONE ANTICOAG (OUTPATIENT)
Dept: INTERNAL MEDICINE CLINIC | Age: 78
End: 2018-09-20

## 2018-09-20 DIAGNOSIS — I48.20 CHRONIC ATRIAL FIBRILLATION (HCC): ICD-10-CM

## 2018-09-20 NOTE — PROGRESS NOTES
Patient was called and advised per kaela demarco to do 7.5mg today and Friday and continue 5mg daily after, patient verbalized understanding

## 2018-09-27 ENCOUNTER — TELEPHONE ANTICOAG (OUTPATIENT)
Dept: INTERNAL MEDICINE CLINIC | Age: 78
End: 2018-09-27

## 2018-09-27 DIAGNOSIS — I48.20 CHRONIC ATRIAL FIBRILLATION (HCC): ICD-10-CM

## 2018-09-27 NOTE — PROGRESS NOTES
Patient called and advised per Ale Veliz to continue current regimen   Patient verbalized understanding

## 2018-10-12 ENCOUNTER — CLINICAL SUPPORT (OUTPATIENT)
Dept: INTERNAL MEDICINE CLINIC | Age: 78
End: 2018-10-12

## 2018-10-12 DIAGNOSIS — Z23 ENCOUNTER FOR IMMUNIZATION: ICD-10-CM

## 2018-10-12 DIAGNOSIS — E53.8 VITAMIN B 12 DEFICIENCY: Primary | ICD-10-CM

## 2018-10-12 RX ORDER — CYANOCOBALAMIN 1000 UG/ML
1000 INJECTION, SOLUTION INTRAMUSCULAR; SUBCUTANEOUS ONCE
Qty: 1 ML | Refills: 0 | Status: SHIPPED | COMMUNITY
Start: 2018-10-12 | End: 2018-10-12

## 2018-10-12 NOTE — PROGRESS NOTES
Patient presented to office for b12 injection. Allergies noted. Patient well and consenting to injection. Injection given intramuscular left  deltoid. Patient tolerated injection well   Patient presented to office for influenza vaccine  injection. Allergies noted. Patient well and consenting to injection. Injection given intramuscular in right  deltoid. Patient tolerated injection well and left office ambulatory.

## 2018-10-18 ENCOUNTER — TELEPHONE (OUTPATIENT)
Dept: INTERNAL MEDICINE CLINIC | Age: 78
End: 2018-10-18

## 2018-10-18 NOTE — TELEPHONE ENCOUNTER
Received home monitoring fax for Mr Chris Payne PT/INR of 3.8 and wanted to see if there was any changes with his regimen or any if the patient had any issues   Will continue to call patient on this

## 2018-10-19 RX ORDER — CYANOCOBALAMIN 1000 UG/ML
INJECTION, SOLUTION INTRAMUSCULAR; SUBCUTANEOUS
Qty: 1 ML | Refills: 8 | Status: SHIPPED | OUTPATIENT
Start: 2018-10-19 | End: 2019-04-28 | Stop reason: SDUPTHER

## 2018-10-29 ENCOUNTER — CLINICAL SUPPORT (OUTPATIENT)
Dept: INTERNAL MEDICINE CLINIC | Age: 78
End: 2018-10-29

## 2018-10-29 DIAGNOSIS — E53.8 B12 DEFICIENCY: Primary | ICD-10-CM

## 2018-10-29 RX ORDER — CYANOCOBALAMIN 1000 UG/ML
1000 INJECTION, SOLUTION INTRAMUSCULAR; SUBCUTANEOUS ONCE
Qty: 1 ML | Refills: 0
Start: 2018-10-29 | End: 2018-10-29

## 2018-11-01 ENCOUNTER — TELEPHONE ANTICOAG (OUTPATIENT)
Dept: INTERNAL MEDICINE CLINIC | Age: 78
End: 2018-11-01

## 2018-11-01 DIAGNOSIS — I48.20 CHRONIC ATRIAL FIBRILLATION (HCC): ICD-10-CM

## 2018-11-01 RX ORDER — AMLODIPINE BESYLATE 5 MG/1
5 TABLET ORAL DAILY
Qty: 90 TAB | Refills: 1 | Status: SHIPPED | OUTPATIENT
Start: 2018-11-01 | End: 2019-01-07

## 2018-11-01 NOTE — PATIENT INSTRUCTIONS
Patient notified of Home monitoring INR of 2.5 and per Dr. Verenice Haro his INR is good and to stay on the same regime.  Recheck as directed

## 2018-11-07 ENCOUNTER — TELEPHONE ANTICOAG (OUTPATIENT)
Dept: INTERNAL MEDICINE CLINIC | Age: 78
End: 2018-11-07

## 2018-11-07 DIAGNOSIS — I48.20 CHRONIC ATRIAL FIBRILLATION (HCC): ICD-10-CM

## 2018-11-07 NOTE — PROGRESS NOTES
Patient was called regarding his ome monitoring INI of 3.0. Per Dr. Tameka Lacy INR good and stay on the same dose. Recheck as instructed. Lmovm.

## 2018-11-20 ENCOUNTER — CLINICAL SUPPORT (OUTPATIENT)
Dept: INTERNAL MEDICINE CLINIC | Age: 78
End: 2018-11-20

## 2018-11-20 DIAGNOSIS — E53.8 VITAMIN B 12 DEFICIENCY: Primary | ICD-10-CM

## 2018-11-20 RX ORDER — CYANOCOBALAMIN 1000 UG/ML
1000 INJECTION, SOLUTION INTRAMUSCULAR; SUBCUTANEOUS ONCE
Qty: 1 ML | Refills: 0 | Status: SHIPPED | COMMUNITY
Start: 2018-11-20 | End: 2018-11-20

## 2018-11-26 ENCOUNTER — DOCUMENTATION ONLY (OUTPATIENT)
Dept: INTERNAL MEDICINE CLINIC | Age: 78
End: 2018-11-26

## 2018-11-26 NOTE — PROGRESS NOTES
Surgical history updated to reflect latest colonoscopy and EGD. Repeat colonoscopy in 10 years and EGD in 2 years per Dr. Juan Jose Walker. Health Maintenance frequency updated to reflect this. Report to be sent to  scanning.     Emilie Gutierrez, FinesseD, BCACP

## 2018-12-11 ENCOUNTER — CLINICAL SUPPORT (OUTPATIENT)
Dept: INTERNAL MEDICINE CLINIC | Age: 78
End: 2018-12-11

## 2018-12-11 DIAGNOSIS — E53.8 VITAMIN B 12 DEFICIENCY: Primary | ICD-10-CM

## 2018-12-11 RX ORDER — CYANOCOBALAMIN 1000 UG/ML
1000 INJECTION, SOLUTION INTRAMUSCULAR; SUBCUTANEOUS ONCE
Qty: 1 ML | Refills: 0 | Status: SHIPPED | COMMUNITY
Start: 2018-12-11 | End: 2018-12-11

## 2018-12-11 NOTE — PROGRESS NOTES
Patient presented to office for b12 injection. Allergies noted. Patient well and consenting to injection. Injection given intramuscular in left deltoid. Patient tolerated injection well and left office ambulatory.

## 2018-12-28 RX ORDER — CYANOCOBALAMIN 1000 UG/ML
1000 INJECTION, SOLUTION INTRAMUSCULAR; SUBCUTANEOUS ONCE
Qty: 1 ML | Refills: 0 | Status: SHIPPED | COMMUNITY
Start: 2018-12-28 | End: 2018-12-28

## 2018-12-31 ENCOUNTER — CLINICAL SUPPORT (OUTPATIENT)
Dept: INTERNAL MEDICINE CLINIC | Age: 78
End: 2018-12-31

## 2018-12-31 DIAGNOSIS — E53.8 B12 DEFICIENCY: Primary | ICD-10-CM

## 2018-12-31 DIAGNOSIS — E53.8 VITAMIN B 12 DEFICIENCY: ICD-10-CM

## 2018-12-31 RX ORDER — CYANOCOBALAMIN 1000 UG/ML
1000 INJECTION, SOLUTION INTRAMUSCULAR; SUBCUTANEOUS ONCE
Qty: 1 ML | Refills: 0
Start: 2018-12-31 | End: 2018-12-31 | Stop reason: CLARIF

## 2018-12-31 RX ORDER — CYANOCOBALAMIN 1000 UG/ML
1000 INJECTION, SOLUTION INTRAMUSCULAR; SUBCUTANEOUS ONCE
Qty: 1 ML | Refills: 0 | Status: SHIPPED | COMMUNITY
Start: 2018-12-31 | End: 2018-12-31

## 2019-01-04 NOTE — PROGRESS NOTES
Dr. Christine Tariq  referred Nigel Park (1940) a 70 y.o. male for a Medicare Annual Wellness Visit (Ludivina Galeano). This is a Subsequent Medicare Annual Wellness Visit providing Personalized Prevention Plan Services (PPPS) (Performed 12 months after initial AWV and PPPS). I have reviewed the patient's medical history in detail and updated the computerized patient record. History Past Medical History:  
Diagnosis Date  AICD (automatic cardioverter/defibrillator) present Medtronic  upgrade from pacer in 2007 due to VT  Atrial fibrillation ([address]  Cardiac cath 03/19/2007 LM 25% ostial.  Otherwise patent coronaries. LVEDP 20.  EF 50%. Dyssynch. mid anterior contraction. Pacemaker.  Cardiac echocardiogram 03/20/2014 A-fib. EF 65-70%. No RWMA. No RWMA. Mild conc LVH. Mild RVE. RVSP 40-45 mmHg. Mild LAE.  HERNANDEZ. Mild MR. Mod TR.  Cardiac nuclear imaging test 07/29/2014 No ischemia or prior infarction. EF 68%. Nondiagnostic EKG due to V pacing on pharm stress test.  Low risk.  Cardioversion 8/31/2011 Successful defibrillation threshold testing at 18 joules. Patient also had conversion to atrial ventricular pacing.  CHF (congestive heart failure) ([address]  Cobalamin deficiency  Dupuytren contracture 02/08/2010  
 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  
 Mastodynia  Paroxysmal VT ([address]  Pure hypercholesterolemia  S/P ablation of atrial fibrillation 05/31/2011  S/P ablation of atrial fibrillation 12/18/2012 Dr. Edgar Martinez at [place] [id number] S/P ablation operation for arrhythmia VT ablation by Dr. Nelson Byrd near [address] [place] [address] 4/2991  Sick sinus syndrome ([facility] [address] Past Surgical History:  
Procedure Laterality Date  COLONOSCOPY N/A 9/12/2018 COLONOSCOPY with Polypectomies and Clip Placement performed by Rajani Hadley MD at [address] HX AFIB ABLATION  12/18/2012 Nils Osler by Dr. Mila Yee  HX CATARACT REMOVAL  02/08-03/08  
 bilateral cataract removed  HX CATARACT REMOVAL  2/2008 & 3/2008  
 bilateral  
 HX ENDOSCOPY  09/12/2018 Dr. Shakila Baer - pathology indicates changes of acid reflux and Garcia's esophagus, repeat EGD in 2 years  HX ORTHOPAEDIC  2/8/10  
 release of Dupuytren's contraction on ring finger of right hand  HX OTHER SURGICAL  6/2000  
 atrial lead placement  HX OTHER SURGICAL  6/5/2009 Cardioversion  HX OTHER SURGICAL  10/12/2012  
 cardioversion [address]  
 placement  HX PACEMAKER  6/2000 DDD  HX PACEMAKER  3/27/07  
 removal of pacemaker & placement of dual chamber defib generator and leads  HX TONSIL AND ADENOIDECTOMY Current Outpatient Medications Medication Sig Dispense Refill  omeprazole (PRILOSEC) 40 mg capsule Take 40 mg by mouth daily.  warfarin (COUMADIN) 5 mg tablet Take 2.5 mg (one-half tablet) by mouth once daily except take 5 mg (one tablet) by mouth on Mondays, Wednesdays and Fridays. Will be adjusted based on INR/lab results.  potassium chloride (KLOR-CON M10) 10 mEq tablet Take 10 mEq by mouth daily.  propranolol (INDERAL) 60 mg tablet Take 60 mg by mouth two (2) times daily as needed (tremors).  cyanocobalamin (VITAMIN B12) 1,000 mcg/mL injection inject 1 milliliter intramuscularly every 3 WEEKS 1 mL 8  
 LUMIGAN 0.01 % ophthalmic drops Administer 1 Drop to both eyes nightly.  rosuvastatin (CRESTOR) 5 mg tablet Take 1 Tab by mouth every Monday, Wednesday, Friday. 15 Tab 3  furosemide (LASIX) 40 mg tablet TAKE 1 TABLET DAILY 90 Tab 3  
  metoprolol tartrate (LOPRESSOR) 50 mg tablet Take 1 Tab by mouth two (2) times a day. 180 Tab 3  
 spironolactone (ALDACTONE) 25 mg tablet Take 1 Tab by mouth daily. 90 Tab 3  clobetasol (TEMOVATE) 0.05 % topical cream Apply  to affected area two (2) times daily as needed for Itching.  AZOPT 1 % ophthalmic suspension Administer 1 Drop to both eyes two (2) times a day. 3  
 multivitamin (ONE A DAY) tablet Take 1 Tab by mouth daily. Allergies Allergen Reactions  Zetia [Ezetimibe] Other (comments) Back pain resolved off Zetia  Lipitor [Atorvastatin] Myalgia  Livalo [Pitavastatin] Other (comments) Extreme fatigue 5/29/14   PATIENT DENIES  Zocor [Simvastatin] Myalgia 5/29/14 PATIENT DENIES Family History Problem Relation Age of Onset  No Known Problems Mother  COPD Father  Hypertension Other Social History Tobacco Use  Smoking status: Never Smoker  Smokeless tobacco: Never Used Substance Use Topics  Alcohol use: Yes Alcohol/week: 6.0 - 7.2 oz Types: 10 - 12 Glasses of wine per week Frequency: 4 or more times a week Drinks per session: 1 or 2 Binge frequency: Never Comment: 2-3 glasses of white wine about 5-6 days/week) Patient Active Problem List  
Diagnosis Code  Essential hypertension I10  Atrial fibrillation (HCC) I48.91  
 Dupuytren contracture M72.0  Medtronic AICD, upgrade from pacer in 2007 due to VT Z95.810  
 Paroxysmal VT (HCC) I47.2  S/P ablation operation for arrhythmia Z98.890, Z86.79  
 Follow-up exam Z09  Impotence of organic origin N52.9  Hypertrophy of prostate with urinary obstruction and other lower urinary tract symptoms (LUTS) N40.1  Erectile dysfunction N52.9  Palpitations R00.2  Other dysphagia R13.19  
 Cobalamin deficiency E53.8  Edema R60.9  Pure hypercholesterolemia E78.00  
 Mastodynia N64.4  
 AICD at end of battery life Z45.02  
  CHF (congestive heart failure) ([facility]) I50.9  Bronchitis J40  
 B12 deficiency E53.8 Depression Risk Factor Screening: PHQ over the last two weeks 1/7/2019 Little interest or pleasure in doing things Not at all Feeling down, depressed, irritable, or hopeless Not at all Total Score PHQ 2 0 Alcohol Risk Factor Screening:  
Patient drinks white wine most days of the week (averages about 5-6 days per week and 2-3 glasses at most per day) Functional Ability and Level of Safety:  
 
Hearing Loss Hearing is good. Patient reports mild, \"selective\" hearing loss but does not want further evaluation. Activities of Daily Living The home contains: handrails Patient does total self care ADL Assessment 1/7/2019 Feeding yourself No Help Needed Getting from bed to chair No Help Needed Getting dressed No Help Needed Bathing or showering No Help Needed Walk across the room (includes cane/walker) No Help Needed Using the telphone No Help Needed Taking your medications No Help Needed Preparing meals No Help Needed Managing money (expenses/bills) No Help Needed Moderately strenuous housework (laundry) No Help Needed Shopping for personal items (toiletries/medicines) No Help Needed Shopping for groceries No Help Needed Driving No Help Needed Climbing a flight of stairs No Help Needed Getting to places beyond walking distances No Help Needed Fall Risk Fall Risk Assessment, last 12 mths 1/7/2019 Able to walk? Yes Fall in past 12 months? No  
 
 
Abuse Screen Patient is not abused Abuse Screening Questionnaire 1/7/2019 Do you ever feel afraid of your partner? Ailyn Avalos Are you in a relationship with someone who physically or mentally threatens you? Ailyn Avalos Is it safe for you to go home? Baron Sneed Cognitive Screening Evaluation of Cognitive Function: 
Has your family/caregiver stated any concerns about your memory: no 
Normal, Mini Cog test 
 Mood/affect:  happy Appearance: age appropriate, overweight and well dressed Family member/caregiver input: N/A Physical Examination No exam data present Visit Vitals /58 (BP 1 Location: Right arm) Pulse 64 Ht 6' (1.829 m) Wt 218 lb (98.9 kg) BMI 29.57 kg/m² No exam performed by pharmacist today, not required for [facility] Annual Wellness Visit. Patient to be seen by Dr. Asia Reid separately. Patient Care Team  
 
Patient Care Team: 
Asia Reid MD as PCP - General (Internal Medicine) Marry Gaviria MD as Physician (Orthopedic Surgery) Isaías Roblero DO as Physician (Cardiology) Daina Campos MD as Physician (Ophthalmology) Sherin Landon MD as Physician (Gastroenterology) Dusty Berumen MD (Pulmonary Disease) Assessment/Plan Education and counseling provided: 
Are appropriate based on today's review and evaluation Prostate cancer screening tests (PSA, covered annually) Colorectal cancer screening tests Cardiovascular screening blood test 
Diabetes screening test 
ACP discussion Tdap / Zoster vaccination recommendations Diagnoses and all orders for this visit: 1. Essential hypertension -     METABOLIC PANEL, BASIC; Future 2. B12 deficiency -     METHYLMALONIC ACID; Future 3. Monitoring for anticoagulant use 4. Medicare annual wellness visit, subsequent 
-     [facility] [id number] [facility]. Screening for depression 
-     [facility] [id number] 6. Medication Reconciliation: Performed today. Patient did not bring his medications to the visit but he had an older medication list and was able to recall most medications.   During the patient interview, the following changes were made: 
 
Discontinued: amlodipine (stopped by PCP due to possible peripheral edema), Voltaren gel (not current therapy), Protonix (switched to Prilosec per GI), cephalexin, Famvir and prednisone (therapies completed), Norco (not current medication), Absorbase (not current med) Additions: omeprazole Changes / other discrepancies: clarified dosing of warfarin (INR 2.5 per patient last Wednesday), clarified Lumigan, KCl, Temovate dosing with patient, clarified propranolol dosing with PCP (BID PRN palpitations - rarely needed, in addition to scheduled metoprolol) Medications Discontinued During This Encounter Medication Reason  amLODIPine (NORVASC) 5 mg tablet Not A Current Medication  diclofenac (VOLTAREN) 1 % gel Not A Current Medication  pantoprazole (PROTONIX) 40 mg tablet Formulary Change  warfarin (COUMADIN) 5 mg tablet Dose Adjustment  cephALEXin (KEFLEX) 250 mg capsule Therapy Completed  famciclovir (FAMVIR) 500 mg tablet Therapy Completed  HYDROcodone-acetaminophen (NORCO) 5-325 mg per tablet Not A Current Medication  predniSONE (DELTASONE) 20 mg tablet Therapy Completed  white petrolatum-mineral oil (ABSORBASE) oint Not A Current Medication 7. Screenings and Immunizations (see patient instructions for chart/information): PSA: Last 10/25/2017, due for repeat per Dr. Justin Gonzalez Colonoscopy: Up to date 9/12/2018 multiple polyps, due for repeat per Dr. Austin Costello (likely in 3-5 years based on age) Glaucoma screening/Eye exam: Up to date 2018 per patient, due for repeat per Dr. Bree Jamison (about every 6 months per patient to monitor glaucoma) Lipid panel: Up to date 8/6/2018, due for repeat annually while on rosuvastatin (history of statin intolerance with other agents/higher doses - patient still periodically has myalgias on current regimen and sometimes needs drug holidays so unable to maximize dose to optimally control FLP) Diabetes: Up to date 8/6/2018, due for repeat with routine fasting labs Immunizations: Patient confirmed the following records of vaccinations are correct and current. Immunization History Administered Date(s) Administered  Influenza High Dose Vaccine PF 10/01/2016, 09/21/2017  Influenza Vaccine (Tri) Adjuvanted 10/12/2018  Pneumococcal Conjugate (PCV-13) 04/12/2017  Pneumococcal Polysaccharide (PPSV-23) 06/09/2009, 10/30/2009 Pneumococcal:  Series completed Influenza:  Due again next fall. Zoster:  Discussed new recommendations for Shingrix vaccination/revaccination. Recommended that patient receive at his pharmacy and have pharmacy records faxed to the office. Tdap:  Recommended that patient receive at the office with a signed waiver, at the Health Department or at his pharmacy and have pharmacy records faxed to the office. Patient declined for now. 8. Advanced Care Planning: Patient currently has advanced directives on file. Wife, Johny Ojeda, is primary healthcare agent and son, Fela Llamas, is secondary agent. Patient reports these are still his wishes and ACP document on file is most current. Patient verbalized understanding of information presented. Answered all of the patient's questions. AVS information was reviewed with patient and will be printed on checkout. Jillian Zhao, PharmD, BCACP Health Maintenance Due Topic Date Due  Shingrix Vaccine Age 50> (1 of 2) 03/[birth year]/1992  MEDICARE YEARLY EXAM  08/16/2018

## 2019-01-07 ENCOUNTER — HOSPITAL ENCOUNTER (OUTPATIENT)
Dept: LAB | Age: 79
Discharge: HOME | End: 2019-01-07
Payer: MEDICARE

## 2019-01-07 ENCOUNTER — OFFICE VISIT (OUTPATIENT)
Dept: INTERNAL MEDICINE CLINIC | Age: 79
End: 2019-01-07

## 2019-01-07 VITALS
HEIGHT: 72 IN | WEIGHT: 218 LBS | SYSTOLIC BLOOD PRESSURE: 122 MMHG | HEART RATE: 64 BPM | BODY MASS INDEX: 29.53 KG/M2 | DIASTOLIC BLOOD PRESSURE: 58 MMHG

## 2019-01-07 DIAGNOSIS — Z51.81 MONITORING FOR ANTICOAGULANT USE: ICD-10-CM

## 2019-01-07 DIAGNOSIS — E78.00 PURE HYPERCHOLESTEROLEMIA: ICD-10-CM

## 2019-01-07 DIAGNOSIS — Z79.01 MONITORING FOR ANTICOAGULANT USE: ICD-10-CM

## 2019-01-07 DIAGNOSIS — E53.8 B12 DEFICIENCY: ICD-10-CM

## 2019-01-07 DIAGNOSIS — I10 ESSENTIAL HYPERTENSION: ICD-10-CM

## 2019-01-07 DIAGNOSIS — Z13.31 SCREENING FOR DEPRESSION: ICD-10-CM

## 2019-01-07 DIAGNOSIS — Z00.00 MEDICARE ANNUAL WELLNESS VISIT, SUBSEQUENT: Primary | ICD-10-CM

## 2019-01-07 DIAGNOSIS — I47.29 PAROXYSMAL VT: ICD-10-CM

## 2019-01-07 LAB
ANION GAP SERPL CALC-SCNC: 7 MMOL/L (ref 3–18)
BUN SERPL-MCNC: 25 MG/DL (ref 7–18)
BUN/CREAT SERPL: 21 (ref 12–20)
CALCIUM SERPL-MCNC: 9.1 MG/DL (ref 8.5–10.1)
CHLORIDE SERPL-SCNC: 105 MMOL/L (ref 100–108)
CO2 SERPL-SCNC: 27 MMOL/L (ref 21–32)
CREAT SERPL-MCNC: 1.17 MG/DL (ref 0.6–1.3)
GLUCOSE SERPL-MCNC: 96 MG/DL (ref 74–99)
POTASSIUM SERPL-SCNC: 4.3 MMOL/L (ref 3.5–5.5)
SODIUM SERPL-SCNC: 139 MMOL/L (ref 136–145)

## 2019-01-07 PROCEDURE — 36415 COLL VENOUS BLD VENIPUNCTURE: CPT

## 2019-01-07 PROCEDURE — 83921 ORGANIC ACID SINGLE QUANT: CPT

## 2019-01-07 PROCEDURE — 80048 BASIC METABOLIC PNL TOTAL CA: CPT

## 2019-01-07 RX ORDER — PROPRANOLOL HYDROCHLORIDE 60 MG/1
60 TABLET ORAL
COMMUNITY
End: 2019-02-11 | Stop reason: ALTCHOICE

## 2019-01-07 RX ORDER — WARFARIN SODIUM 5 MG/1
TABLET ORAL
COMMUNITY
End: 2019-07-11 | Stop reason: SDUPTHER

## 2019-01-07 RX ORDER — OMEPRAZOLE 40 MG/1
40 CAPSULE, DELAYED RELEASE ORAL DAILY
COMMUNITY
Start: 2018-10-22 | End: 2019-10-13 | Stop reason: ALTCHOICE

## 2019-01-07 RX ORDER — POTASSIUM CHLORIDE 750 MG/1
10 TABLET, EXTENDED RELEASE ORAL DAILY
COMMUNITY
End: 2020-01-31

## 2019-01-07 NOTE — PATIENT INSTRUCTIONS
Medicare Wellness Visit, Male The best way to improve and maintain good health is to have a healthy lifestyle by eating a well-balanced diet, exercising regularly, limiting alcohol and stopping smoking. Regular physical exams and screening tests are another way to keep healthy. Preventive exams provided by your health care provider can find health problems before they become diseases or illnesses. Preventive services including immunizations, screening tests, monitoring and exams can help you take care of your own health. Preventive services such as immunizations prevent serious infections. All people over age 72 should have a Pneumovax and a Prevnar-13 shot to prevent potentially life threatening infections with the pneumococcus bacteria, a common cause of pneumonia. These are once in a lifetime unless you and your provider decide differently. Next due: series completed All people over 65 should have a yearly influenza vaccine or \"flu\" shot. This does not prevent infection with cold viruses but has been proven to prevent hospitalization and death from influenza. Next due: next fall Although Medicare part B \"regular Medicare\" currently only covers tetanus vaccination in the context of an injury, a tetanus vaccine (Tdap or Td) is recommended every 10 years. Tdap is generally given once in a lifetime for older adults. Next due: Tdap A shingles vaccine is recommended as you get older. Zostavax is a once in a lifetime vaccine given over age 61years of age. There is also a new shingles vaccine, Shingrix, that is now preferred over Zostavax. Shingrix (a 2-dose series) is recommended if you are over age 48years of age and you've never received a shingles vaccine. It is also recommended for revaccination if you've previously received Zostavax. The Shingles vaccines are not covered by Medicare part B. Next due: Shingrix (please check with your pharmacy on cost/availabilty) Note, however, that both the Shingles vaccine and Tdap/Td are generally covered by secondary carriers. Please check your coverage and out of pocket expenses. Your pharmacy benefits may cover these vaccines so please check with your pharmacist.  Also consider contacting your local health department because it may stock these vaccines for a reasonable charge. We currently have documentation of the following immunization history for you: 
Immunization History Administered Date(s) Administered  Influenza High Dose Vaccine PF 10/01/2016, 09/21/2017  Influenza Vaccine (Tri) Adjuvanted 10/12/2018  Pneumococcal Conjugate (PCV-13) 04/12/2017  Pneumococcal Polysaccharide (PPSV-23) 06/09/2009, 10/30/2009 Screening for diabetes mellitus with a blood sugar test (glucose) should be done every year. If you have diabetes, this monitoring will be done more frequently (usually every 3-6 months) and will include A1C testing. The most recent blood glucose we have on file for you is:  
Lab Results Component Value Date/Time Glucose 90 08/06/2018 09:39 AM  
 
No results found for: HBA1C, HGBE8, NGA2CPXX, IUE9RLGI Glaucoma is a disease of the eye due to increased ocular pressure that can lead to blindness. People with risk factors for glaucoma ( race,  American race, diabetes, family history) should be screened every year by an eye professional.  
Last done: 2018  Next due: per Dr. Patricio Garvin to monitor glaucoma (about every 6 months) Cardiovascular screening tests that check for elevated lipids or cholesterol (fatty part of blood) which can lead to heart disease and strokes should be done every 5 years. The most recent lipid panel we have on file for you is:  
Lab Results Component Value Date/Time  Cholesterol, total 221 (H) 08/06/2018 09:39 AM  
 HDL Cholesterol 51 08/06/2018 09:39 AM  
 LDL, calculated 142 (H) 08/06/2018 09:39 AM  
 VLDL, calculated 28 08/06/2018 09:39 AM  
 Triglyceride 140 08/06/2018 09:39 AM  
 CHOL/HDL Ratio 4.3 08/06/2018 09:39 AM  
 
Next due: annually while on rosuvastatin Colorectal cancer screening that evaluates for blood or polyps in your colon for people with average risk should be done yearly as a stool test, every five years as a flexible sigmoidoscope or every 10 years as a colonoscopy up to age 76. You and your health care provider may decide whether to continue screening after age 76 or if you need to be screened more frequently. Last done: 9/12/2018 polyps Next due: per Dr. Edwardo Paula Men up to age 76 may elect to screen for prostate cancer with a blood test called a PSA at certain intervals, depending on your personal and family history. This decision is between you and your provider. The most recent PSA values we have on file for you are: 
Lab Results Component Value Date/Time  
 Prostate Specific Ag 0.4 10/25/2017 10:30 AM  
 Prostate Specific Ag 0.5 01/13/2017 04:58 PM  
 Prostate Specific Ag 0.6 12/21/2015 09:15 AM  
 Prostate Specific Ag 0.5 03/26/2013 04:14 PM  
 Prostate Specific Ag 0.6 09/12/2011 01:34 PM  
 
 
Screening for infection with Hepatitis C is recommended for anyone born between [birth year] through 1963. People who are between age 54and [de-identified]years of age and have smoked the equivalent of 1 pack per day for 30 years or more may benefit from screening for lung cancer with a yearly low dose CT scan until they have been non smokers for 15 years. Please ask your health care provider if you have any questions. Your Medicare Wellness Exam is recommended annually. Here is a list of your current Health Maintenance items with a due date: 
Health Maintenance Due Topic Date Due  Shingles Vaccine (1 of 2) 03/16/1992 [facility] Annual Well Visit  08/16/2018

## 2019-01-07 NOTE — ACP (ADVANCE CARE PLANNING)
Non-Provider Advance Care Planning (ACP) Note    Date of ACP Conversation: 1/7/2019  Persons included in Conversation: patient  Length of ACP Conversation in minutes: <16 minutes (Non-Billable)    Conversation requested by: Other: Included in Medicare AWV      Authorized Decision Maker (if patient is incapable of making informed decisions): This person is:  Healthcare Agent/Medical Power of  under Advance Directive    General ACP for ALL Patients with Decision Making Capacity:    Review of Existing Advance Directive: (Select questions covered)  Does this advance directive still reflect your preferences?   Yes   Wife is primary healthcare agent and son is alternate/secondary - patient confirmed that these are still his wishes/document on file is most current    Interventions Provided:  Recommended review of completed ACP document annually or upon change in health status

## 2019-01-10 LAB
Lab: ABNORMAL
METHYLMALONATE SERPL-SCNC: 399 NMOL/L (ref 0–378)

## 2019-01-11 ENCOUNTER — TELEPHONE (OUTPATIENT)
Dept: INTERNAL MEDICINE CLINIC | Age: 79
End: 2019-01-11

## 2019-01-11 NOTE — PROGRESS NOTES
The patient presents to the office today with the chief complaint of paroxysmal VT 
 
HPI The patient remains on pacing due to atrial fibrillation treated with ablation of the AV node. The patient has a dfbrillation in place due to paroxysmal VT. There have been no reoccurrences. The patient remains on long term anticoagulation due to atrial fibrillation. The patient is is on q3 weeks B12 shots and oral B12 due to vitamin B12 deficiency. His MMA level remains high. Review of Systems Respiratory: Negative for shortness of breath. Cardiovascular: Negative for chest pain and leg swelling. Allergies Allergen Reactions  Zetia [Ezetimibe] Other (comments) Back pain resolved off Zetia  Lipitor [Atorvastatin] Myalgia  Livalo [Pitavastatin] Other (comments) Extreme fatigue 5/29/14   PATIENT DENIES  Zocor [Simvastatin] Myalgia 5/29/14 PATIENT DENIES Current Outpatient Medications Medication Sig Dispense Refill  omeprazole (PRILOSEC) 40 mg capsule Take 40 mg by mouth daily.  warfarin (COUMADIN) 5 mg tablet Take 2.5 mg (one-half tablet) by mouth once daily except take 5 mg (one tablet) by mouth on Mondays, Wednesdays and Fridays. Will be adjusted based on INR/lab results.  potassium chloride (KLOR-CON M10) 10 mEq tablet Take 10 mEq by mouth daily.  propranolol (INDERAL) 60 mg tablet Take 60 mg by mouth two (2) times daily as needed (tremors).  cyanocobalamin (VITAMIN B12) 1,000 mcg/mL injection inject 1 milliliter intramuscularly every 3 WEEKS 1 mL 8  
 LUMIGAN 0.01 % ophthalmic drops Administer 1 Drop to both eyes nightly.  rosuvastatin (CRESTOR) 5 mg tablet Take 1 Tab by mouth every Monday, Wednesday, Friday. 15 Tab 3  furosemide (LASIX) 40 mg tablet TAKE 1 TABLET DAILY 90 Tab 3  
 metoprolol tartrate (LOPRESSOR) 50 mg tablet Take 1 Tab by mouth two (2) times a day.  180 Tab 3  
  spironolactone (ALDACTONE) 25 mg tablet Take 1 Tab by mouth daily. 90 Tab 3  clobetasol (TEMOVATE) 0.05 % topical cream Apply  to affected area two (2) times daily as needed for Itching.  AZOPT 1 % ophthalmic suspension Administer 1 Drop to both eyes two (2) times a day. 3  
 multivitamin (ONE A DAY) tablet Take 1 Tab by mouth daily. Past Medical History:  
Diagnosis Date  AICD (automatic cardioverter/defibrillator) present Medtronic  upgrade from pacer in 2007 due to VT  Atrial fibrillation ([facility]  Cardiac cath 03/19/2007 LM 25% ostial.  Otherwise patent coronaries. LVEDP 20.  EF 50%. Dyssynch. mid anterior contraction. Pacemaker.  Cardiac echocardiogram 03/20/2014 A-fib. EF 65-70%. No RWMA. No RWMA. Mild conc LVH. Mild RVE. RVSP 40-45 mmHg. Mild LAE.  HERNANDEZ. Mild MR. Mod TR.  Cardiac nuclear imaging test 07/29/2014 No ischemia or prior infarction. EF 68%. Nondiagnostic EKG due to V pacing on pharm stress test.  Low risk.  Cardioversion 8/31/2011 Successful defibrillation threshold testing at 18 joules. Patient also had conversion to atrial ventricular pacing.  CHF (congestive heart failure) ([facility]  Cobalamin deficiency  Dupuytren contracture 02/08/2010  
 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  
 Mastodynia  Paroxysmal VT ([facility]  Pure hypercholesterolemia  S/P ablation of atrial fibrillation 05/31/2011  S/P ablation of atrial fibrillation 12/18/2012 Dr. Damari Jorge at [place] [address] S/P ablation operation for arrhythmia VT ablation by Dr. Annalisa Kramer near [address] [place] [address] 5/8615  Sick sinus syndrome ([facility] Past Surgical History: Procedure Laterality Date  COLONOSCOPY N/A 9/12/2018 COLONOSCOPY with Polypectomies and Clip Placement performed by Fran Epperson MD at [address] HX AFIB ABLATION  12/18/2012 Juhi Dowd by Dr. Babs Sullivan  HX CATARACT REMOVAL  02/08-03/08  
 bilateral cataract removed  HX CATARACT REMOVAL  2/2008 & 3/2008  
 bilateral  
 HX ENDOSCOPY  09/12/2018 Dr. Araceli Tam - pathology indicates changes of acid reflux and Garcia's esophagus, repeat EGD in 2 years  HX ORTHOPAEDIC  2/8/10  
 release of Dupuytren's contraction on ring finger of right hand  HX OTHER SURGICAL  6/2000  
 atrial lead placement  HX OTHER SURGICAL  6/5/2009 Cardioversion  HX OTHER SURGICAL  10/12/2012  
 cardioversion [address]  
 placement  HX PACEMAKER  6/2000 DDD  HX PACEMAKER  3/27/07  
 removal of pacemaker & placement of dual chamber defib generator and leads  HX TONSIL AND ADENOIDECTOMY Social History Socioeconomic History  Marital status:  Spouse name: Not on file  Number of children: Not on file  Years of education: Not on file  Highest education level: Not on file Social Needs  Financial resource strain: Not on file  Food insecurity - worry: Not on file  Food insecurity - inability: Not on file  Transportation needs - medical: Not on file  Transportation needs - non-medical: Not on file Occupational History  Not on file Tobacco Use  Smoking status: Never Smoker  Smokeless tobacco: Never Used Substance and Sexual Activity  Alcohol use: Yes Alcohol/week: 6.0 - 7.2 oz Types: 10 - 12 Glasses of wine per week Frequency: 4 or more times a week Drinks per session: 1 or 2 Binge frequency: Never Comment: 2-3 glasses of white wine about 5-6 days/week)  Drug use: No  
 Sexual activity: Not Currently Partners: Female Other Topics Concern  Not on file Social History Narrative  Not on file Patient does have an advanced directive on file Visit Vitals /58 (BP 1 Location: Right arm) Pulse 64 Ht 6' (1.829 m) Wt 218 lb (98.9 kg) BMI 29.57 kg/m² Physical Exam  
No Cervical Lymphadenopathy No Supraclavicular Lymphadenopathy Thyroid is Normal 
Lungs are normal to percussion. Clear to auscultation Heart:  S1 S2 are normal, No gallops, No murmurs No Carotid Bruits Abdomen:  Normal Bowel Sounds. No tenderness. No masses. No Hepatomegaly or Splenomegaly LE:  Strong Pedal Pulses. No Edema BMI:  The patient was advised to limit fat to 20% of the calories - approximately 60 grams Hospital Outpatient Visit on 01/07/2019 Component Date Value Ref Range Status  Sodium 01/07/2019 139  136 - 145 mmol/L Final  
 Potassium 01/07/2019 4.3  3.5 - 5.5 mmol/L Final  
 Chloride 01/07/2019 105  100 - 108 mmol/L Final  
 CO2 01/07/2019 27  21 - 32 mmol/L Final  
 Anion gap 01/07/2019 7  3.0 - 18 mmol/L Final  
 Glucose 01/07/2019 96  74 - 99 mg/dL Final  
 BUN 01/07/2019 25* 7.0 - 18 MG/DL Final  
 Creatinine 01/07/2019 1.17  0.6 - 1.3 MG/DL Final  
 BUN/Creatinine ratio 01/07/2019 21* 12 - 20   Final  
 GFR est AA 01/07/2019 >60  >60 ml/min/1.73m2 Final  
 GFR est non-AA 01/07/2019 >60  >60 ml/min/1.73m2 Final  
 Comment: (NOTE) Estimated GFR is calculated using the Modification of Diet in Renal  
Disease (MDRD) Study equation, reported for both  Americans [facility]) and non- Americans (GFRNA), and normalized to 1.73m2  
body surface area. The physician must decide which value applies to  
the patient. The MDRD study equation should only be used in  
individuals age 25 or older. It has not been validated for the  
following: pregnant women, patients with serious comorbid conditions,  
or on certain medications, or persons with extremes of body size,  
muscle mass, or nutritional status.  Calcium 01/07/2019 9.1  8.5 - 10.1 MG/DL Final  
 Methylmalonic acid 01/07/2019 399* 0 - 378 nmol/L Final  
 Disclaimer 01/07/2019 Comment    Final  
 Comment: (NOTE) This test was developed and its performance characteristics 
determined by [facility]. It has not been cleared or approved 
by the Food and Drug Administration. Performed At: [address] [id number] Shemar Barton MD SO:[id number] Sandra Peaks Results for orders placed or performed during the hospital encounter of 01/07/19 METABOLIC PANEL, BASIC Result Value Ref Range Sodium 139 136 - 145 mmol/L Potassium 4.3 3.5 - 5.5 mmol/L Chloride 105 100 - 108 mmol/L  
 CO2 27 21 - 32 mmol/L Anion gap 7 3.0 - 18 mmol/L Glucose 96 74 - 99 mg/dL BUN 25 (H) 7.0 - 18 MG/DL Creatinine 1.17 0.6 - 1.3 MG/DL  
 BUN/Creatinine ratio 21 (H) 12 - 20 GFR est AA >60 >60 ml/min/1.73m2 GFR est non-AA >60 >60 ml/min/1.73m2 Calcium 9.1 8.5 - 10.1 MG/DL METHYLMALONIC ACID Result Value Ref Range Methylmalonic acid 399 (H) 0 - 378 nmol/L Disclaimer Comment Assessment / Plan ICD-10-CM ICD-9-CM 1. Medicare annual wellness visit, subsequent Z00.00 V70.0 [facility] 2. Essential hypertension I10 401.9 3. B12 deficiency E53.8 266.2 METHYLMALONIC ACID 4. Monitoring for anticoagulant use Z51.81 V58.61   
 Z79.01    
5. Screening for [facility] Z13.31 [facility]79.[facility] 6. Pure hypercholesterolemia E78.00 272.0 7. Paroxysmal VT ([address] R06.6 741.1 METABOLIC PANEL, BASIC BMP and MMA ordered 
he was advised to continue his maintenance medications Follow-up Disposition: 
Return in about 6 months (around 7/7/2019). I asked Jono Lamas if he has any questions and I answered the questions. Jono Lamas states that he understands the treatment plan and agrees with the treatment plan

## 2019-01-11 NOTE — TELEPHONE ENCOUNTER
Patient was called and advised per DR Giovanni Solano that he should still be having the B12 injections every three weeks and he is to up his oral B12 to 5ooo  Patient verbalized understanding

## 2019-01-21 ENCOUNTER — CLINICAL SUPPORT (OUTPATIENT)
Dept: INTERNAL MEDICINE CLINIC | Age: 79
End: 2019-01-21

## 2019-01-21 DIAGNOSIS — E53.8 B12 DEFICIENCY: Primary | ICD-10-CM

## 2019-01-21 RX ORDER — CYANOCOBALAMIN 1000 UG/ML
1000 INJECTION, SOLUTION INTRAMUSCULAR; SUBCUTANEOUS ONCE
Qty: 1 ML | Refills: 0
Start: 2019-01-21 | End: 2019-01-21

## 2019-01-28 RX ORDER — ROSUVASTATIN CALCIUM 5 MG/1
5 TABLET, COATED ORAL
Qty: 90 TAB | Refills: 3 | Status: SHIPPED | OUTPATIENT
Start: 2019-01-28 | End: 2020-09-09

## 2019-02-01 ENCOUNTER — OFFICE VISIT (OUTPATIENT)
Dept: INTERNAL MEDICINE CLINIC | Age: 79
End: 2019-02-01

## 2019-02-01 VITALS
TEMPERATURE: 96.7 F | BODY MASS INDEX: 29.8 KG/M2 | SYSTOLIC BLOOD PRESSURE: 132 MMHG | DIASTOLIC BLOOD PRESSURE: 72 MMHG | RESPIRATION RATE: 16 BRPM | HEART RATE: 72 BPM | WEIGHT: 220 LBS | HEIGHT: 72 IN | OXYGEN SATURATION: 97 %

## 2019-02-01 DIAGNOSIS — L81.9 ATYPICAL PIGMENTED SKIN LESION: Primary | ICD-10-CM

## 2019-02-01 DIAGNOSIS — I10 ESSENTIAL HYPERTENSION: ICD-10-CM

## 2019-02-01 NOTE — PROGRESS NOTES
Lyudmila Bowser is a 70 y.o. male presenting today for Skin Problem (left calf)  . HPI:  Lyudmila Bowser presents to the office today for skin lesion left calf region. Unknown how long the skin lesion has been present on his left calf region but he estimates about 2 months or more. He denies any pain, pruritus or erythema to the site. Review of Systems   Respiratory: Negative for cough. Cardiovascular: Negative for chest pain and palpitations. Skin: Negative for itching and rash. Allergies   Allergen Reactions    Zetia [Ezetimibe] Other (comments)     Back pain resolved off Zetia    Lipitor [Atorvastatin] Myalgia    Livalo [Pitavastatin] Other (comments)     Extreme fatigue    5/29/14   PATIENT DENIES    Zocor [Simvastatin] Myalgia     5/29/14 PATIENT DENIES        Current Outpatient Medications   Medication Sig Dispense Refill    rosuvastatin (CRESTOR) 5 mg tablet Take 1 Tab by mouth every Monday, Wednesday, Friday. 90 Tab 3    omeprazole (PRILOSEC) 40 mg capsule Take 40 mg by mouth daily.  warfarin (COUMADIN) 5 mg tablet Take 2.5 mg (one-half tablet) by mouth once daily except take 5 mg (one tablet) by mouth on Mondays, Wednesdays and Fridays. Will be adjusted based on INR/lab results.  potassium chloride (KLOR-CON M10) 10 mEq tablet Take 10 mEq by mouth daily.  cyanocobalamin (VITAMIN B12) 1,000 mcg/mL injection inject 1 milliliter intramuscularly every 3 WEEKS 1 mL 8    LUMIGAN 0.01 % ophthalmic drops Administer 1 Drop to both eyes nightly.  furosemide (LASIX) 40 mg tablet TAKE 1 TABLET DAILY 90 Tab 3    metoprolol tartrate (LOPRESSOR) 50 mg tablet Take 1 Tab by mouth two (2) times a day. 180 Tab 3    clobetasol (TEMOVATE) 0.05 % topical cream Apply  to affected area two (2) times daily as needed for Itching.  AZOPT 1 % ophthalmic suspension Administer 1 Drop to both eyes two (2) times a day.   3    multivitamin (ONE A DAY) tablet Take 1 Tab by mouth daily.      propranolol (INDERAL) 60 mg tablet Take 60 mg by mouth two (2) times daily as needed (tremors).  spironolactone (ALDACTONE) 25 mg tablet Take 1 Tab by mouth daily. 80 Tab 3       Past Medical History:   Diagnosis Date    AICD (automatic cardioverter/defibrillator) present     Medtronic  upgrade from pacer in 2007 due to VT    Atrial fibrillation ([address]     Cardiac cath 03/19/2007    LM 25% ostial.  Otherwise patent coronaries. LVEDP 20.  EF 50%. Dyssynch. mid anterior contraction. Pacemaker.  Cardiac echocardiogram 03/20/2014    A-fib. EF 65-70%. No RWMA. No RWMA. Mild conc LVH. Mild RVE. RVSP 40-45 mmHg. Mild LAE.  HERNANDEZ. Mild MR. Mod TR.  Cardiac nuclear imaging test 07/29/2014    No ischemia or prior infarction. EF 68%. Nondiagnostic EKG due to V pacing on pharm stress test.  Low risk.  Cardioversion 8/31/2011    Successful defibrillation threshold testing at 18 joules. Patient also had conversion to atrial ventricular pacing.      CHF (congestive heart failure) (HCC)     Cobalamin deficiency     Dupuytren contracture 02/08/2010    on the right ring finger     Edema     GERD (gastroesophageal reflux disease)     Hypertension     Hypertrophy of prostate with urinary obstruction and other lower urinary tract symptoms (LUTS)     Impotence of organic origin     Intolerance of drug     Intolerant high doses of sotalol due to prolonged QT    Mastodynia     Paroxysmal VT ([address]     Pure hypercholesterolemia     S/P ablation of atrial fibrillation 05/31/2011    S/P ablation of atrial fibrillation 12/18/2012    Dr. Christa Kovacs at [facility] S/P ablation operation for arrhythmia     VT ablation by Dr. Royal Wilson near [address] [place] [address] 2/3007    Sick sinus syndrome [facility])        Past Surgical History:   Procedure Laterality Date    COLONOSCOPY N/A 9/12/2018    COLONOSCOPY with Polypectomies and Clip Placement performed by Nieves Cabezas MD at [address] HX AFIB ABLATION  12/18/2012    [facility] by Dr. Elisabeth Sterling  02/08-03/08    bilateral cataract removed    HX CATARACT REMOVAL  2/2008 & 3/2008    bilateral    HX ENDOSCOPY  09/12/2018    Dr. Alonso Davey - pathology indicates changes of acid reflux and Garcia's esophagus, repeat EGD in 2 years    HX ORTHOPAEDIC  2/8/10    release of Dupuytren's contraction on ring finger of right hand    HX OTHER SURGICAL  6/2000    atrial lead placement    HX OTHER SURGICAL  6/5/2009    Cardioversion    HX OTHER SURGICAL  10/12/2012    cardioversion    HX PACEMAKER  1998    placement    HX PACEMAKER  6/2000    DDD    HX PACEMAKER  3/27/07    removal of pacemaker & placement of dual chamber defib generator and leads    HX TONSIL AND ADENOIDECTOMY         Social History     Socioeconomic History    Marital status:      Spouse name: Not on file    Number of children: Not on file    Years of education: Not on file    Highest education level: Not on file   Social Needs    Financial resource strain: Not on file    Food insecurity - worry: Not on file    Food insecurity - inability: Not on file   [employer] needs - medical: Not on file   [employer] needs - non-medical: Not on file   Occupational History    Not on file   Tobacco Use    Smoking status: Never Smoker    Smokeless tobacco: Never Used   Substance and Sexual Activity    Alcohol use:  Yes     Alcohol/week: 6.0 - 7.2 oz     Types: 10 - 12 Glasses of wine per week     Frequency: 4 or more times a week     Drinks per session: 1 or 2     Binge frequency: Never     Comment: 2-3 glasses of white wine about 5-6 days/week)    Drug use: No    Sexual activity: Not Currently     Partners: Female   Other Topics Concern    Not on file   Social History Narrative    Not on file       Patient does not have an advanced directive on file    Vitals:    02/01/19 1558   BP: 132/72   Pulse: 72   Resp: 16   Temp: 96.7 °F (35.9 °C)   TempSrc: Tympanic   SpO2: 97%   Weight: 220 lb (99.8 kg)   Height: 6' (1.829 m)   PainSc:   0 - No pain       Physical Exam   Cardiovascular: Normal rate, regular rhythm and normal heart sounds. Pulmonary/Chest: Effort normal and breath sounds normal.   Skin: Skin is warm and dry. No erythema. Nursing note and vitals reviewed. Hospital Outpatient Visit on 01/07/2019   Component Date Value Ref Range Status    Sodium 01/07/2019 139  136 - 145 mmol/L Final    Potassium 01/07/2019 4.3  3.5 - 5.5 mmol/L Final    Chloride 01/07/2019 105  100 - 108 mmol/L Final    CO2 01/07/2019 27  21 - 32 mmol/L Final    Anion gap 01/07/2019 7  3.0 - 18 mmol/L Final    Glucose 01/07/2019 96  74 - 99 mg/dL Final    BUN 01/07/2019 25* 7.0 - 18 MG/DL Final    Creatinine 01/07/2019 1.17  0.6 - 1.3 MG/DL Final    BUN/Creatinine ratio 01/07/2019 21* 12 - 20   Final    GFR est AA 01/07/2019 >60  >60 ml/min/1.73m2 Final    GFR est non-AA 01/07/2019 >60  >60 ml/min/1.73m2 Final    Comment: (NOTE)  Estimated GFR is calculated using the Modification of Diet in Renal   Disease (MDRD) Study equation, reported for both  Americans   (GFRAA) and non- Americans (GFRNA), and normalized to 1.73m2   body surface area. The physician must decide which value applies to   the patient. The MDRD study equation should only be used in   individuals age 25 or older. It has not been validated for the   following: pregnant women, patients with serious comorbid conditions,   or on certain medications, or persons with extremes of body size,   muscle mass, or nutritional status.  Calcium 01/07/2019 9.1  8.5 - 10.1 MG/DL Final    Methylmalonic acid 01/07/2019 399* 0 - 378 nmol/L Final    Disclaimer 01/07/2019 Comment    Final    Comment: (NOTE)  This test was developed and its performance characteristics  determined by [facility]. It has not been cleared or approved  by the Food and Drug Administration.   Performed At: [facility] [address] [id number]  Karan Marcial MD PX:[id number]         . No results found for any visits on 02/01/19. Assessment / Plan:      ICD-10-CM ICD-9-CM    1. Atypical pigmented skin lesion L81.9 709.00 REFERRAL TO DERMATOLOGY   2. Essential hypertension I10 401.9      Referral to dermatology for skin lesion  Blood pressure controlled at 132/72  Follow-up as needed    Follow-up Disposition:  Return if symptoms worsen or fail to improve. I asked the patient if he  had any questions and answered his  questions.   The patient stated that he understands the treatment plan and agrees with the treatment plan

## 2019-02-11 ENCOUNTER — OFFICE VISIT (OUTPATIENT)
Dept: INTERNAL MEDICINE CLINIC | Age: 79
End: 2019-02-11

## 2019-02-11 VITALS
OXYGEN SATURATION: 98 % | SYSTOLIC BLOOD PRESSURE: 130 MMHG | BODY MASS INDEX: 29.26 KG/M2 | HEART RATE: 97 BPM | HEIGHT: 72 IN | TEMPERATURE: 98.1 F | WEIGHT: 216 LBS | DIASTOLIC BLOOD PRESSURE: 76 MMHG

## 2019-02-11 DIAGNOSIS — R60.0 BILATERAL LEG EDEMA: ICD-10-CM

## 2019-02-11 DIAGNOSIS — E53.8 VITAMIN B 12 DEFICIENCY: ICD-10-CM

## 2019-02-11 DIAGNOSIS — I10 ESSENTIAL HYPERTENSION: Primary | ICD-10-CM

## 2019-02-11 RX ORDER — BENAZEPRIL HYDROCHLORIDE 20 MG/1
20 TABLET ORAL DAILY
Qty: 90 TAB | Refills: 3 | Status: SHIPPED | OUTPATIENT
Start: 2019-02-11 | End: 2019-11-04 | Stop reason: ALTCHOICE

## 2019-02-11 RX ORDER — FUROSEMIDE 40 MG/1
TABLET ORAL
Qty: 90 TAB | Refills: 3 | Status: SHIPPED | OUTPATIENT
Start: 2019-02-11 | End: 2020-03-16 | Stop reason: SDUPTHER

## 2019-02-11 RX ORDER — CYANOCOBALAMIN 1000 UG/ML
1000 INJECTION, SOLUTION INTRAMUSCULAR; SUBCUTANEOUS ONCE
Qty: 1 ML | Refills: 0 | Status: SHIPPED | COMMUNITY
Start: 2019-02-11 | End: 2019-02-11

## 2019-02-11 RX ORDER — FUROSEMIDE 40 MG/1
TABLET ORAL
Qty: 90 TAB | Refills: 3 | Status: CANCELLED | OUTPATIENT
Start: 2019-02-11

## 2019-02-12 NOTE — PROGRESS NOTES
The patient presents to the office today with the chief complaint of hypertensio HPI The patient had been on Norvasc for hypertension. The patient had swelling on the medication. The Norvasc was stopped. The swelling resolved. The BP, however, went up. The patient has no complaints. The patient remains on vitamin B12 shots and oral B12 for vitamin B12 deficiency. Review of Systems Respiratory: Negative for shortness of breath. Cardiovascular: Negative for chest pain and leg swelling (After stopping Norvasc). Allergies Allergen Reactions  Zetia [Ezetimibe] Other (comments) Back pain resolved off Zetia  Lipitor [Atorvastatin] Myalgia  Livalo [Pitavastatin] Other (comments) Extreme fatigue 5/29/14   PATIENT DENIES  Zocor [Simvastatin] Myalgia 5/29/14 PATIENT DENIES Current Outpatient Medications Medication Sig Dispense Refill  benazepril (LOTENSIN) 20 mg tablet Take 1 Tab by mouth daily. 90 Tab 3  furosemide (LASIX) 40 mg tablet TAKE 1 TABLET DAILY 90 Tab 3  cyanocobalamin (VITAMIN B-12) 1,000 mcg/mL injection 1 mL by IntraMUSCular route once for 1 dose. 1 mL 0  
 rosuvastatin (CRESTOR) 5 mg tablet Take 1 Tab by mouth every Monday, Wednesday, Friday. 90 Tab 3  
 omeprazole (PRILOSEC) 40 mg capsule Take 40 mg by mouth daily.  warfarin (COUMADIN) 5 mg tablet Take 2.5 mg (one-half tablet) by mouth once daily except take 5 mg (one tablet) by mouth on Mondays, Wednesdays and Fridays. Will be adjusted based on INR/lab results.  potassium chloride (KLOR-CON M10) 10 mEq tablet Take 10 mEq by mouth daily.  cyanocobalamin (VITAMIN B12) 1,000 mcg/mL injection inject 1 milliliter intramuscularly every 3 WEEKS 1 mL 8  
 LUMIGAN 0.01 % ophthalmic drops Administer 1 Drop to both eyes nightly.  metoprolol tartrate (LOPRESSOR) 50 mg tablet Take 1 Tab by mouth two (2) times a day.  180 Tab 3  
  AZOPT 1 % ophthalmic suspension Administer 1 Drop to both eyes two (2) times a day. 3  
 multivitamin (ONE A DAY) tablet Take 1 Tab by mouth daily. Past Medical History:  
Diagnosis Date  AICD (automatic cardioverter/defibrillator) present Medtronic  upgrade from pacer in 2007 due to VT  Atrial fibrillation ([facility]  Cardiac cath 03/19/2007 LM 25% ostial.  Otherwise patent coronaries. LVEDP 20.  EF 50%. Dyssynch. mid anterior contraction. Pacemaker.  Cardiac echocardiogram 03/20/2014 A-fib. EF 65-70%. No RWMA. No RWMA. Mild conc LVH. Mild RVE. RVSP 40-45 mmHg. Mild LAE.  HERNANDEZ. Mild MR. Mod TR.  Cardiac nuclear imaging test 07/29/2014 No ischemia or prior infarction. EF 68%. Nondiagnostic EKG due to V pacing on pharm stress test.  Low risk.  Cardioversion 8/31/2011 Successful defibrillation threshold testing at 18 joules. Patient also had conversion to atrial ventricular pacing.  CHF (congestive heart failure) ([facility]  Cobalamin deficiency  Dupuytren contracture 02/08/2010  
 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  
 Mastodynia  Paroxysmal VT ([facility]  Pure hypercholesterolemia  S/P ablation of atrial fibrillation 05/31/2011  S/P ablation of atrial fibrillation 12/18/2012 Dr. Casandra Villasenor at [facility] S/P ablation operation for arrhythmia VT ablation by Dr. Deandra Moralez near [address] [place] [address] 2/3007  Sick sinus syndrome ([facility] Past Surgical History:  
Procedure Laterality Date  COLONOSCOPY N/A 9/12/2018 COLONOSCOPY with Polypectomies and Clip Placement performed by Fran Epperson MD at [address] HX AFIB ABLATION  12/18/2012 [place] by Dr. Kimberlee Lewis  HX CATARACT REMOVAL  02/08-03/08  
 bilateral cataract removed  HX CATARACT REMOVAL  2/2008 & 3/2008  
 bilateral  
 HX ENDOSCOPY  09/12/2018 Dr. Yadira Keen - pathology indicates changes of acid reflux and Garcia's esophagus, repeat EGD in 2 years  HX ORTHOPAEDIC  2/8/10  
 release of Dupuytren's contraction on ring finger of right hand  HX OTHER SURGICAL  6/2000  
 atrial lead placement  HX OTHER SURGICAL  6/5/2009 Cardioversion  HX OTHER SURGICAL  10/12/2012  
 cardioversion [address]  
 placement  HX PACEMAKER  6/2000 DDD  HX PACEMAKER  3/27/07  
 removal of pacemaker & placement of dual chamber defib generator and leads  HX TONSIL AND ADENOIDECTOMY Social History Socioeconomic History  Marital status:  Spouse name: Not on file  Number of children: Not on file  Years of education: Not on file  Highest education level: Not on file Social Needs  Financial resource strain: Not on file  Food insecurity - worry: Not on file  Food insecurity - inability: Not on file  Transportation needs - medical: Not on file  Transportation needs - non-medical: Not on file Occupational History  Not on file Tobacco Use  Smoking status: Never Smoker  Smokeless tobacco: Never Used Substance and Sexual Activity  Alcohol use: Yes Alcohol/week: 6.0 - 7.2 oz Types: 10 - 12 Glasses of wine per week Frequency: 4 or more times a week Drinks per session: 1 or 2 Binge frequency: Never Comment: 2-3 glasses of white wine about 5-6 days/week)  Drug use: No  
 Sexual activity: Not Currently Partners: Female Other Topics Concern  Not on file Social History Narrative  Not on file Patient does have an advanced directive on file Visit Vitals /76 (BP 1 Location: Right arm, BP Patient Position: Sitting) Pulse 97 Temp 98.1 °F (36.7 °C) (Tympanic) Ht 6' (1.829 m) Wt 216 lb (98 kg) SpO2 98% BMI 29.29 kg/m² Physical Exam 
 
BMI:  The patient was advised to limit fat to 20% of the calories - approximately 60 grams Hospital Outpatient Visit on 01/07/2019 Component Date Value Ref Range Status  Sodium 01/07/2019 139  136 - 145 mmol/L Final  
 Potassium 01/07/2019 4.3  3.5 - 5.5 mmol/L Final  
 Chloride 01/07/2019 105  100 - 108 mmol/L Final  
 CO2 01/07/2019 27  21 - 32 mmol/L Final  
 Anion gap 01/07/2019 7  3.0 - 18 mmol/L Final  
 Glucose 01/07/2019 96  74 - 99 mg/dL Final  
 BUN 01/07/2019 25* 7.0 - 18 MG/DL Final  
 Creatinine 01/07/2019 1.17  0.6 - 1.3 MG/DL Final  
 BUN/Creatinine ratio 01/07/2019 21* 12 - 20   Final  
 GFR est AA 01/07/2019 >60  >60 ml/min/1.73m2 Final  
 GFR est non-AA 01/07/2019 >60  >60 ml/min/1.73m2 Final  
 Comment: (NOTE) Estimated GFR is calculated using the Modification of Diet in Renal  
Disease (MDRD) Study equation, reported for both  Americans [facility]) and non- Americans (GFRNA), and normalized to 1.73m2  
body surface area. The physician must decide which value applies to  
the patient. The MDRD study equation should only be used in  
individuals age 25 or older. It has not been validated for the  
following: pregnant women, patients with serious comorbid conditions,  
or on certain medications, or persons with extremes of body size,  
muscle mass, or nutritional status.  Calcium 01/07/2019 9.1  8.5 - 10.1 MG/DL Final  
 Methylmalonic acid 01/07/2019 399* 0 - 378 nmol/L Final  
 Disclaimer 01/07/2019 Comment    Final  
 Comment: (NOTE) This test was developed and its performance characteristics 
determined by [employer]. It has not been cleared or approved 
by the Food and Drug Administration. Performed At: [address] [id number] Carla Whiteside MD [id number] [place] No results found for any visits on 02/11/19. Assessment / Plan ICD-10-CM ICD-9-CM 1. Essential hypertension I10 401.9 2. Vitamin B 12 deficiency E53.8 266.2 IN THER/PROPH/DIAG INJECTION, SUBCUT/IM  
   cyanocobalamin (VITAMIN B-12) 1,000 mcg/mL injection 3. Bilateral leg edema R60.0 782.3 Go to Benazepril for BP 
he was advised to continue his maintenance medications Follow-up Disposition: 
Return in about 4 months (around 6/11/2019). I asked Parris Alejandro if he has any questions and I answered the questions. Parris Alejandro states that he understands the treatment plan and agrees with the treatment plan

## 2019-02-15 ENCOUNTER — HOSPITAL ENCOUNTER (OUTPATIENT)
Dept: LAB | Age: 79
Discharge: HOME | End: 2019-02-15
Payer: MEDICARE

## 2019-02-15 LAB
ANION GAP SERPL CALC-SCNC: 8 MMOL/L (ref 3–18)
BUN SERPL-MCNC: 19 MG/DL (ref 7–18)
BUN/CREAT SERPL: 19 (ref 12–20)
CALCIUM SERPL-MCNC: 8.7 MG/DL (ref 8.5–10.1)
CHLORIDE SERPL-SCNC: 108 MMOL/L (ref 100–108)
CO2 SERPL-SCNC: 25 MMOL/L (ref 21–32)
CREAT SERPL-MCNC: 0.99 MG/DL (ref 0.6–1.3)
GLUCOSE SERPL-MCNC: 84 MG/DL (ref 74–99)
POTASSIUM SERPL-SCNC: 4.3 MMOL/L (ref 3.5–5.5)
SODIUM SERPL-SCNC: 141 MMOL/L (ref 136–145)

## 2019-02-15 PROCEDURE — 80048 BASIC METABOLIC PNL TOTAL CA: CPT

## 2019-02-15 PROCEDURE — 36415 COLL VENOUS BLD VENIPUNCTURE: CPT

## 2019-02-18 ENCOUNTER — TELEPHONE (OUTPATIENT)
Dept: CARDIOLOGY CLINIC | Age: 79
End: 2019-02-18

## 2019-02-18 NOTE — TELEPHONE ENCOUNTER
Dr. Barba Clayhole office called to see if this patient can hold coumadin 2 days prior to procedure on the 20th. Verbal order and read back per Tree Lopez, DO  Ok to hold two days on coumadin if that is what Dr. Beni Babb wants.      Faxed letter to office [phone]

## 2019-02-18 NOTE — LETTER
2/18/2019 10:56 AM 
 
Mr. Lilly Vitale 
[address] [id number] Lilly Vitale was seen in our office on 08/20/2018 for cardiac evaluation. From a cardiac standpoint he is ok to hold coumadin 2 days prior to procedure with Dr. Sherly Avendano if that is what Dr. Sherly Avendano is requiring. Please feel free to contact our office if you have any questions regarding this patient.   
 
 
 
Sincerely, 
 
 
August Suero, DO

## 2019-02-20 ENCOUNTER — TELEPHONE (OUTPATIENT)
Dept: INTERNAL MEDICINE CLINIC | Age: 79
End: 2019-02-20

## 2019-02-20 NOTE — TELEPHONE ENCOUNTER
Patient was suppose to have surgery today by Dr. Gina Polo and per Dr. Giovanni Solano if  Dr. Gina Polo is going to start patient on a blood thinner injection then the coumadin will be address after if he is not going to be on an injection then he is to start back on the regular dose of his coumadin. I tried to call patient n/a but dont know what time his surgery was.

## 2019-02-28 ENCOUNTER — TELEPHONE (OUTPATIENT)
Dept: INTERNAL MEDICINE CLINIC | Age: 79
End: 2019-02-28

## 2019-03-04 ENCOUNTER — CLINICAL SUPPORT (OUTPATIENT)
Dept: INTERNAL MEDICINE CLINIC | Age: 79
End: 2019-03-04

## 2019-03-04 DIAGNOSIS — E53.8 B12 DEFICIENCY: Primary | ICD-10-CM

## 2019-03-04 RX ORDER — CYANOCOBALAMIN 1000 UG/ML
1000 INJECTION, SOLUTION INTRAMUSCULAR; SUBCUTANEOUS ONCE
Qty: 1 ML | Refills: 0
Start: 2019-03-04 | End: 2019-03-04

## 2019-03-04 NOTE — PROGRESS NOTES
Jarrell Minor presents today for cyanocobalamin 1000 mcg/ml injection. Allergies reviewed. Injection given in right arm per Dr Merary Ball order. Patient tolerated injection well and left office ambulatory.

## 2019-03-06 LAB — INR, EXTERNAL: 2.8

## 2019-03-07 ENCOUNTER — TELEPHONE ANTICOAG (OUTPATIENT)
Dept: INTERNAL MEDICINE CLINIC | Age: 79
End: 2019-03-07

## 2019-03-07 ENCOUNTER — HOSPITAL ENCOUNTER (OUTPATIENT)
Dept: LAB | Age: 79
Discharge: HOME | End: 2019-03-07
Payer: MEDICARE

## 2019-03-07 DIAGNOSIS — I48.20 CHRONIC ATRIAL FIBRILLATION (HCC): ICD-10-CM

## 2019-03-07 PROCEDURE — 87186 SC STD MICRODIL/AGAR DIL: CPT

## 2019-03-07 PROCEDURE — 87077 CULTURE AEROBIC IDENTIFY: CPT

## 2019-03-07 PROCEDURE — 87070 CULTURE OTHR SPECIMN AEROBIC: CPT

## 2019-03-08 ENCOUNTER — HOSPITAL ENCOUNTER (OUTPATIENT)
Dept: GENERAL RADIOLOGY | Age: 79
Discharge: HOME | End: 2019-03-08
Payer: MEDICARE

## 2019-03-08 DIAGNOSIS — C43.72 MALIGNANT MELANOMA OF LEFT LOWER LIMB, INCLUDING HIP (HCC): ICD-10-CM

## 2019-03-08 PROCEDURE — 71046 X-RAY EXAM CHEST 2 VIEWS: CPT

## 2019-03-10 LAB
BACTERIA SPEC CULT: ABNORMAL
GRAM STN SPEC: ABNORMAL
SERVICE CMNT-IMP: ABNORMAL

## 2019-03-15 ENCOUNTER — TELEPHONE (OUTPATIENT)
Dept: INTERNAL MEDICINE CLINIC | Age: 79
End: 2019-03-15

## 2019-03-15 ENCOUNTER — TELEPHONE ANTICOAG (OUTPATIENT)
Dept: INTERNAL MEDICINE CLINIC | Age: 79
End: 2019-03-15

## 2019-03-15 DIAGNOSIS — I48.20 CHRONIC ATRIAL FIBRILLATION (HCC): ICD-10-CM

## 2019-03-18 ENCOUNTER — OFFICE VISIT (OUTPATIENT)
Dept: CARDIOLOGY CLINIC | Age: 79
End: 2019-03-18

## 2019-03-18 ENCOUNTER — CLINICAL SUPPORT (OUTPATIENT)
Dept: CARDIOLOGY CLINIC | Age: 79
End: 2019-03-18

## 2019-03-18 VITALS
DIASTOLIC BLOOD PRESSURE: 82 MMHG | WEIGHT: 219 LBS | HEIGHT: 72 IN | SYSTOLIC BLOOD PRESSURE: 134 MMHG | BODY MASS INDEX: 29.66 KG/M2

## 2019-03-18 DIAGNOSIS — Z95.810 AICD (AUTOMATIC CARDIOVERTER/DEFIBRILLATOR) PRESENT: ICD-10-CM

## 2019-03-18 DIAGNOSIS — I47.29 PAROXYSMAL VT: Primary | ICD-10-CM

## 2019-03-18 DIAGNOSIS — E78.00 PURE HYPERCHOLESTEROLEMIA: Primary | ICD-10-CM

## 2019-03-18 DIAGNOSIS — I50.32 CHRONIC DIASTOLIC CONGESTIVE HEART FAILURE (HCC): ICD-10-CM

## 2019-03-18 DIAGNOSIS — I10 ESSENTIAL HYPERTENSION: ICD-10-CM

## 2019-03-18 DIAGNOSIS — I48.20 CHRONIC ATRIAL FIBRILLATION (HCC): ICD-10-CM

## 2019-03-18 NOTE — PROGRESS NOTES
HPI:  I saw Latesha Garcias in my office today in cardiovascular evaluation regarding his chronic atrial fibrillation and some diastolic failure issues in the past. Mr. Lady Gosselin is a very pleasant 67 year old white male with a rather complex cardiovascular history, including problems with atrial fibrillation and ventricular tachycardia. He's had a very long and complicated past cardiovascular course in terms of management of his ventricular tachycardia and atrial fibrillation, which is well outlined in his chart and my [facility] note of 4/24/13.       He ultimately has gone into atrial fibrillation after numerous atrial fibrillation ablations and the last of which was by Dr. Janny English at [facility] in December of 2012, and we have now been managing him with rate control and the use of Lopressor and Coumadin for anticoagulation.     He comes in today and relates that he is doing fairly well. He is staying fairly active and except for some mild shortness of breath with exertion is really denying any cardiovascular symptomatology at this time. Encounter Diagnoses   Name Primary?  Chronic atrial fibrillation (HCC)     Chronic diastolic congestive heart failure ([address]     Medtronic AICD, upgrade from pacer in 2007 due to VT     Essential hypertension     Pure hypercholesterolemia Yes       Discussion: This gentleman appears to be doing about as well as we could expect and really of no recommendations for change at this time. He is not having any symptoms to suggest the development of symptomatic obstructive coronary disease or heart failure issues. We did check his AICD today and he has 4.9 years remaining on the battery he had only a 1 second ventricular high rate in the last 3 months and he is V pacing 99.6% of the time in a VVIR mode. His OptiVol shows normal volume levels.     His latest lipid profile that have a copy of was actually done back on August 6, 2018 showed total cholesterol of 221 with triglycerides of 140, HDL 51, , and VLDL of 23 which I think is poor control. He has been on Crestor 5 mg 3 days a week which he seems to be tolerating. I am going to check another lipid profile now and if his numbers are still elevated I would add Zetia 10 mg daily to his regimen and repeat the cholesterol again in a couple of months. We really need to get his LDL down under 70 if at all possible. His blood pressure is well controlled today and he otherwise seems to be doing well some simply get a plan to see him again in several months. PCP: Cassandra Burkitt, MD      Past Medical History:   Diagnosis Date    AICD (automatic cardioverter/defibrillator) present     Medtronic  upgrade from pacer in 2007 due to VT    Arthritis     Atrial fibrillation ([address]     Cardiac cath 03/19/2007    LM 25% ostial.  Otherwise patent coronaries. LVEDP 20.  EF 50%. Dyssynch. mid anterior contraction. Pacemaker.  Cardiac echocardiogram 03/20/2014    A-fib. EF 65-70%. No RWMA. No RWMA. Mild conc LVH. Mild RVE. RVSP 40-45 mmHg. Mild LAE.  HERNANDEZ. Mild MR. Mod TR.  Cardiac nuclear imaging test 07/29/2014    No ischemia or prior infarction. EF 68%. Nondiagnostic EKG due to V pacing on pharm stress test.  Low risk.  Cardioversion 8/31/2011    Successful defibrillation threshold testing at 18 joules. Patient also had conversion to atrial ventricular pacing.      CHF (congestive heart failure) ([facility]     Cobalamin deficiency     Dupuytren contracture 02/08/2010    on the right ring finger     Edema     GERD (gastroesophageal reflux disease)     Hypertension     Hypertrophy of prostate with urinary obstruction and other lower urinary tract symptoms (LUTS)     Ill-defined condition 2007    AICD    Impotence of organic origin     Intolerance of drug     Intolerant high doses of sotalol due to prolonged QT    Mastodynia     Paroxysmal VT ([facility])     Pure hypercholesterolemia     S/P ablation of atrial fibrillation 05/31/2011    S/P ablation of atrial fibrillation 12/18/2012    Dr. Paresh Muniz at [address] S/P ablation operation for arrhythmia     VT ablation by Dr. Trey Rich near [place] 2/3007    Sick sinus syndrome [facility])          Past Surgical History:   Procedure Laterality Date    COLONOSCOPY N/A 9/12/2018    COLONOSCOPY with Polypectomies and Clip Placement performed by Radha Nava MD at [address] [place] [address] [facility] by Dr. Nova Jimenez HX CATARACT REMOVAL  02/08-03/08    bilateral cataract removed    HX CATARACT REMOVAL  2/2008 & 3/2008    bilateral    HX ENDOSCOPY  09/12/2018    Dr. Tessa Quezada - pathology indicates changes of acid reflux and Garcia's esophagus, repeat EGD in 2 years    HX ORTHOPAEDIC  2/8/10    release of Dupuytren's contraction on ring finger of right hand    HX OTHER SURGICAL  6/2000    atrial lead placement    HX OTHER SURGICAL  6/5/2009    Cardioversion    HX OTHER SURGICAL  10/12/2012    cardioversion    HX PACEMAKER  1998    placement    HX PACEMAKER  6/2000    DDD    HX PACEMAKER  3/27/07    removal of pacemaker & placement of dual chamber defib generator and leads    HX TONSIL AND ADENOIDECTOMY         Current Outpatient Medications   Medication Sig    benazepril (LOTENSIN) 20 mg tablet Take 1 Tab by mouth daily.  furosemide (LASIX) 40 mg tablet TAKE 1 TABLET DAILY    rosuvastatin (CRESTOR) 5 mg tablet Take 1 Tab by mouth every Monday, Wednesday, Friday.  omeprazole (PRILOSEC) 40 mg capsule Take 40 mg by mouth daily.  warfarin (COUMADIN) 5 mg tablet Take 2.5 mg (one-half tablet) by mouth once daily except take 5 mg (one tablet) by mouth on Mondays, Wednesdays and Fridays. Will be adjusted based on INR/lab results.  potassium chloride (KLOR-CON M10) 10 mEq tablet Take 10 mEq by mouth daily.     cyanocobalamin (VITAMIN B12) 1,000 mcg/mL injection inject 1 milliliter intramuscularly every 3 WEEKS    LUMIGAN 0.01 % ophthalmic drops Administer 1 Drop to both eyes nightly.  metoprolol tartrate (LOPRESSOR) 50 mg tablet Take 1 Tab by mouth two (2) times a day.  AZOPT 1 % ophthalmic suspension Administer 1 Drop to both eyes two (2) times a day.  multivitamin (ONE A DAY) tablet Take 1 Tab by mouth daily. No current facility-administered medications for this visit. Allergies   Allergen Reactions    Zetia [Ezetimibe] Other (comments)     Back pain resolved off Zetia    Lipitor [Atorvastatin] Myalgia    Livalo [Pitavastatin] Other (comments)     Extreme fatigue    5/29/14   PATIENT DENIES    Zocor [Simvastatin] Myalgia     5/29/14 PATIENT DENIES        Social History:   Social History     Tobacco Use    Smoking status: Never Smoker    Smokeless tobacco: Never Used   Substance Use Topics    Alcohol use: Yes     Alcohol/week: 6.0 - 7.2 oz     Types: 10 - 12 Glasses of wine per week     Frequency: 4 or more times a week     Drinks per session: 1 or 2     Binge frequency: Never     Comment: 2-3 glasses of white wine about 5-6 days/week)           Family history: family history includes COPD in his father; Hypertension in an other family member; No Known Problems in his mother. Review of Systems:   Constitutional: Negative. Respiratory: Positive for shortness of breath. Negative for cough, hemoptysis and wheezing. Cardiovascular: Negative. Gastrointestinal: Negative. Musculoskeletal: Positive for myalgias. Negative for falls and joint pain. Neurological: Negative for dizziness. Physical Exam:   The patient is an alert, oriented, well developed, well nourished 70 y.o.  male who was in no acute distress at the time of my examination.   Visit Vitals  /82   Ht 6' (1.829 m)   Wt 99.3 kg (219 lb)   BMI 29.70 kg/m²      BP Readings from Last 3 Encounters:   03/18/19 134/82   02/20/19 140/77   02/11/19 130/76        Wt Readings from Last 3 Encounters:   03/18/19 99.3 kg (219 lb)   02/20/19 97.5 kg (215 lb)   02/11/19 98 kg (216 lb)       HEENT: Conjunctivae pink, sclera clear and white, symmetrical with no lag. Neck: Supple without masses, tenderness or thyromegaly. No jugular venous distention. Carotid upstrokes are full bilaterally, without bruits. Cardiovascular: Normal pacer site in left upper chest.  Chest is symmetrical with good excursion. [place] is not displaced. No lifts, heaves or thrills. S1 and S2 are normal, without appreciable murmurs, rubs, clicks or gallops. Lungs: Clear to auscultation bilaterally. Abdomen: Soft and non-tender. Extremities: No edema with full peripheral pulses     Review of Data: See PMH and Cardiology and Imaging sections for cardiac testing      Results for orders placed or performed in visit on 08/20/18   AMB POC EKG ROUTINE W/ 12 LEADS, INTER & REP     Status: None    Narrative    Ventricularly paced rhythm with a rate of 81. This EKG is similar to his tracing of December 13, 2017. Harish Munson D.O., F.A.C.C. [facility]  [facility] [place]  [address] [place] [address]. [address]    PLEASE NOTE:  This document has been produced using voice recognition software. Unrecognized errors in transcription may be present.

## 2019-03-21 ENCOUNTER — HOSPITAL ENCOUNTER (OUTPATIENT)
Dept: LAB | Age: 79
Discharge: HOME | End: 2019-03-21
Payer: MEDICARE

## 2019-03-21 DIAGNOSIS — E78.00 PURE HYPERCHOLESTEROLEMIA: ICD-10-CM

## 2019-03-21 LAB
ALBUMIN SERPL-MCNC: 3.5 G/DL (ref 3.4–5)
ALBUMIN/GLOB SERPL: 1.1 {RATIO} (ref 0.8–1.7)
ALP SERPL-CCNC: 90 U/L (ref 45–117)
ALT SERPL-CCNC: 22 U/L (ref 16–61)
AST SERPL-CCNC: 20 U/L (ref 15–37)
BILIRUB DIRECT SERPL-MCNC: 0.2 MG/DL (ref 0–0.2)
BILIRUB SERPL-MCNC: 0.7 MG/DL (ref 0.2–1)
CHOLEST SERPL-MCNC: 201 MG/DL
GLOBULIN SER CALC-MCNC: 3.3 G/DL (ref 2–4)
HDLC SERPL-MCNC: 55 MG/DL (ref 40–60)
HDLC SERPL: 3.7 {RATIO} (ref 0–5)
LDLC SERPL CALC-MCNC: 129.4 MG/DL (ref 0–100)
LIPID PROFILE,FLP: ABNORMAL
PROT SERPL-MCNC: 6.8 G/DL (ref 6.4–8.2)
TRIGL SERPL-MCNC: 83 MG/DL (ref ?–150)
VLDLC SERPL CALC-MCNC: 16.6 MG/DL

## 2019-03-21 PROCEDURE — 80061 LIPID PANEL: CPT

## 2019-03-21 PROCEDURE — 80076 HEPATIC FUNCTION PANEL: CPT

## 2019-03-21 PROCEDURE — 36415 COLL VENOUS BLD VENIPUNCTURE: CPT

## 2019-03-22 NOTE — PROGRESS NOTES
I have personally seen and evaluated the device findings. Interrogation reviewed and I agree with assessment.     Amanda Kay

## 2019-03-22 NOTE — PROGRESS NOTES
His cholesterol is better than it was, but LDL still high at 129.4 we would like down closer to 70. He is taking Crestor 5 mg three times per week. Try to a daily Zetia 10 mg daily and repeat lipid profile in 2 months. Please let the patient know.  ES

## 2019-04-05 ENCOUNTER — TELEPHONE (OUTPATIENT)
Dept: CARDIOLOGY CLINIC | Age: 79
End: 2019-04-05

## 2019-04-05 DIAGNOSIS — E78.00 PURE HYPERCHOLESTEROLEMIA: Primary | ICD-10-CM

## 2019-04-05 RX ORDER — EZETIMIBE 10 MG/1
10 TABLET ORAL DAILY
Qty: 90 TAB | Refills: 2 | Status: SHIPPED | OUTPATIENT
Start: 2019-04-05 | End: 2020-01-03 | Stop reason: SDUPTHER

## 2019-04-05 NOTE — TELEPHONE ENCOUNTER
----- Message from Jama Senior DO sent at 3/22/2019  3:45 PM EDT -----  His cholesterol is better than it was, but LDL still high at 129.4 we would like down closer to 70. He is taking Crestor 5 mg three times per week. Try to a daily Zetia 10 mg daily and repeat lipid profile in 2 months. Please let the patient know.  ES

## 2019-04-11 ENCOUNTER — TELEPHONE ANTICOAG (OUTPATIENT)
Dept: FAMILY MEDICINE CLINIC | Facility: CLINIC | Age: 79
End: 2019-04-11

## 2019-04-11 DIAGNOSIS — I48.20 CHRONIC ATRIAL FIBRILLATION (HCC): ICD-10-CM

## 2019-04-11 NOTE — PROGRESS NOTES
Mr. Noble Jarrell was notified on 4/11/19  for anticoagulation monitoring for the diagnosis of Atrial Fibrillation. His INR goal is 2.0-3.0 and his current Coumadin dose is 5 mg x 4 days a week, 2.5 mg x 3 days a week    Today's findings include an INR of 1.8 (normal INR range 0.8-1.2) . Considering Mr. Alvarez Reusing past history, todays findings, and per the coumadin policy/protocol, Mr. Mackenzie Velez was instructed to take Coumadin as follows,  Same dose . He was also instructed to schedule an appointment in 1 weeks prior to leaving for an INR check. A full discussion of the nature of anticoagulants has been carried out. A full discussion of the need for frequent and regular monitoring, precise dosage adjustment and compliance was stressed. Side effects of potential bleeding were discussed and Mr. Mackenzie Velez was instructed to call [phone] if there are any signs of abnormal bleeding. Mr. Mackenzie Velez was instructed to avoid any OTC items containing aspirin or ibuprofen and prior to starting any new OTC products to consult with his physician or pharmacist to ensure no drug interactions are present. Mr. Mackenzie Velez was instructed to avoid any major changes in his general diet and to avoid alcohol consumption. .        Mr. Mackenzie Velez verbalized his understanding of all instructions and will call the office with any questions, concerns, or signs of abnormal bleeding or blood clot.

## 2019-04-15 ENCOUNTER — CLINICAL SUPPORT (OUTPATIENT)
Dept: FAMILY MEDICINE CLINIC | Facility: CLINIC | Age: 79
End: 2019-04-15

## 2019-04-15 DIAGNOSIS — E53.8 B12 DEFICIENCY: Primary | ICD-10-CM

## 2019-04-15 RX ORDER — CYANOCOBALAMIN 1000 UG/ML
1000 INJECTION, SOLUTION INTRAMUSCULAR; SUBCUTANEOUS ONCE
Qty: 1 ML | Refills: 0
Start: 2019-04-15 | End: 2019-04-15

## 2019-04-22 ENCOUNTER — TELEPHONE ANTICOAG (OUTPATIENT)
Dept: FAMILY MEDICINE CLINIC | Facility: CLINIC | Age: 79
End: 2019-04-22

## 2019-04-22 DIAGNOSIS — I48.20 CHRONIC ATRIAL FIBRILLATION (HCC): ICD-10-CM

## 2019-04-22 NOTE — PATIENT INSTRUCTIONS
Mr. Som Ho was notified 4/18/19 for anticoagulation monitoring for the diagnosis of Atrial Fibrillation. His INR goal is 2.0-3.0 and his current Coumadin dose is 5 mg x 4 days a week , 2.5 mg x 3 days a week. 4/17/19 findings include an INR of 2.8 (normal INR range 0.8-1.2) . Considering Mr. Jonathan Slater past history, todays findings, and per the coumadin policy/protocol, Mr. Scooby Gamboa was instructed to take Coumadin as follows,  samr dose. He was also instructed to schedule an appointment in 1 weeks prior to leaving for an INR check. A full discussion of the nature of anticoagulants has been carried out. A full discussion of the need for frequent and regular monitoring, precise dosage adjustment and compliance was stressed. Side effects of potential bleeding were discussed and Mr. Scooby Gamboa was instructed to call [phone] if there are any signs of abnormal bleeding. Mr. Scooby Gamboa was instructed to avoid any OTC items containing aspirin or ibuprofen and prior to starting any new OTC products to consult with his physician or pharmacist to ensure no drug interactions are present. Mr. Scooby Gamboa was instructed to avoid any major changes in his general diet and to avoid alcohol consumption. .    Mr. Scooby Gamboa verbalized his understanding of all instructions and will call the office with any questions, concerns, or signs of abnormal bleeding or blood clot.

## 2019-04-28 RX ORDER — CYANOCOBALAMIN 1000 UG/ML
INJECTION, SOLUTION INTRAMUSCULAR; SUBCUTANEOUS
Qty: 1 ML | Refills: 8 | Status: SHIPPED | OUTPATIENT
Start: 2019-04-28 | End: 2019-11-04 | Stop reason: SDUPTHER

## 2019-04-29 ENCOUNTER — TELEPHONE ANTICOAG (OUTPATIENT)
Dept: FAMILY MEDICINE CLINIC | Facility: CLINIC | Age: 79
End: 2019-04-29

## 2019-04-29 DIAGNOSIS — I48.20 CHRONIC ATRIAL FIBRILLATION (HCC): ICD-10-CM

## 2019-04-29 NOTE — PATIENT INSTRUCTIONS
Mr. Danita Brown was notified on 4/25/19  for anticoagulation monitoring for4/24/19 the diagnosis of Atrial Fibrillation. His INR goal is 2.0-3.0 and his current Coumadin dose is 5 mg x 4 days a week, 2.5 mg other days. Today's findings include an INR of 2.5 (normal INR range 0.8-1.2) . Considering Mr. Bhupinder Sanchez past history, todays findings, and per the coumadin policy/protocol, Mr. Valdez Saini was instructed to take Coumadin as follows,  Same dose. He was also instructed to schedule an appointment in 1 weeks prior to leaving for an INR check. A full discussion of the nature of anticoagulants has been carried out. A full discussion of the need for frequent and regular monitoring, precise dosage adjustment and compliance was stressed. Side effects of potential bleeding were discussed and Mr. Valdez Saini was instructed to call [phone] if there are any signs of abnormal bleeding. Mr. Valdez Saini was instructed to avoid any OTC items containing aspirin or ibuprofen and prior to starting any new OTC products to consult with his physician or pharmacist to ensure no drug interactions are present. Mr. Valdez Saini was instructed to avoid any major changes in his general diet and to avoid alcohol consumption. .         Mr. Valdez Saini verbalized his understanding of all instructions and will call the office with any questions, concerns, or signs of abnormal bleeding or blood clot.

## 2019-05-02 ENCOUNTER — TELEPHONE ANTICOAG (OUTPATIENT)
Dept: FAMILY MEDICINE CLINIC | Facility: CLINIC | Age: 79
End: 2019-05-02

## 2019-05-02 DIAGNOSIS — I48.20 CHRONIC ATRIAL FIBRILLATION (HCC): ICD-10-CM

## 2019-05-02 NOTE — PATIENT INSTRUCTIONS
Korey Kim Mr. Krzysztof Leroy was notified today for anticoagulation home monitoring for 5/1/19 the diagnosis of Atrial Fibrillation. His INR goal is 2.0-3.0 and his current Coumadin dose is 5 mg x 4 days a week , 2.5 mg other days. Today's findings include an INR of 3.3 (normal INR range 0.8-1.2) . Considering Mr. Bimal Rome past history, todays findings, and per the coumadin policy/protocol, Mr. Maria M Nichols was instructed to take Coumadin as follows,  Skip x 1 day then resume 5 mg x 4 days a week, 2.5 mg all other days. He was also instructed to schedule an appointment in 1 weeks prior to leaving for an INR check. A full discussion of the nature of anticoagulants has been carried out. A full discussion of the need for frequent and regular monitoring, precise dosage adjustment and compliance was stressed. Side effects of potential bleeding were discussed and Mr. Maria M Nichols was instructed to call [phone] if there are any signs of abnormal bleeding. Mr. Maria M Nichols was instructed to avoid any OTC items containing aspirin or ibuprofen and prior to starting any new OTC products to consult with his physician or pharmacist to ensure no drug interactions are present. Mr. Maria M Nichols was instructed to avoid any major changes in his general diet and to avoid alcohol consumption. .        Mr. Maria M Nichols verbalized his understanding of all instructions and will call the office with any questions, concerns, or signs of abnormal bleeding or blood clot.

## 2019-05-06 ENCOUNTER — CLINICAL SUPPORT (OUTPATIENT)
Dept: FAMILY MEDICINE CLINIC | Facility: CLINIC | Age: 79
End: 2019-05-06

## 2019-05-06 DIAGNOSIS — E53.8 VITAMIN B 12 DEFICIENCY: Primary | ICD-10-CM

## 2019-05-06 RX ORDER — CYANOCOBALAMIN 1000 UG/ML
1000 INJECTION, SOLUTION INTRAMUSCULAR; SUBCUTANEOUS ONCE
Qty: 1 ML | Refills: 0 | Status: SHIPPED | COMMUNITY
Start: 2019-05-06 | End: 2019-05-06

## 2019-05-06 NOTE — PROGRESS NOTES
Teressa Paiz presents today for cyanocobalamin (VITAMIN B-12) 1,000 mcg/mL injection. Allergies reviewed. Injection given in right arm per Dr Rebekah Lang order cyanocobalamin (VITAMIN B-12) 1,000 mcg/mL injection inject 1 ml SC once monthly. Patient tolerated injection well and left office ambulatory.

## 2019-05-09 ENCOUNTER — HOSPITAL ENCOUNTER (OUTPATIENT)
Dept: LAB | Age: 79
Discharge: HOME | End: 2019-05-09
Payer: MEDICARE

## 2019-05-09 ENCOUNTER — DOCUMENTATION ONLY (OUTPATIENT)
Dept: FAMILY MEDICINE CLINIC | Facility: CLINIC | Age: 79
End: 2019-05-09

## 2019-05-09 DIAGNOSIS — E78.00 PURE HYPERCHOLESTEROLEMIA: ICD-10-CM

## 2019-05-09 DIAGNOSIS — Z12.5 PROSTATE CANCER SCREENING: ICD-10-CM

## 2019-05-09 DIAGNOSIS — I50.9 CONGESTIVE HEART FAILURE, UNSPECIFIED HF CHRONICITY, UNSPECIFIED HEART FAILURE TYPE (HCC): ICD-10-CM

## 2019-05-09 DIAGNOSIS — E53.8 VITAMIN B 12 DEFICIENCY: ICD-10-CM

## 2019-05-09 DIAGNOSIS — E53.8 B12 DEFICIENCY: ICD-10-CM

## 2019-05-09 DIAGNOSIS — I48.20 CHRONIC ATRIAL FIBRILLATION (HCC): ICD-10-CM

## 2019-05-09 DIAGNOSIS — I10 ESSENTIAL HYPERTENSION: ICD-10-CM

## 2019-05-09 LAB
ALBUMIN SERPL-MCNC: 3.6 G/DL (ref 3.4–5)
ALBUMIN/GLOB SERPL: 1 {RATIO} (ref 0.8–1.7)
ALP SERPL-CCNC: 82 U/L (ref 45–117)
ALT SERPL-CCNC: 24 U/L (ref 16–61)
ANION GAP SERPL CALC-SCNC: 6 MMOL/L (ref 3–18)
AST SERPL-CCNC: 21 U/L (ref 15–37)
BASOPHILS # BLD: 0 K/UL (ref 0–0.1)
BASOPHILS NFR BLD: 1 % (ref 0–2)
BILIRUB SERPL-MCNC: 0.8 MG/DL (ref 0.2–1)
BNP SERPL-MCNC: 1581 PG/ML (ref 0–1800)
BUN SERPL-MCNC: 18 MG/DL (ref 7–18)
BUN/CREAT SERPL: 16 (ref 12–20)
CALCIUM SERPL-MCNC: 9 MG/DL (ref 8.5–10.1)
CHLORIDE SERPL-SCNC: 108 MMOL/L (ref 100–108)
CO2 SERPL-SCNC: 26 MMOL/L (ref 21–32)
CREAT SERPL-MCNC: 1.11 MG/DL (ref 0.6–1.3)
DIFFERENTIAL METHOD BLD: ABNORMAL
EOSINOPHIL # BLD: 0.1 K/UL (ref 0–0.4)
EOSINOPHIL NFR BLD: 2 % (ref 0–5)
ERYTHROCYTE [DISTWIDTH] IN BLOOD BY AUTOMATED COUNT: 13.2 % (ref 11.6–14.5)
GLOBULIN SER CALC-MCNC: 3.5 G/DL (ref 2–4)
GLUCOSE SERPL-MCNC: 90 MG/DL (ref 74–99)
HCT VFR BLD AUTO: 43.8 % (ref 36–48)
HGB BLD-MCNC: 14.1 G/DL (ref 13–16)
LYMPHOCYTES # BLD: 1.8 K/UL (ref 0.9–3.6)
LYMPHOCYTES NFR BLD: 26 % (ref 21–52)
MCH RBC QN AUTO: 31.6 PG (ref 24–34)
MCHC RBC AUTO-ENTMCNC: 32.2 G/DL (ref 31–37)
MCV RBC AUTO: 98.2 FL (ref 74–97)
MONOCYTES # BLD: 0.9 K/UL (ref 0.05–1.2)
MONOCYTES NFR BLD: 13 % (ref 3–10)
NEUTS SEG # BLD: 4.1 K/UL (ref 1.8–8)
NEUTS SEG NFR BLD: 58 % (ref 40–73)
PLATELET # BLD AUTO: 254 K/UL (ref 135–420)
PMV BLD AUTO: 11.2 FL (ref 9.2–11.8)
POTASSIUM SERPL-SCNC: 4.6 MMOL/L (ref 3.5–5.5)
PROT SERPL-MCNC: 7.1 G/DL (ref 6.4–8.2)
PSA SERPL-MCNC: 0.7 NG/ML (ref 0–4)
RBC # BLD AUTO: 4.46 M/UL (ref 4.7–5.5)
SODIUM SERPL-SCNC: 140 MMOL/L (ref 136–145)
WBC # BLD AUTO: 7 K/UL (ref 4.6–13.2)

## 2019-05-09 PROCEDURE — 83880 ASSAY OF NATRIURETIC PEPTIDE: CPT

## 2019-05-09 PROCEDURE — 84153 ASSAY OF PSA TOTAL: CPT

## 2019-05-09 PROCEDURE — 83921 ORGANIC ACID SINGLE QUANT: CPT

## 2019-05-09 PROCEDURE — 85025 COMPLETE CBC W/AUTO DIFF WBC: CPT

## 2019-05-09 PROCEDURE — 80053 COMPREHEN METABOLIC PANEL: CPT

## 2019-05-09 PROCEDURE — 36415 COLL VENOUS BLD VENIPUNCTURE: CPT

## 2019-05-09 NOTE — PROGRESS NOTES
Pharmacist Note: Immunization Update    Patient: Toni Finch (23 y.o., 1940)     Patient's immunization history was updated to reflect information contained in the [place] and/or outside immunization/pharmacy records were reconciled within [facility]. Health Maintenance schedule updated when appropriate.     Current immunizations now reflect:       Immunization History   Administered Date(s) Administered    Influenza High Dose Vaccine PF 10/01/2016, 09/21/2017    Influenza Vaccine (Tri) Adjuvanted 10/12/2018    Pneumococcal Conjugate (PCV-13) 04/12/2017    Pneumococcal Polysaccharide (PPSV-23) 06/09/2009, 10/30/2009    Zoster Recombinant 03/28/2019       Reinier Zhao, PharmD, BCACP

## 2019-05-10 ENCOUNTER — TELEPHONE ANTICOAG (OUTPATIENT)
Dept: FAMILY MEDICINE CLINIC | Facility: CLINIC | Age: 79
End: 2019-05-10

## 2019-05-10 DIAGNOSIS — I48.20 CHRONIC ATRIAL FIBRILLATION (HCC): ICD-10-CM

## 2019-05-10 NOTE — PATIENT INSTRUCTIONS
Mr. Danielle bAernathy was notified today for anticoagulation home monitoring for the diagnosis of Atrial Fibrillation. His INR goal is 2.0-3.0 and his current Coumadin dose is 5 mg x 4 days a week, 2.5 mg all other days . [place] Today's findings include an INR of 2.2 (normal INR range 0.8-1.2) . Considering Mr. Whitley Hill past history, todays findings, and per the coumadin policy/protocol, Mr. Vania Ramírez was instructed to take Coumadin as follows,  Same dose. He was also instructed to schedule an appointment in 1 weeks prior to leaving for an INR check. A full discussion of the nature of anticoagulants has been carried out. A full discussion of the need for frequent and regular monitoring, precise dosage adjustment and compliance was stressed. Side effects of potential bleeding were discussed and Mr. Vania Ramírez was instructed to call [phone] if there are any signs of abnormal bleeding. Mr. Vania Ramírez was instructed to avoid any OTC items containing aspirin or ibuprofen and prior to starting any new OTC products to consult with his physician or pharmacist to ensure no drug interactions are present. Mr. Vania Ramírez was instructed to avoid any major changes in his general diet and to avoid alcohol consumption. .    .    Mr. Vania Ramírez verbalized his understanding of all instructions and will call the office with any questions, concerns, or signs of abnormal bleeding or blood clot.

## 2019-05-13 ENCOUNTER — OFFICE VISIT (OUTPATIENT)
Dept: FAMILY MEDICINE CLINIC | Facility: CLINIC | Age: 79
End: 2019-05-13

## 2019-05-13 VITALS
SYSTOLIC BLOOD PRESSURE: 123 MMHG | HEIGHT: 72 IN | HEART RATE: 86 BPM | OXYGEN SATURATION: 99 % | DIASTOLIC BLOOD PRESSURE: 81 MMHG | TEMPERATURE: 97.6 F | RESPIRATION RATE: 16 BRPM | BODY MASS INDEX: 28.44 KG/M2 | WEIGHT: 210 LBS

## 2019-05-13 DIAGNOSIS — I10 ESSENTIAL HYPERTENSION: ICD-10-CM

## 2019-05-13 DIAGNOSIS — I48.20 CHRONIC ATRIAL FIBRILLATION (HCC): ICD-10-CM

## 2019-05-13 DIAGNOSIS — E53.8 VITAMIN B 12 DEFICIENCY: ICD-10-CM

## 2019-05-13 DIAGNOSIS — Z85.820 HX OF MALIGNANT MELANOMA: ICD-10-CM

## 2019-05-13 DIAGNOSIS — H61.23 BILATERAL IMPACTED CERUMEN: Primary | ICD-10-CM

## 2019-05-13 DIAGNOSIS — H92.02 LEFT EAR PAIN: ICD-10-CM

## 2019-05-13 LAB
Lab: NORMAL
METHYLMALONATE SERPL-SCNC: 364 NMOL/L (ref 0–378)

## 2019-05-13 NOTE — PROGRESS NOTES
The patient presents to the office today with the chief complaint of left ear pain    HPI    The patient remains on Coumadin due to chronic atrial fibrillation. He is doing well on the medication. His protimes are doing well. The patient remains on Benazepril for hypertension. His BPs are doing well on the therapy. The patient has a low B12 level. He remains on IM B12 replacement. The patient complains of fullness and pain in his left ear. This has been a problem for the past week. The patient is status post resection of a melanoma on his left shin. This surgery was several months ago. The incision has been slow to heal.  There had been drainage at the site. The drainage at stopped but the incision is still open      Review of Systems   HENT: Positive for ear pain. Respiratory: Negative for shortness of breath. Cardiovascular: Negative for chest pain and leg swelling. Allergies   Allergen Reactions    Zetia [Ezetimibe] Other (comments)     Back pain resolved off Zetia    Lipitor [Atorvastatin] Myalgia    Livalo [Pitavastatin] Other (comments)     Extreme fatigue    5/29/14   PATIENT DENIES    Zocor [Simvastatin] Myalgia     5/29/14 PATIENT DENIES        Current Outpatient Medications   Medication Sig Dispense Refill    cyanocobalamin (VITAMIN B12) 1,000 mcg/mL injection INJECT 1 MILLILITER INTRAMUSCULARLY EVERY 3 WEEKS 1 mL 8    ezetimibe (ZETIA) 10 mg tablet Take 1 Tab by mouth daily. 90 Tab 2    benazepril (LOTENSIN) 20 mg tablet Take 1 Tab by mouth daily. 90 Tab 3    furosemide (LASIX) 40 mg tablet TAKE 1 TABLET DAILY 90 Tab 3    rosuvastatin (CRESTOR) 5 mg tablet Take 1 Tab by mouth every Monday, Wednesday, Friday. 90 Tab 3    omeprazole (PRILOSEC) 40 mg capsule Take 40 mg by mouth daily.  warfarin (COUMADIN) 5 mg tablet Take 2.5 mg (one-half tablet) by mouth once daily except take 5 mg (one tablet) by mouth on Mondays, Wednesdays and Fridays.   Will be adjusted based on INR/lab results.  potassium chloride (KLOR-CON M10) 10 mEq tablet Take 10 mEq by mouth daily.  LUMIGAN 0.01 % ophthalmic drops Administer 1 Drop to both eyes nightly.  metoprolol tartrate (LOPRESSOR) 50 mg tablet Take 1 Tab by mouth two (2) times a day. 180 Tab 3    AZOPT 1 % ophthalmic suspension Administer 1 Drop to both eyes two (2) times a day. 3    multivitamin (ONE A DAY) tablet Take 1 Tab by mouth daily. Past Medical History:   Diagnosis Date    AICD (automatic cardioverter/defibrillator) present     [employer]  upgrade from pacer in 2007 due to VT    Arthritis     Atrial fibrillation [facility]     Cardiac cath 03/19/2007    LM 25% ostial.  Otherwise patent coronaries. LVEDP 20.  EF 50%. Dyssynch. mid anterior contraction. Pacemaker.  Cardiac echocardiogram 03/20/2014    A-fib. EF 65-70%. No RWMA. No RWMA. Mild conc LVH. Mild RVE. RVSP 40-45 mmHg. Mild LAE.  HERNANDEZ. Mild MR. Mod TR.  Cardiac nuclear imaging test 07/29/2014    No ischemia or prior infarction. EF 68%. Nondiagnostic EKG due to V pacing on pharm stress test.  Low risk.  Cardioversion 8/31/2011    Successful defibrillation threshold testing at 18 joules. Patient also had conversion to atrial ventricular pacing.      CHF (congestive heart failure) ([facility] [address] [facility]     Cobalamin deficiency     Dupuytren contracture 02/08/2010    on the right ring finger     Edema     GERD (gastroesophageal reflux disease)     Hypertension     Hypertrophy of prostate with urinary obstruction and other lower urinary tract symptoms (LUTS)     Ill-defined condition 2007    AICD    Impotence of organic origin     Intolerance of drug     Intolerant high doses of sotalol due to prolonged QT    Mastodynia     Paroxysmal VT ([address]     Pure hypercholesterolemia     S/P ablation of atrial fibrillation 05/31/2011    S/P ablation of atrial fibrillation 12/18/2012    Dr. Shari Ervin at [facility] S/P ablation operation for arrhythmia     VT ablation by Dr. Benjamin Alejandro near [address] [place] [address] 2/3007    Sick sinus syndrome [facility])        Past Surgical History:   Procedure Laterality Date    COLONOSCOPY N/A 9/12/2018    COLONOSCOPY with Polypectomies and Clip Placement performed by Sudhakar Worley MD at [address] [place] [address] [facility] by Dr. Val Michael  02/08-03/08    bilateral cataract removed    HX CATARACT REMOVAL  2/2008 & 3/2008    bilateral    HX ENDOSCOPY  09/12/2018    Dr. Adirel Worrell - pathology indicates changes of acid reflux and Garcia's esophagus, repeat EGD in 2 years    HX ORTHOPAEDIC  2/8/10    release of Dupuytren's contraction on ring finger of right hand    HX OTHER SURGICAL  6/2000    atrial lead placement    HX OTHER SURGICAL  6/5/2009    Cardioversion    HX OTHER SURGICAL  10/12/2012    cardioversion    HX PACEMAKER  1998    placement    HX PACEMAKER  6/2000    DDD    HX PACEMAKER  3/27/07    removal of pacemaker & placement of dual chamber defib generator and leads    HX TONSIL AND ADENOIDECTOMY         Social History     Socioeconomic History    Marital status:      Spouse name: Not on file    Number of children: Not on file    Years of education: Not on file    Highest education level: Not on file   Occupational History    Not on file   Social Needs    Financial resource strain: Not on file    Food insecurity:     Worry: Not on file     Inability: Not on file    Transportation needs:     Medical: Not on file     Non-medical: Not on file   Tobacco Use    Smoking status: Never Smoker    Smokeless tobacco: Never Used   Substance and Sexual Activity    Alcohol use:  Yes     Alcohol/week: 6.0 - 7.2 oz     Types: 10 - 12 Glasses of wine per week     Frequency: 4 or more times a week     Drinks per session: 1 or 2     Binge frequency: Never     Comment: 2-3 glasses of white wine about 5-6 days/week)    Drug use: No    Sexual activity: Not Currently     Partners: Female   Lifestyle    Physical activity:     Days per week: Not on file     Minutes per session: Not on file    Stress: Not on file   Relationships    Social connections:     Talks on phone: Not on file     Gets together: Not on file     Attends Sabianism service: Not on file     Active member of club or organization: Not on file     Attends meetings of clubs or organizations: Not on file     Relationship status: Not on file    Intimate partner violence:     Fear of current or ex partner: Not on file     Emotionally abused: Not on file     Physically abused: Not on file     Forced sexual activity: Not on file   Other Topics Concern    Not on file   Social History Narrative    Not on file       Patient does have an advanced directive on file    Visit Vitals  /81   Pulse 86   Temp 97.6 °F (36.4 °C) (Tympanic)   Resp 16   Ht 6' (1.829 m)   Wt 210 lb (95.3 kg)   SpO2 99%   BMI 28.48 kg/m²       Physical Exam   HENT:   Both ear canals were totally blocked with cerumen   Neck: Carotid bruit is not present. Cardiovascular: Normal rate and regular rhythm. Exam reveals no gallop. No murmur heard. Pulmonary/Chest: He has no wheezes. He has no rales. Abdominal: Soft. Bowel sounds are normal. He exhibits no distension and no mass. There is no tenderness. Musculoskeletal: He exhibits no edema. Skin:           Ears: Both ear canal clogged with cerumen  No Cervical Lymphadenopathy  No Supraclavicular Lymphadenopathy  Thyroid is Normal  Lungs are normal to percussion. Clear to auscultation   Heart:  S1 S2 are normal, No gallops, No murmurs  No Carotid Bruits  Abdomen:  Normal Bowel Sounds. No tenderness. No masses. No Hepatomegaly or Splenomegaly  LE:  Strong Pedal Pulses. No Edema      BMI:  OK        Assessment / Plan      ICD-10-CM ICD-9-CM    1. Bilateral impacted cerumen H61.23 380.4 REMOVE IMPACTED EAR WAX   2. Hx of malignant melanoma Z85.820 V10.82    3. Vitamin B 12 deficiency E53.8 266.2    4. Essential hypertension I10 401.9    5. Chronic atrial fibrillation (HCC) I48.2 427.31    6. Left ear pain H92.02 388.70        Labs ordered  The ear canals were irrigated to clear. The patient notes that his left ear feels better  he was advised to continue his maintenance medications  The patient will call in a week if his left leg incision remains open    Next appointment in 4 months      I asked Vasile Yepez if he has any questions and I answered the questions. Vasile Yepez states that he understands the treatment plan and agrees with the treatment plan          THIS DOCUMENT WAS CREATED WITH A VOICE ACTIVATED DICTATION SYSTEM.   IT MAY CONTAIN TRANSCRIPTION ERRORS

## 2019-05-13 NOTE — PROGRESS NOTES
Chief Complaint   Patient presents with    Follow Up Chronic Condition     HTN Cholesterol    Results

## 2019-05-20 ENCOUNTER — TELEPHONE ANTICOAG (OUTPATIENT)
Dept: FAMILY MEDICINE CLINIC | Facility: CLINIC | Age: 79
End: 2019-05-20

## 2019-05-20 DIAGNOSIS — I48.20 CHRONIC ATRIAL FIBRILLATION (HCC): ICD-10-CM

## 2019-05-20 NOTE — PATIENT INSTRUCTIONS
Mr. Eugenia James  was notified today for anticoagulation monitoring for the diagnosis of Atrial Fibrillation. His INR goal is 2.0-3.0 and his current Coumadin dose is 5 mg x 4 days , 2.5 mg all other days. Today's findings include an INR of 2.4 (normal INR range 0.8-1.2) . Considering Mr. Eladia Arroyo past history, todays findings, and per the coumadin policy/protocol, Mr. Loreta Lane was instructed to take Coumadin as follows,  Same dose. He was also instructed to schedule an appointment in 1 weeks prior to leaving for an INR check. A full discussion of the nature of anticoagulants has been carried out. A full discussion of the need for frequent and regular monitoring, precise dosage adjustment and compliance was stressed. Side effects of potential bleeding were discussed and Mr. Loreta Lane was instructed to call [phone] if there are any signs of abnormal bleeding. Mr. Loreta Lane was instructed to avoid any OTC items containing aspirin or ibuprofen and prior to starting any new OTC products to consult with his physician or pharmacist to ensure no drug interactions are present. Mr. Loreta Lane was instructed to avoid any major changes in his general diet and to avoid alcohol consumption. .      Mr. Loreta Lane verbalized his understanding of all instructions and will call the office with any questions, concerns, or signs of abnormal bleeding or blood clot.

## 2019-05-28 ENCOUNTER — CLINICAL SUPPORT (OUTPATIENT)
Dept: FAMILY MEDICINE CLINIC | Facility: CLINIC | Age: 79
End: 2019-05-28

## 2019-05-28 DIAGNOSIS — E53.8 VITAMIN B 12 DEFICIENCY: Primary | ICD-10-CM

## 2019-05-29 ENCOUNTER — OFFICE VISIT (OUTPATIENT)
Dept: FAMILY MEDICINE CLINIC | Facility: CLINIC | Age: 79
End: 2019-05-29

## 2019-05-29 VITALS
SYSTOLIC BLOOD PRESSURE: 88 MMHG | OXYGEN SATURATION: 98 % | BODY MASS INDEX: 28.17 KG/M2 | HEART RATE: 92 BPM | TEMPERATURE: 97.4 F | DIASTOLIC BLOOD PRESSURE: 60 MMHG | WEIGHT: 208 LBS | HEIGHT: 72 IN

## 2019-05-29 DIAGNOSIS — I48.20 CHRONIC ATRIAL FIBRILLATION (HCC): ICD-10-CM

## 2019-05-29 DIAGNOSIS — I10 ESSENTIAL HYPERTENSION: Primary | ICD-10-CM

## 2019-05-29 DIAGNOSIS — E53.8 VITAMIN B 12 DEFICIENCY: ICD-10-CM

## 2019-05-29 RX ORDER — CYCLOBENZAPRINE HCL 10 MG
TABLET ORAL
Qty: 30 TAB | Refills: 0 | Status: SHIPPED | OUTPATIENT
Start: 2019-05-29 | End: 2019-07-22 | Stop reason: ALTCHOICE

## 2019-05-29 RX ORDER — CYANOCOBALAMIN 1000 UG/ML
1000 INJECTION, SOLUTION INTRAMUSCULAR; SUBCUTANEOUS ONCE
Qty: 1 ML | Refills: 0 | Status: SHIPPED | COMMUNITY
Start: 2019-05-29 | End: 2019-05-29

## 2019-05-29 NOTE — PROGRESS NOTES
Michael Sanchez presents today for cyanocobalamin (VITAMIN B12) 1,000 mcg/mL injection. Allergies reviewed. Injection given in right arm per Dr Leon [place] cyanocobalamin (VITAMIN B12) 1,000 mcg/mL injection INJECT 1 MILLILITER INTRAMUSCULARLY EVERY 3 WEEKS. Patient tolerated injection well and left office ambulatory.

## 2019-05-29 NOTE — PROGRESS NOTES
Kole Pillai presents today for   Chief Complaint   Patient presents with    Well Male       Kole Pillai preferred language for health care discussion is english. Is someone accompanying this pt? no    Is the patient using any DME equipment during [address]? no    Depression Screening:  3 most recent PHQ Screens 5/29/2019   Little interest or pleasure in doing things Not at all   Feeling down, depressed, irritable, or hopeless Not at all   Total Score PHQ 2 0       Learning Assessment:  Learning Assessment 4/12/2017   PRIMARY LEARNER Patient   HIGHEST LEVEL OF EDUCATION - PRIMARY LEARNER  GRADUATED HIGH SCHOOL OR GED   BARRIERS PRIMARY LEARNER NONE   CO-LEARNER CAREGIVER No   PRIMARY LANGUAGE ENGLISH   LEARNER PREFERENCE PRIMARY DEMONSTRATION   ANSWERED BY arnoldo   RELATIONSHIP SELF       Abuse Screening:  Abuse Screening Questionnaire 1/7/2019   Do you ever feel afraid of your partner? N   Are you in a relationship with someone who physically or mentally threatens you? N   Is it safe for you to go home? Y       Fall Risk  Fall Risk Assessment, last 12 mths 5/29/2019   Able to walk? Yes   Fall in past 12 months? No       Health Maintenance reviewed and discussed and ordered per Provider. Health Maintenance Due   Topic Date Due    Shingrix Vaccine Age 49> (2 of 2) 05/23/2019   . Kole Pillai is updated on all     Pt currently taking Antiplatelet therapy? Yes Warfarin    Coordination of Care:  1. Have you been to the ER, urgent care clinic since your last visit? no Hospitalized since your last visit? no    2. Have you seen or consulted any other health care providers outside of the [address] [place] [address] since your last visit? no Include any pap smears or colon screening.  no

## 2019-05-31 ENCOUNTER — TELEPHONE (OUTPATIENT)
Dept: FAMILY MEDICINE CLINIC | Facility: CLINIC | Age: 79
End: 2019-05-31

## 2019-05-31 NOTE — TELEPHONE ENCOUNTER
Per Dr. Luca Cabrera I called pt and pt verified name and  Pt was made aware to continue same dose for is medication Coumadin.

## 2019-05-31 NOTE — PROGRESS NOTES
The patient presents to the office today with the chief complaint of hypertension    HPI    The patient remains on benazepril for hypertension. The patient is doing well on ACE inhibitor. The patient has chronic atrial fibrillation. The patient is on metoprolol for rate control. The patient also has a pacemaker/defibrillator in place. The patient remains on Coumadin for anticoagulation. His pro times are doing well. The patient has B12 deficiency. The patient remains on B12 injections every 3 weeks. The patient's methylmalonic acid levels however have dropped but not to the extent one would expect. Methylmalonic acid last time May 9 was 369 (just within normal limits) this was the lowest methylmalonic acid is been obtained today. ROS  Negative for chest pain, dizziness, or lower extremity edema. Allergies   Allergen Reactions    Zetia [Ezetimibe] Other (comments)     Back pain resolved off Zetia    Lipitor [Atorvastatin] Myalgia    Livalo [Pitavastatin] Other (comments)     Extreme fatigue    5/29/14   PATIENT DENIES    Zocor [Simvastatin] Myalgia     5/29/14 PATIENT DENIES        Current Outpatient Medications   Medication Sig Dispense Refill    cyclobenzaprine (FLEXERIL) 10 mg tablet 1/2 tab twice per day per day (muscle relaxer) 30 Tab 0    cyanocobalamin (VITAMIN B12) 1,000 mcg/mL injection INJECT 1 MILLILITER INTRAMUSCULARLY EVERY 3 WEEKS 1 mL 8    ezetimibe (ZETIA) 10 mg tablet Take 1 Tab by mouth daily. 90 Tab 2    benazepril (LOTENSIN) 20 mg tablet Take 1 Tab by mouth daily. 90 Tab 3    furosemide (LASIX) 40 mg tablet TAKE 1 TABLET DAILY 90 Tab 3    rosuvastatin (CRESTOR) 5 mg tablet Take 1 Tab by mouth every Monday, Wednesday, Friday. 90 Tab 3    omeprazole (PRILOSEC) 40 mg capsule Take 40 mg by mouth daily.  warfarin (COUMADIN) 5 mg tablet Take 2.5 mg (one-half tablet) by mouth once daily except take 5 mg (one tablet) by mouth on Mondays, Wednesdays and Fridays.   Will be adjusted based on INR/lab results.  potassium chloride (KLOR-CON M10) 10 mEq tablet Take 10 mEq by mouth daily.  LUMIGAN 0.01 % ophthalmic drops Administer 1 Drop to both eyes nightly.  metoprolol tartrate (LOPRESSOR) 50 mg tablet Take 1 Tab by mouth two (2) times a day. 180 Tab 3    AZOPT 1 % ophthalmic suspension Administer 1 Drop to both eyes two (2) times a day. 3    multivitamin (ONE A DAY) tablet Take 1 Tab by mouth daily. Past Medical History:   Diagnosis Date    AICD (automatic cardioverter/defibrillator) present     Medtronic  upgrade from pacer in 2007 due to VT    Arthritis     Atrial fibrillation ([address]     Cancer ([facility] 02/2019    melenoma on left leg    Cardiac cath 03/19/2007    LM 25% ostial.  Otherwise patent coronaries. LVEDP 20.  EF 50%. Dyssynch. mid anterior contraction. Pacemaker.  Cardiac echocardiogram 03/20/2014    A-fib. EF 65-70%. No RWMA. No RWMA. Mild conc LVH. Mild RVE. RVSP 40-45 mmHg. Mild LAE.  HERNANDEZ. Mild MR. Mod TR.  Cardiac nuclear imaging test 07/29/2014    No ischemia or prior infarction. EF 68%. Nondiagnostic EKG due to V pacing on pharm stress test.  Low risk.  Cardioversion 8/31/2011    Successful defibrillation threshold testing at 18 joules. Patient also had conversion to atrial ventricular pacing.      CHF (congestive heart failure) ([address]     Cobalamin deficiency     Dupuytren contracture 02/08/2010    on the right ring finger     Edema     GERD (gastroesophageal reflux disease)     Hypertension     Hypertrophy of prostate with urinary obstruction and other lower urinary tract symptoms (LUTS)     Ill-defined condition 2007    AICD    Impotence of organic origin     Intolerance of drug     Intolerant high doses of sotalol due to prolonged QT    Mastodynia     Paroxysmal VT ([address]     Pure hypercholesterolemia     S/P ablation of atrial fibrillation 05/31/2011    S/P ablation of atrial fibrillation 12/18/2012 Dr. Mouna Johnson at [facility] S/P ablation operation for arrhythmia     VT ablation by Dr. Poli Benitez near [address] [place] [address] Street 2/3007    Sick sinus syndrome [facility])        Past Surgical History:   Procedure Laterality Date    COLONOSCOPY N/A 9/12/2018    COLONOSCOPY with Polypectomies and Clip Placement performed by Angelique De La Cruz MD at [address] [place] [address] [address] [facility] [address] by Dr. Yesy Hensley  02/08-03/08    bilateral cataract removed    HX CATARACT REMOVAL  2/2008 & 3/2008    bilateral    HX ENDOSCOPY  09/12/2018    Dr. Jerman Dale - pathology indicates changes of acid reflux and Garcia's esophagus, repeat EGD in 2 years    HX ORTHOPAEDIC  2/8/10    release of Dupuytren's contraction on ring finger of right hand    HX OTHER SURGICAL  6/2000    atrial lead placement    HX OTHER SURGICAL  6/5/2009    Cardioversion    HX OTHER SURGICAL  10/12/2012    cardioversion    HX PACEMAKER  1998    placement    HX PACEMAKER  6/2000    DDD    HX PACEMAKER  3/27/07    removal of pacemaker & placement of dual chamber defib generator and leads    HX TONSIL AND ADENOIDECTOMY         Social History     Socioeconomic History    Marital status:      Spouse name: Not on file    Number of children: Not on file    Years of education: Not on file    Highest education level: Not on file   Occupational History    Not on file   Social Needs    Financial resource strain: Not on file    Food insecurity:     Worry: Not on file     Inability: Not on file    Transportation needs:     Medical: Not on file     Non-medical: Not on file   Tobacco Use    Smoking status: Never Smoker    Smokeless tobacco: Never Used   Substance and Sexual Activity    Alcohol use:  Yes     Alcohol/week: 6.0 - 7.2 oz     Types: 10 - 12 Glasses of wine per week     Frequency: 4 or more times a week     Drinks per session: 1 or 2     Binge frequency: Never     Comment: 2-3 glasses of white wine about 5-6 days/week)    Drug use: No    Sexual activity: Not Currently     Partners: Female   Lifestyle    Physical activity:     Days per week: Not on file     Minutes per session: Not on file    Stress: Not on file   Relationships    Social connections:     Talks on phone: Not on file     Gets together: Not on file     Attends Voodoo service: Not on file     Active member of club or organization: Not on file     Attends meetings of clubs or organizations: Not on file     Relationship status: Not on file    Intimate partner violence:     Fear of current or ex partner: Not on file     Emotionally abused: Not on file     Physically abused: Not on file     Forced sexual activity: Not on file   Other Topics Concern    Not on file   Social History Narrative    Not on file       Patient does not have an advanced directive on file    Visit Vitals  BP (!) 88/60 (BP 1 Location: Right arm, BP Patient Position: Sitting)   Pulse 92   Temp 97.4 °F (36.3 °C) (Tympanic)   Ht 6' (1.829 m)   Wt 208 lb (94.3 kg)   SpO2 98%   BMI 28.21 kg/m²       Physical Exam  No Cervical Lymphadenopathy  No Supraclavicular Lymphadenopathy  Thyroid is Normal  Lungs are normal to percussion. Clear to auscultation   Heart:  S1 S2 are normal, No gallops, No murmurs  No Carotid Bruits  Abdomen:  Normal Bowel Sounds. No tenderness. No masses. No Hepatomegaly or Splenomegaly  LE:  Strong Pedal Pulses. No Edema      BMI: Okay    . No results found for any visits on 05/29/19. Assessment / Plan      ICD-10-CM ICD-9-CM    1. Essential hypertension I10 401.9    2. Vitamin B 12 deficiency E53.8 266.2    3. Chronic atrial fibrillation (HCC) I48.2 427.31        he was advised to continue his maintenance medications          I asked Christy Gosselin if he has any questions and I answered the questions.   Christy Gosselin states that he understands the treatment plan and agrees with the treatment plan          THIS DOCUMENT WAS CREATED WITH A VOICE ACTIVATED DICTATION SYSTEM.   IT MAY CONTAIN TRANSCRIPTION ERRORS

## 2019-06-05 ENCOUNTER — TELEPHONE (OUTPATIENT)
Dept: FAMILY MEDICINE CLINIC | Facility: CLINIC | Age: 79
End: 2019-06-05

## 2019-06-05 NOTE — TELEPHONE ENCOUNTER
Patient called stating the muscle relaxer has not help at all and his back still hurts.   His BP is a little higher than what it was in office (running towards normal)  Please call [phone] or  [phone]

## 2019-06-17 ENCOUNTER — TELEPHONE ANTICOAG (OUTPATIENT)
Dept: FAMILY MEDICINE CLINIC | Facility: CLINIC | Age: 79
End: 2019-06-17

## 2019-06-17 ENCOUNTER — CLINICAL SUPPORT (OUTPATIENT)
Dept: FAMILY MEDICINE CLINIC | Facility: CLINIC | Age: 79
End: 2019-06-17

## 2019-06-17 DIAGNOSIS — I48.20 CHRONIC ATRIAL FIBRILLATION (HCC): ICD-10-CM

## 2019-06-17 DIAGNOSIS — E53.8 VITAMIN B 12 DEFICIENCY: Primary | ICD-10-CM

## 2019-06-17 RX ORDER — CYANOCOBALAMIN 1000 UG/ML
1000 INJECTION, SOLUTION INTRAMUSCULAR; SUBCUTANEOUS ONCE
Qty: 1 ML | Refills: 0 | Status: SHIPPED | COMMUNITY
Start: 2019-06-17 | End: 2019-06-17

## 2019-06-17 NOTE — PATIENT INSTRUCTIONS
Mr. Shena Crabtree was notified today for anticoagulation home monitoring on 6/16/19 for the diagnosis of Atrial Fibrillation. His INR goal is 2.0-3.0 and his current Coumadin dose is 5 mg x 4 days a week, 2.5 mg all other days. Today's findings include an INR of 3.2 (normal INR range 0.8-1.2). Considering Mr. Kristyn Edward past history, todays findings, and per the coumadin policy/protocol, Mr. Ibis Saenz was instructed to take Coumadin as follows,  Skip x 1 day dose, then resume normal dose. He was also instructed to schedule an appointment in 1 weeks prior to leaving for an INR check. A full discussion of the nature of anticoagulants has been carried out. A full discussion of the need for frequent and regular monitoring, precise dosage adjustment and compliance was stressed. Side effects of potential bleeding were discussed and Mr. Ibis Saenz was instructed to call [phone] if there are any signs of abnormal bleeding. Mr. Ibis Saenz was instructed to avoid any OTC items containing aspirin or ibuprofen and prior to starting any new OTC products to consult with his physician or pharmacist to ensure no drug interactions are present. Mr. Ibis Saenz was instructed to avoid any major changes in his general diet and to avoid alcohol consumption. .      Mr. Ibis Saenz verbalized his understanding of all instructions and will call the office with any questions, concerns, or signs of abnormal bleeding or blood clot.

## 2019-06-19 ENCOUNTER — OFFICE VISIT (OUTPATIENT)
Dept: CARDIOLOGY CLINIC | Age: 79
End: 2019-06-19

## 2019-06-19 DIAGNOSIS — I47.29 PAROXYSMAL VT: Primary | ICD-10-CM

## 2019-06-19 DIAGNOSIS — Z95.810 AICD (AUTOMATIC CARDIOVERTER/DEFIBRILLATOR) PRESENT: ICD-10-CM

## 2019-06-27 RX ORDER — METOPROLOL TARTRATE 50 MG/1
50 TABLET ORAL 2 TIMES DAILY
Qty: 180 TAB | Refills: 3 | Status: SHIPPED | OUTPATIENT
Start: 2019-06-27 | End: 2020-07-06 | Stop reason: SDUPTHER

## 2019-07-01 ENCOUNTER — TELEPHONE ANTICOAG (OUTPATIENT)
Dept: FAMILY MEDICINE CLINIC | Facility: CLINIC | Age: 79
End: 2019-07-01

## 2019-07-01 DIAGNOSIS — I48.20 CHRONIC ATRIAL FIBRILLATION (HCC): ICD-10-CM

## 2019-07-01 NOTE — PROGRESS NOTES
I have personally seen and evaluated the device findings. Interrogation reviewed and I agree with assessment.     Luis A Bolanos

## 2019-07-01 NOTE — PATIENT INSTRUCTIONS
Patient was called on 6/28 and message was left on  to call back. Notified patient today per .      Mr. Peggy Naik was notified today for anticoagulation home monitoring for the diagnosis of Atrial Fibrillation. His INR goal is 2.0-3.0 and his current Coumadin dose is 5 mg x 4 days a week, 2.5 mg x 3 days a week. Today's findings include an INR of 3.6 (normal INR range 0.8-1.2) . Considering Mr. Pressley Peaks past history, todays findings, and per the coumadin policy/protocol, Mr. Amrita Mercer was instructed to take Coumadin as follows,  Skip x 2 days, then 5 mg x 2 days a week and 2.5 mg all other days. He was also instructed to schedule an appointment in 1 weeks prior to leaving for an INR check. A full discussion of the nature of anticoagulants has been carried out. A full discussion of the need for frequent and regular monitoring, precise dosage adjustment and compliance was stressed. Side effects of potential bleeding were discussed and Mr. Amrita Mercer was instructed to call [phone] if there are any signs of abnormal bleeding. Mr. Amrita Mercer was instructed to avoid any OTC items containing aspirin or ibuprofen and prior to starting any new OTC products to consult with his physician or pharmacist to ensure no drug interactions are present. Mr. Amrita Mercer was instructed to avoid any major changes in his general diet and to avoid alcohol consumption. .        Mr. Amrita Mercer verbalized his understanding of all instructions and will call the office with any questions, concerns, or signs of abnormal bleeding or blood clot.      2.5 mg  All other days

## 2019-07-08 ENCOUNTER — CLINICAL SUPPORT (OUTPATIENT)
Dept: FAMILY MEDICINE CLINIC | Facility: CLINIC | Age: 79
End: 2019-07-08

## 2019-07-08 DIAGNOSIS — E53.8 VITAMIN B 12 DEFICIENCY: Primary | ICD-10-CM

## 2019-07-08 RX ORDER — CYANOCOBALAMIN 1000 UG/ML
1000 INJECTION, SOLUTION INTRAMUSCULAR; SUBCUTANEOUS ONCE
Qty: 1 ML | Refills: 0 | Status: SHIPPED | COMMUNITY
Start: 2019-07-08 | End: 2019-07-08

## 2019-07-11 ENCOUNTER — TELEPHONE ANTICOAG (OUTPATIENT)
Dept: FAMILY MEDICINE CLINIC | Facility: CLINIC | Age: 79
End: 2019-07-11

## 2019-07-11 ENCOUNTER — TELEPHONE (OUTPATIENT)
Dept: FAMILY MEDICINE CLINIC | Facility: CLINIC | Age: 79
End: 2019-07-11

## 2019-07-11 DIAGNOSIS — I48.20 CHRONIC ATRIAL FIBRILLATION (HCC): ICD-10-CM

## 2019-07-11 RX ORDER — WARFARIN SODIUM 5 MG/1
TABLET ORAL
Qty: 100 TAB | Refills: 3 | Status: SHIPPED | OUTPATIENT
Start: 2019-07-11 | End: 2019-07-12 | Stop reason: SDUPTHER

## 2019-07-11 NOTE — TELEPHONE ENCOUNTER
Requested Prescriptions     Pending Prescriptions Disp Refills    warfarin (COUMADIN) 5 mg tablet       Sig: Take 2.5 mg (one-half tablet) by mouth once daily except take 5 mg (one tablet) by mouth on Mondays, Wednesdays and Fridays. Will be adjusted based on INR/lab results.

## 2019-07-11 NOTE — PATIENT INSTRUCTIONS
Mr. Renée Stewart was notified for home anticoagulation monitoring for the diagnosis of Atrial Fibrillation. His INR goal is 2.0-3.0 and his current Coumadin dose is 5 mg x 2 days a week and 2.5 mg all other days . Today's findings include an INR of 2.3 (normal INR range 0.8-1.2) . Considering Mr. Lyndsey Napoles past history, todays findings, and per the coumadin policy/protocol, Mr. Saint Loa was instructed to take Coumadin as follows,  Same dose. He was also instructed to schedule an appointment in 1 weeks prior to leaving for an INR check. A full discussion of the nature of anticoagulants has been carried out. A full discussion of the need for frequent and regular monitoring, precise dosage adjustment and compliance was stressed. Side effects of potential bleeding were discussed and Mr. Saint Loa was instructed to call [phone] if there are any signs of abnormal bleeding. Mr. Saint Loa was instructed to avoid any OTC items containing aspirin or ibuprofen and prior to starting any new OTC products to consult with his physician or pharmacist to ensure no drug interactions are present. Mr. Saint Loa was instructed to avoid any major changes in his general diet and to avoid alcohol consumption. .        Mr. Saint Loa verbalized his understanding of all instructions and will call the office with any questions, concerns, or signs of abnormal bleeding or blood clot.

## 2019-07-12 RX ORDER — WARFARIN SODIUM 5 MG/1
TABLET ORAL
Qty: 100 TAB | Refills: 1 | Status: SHIPPED | OUTPATIENT
Start: 2019-07-12 | End: 2019-07-23 | Stop reason: SDUPTHER

## 2019-07-22 ENCOUNTER — TELEPHONE ANTICOAG (OUTPATIENT)
Dept: FAMILY MEDICINE CLINIC | Facility: CLINIC | Age: 79
End: 2019-07-22

## 2019-07-22 ENCOUNTER — OFFICE VISIT (OUTPATIENT)
Dept: FAMILY MEDICINE CLINIC | Facility: CLINIC | Age: 79
End: 2019-07-22

## 2019-07-22 VITALS
TEMPERATURE: 97.6 F | BODY MASS INDEX: 27.63 KG/M2 | DIASTOLIC BLOOD PRESSURE: 64 MMHG | HEART RATE: 85 BPM | OXYGEN SATURATION: 97 % | HEIGHT: 72 IN | WEIGHT: 204 LBS | SYSTOLIC BLOOD PRESSURE: 106 MMHG | RESPIRATION RATE: 16 BRPM

## 2019-07-22 DIAGNOSIS — S30.863D: ICD-10-CM

## 2019-07-22 DIAGNOSIS — T36.95XA ANTIBIOTIC REACTION: Primary | ICD-10-CM

## 2019-07-22 DIAGNOSIS — I48.20 CHRONIC ATRIAL FIBRILLATION (HCC): ICD-10-CM
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
## 2019-07-22 RX ORDER — PREDNISONE 20 MG/1
TABLET ORAL
Qty: 26 TAB | Refills: 0 | Status: SHIPPED | OUTPATIENT
Start: 2019-07-22 | End: 2019-08-24 | Stop reason: ALTCHOICE

## 2019-07-22 RX ORDER — HYDROXYZINE HYDROCHLORIDE 10 MG/1
TABLET, FILM COATED ORAL
Qty: 30 TAB | Refills: 1 | Status: SHIPPED | OUTPATIENT
Start: 2019-07-22 | End: 2019-10-13 | Stop reason: ALTCHOICE

## 2019-07-22 NOTE — PATIENT INSTRUCTIONS
Mr. Myriam Mann was notified today for home anticoagulation monitoring for 7/17/19 the diagnosis of Atrial Fibrillation. His INR goal is 2.0-3.0 and his current Coumadin dose is 5 mg x 2 days  A week and 2.5 mg all other days. Today's findings include an INR of 2.7 (normal INR range 0.8-1.2) . Considering Mr. Gely Boyd past history, todays findings, and per the coumadin policy/protocol, Mr. Patti Oliveira was instructed to take Coumadin as follows,  Same dose. He was also instructed to schedule an appointment in 1 weeks prior to leaving for an INR check. A full discussion of the nature of anticoagulants has been carried out. A full discussion of the need for frequent and regular monitoring, precise dosage adjustment and compliance was stressed. Side effects of potential bleeding were discussed and Mr. Patti Oliveira was instructed to call [phone] if there are any signs of abnormal bleeding. Mr. Patti Oliveira was instructed to avoid any OTC items containing aspirin or ibuprofen and prior to starting any new OTC products to consult with his physician or pharmacist to ensure no drug interactions are present. Mr. Patti Oliveira was instructed to avoid any major changes in his general diet and to avoid alcohol consumption. .        Mr. Patti Oliveira verbalized his understanding of all instructions and will call the office with any questions, concerns, or signs of abnormal bleeding or blood clot.

## 2019-07-22 NOTE — PROGRESS NOTES
1. Have you been to the ER, urgent care clinic since your last visit? Hospitalized since your last visit? [facility] for a bug bite    2. Have you seen or consulted any other health care providers outside of the [address] [place] [address] since your last visit? Include any pap smears or colon screening.  No        Chief Complaint   Patient presents with    Hives     x 1 week after patient started taking Cephalexin 250mg

## 2019-07-23 NOTE — TELEPHONE ENCOUNTER
Last seen 19  Next appt  19    Requested Prescriptions     Pending Prescriptions Disp Refills    warfarin (COUMADIN) 5 mg tablet 100 Tab 1     Si tab on Mon, Wed, Fri.   1/2 tab on the other days

## 2019-07-23 NOTE — PROGRESS NOTES
The patient presents to the office today with the chief complaint of hives    HPI    The patient is status post an insect bite on the left side of the scrotum. The area becamed infected. The patient was prescribed Keflex. The patient now has hives over his entire body. This is quite pruritic. The scrotal lesion is well healed. The patient remains on Coumadin due to atrial fibrillation. His protimes have been doing we. .. Review of Systems   Respiratory: Negative for cough and shortness of breath. Cardiovascular: Negative for leg swelling. Skin: Positive for itching and rash. Allergies   Allergen Reactions    Cephalexin Hives and Itching    Zetia [Ezetimibe] Other (comments)     Back pain resolved off Zetia    Lipitor [Atorvastatin] Myalgia    Livalo [Pitavastatin] Other (comments)     Extreme fatigue    5/29/14   PATIENT DENIES    Zocor [Simvastatin] Myalgia     5/29/14 PATIENT DENIES        Current Outpatient Medications   Medication Sig Dispense Refill    predniSONE (DELTASONE) 20 mg tablet 4 tab daily x 2 days, 3 tab daily x 2 days, 2 tab daily x 4 days, 1 tab daily x 4 days 26 Tab 0    hydrOXYzine HCl (ATARAX) 10 mg tablet 1 tab three times per day as needed for itching 30 Tab 1    warfarin (COUMADIN) 5 mg tablet 1 tab on Mon, Wed, Fri.   1/2 tab on the other days 100 Tab 1    metoprolol tartrate (LOPRESSOR) 50 mg tablet Take 1 Tab by mouth two (2) times a day. 180 Tab 3    cyanocobalamin (VITAMIN B12) 1,000 mcg/mL injection INJECT 1 MILLILITER INTRAMUSCULARLY EVERY 3 WEEKS 1 mL 8    ezetimibe (ZETIA) 10 mg tablet Take 1 Tab by mouth daily. 90 Tab 2    benazepril (LOTENSIN) 20 mg tablet Take 1 Tab by mouth daily. 90 Tab 3    furosemide (LASIX) 40 mg tablet TAKE 1 TABLET DAILY 90 Tab 3    rosuvastatin (CRESTOR) 5 mg tablet Take 1 Tab by mouth every Monday, Wednesday, Friday. 90 Tab 3    omeprazole (PRILOSEC) 40 mg capsule Take 40 mg by mouth daily.       potassium chloride (KLOR-CON M10) 10 mEq tablet Take 10 mEq by mouth daily.  LUMIGAN 0.01 % ophthalmic drops Administer 1 Drop to both eyes nightly.  AZOPT 1 % ophthalmic suspension Administer 1 Drop to both eyes two (2) times a day. 3    multivitamin (ONE A DAY) tablet Take 1 Tab by mouth daily. Past Medical History:   Diagnosis Date    AICD (automatic cardioverter/defibrillator) present     Medtronic  upgrade from pacer in 2007 due to VT    Arthritis     [facility] fibrillation ([facility]     Cancer ([facility] 02/2019    melenoma on left leg    Cardiac cath 03/19/2007    LM 25% ostial.  Otherwise patent coronaries. LVEDP 20.  EF 50%. Dyssynch. mid anterior contraction. Pacemaker.  Cardiac echocardiogram 03/20/2014    A-fib. EF 65-70%. No RWMA. No RWMA. Mild conc LVH. Mild RVE. RVSP 40-45 mmHg. Mild LAE.  HERNANDEZ. Mild MR. Mod TR.  Cardiac nuclear imaging test 07/29/2014    No ischemia or prior infarction. EF 68%. Nondiagnostic EKG due to V pacing on pharm stress test.  Low risk.  Cardioversion 8/31/2011    Successful defibrillation threshold testing at 18 joules. Patient also had conversion to atrial ventricular pacing.      CHF (congestive heart failure) ([facility]     Cobalamin deficiency     Dupuytren contracture 02/08/2010    on the right ring finger     Edema     GERD (gastroesophageal reflux disease)     Hypertension     Hypertrophy of prostate with urinary obstruction and other lower urinary tract symptoms (LUTS)     Ill-defined condition 2007    AICD    Impotence of organic origin     Intolerance of drug     Intolerant high doses of sotalol due to prolonged QT    Mastodynia     Paroxysmal VT ([facility]     Pure hypercholesterolemia     S/P ablation of atrial fibrillation 05/31/2011    S/P ablation of atrial fibrillation 12/18/2012    Dr. Ashley Yuen at [facility] S/P ablation operation for arrhythmia     VT ablation by Dr. Diego Tabares near [address] [place] [address] 2/3007    Sick sinus syndrome [facility])        Past Surgical History:   Procedure Laterality Date    COLONOSCOPY N/A 9/12/2018    COLONOSCOPY with Polypectomies and Clip Placement performed by Lucho Watts MD at [address] [place]X AFIB ABLATION      [facility] by Dr. Marilou Infante  02/08-03/08    bilateral cataract removed    HX CATARACT REMOVAL  2/2008 & 3/2008    bilateral    HX ENDOSCOPY  09/12/2018    Dr. Alon Desouza - pathology indicates changes of acid reflux and Garcia's esophagus, repeat EGD in 2 years    HX ORTHOPAEDIC  2/8/10    release of Dupuytren's contraction on ring finger of right hand    HX OTHER SURGICAL  6/2000    atrial lead placement    HX OTHER SURGICAL  6/5/2009    Cardioversion    HX OTHER SURGICAL  10/12/2012    cardioversion    HX PACEMAKER  1998    placement    HX PACEMAKER  6/2000    DDD    HX PACEMAKER  3/27/07    removal of pacemaker & placement of dual chamber defib generator and leads    HX TONSIL AND ADENOIDECTOMY         Social History     Socioeconomic History    Marital status:      Spouse name: Not on file    Number of children: Not on file    Years of education: Not on file    Highest education level: Not on file   Occupational History    Not on file   Social Needs    Financial resource strain: Not on file    Food insecurity:     Worry: Not on file     Inability: Not on file    Transportation needs:     Medical: Not on file     Non-medical: Not on file   Tobacco Use    Smoking status: Never Smoker    Smokeless tobacco: Never Used   Substance and Sexual Activity    Alcohol use:  Yes     Alcohol/week: 10.0 - 12.0 standard drinks     Types: 10 - 12 Glasses of wine per week     Frequency: 4 or more times a week     Drinks per session: 1 or 2     Binge frequency: Never     Comment: 2-3 glasses of white wine about 5-6 days/week)    Drug use: No    Sexual activity: Not Currently     Partners: Female   Lifestyle    Physical activity: Days per week: Not on file     Minutes per session: Not on file    Stress: Not on file   Relationships    Social connections:     Talks on phone: Not on file     Gets together: Not on file     Attends Hindu service: Not on file     Active member of club or organization: Not on file     Attends meetings of clubs or organizations: Not on file     Relationship status: Not on file    Intimate partner violence:     Fear of current or ex partner: Not on file     Emotionally abused: Not on file     Physically abused: Not on file     Forced sexual activity: Not on file   Other Topics Concern    Not on file   Social History Narrative    Not on file       Patient does have an advanced directive on file    Visit Vitals  /64 (BP 1 Location: Left arm, BP Patient Position: Sitting)   Pulse 85   Temp 97.6 °F (36.4 °C) (Tympanic)   Resp 16   Ht 6' (1.829 m)   Wt 204 lb (92.5 kg)   SpO2 97%   BMI 27.67 kg/m²       Physical Exam   Cardiovascular: Normal rate and regular rhythm. Exam reveals no gallop. No murmur heard. Pulmonary/Chest: He has no wheezes. He has no rales. Musculoskeletal: He exhibits no edema. Skin:   Rash consistent with a drug reaction over his entire body       BMI:  OK      . No results found for any visits on 07/22/19. Assessment / Plan      ICD-10-CM ICD-9-CM    1. Antibiotic reaction T36.95XA E930.9    2. Insect bite of scrotum, subsequent encounter S30.863D V58.89     W57. XXXD 911.4    3. Chronic atrial fibrillation (HCC) I48.2 427.31      High dose Prednisone  Atarax for itching  he was advised to continue his maintenance medications  he is to call if symptoms persist over five days      Follow-up and Dispositions    · Return in about 3 months (around 11/5/2019). I asked Deborah Sharma if he has any questions and I answered the questions.   Deborah Sharma states that he understands the treatment plan and agrees with the treatment plan          THIS DOCUMENT WAS CREATED WITH A VOICE ACTIVATED DICTATION SYSTEM.   IT MAY CONTAIN TRANSCRIPTION ERRORS

## 2019-07-24 RX ORDER — WARFARIN SODIUM 5 MG/1
TABLET ORAL
Qty: 100 TAB | Refills: 1 | Status: SHIPPED | OUTPATIENT
Start: 2019-07-24 | End: 2019-12-27 | Stop reason: SDUPTHER

## 2019-07-29 ENCOUNTER — CLINICAL SUPPORT (OUTPATIENT)
Dept: FAMILY MEDICINE CLINIC | Facility: CLINIC | Age: 79
End: 2019-07-29

## 2019-07-29 ENCOUNTER — TELEPHONE ANTICOAG (OUTPATIENT)
Dept: FAMILY MEDICINE CLINIC | Facility: CLINIC | Age: 79
End: 2019-07-29

## 2019-07-29 DIAGNOSIS — E53.8 VITAMIN B 12 DEFICIENCY: Primary | ICD-10-CM

## 2019-07-29 DIAGNOSIS — I48.20 CHRONIC ATRIAL FIBRILLATION (HCC): ICD-10-CM

## 2019-07-29 RX ORDER — CYANOCOBALAMIN 1000 UG/ML
1000 INJECTION, SOLUTION INTRAMUSCULAR; SUBCUTANEOUS ONCE
Qty: 1 ML | Refills: 0 | Status: SHIPPED | COMMUNITY
Start: 2019-07-29 | End: 2019-07-29

## 2019-07-29 NOTE — PROGRESS NOTES
Patient presented to office for b12 1ml injection. Allergies noted. Patient well and consenting to injection. Injection given intramuscular in right deltoid. Patient tolerated injection well and left office ambulatory.

## 2019-08-14 ENCOUNTER — TELEPHONE (OUTPATIENT)
Dept: FAMILY MEDICINE CLINIC | Facility: CLINIC | Age: 79
End: 2019-08-14

## 2019-08-14 NOTE — TELEPHONE ENCOUNTER
I spoke to Dr Montilla Records regarding Inr of 4.1 and patient takes Warfarin 5 mg x 2 days a week and 2.5 mg the other days. Per Dr. Wallace Young have patient skip 2 days and then go back on the same dose. Patient was called and spoke with patients wife and stated the directions of above and she understood the directions.

## 2019-08-15 ENCOUNTER — TELEPHONE ANTICOAG (OUTPATIENT)
Dept: FAMILY MEDICINE CLINIC | Facility: CLINIC | Age: 79
End: 2019-08-15

## 2019-08-15 DIAGNOSIS — I48.20 CHRONIC ATRIAL FIBRILLATION (HCC): ICD-10-CM

## 2019-08-19 ENCOUNTER — CLINICAL SUPPORT (OUTPATIENT)
Dept: FAMILY MEDICINE CLINIC | Facility: CLINIC | Age: 79
End: 2019-08-19

## 2019-08-19 DIAGNOSIS — E53.8 VITAMIN B 12 DEFICIENCY: Primary | ICD-10-CM

## 2019-08-19 RX ORDER — CYANOCOBALAMIN 1000 UG/ML
1000 INJECTION, SOLUTION INTRAMUSCULAR; SUBCUTANEOUS ONCE
Qty: 1 ML | Refills: 0
Start: 2019-08-19 | End: 2019-08-19

## 2019-08-19 NOTE — PROGRESS NOTES
After obtaining consent, and per orders of Dr. Sharon Tripp, injection of B-12 in left deltoid given by Jonny Pal. Patient tolerated medication well.

## 2019-08-22 ENCOUNTER — TELEPHONE ANTICOAG (OUTPATIENT)
Dept: FAMILY MEDICINE CLINIC | Facility: CLINIC | Age: 79
End: 2019-08-22

## 2019-08-22 ENCOUNTER — OFFICE VISIT (OUTPATIENT)
Dept: FAMILY MEDICINE CLINIC | Facility: CLINIC | Age: 79
End: 2019-08-22

## 2019-08-22 VITALS
TEMPERATURE: 96.4 F | DIASTOLIC BLOOD PRESSURE: 60 MMHG | WEIGHT: 202 LBS | HEIGHT: 72 IN | HEART RATE: 95 BPM | OXYGEN SATURATION: 97 % | SYSTOLIC BLOOD PRESSURE: 100 MMHG | BODY MASS INDEX: 27.36 KG/M2 | RESPIRATION RATE: 14 BRPM

## 2019-08-22 DIAGNOSIS — I48.20 CHRONIC ATRIAL FIBRILLATION (HCC): ICD-10-CM

## 2019-08-22 DIAGNOSIS — I10 ESSENTIAL HYPERTENSION: Primary | ICD-10-CM

## 2019-08-22 NOTE — PROGRESS NOTES
Davrivera Barberyamileth presents today for No chief complaint on file. [facility] preferred language for health care discussion is english. Is someone accompanying this pt? no    Is the patient using any DME equipment during [address]? no    Depression Screening:  3 most recent PHQ Screens 8/22/2019   Little interest or pleasure in doing things Not at all   Feeling down, depressed, irritable, or hopeless Not at all   Total Score PHQ 2 0       Learning Assessment:  Learning Assessment 4/12/2017   PRIMARY LEARNER Patient   HIGHEST LEVEL OF EDUCATION - PRIMARY LEARNER  GRADUATED HIGH SCHOOL OR GED   BARRIERS PRIMARY LEARNER NONE   CO-LEARNER CAREGIVER No   PRIMARY LANGUAGE ENGLISH   LEARNER PREFERENCE PRIMARY DEMONSTRATION   ANSWERED BY arnoldo   RELATIONSHIP SELF       Abuse Screening:  Abuse Screening Questionnaire 1/7/2019   Do you ever feel afraid of your partner? N   Are you in a relationship with someone who physically or mentally threatens you? N   Is it safe for you to go home? Y       Fall Risk  Fall Risk Assessment, last 12 mths 8/22/2019   Able to walk? Yes   Fall in past 12 months? No       Health Maintenance reviewed and discussed and ordered per Provider. Health Maintenance Due   Topic Date Due    Shingrix Vaccine Age 49> (2 of 2) 05/23/2019    Influenza Age 5 to Adult  08/01/2019    GLAUCOMA SCREENING Q2Y  08/16/2019   . [facility] is updated on all     Pt currently taking Antiplatelet therapy? Yes warfarin    Coordination of Care:  1. Have you been to the ER, urgent care clinic since your last visit? no Hospitalized since your last visit? no    2. Have you seen or consulted any other health care providers outside of the [address] [place] [address] since your last visit? no Include any pap smears or colon screening.  no

## 2019-08-22 NOTE — PATIENT INSTRUCTIONS
Mr. Nahum Orr was notified today for anticoagulation home monitoring for 8/21/19 the diagnosis of Atrial Fibrillation. His INR goal is 2.0-3.0 and his current Coumadin dose is 2.5 mg x 4 days a week, 5 mg x 3 days a week. Today's findings include an INR of 3.6 (normal INR range 0.8-1.2) . Considering Mr. Remy Rule past history, todays findings, and per the coumadin policy/protocol, Mr. Devyn Walsh was instructed to take Coumadin as follows,  Skip x 2 days then go to same dose ( patient is taking an antibiotic ). He was also instructed to schedule an appointment in 1 weeks prior to leaving for an INR check. A full discussion of the nature of anticoagulants has been carried out. A full discussion of the need for frequent and regular monitoring, precise dosage adjustment and compliance was stressed. Side effects of potential bleeding were discussed and Mr. Devyn Walsh was instructed to call [phone] if there are any signs of abnormal bleeding. Mr. Devyn Walsh was instructed to avoid any OTC items containing aspirin or ibuprofen and prior to starting any new OTC products to consult with his physician or pharmacist to ensure no drug interactions are present. Mr. Devyn Walsh was instructed to avoid any major changes in his general diet and to avoid alcohol consumption. .         Mr. Devyn Walsh verbalized his understanding of all instructions and will call the office with any questions, concerns, or signs of abnormal bleeding or blood clot.

## 2019-08-24 NOTE — PROGRESS NOTES
The patient presents to the office today with the chief complaint of hypertension. HPI    The patient remains on Coumadin due to atrial fibrillation. His protimes are monitored weekly. He is status post AV node ablation. He has a pacer - defibrillator in place. The patient remains on Benazepril and Metoprolol for hypertension. He is doing well on the medications. Lately his BP has been on the low side. Review of Systems   Respiratory: Negative for shortness of breath. Cardiovascular: Negative for chest pain, palpitations and leg swelling. Allergies   Allergen Reactions    Cephalexin Hives and Itching    Zetia [Ezetimibe] Other (comments)     Back pain resolved off Zetia    Lipitor [Atorvastatin] Myalgia    Livalo [Pitavastatin] Other (comments)     Extreme fatigue    5/29/14   PATIENT DENIES    Zocor [Simvastatin] Myalgia     5/29/14 PATIENT DENIES        Current Outpatient Medications   Medication Sig Dispense Refill    warfarin (COUMADIN) 5 mg tablet 1 tab on Mon, Wed, Fri.   1/2 tab on the other days 100 Tab 1    metoprolol tartrate (LOPRESSOR) 50 mg tablet Take 1 Tab by mouth two (2) times a day. 180 Tab 3    cyanocobalamin (VITAMIN B12) 1,000 mcg/mL injection INJECT 1 MILLILITER INTRAMUSCULARLY EVERY 3 WEEKS 1 mL 8    ezetimibe (ZETIA) 10 mg tablet Take 1 Tab by mouth daily. 90 Tab 2    benazepril (LOTENSIN) 20 mg tablet Take 1 Tab by mouth daily. 90 Tab 3    furosemide (LASIX) 40 mg tablet TAKE 1 TABLET DAILY 90 Tab 3    rosuvastatin (CRESTOR) 5 mg tablet Take 1 Tab by mouth every Monday, Wednesday, Friday. 90 Tab 3    omeprazole (PRILOSEC) 40 mg capsule Take 40 mg by mouth daily.  potassium chloride (KLOR-CON M10) 10 mEq tablet Take 10 mEq by mouth daily.  LUMIGAN 0.01 % ophthalmic drops Administer 1 Drop to both eyes nightly.  AZOPT 1 % ophthalmic suspension Administer 1 Drop to both eyes two (2) times a day.   3    multivitamin (ONE A DAY) tablet Take 1 Tab by mouth daily.  hydrOXYzine HCl (ATARAX) 10 mg tablet 1 tab three times per day as needed for itching 30 Tab 1       Past Medical History:   Diagnosis Date    AICD (automatic cardioverter/defibrillator) present     [employer]  upgrade from pacer in 2007 due to VT    Arthritis     [facility]     Cancer ([facility] 02/2019    melenoma on left leg    Cardiac cath 03/19/2007    LM 25% ostial.  Otherwise patent coronaries. LVEDP 20.  EF 50%. Dyssynch. mid anterior contraction. Pacemaker.  Cardiac echocardiogram 03/20/2014    A-fib. EF 65-70%. No RWMA. No RWMA. Mild conc LVH. Mild RVE. RVSP 40-45 mmHg. Mild LAE.  HERNANDEZ. Mild MR. Mod TR.  Cardiac nuclear imaging test 07/29/2014    No ischemia or prior infarction. EF 68%. Nondiagnostic EKG due to V pacing on pharm stress test.  Low risk.  Cardioversion 8/31/2011    Successful defibrillation threshold testing at 18 joules. Patient also had conversion to atrial ventricular pacing.      CHF (congestive heart failure) ([facility]     Cobalamin deficiency     Dupuytren contracture 02/08/2010    on the right ring finger     Edema     GERD (gastroesophageal reflux disease)     Hypertension     Hypertrophy of prostate with urinary obstruction and other lower urinary tract symptoms (LUTS)     Ill-defined condition 2007    AICD    Impotence of organic origin     Intolerance of drug     Intolerant high doses of sotalol due to prolonged QT    Mastodynia     Paroxysmal VT ([facility]     Pure hypercholesterolemia     S/P ablation of atrial fibrillation 05/31/2011    S/P ablation of atrial fibrillation 12/18/2012    Dr. Edilberto Pierson at [facility] S/P ablation operation for arrhythmia     VT ablation by Dr. Jeremy Dixon near [address] [place] [address] 2/3007    Sick sinus syndrome [facility])        Past Surgical History:   Procedure Laterality Date    COLONOSCOPY N/A 9/12/2018    COLONOSCOPY with Polypectomies and Clip Placement performed by Cathy Love eDandra Garibay MD at [address] HX [place] HX AFIB ABLATION      [facility] by Dr. Robel Chaudhari  02/08-03/08    bilateral cataract removed    HX CATARACT REMOVAL  2/2008 & 3/2008    bilateral    HX ENDOSCOPY  09/12/2018    Dr. Deandra Garibay - pathology indicates changes of acid reflux and Garcia's esophagus, repeat EGD in 2 years    HX ORTHOPAEDIC  2/8/10    release of Dupuytren's contraction on ring finger of right hand    HX OTHER SURGICAL  6/2000    atrial lead placement    HX OTHER SURGICAL  6/5/2009    Cardioversion    HX OTHER SURGICAL  10/12/2012    cardioversion    HX PACEMAKER  1998    placement    HX PACEMAKER  6/2000    DDD    HX PACEMAKER  3/27/07    removal of pacemaker & placement of dual chamber defib generator and leads    HX TONSIL AND ADENOIDECTOMY         Social History     Socioeconomic History    Marital status:      Spouse name: Not on file    Number of children: Not on file    Years of education: Not on file    Highest education level: Not on file   Occupational History    Not on file   Social Needs    Financial resource strain: Not on file    Food insecurity:     Worry: Not on file     Inability: Not on file    Transportation needs:     Medical: Not on file     Non-medical: Not on file   Tobacco Use    Smoking status: Never Smoker    Smokeless tobacco: Never Used   Substance and Sexual Activity    Alcohol use:  Yes     Alcohol/week: 10.0 - 12.0 standard drinks     Types: 10 - 12 Glasses of wine per week     Frequency: 4 or more times a week     Drinks per session: 1 or 2     Binge frequency: Never     Comment: 2-3 glasses of white wine about 5-6 days/week)    Drug use: No    Sexual activity: Not Currently     Partners: Female   Lifestyle    Physical activity:     Days per week: Not on file     Minutes per session: Not on file    Stress: Not on file   Relationships    Social connections:     Talks on phone: Not on file     Gets together: Not on file     Attends Alevism service: Not on file     Active member of club or organization: Not on file     Attends meetings of clubs or organizations: Not on file     Relationship status: Not on file    Intimate partner violence:     Fear of current or ex partner: Not on file     Emotionally abused: Not on file     Physically abused: Not on file     Forced sexual activity: Not on file   Other Topics Concern    Not on file   Social History Narrative    Not on file       Patient does have an advanced directive on file    Visit Vitals  /60 (BP 1 Location: Left arm, BP Patient Position: Sitting)   Pulse 95   Temp 96.4 °F (35.8 °C) (Tympanic)   Resp 14   Ht 6' (1.829 m)   Wt 202 lb (91.6 kg)   SpO2 97%   BMI 27.40 kg/m²       Physical Exam  No Cervical Lymphadenopathy  No Supraclavicular Lymphadenopathy  Thyroid is Normal  Lungs are normal to percussion. Clear to auscultation   Heart:  S1 S2 are normal, No gallops, No murmurs  No Carotid Bruits  Abdomen:  Normal Bowel Sounds. No tenderness. No masses. No Hepatomegaly or Splenomegaly  LE:  Strong Pedal Pulses. No Edema      BMI:  OK      . No results found for any visits on 08/22/19. Assessment / Plan      ICD-10-CM ICD-9-CM    1. Essential hypertension Z31 172.2 METABOLIC PANEL, BASIC   2. Chronic atrial fibrillation (HCC) I48.2 427.31        he was advised to continue his maintenance medications  Will follow BP    Follow-up and Dispositions    · Return in about 6 months (around 2/22/2020). I asked Kong Escalona if he has any questions and I answered the questions. Kong Escalona states that he understands the treatment plan and agrees with the treatment plan          THIS DOCUMENT WAS CREATED WITH A VOICE ACTIVATED DICTATION SYSTEM.   IT MAY CONTAIN TRANSCRIPTION ERRORS

## 2019-09-05 ENCOUNTER — TELEPHONE ANTICOAG (OUTPATIENT)
Dept: FAMILY MEDICINE CLINIC | Facility: CLINIC | Age: 79
End: 2019-09-05

## 2019-09-05 DIAGNOSIS — I48.20 CHRONIC ATRIAL FIBRILLATION (HCC): ICD-10-CM

## 2019-09-05 NOTE — PATIENT INSTRUCTIONS
Mr. Myriam Mann was notified for anticoagulation home monitoring  8/28/19 for the diagnosis of Atrial Fibrillation. His INR goal is 2.0-3.0 and his current Coumadin dose is 2.5 mg x 4 days a week, 5 mg all other days. Today's findings include an INR of 2.7 (normal INR range 0.8-1.2) . Considering Mr. Gely Boyd past history, todays findings, and per the coumadin policy/protocol, Mr. Patti Oliveira was instructed to take Coumadin as follows,  Same dose. He was also instructed to schedule an appointment in 1 weeks prior to leaving for an INR check. A full discussion of the nature of anticoagulants has been carried out. A full discussion of the need for frequent and regular monitoring, precise dosage adjustment and compliance was stressed. Side effects of potential bleeding were discussed and Mr. Patti Oliveira was instructed to call [phone] if there are any signs of abnormal bleeding. Mr. Patti Oliveira was instructed to avoid any OTC items containing aspirin or ibuprofen and prior to starting any new OTC products to consult with his physician or pharmacist to ensure no drug interactions are present. Mr. Patti Oliveira was instructed to avoid any major changes in his general diet and to avoid alcohol consumption. .        Mr. Patti Oliveira verbalized his understanding of all instructions and will call the office with any questions, concerns, or signs of abnormal bleeding or blood clot.

## 2019-09-06 ENCOUNTER — TELEPHONE ANTICOAG (OUTPATIENT)
Dept: FAMILY MEDICINE CLINIC | Facility: CLINIC | Age: 79
End: 2019-09-06

## 2019-09-06 ENCOUNTER — TELEPHONE (OUTPATIENT)
Dept: FAMILY MEDICINE CLINIC | Facility: CLINIC | Age: 79
End: 2019-09-06

## 2019-09-06 DIAGNOSIS — I48.20 CHRONIC ATRIAL FIBRILLATION (HCC): ICD-10-CM

## 2019-09-06 NOTE — PATIENT INSTRUCTIONS
Mr. Henrique Grewal was notified today for anticoagulation home monitoring 9/4/19 for the diagnosis of Atrial Fibrillation. His INR goal is 2.0-3.0 and his current Coumadin dose is 2.5 mg x 4 days a week, 5 mg x 3 days a week. Today's findings include an INR of 4.8 (normal INR range 0.8-1.2) . Considering Mr. Maria L Wood past history, todays findings, and per the coumadin policy/protocol, Mr. Ciro Harvey was instructed to take Coumadin as follows,  Stop x 3 days 9/5, 9/6, 9/7 then go back to 2.5 mg x 4 days a week and 5 mg x 3 days a week. He was also instructed to schedule an appointment in 1 weeks prior to leaving for an INR check. A full discussion of the nature of anticoagulants has been carried out. A full discussion of the need for frequent and regular monitoring, precise dosage adjustment and compliance was stressed. Side effects of potential bleeding were discussed and Mr. Ciro Harvey was instructed to call [phone] if there are any signs of abnormal bleeding. Mr. Ciro Harvey was instructed to avoid any OTC items containing aspirin or ibuprofen and prior to starting any new OTC products to consult with his physician or pharmacist to ensure no drug interactions are present. Mr. Ciro Harvey was instructed to avoid any major changes in his general diet and to avoid alcohol consumption. .        Mr. Ciro Harvey verbalized his understanding of all instructions and will call the office with any questions, concerns, or signs of abnormal bleeding or blood clot.

## 2019-09-09 ENCOUNTER — CLINICAL SUPPORT (OUTPATIENT)
Dept: FAMILY MEDICINE CLINIC | Facility: CLINIC | Age: 79
End: 2019-09-09

## 2019-09-09 DIAGNOSIS — E53.8 VITAMIN B 12 DEFICIENCY: Primary | ICD-10-CM

## 2019-09-09 RX ORDER — CYANOCOBALAMIN 1000 UG/ML
1000 INJECTION, SOLUTION INTRAMUSCULAR; SUBCUTANEOUS ONCE
Qty: 1 ML | Refills: 0 | Status: SHIPPED | COMMUNITY
Start: 2019-09-09 | End: 2019-09-09

## 2019-09-11 ENCOUNTER — OFFICE VISIT (OUTPATIENT)
Dept: CARDIOLOGY CLINIC | Age: 79
End: 2019-09-11

## 2019-09-11 ENCOUNTER — TELEPHONE ANTICOAG (OUTPATIENT)
Dept: FAMILY MEDICINE CLINIC | Facility: CLINIC | Age: 79
End: 2019-09-11

## 2019-09-11 VITALS
SYSTOLIC BLOOD PRESSURE: 110 MMHG | BODY MASS INDEX: 27.9 KG/M2 | DIASTOLIC BLOOD PRESSURE: 54 MMHG | WEIGHT: 206 LBS | HEIGHT: 72 IN | HEART RATE: 91 BPM | OXYGEN SATURATION: 98 %

## 2019-09-11 DIAGNOSIS — Z95.810 AICD (AUTOMATIC CARDIOVERTER/DEFIBRILLATOR) PRESENT: ICD-10-CM

## 2019-09-11 DIAGNOSIS — I48.20 CHRONIC ATRIAL FIBRILLATION (HCC): ICD-10-CM

## 2019-09-11 DIAGNOSIS — E78.00 PURE HYPERCHOLESTEROLEMIA: ICD-10-CM

## 2019-09-11 DIAGNOSIS — I47.29 PAROXYSMAL VT: Primary | ICD-10-CM

## 2019-09-11 DIAGNOSIS — R42 DIZZINESS: Primary | ICD-10-CM

## 2019-09-11 DIAGNOSIS — I10 ESSENTIAL HYPERTENSION: ICD-10-CM

## 2019-09-11 DIAGNOSIS — I50.32 CHRONIC DIASTOLIC CONGESTIVE HEART FAILURE (HCC): ICD-10-CM

## 2019-09-11 NOTE — PROGRESS NOTES
HPI:   I saw Scott Worley in my office today in cardiovascular evaluation regarding his chronic atrial fibrillation and some diastolic failure issues in the past. Mr. Kong Clements is a very pleasant 67 year old white male with a rather complex cardiovascular history, including problems with atrial fibrillation and ventricular tachycardia. He's had a very long and complicated past cardiovascular course in terms of management of his ventricular tachycardia and atrial fibrillation, which is well outlined in his chart and my [facility] note of 4/24/13.       He ultimately has gone into atrial fibrillation after numerous atrial fibrillation ablations and the last of which was by Dr. Kalli De Dios at [facility] in December of 2012, and we have now been managing him with rate control and the use of Lopressor and Coumadin for anticoagulation.     He comes in today and relates that he is doing reasonably well although he has noticed that his blood pressures a little bit on the low side frequently in the 90 to 500 systolic range and he has been having some vague dizziness from time to time which is concerning him. He is not having any other cardiovascular complaints at this time. Encounter Diagnoses   Name Primary?  Dizziness Yes    Chronic atrial fibrillation (HCC)     Chronic diastolic congestive heart failure (HCC)     Medtronic AICD, upgrade from pacer in 2007 due to VT     Essential hypertension     Pure hypercholesterolemia        Discussion: This patient appears to be doing reasonably well except for his dizziness issues and is borderline low blood pressure. I think the first thing we should do is check a CBC and a BMP to be sure he is not anemic or prerenal in view of the fact that he is on daily Lasix for his mild chronic diastolic heart failure.   If these tests are both normal then I think we will have to further cut down on his antihypertensive medications and possibly decrease his Lasix to some degree as well. His latest lipid profile which was completed on March 21, 2019 demonstrated total cholesterol of 201 with triglycerides of 83, HDL of 55, LDL of 129.4, and VLDL of 16.6 and at that time we added Zetia 10 mg daily to his Crestor which she was taking at 5 mg 3 days a week due to myalgia issues and he is tolerated the Zetia so hopefully a repeat lipid profile will show a significant improvement. If not we could consider him for a PCSK9 inhibitor. His blood pressure is very well controlled today and we were going to check his pacemaker today but unfortunately were unable to do that so we are going to have him send a transmission tomorrow and will make further recommendations if there is any abnormality at that time with plans on seeing him again in 6 months. PCP: Henrietta Carney MD      Past Medical History:   Diagnosis Date    AICD (automatic cardioverter/defibrillator) present     Medtronic  upgrade from pacer in 2007 due to VT    Arthritis     [facility]     Cancer ([facility] 02/2019    melenoma on left leg    Cardiac cath 03/19/2007    LM 25% ostial.  Otherwise patent coronaries. LVEDP 20.  EF 50%. Dyssynch. mid anterior contraction. Pacemaker.  Cardiac echocardiogram 03/20/2014    A-fib. EF 65-70%. No RWMA. No RWMA. Mild conc LVH. Mild RVE. RVSP 40-45 mmHg. Mild LAE.  HERNANDEZ. Mild MR. Mod TR.  Cardiac nuclear imaging test 07/29/2014    No ischemia or prior infarction. EF 68%. Nondiagnostic EKG due to V pacing on pharm stress test.  Low risk.  Cardioversion 8/31/2011    Successful defibrillation threshold testing at 18 joules. Patient also had conversion to atrial ventricular pacing.      CHF (congestive heart failure) (HCC)     Cobalamin deficiency     Dupuytren contracture 02/08/2010    on the right ring finger     Edema     GERD (gastroesophageal reflux disease)     Hypertension     Hypertrophy of prostate with urinary obstruction and other lower urinary tract symptoms (LUTS)     Ill-defined condition 2007    AICD    Impotence of organic origin     Intolerance of drug     Intolerant high doses of sotalol due to prolonged QT    Mastodynia     Paroxysmal VT ([address]     Pure hypercholesterolemia     S/P ablation of atrial fibrillation 05/31/2011    S/P ablation of atrial fibrillation 12/18/2012    Dr. Macey Kumari at [facility] S/P ablation operation for arrhythmia     VT ablation by Dr. Rayo Cervantes near [place] 2/3007    Sick sinus syndrome [facility])          Past Surgical History:   Procedure Laterality Date    COLONOSCOPY N/A 9/12/2018    COLONOSCOPY with Polypectomies and Clip Placement performed by Adi Wiggins MD at [address] [place] [address] [address] by Dr. Meka Rich HX CATARACT REMOVAL  02/08-03/08    bilateral cataract removed    HX CATARACT REMOVAL  2/2008 & 3/2008    bilateral    HX ENDOSCOPY  09/12/2018    Dr. Brittnee Sanchez - pathology indicates changes of acid reflux and Garcia's esophagus, repeat EGD in 2 years    HX ORTHOPAEDIC  2/8/10    release of Dupuytren's contraction on ring finger of right hand    HX OTHER SURGICAL  6/2000    atrial lead placement    HX OTHER SURGICAL  6/5/2009    Cardioversion    HX OTHER SURGICAL  10/12/2012    cardioversion    HX PACEMAKER  1998    placement    HX PACEMAKER  6/2000    DDD    HX PACEMAKER  3/27/07    removal of pacemaker & placement of dual chamber defib generator and leads    HX TONSIL AND ADENOIDECTOMY         Current Outpatient Medications   Medication Sig    warfarin (COUMADIN) 5 mg tablet 1 tab on Mon, Wed, Fri.   1/2 tab on the other days    hydrOXYzine HCl (ATARAX) 10 mg tablet 1 tab three times per day as needed for itching    metoprolol tartrate (LOPRESSOR) 50 mg tablet Take 1 Tab by mouth two (2) times a day.     cyanocobalamin (VITAMIN B12) 1,000 mcg/mL injection INJECT 1 MILLILITER INTRAMUSCULARLY EVERY 3 WEEKS  ezetimibe (ZETIA) 10 mg tablet Take 1 Tab by mouth daily.  benazepril (LOTENSIN) 20 mg tablet Take 1 Tab by mouth daily.  furosemide (LASIX) 40 mg tablet TAKE 1 TABLET DAILY    rosuvastatin (CRESTOR) 5 mg tablet Take 1 Tab by mouth every Monday, Wednesday, Friday.  omeprazole (PRILOSEC) 40 mg capsule Take 40 mg by mouth daily.  potassium chloride (KLOR-CON M10) 10 mEq tablet Take 10 mEq by mouth daily.  LUMIGAN 0.01 % ophthalmic drops Administer 1 Drop to both eyes nightly.  AZOPT 1 % ophthalmic suspension Administer 1 Drop to both eyes two (2) times a day.  multivitamin (ONE A DAY) tablet Take 1 Tab by mouth daily. No current facility-administered medications for this visit. Allergies   Allergen Reactions    Cephalexin Hives and Itching    Zetia [Ezetimibe] Other (comments)     Back pain resolved off Zetia    Lipitor [Atorvastatin] Myalgia    Livalo [Pitavastatin] Other (comments)     Extreme fatigue    5/29/14   PATIENT DENIES    Zocor [Simvastatin] Myalgia     5/29/14 PATIENT DENIES        Social History:   Social History     Tobacco Use    Smoking status: Never Smoker    Smokeless tobacco: Never Used   Substance Use Topics    Alcohol use: Yes     Alcohol/week: 10.0 - 12.0 standard drinks     Types: 10 - 12 Glasses of wine per week     Frequency: 4 or more times a week     Drinks per session: 1 or 2     Binge frequency: Never     Comment: 2-3 glasses of white wine about 5-6 days/week)           Family history: family history includes COPD in his father; Hypertension in an other family member; No Known Problems in his mother. Review of Systems:   Constitutional: Negative. Respiratory: Negative. Cardiovascular: Negative. Gastrointestinal: Negative. Musculoskeletal: Positive for joint pain. Negative for back pain, falls, myalgias and neck pain. Physical Exam:   The patient is an alert, oriented, well developed, well nourished 70 y.o.   male who was in no acute distress at the time of my examination. Visit Vitals  /54   Pulse 91   Ht 6' (1.829 m)   Wt 93.4 kg (206 lb)   SpO2 98%   BMI 27.94 kg/m²      BP Readings from Last 3 Encounters:   09/11/19 110/54   08/22/19 100/60   07/22/19 106/64        Wt Readings from Last 3 Encounters:   09/11/19 93.4 kg (206 lb)   08/22/19 91.6 kg (202 lb)   07/22/19 92.5 kg (204 lb)       HEENT: Conjunctivae pink, sclera clear and white, symmetrical with no lag. Neck: Supple without masses, tenderness or thyromegaly. No jugular venous distention. Carotid upstrokes are full bilaterally, without bruits. Cardiovascular: Normal pacer site in left upper chest.  Chest is symmetrical with good excursion. [place] is not displaced. No lifts, heaves or thrills. S1 and S2 are normal, without appreciable murmurs, rubs, clicks or gallops. Lungs: Clear to auscultation bilaterally. Abdomen: Soft and non-tender. Extremities: No edema with full peripheral pulses     Review of Data: See PMH and Cardiology and Imaging sections for cardiac testing      Results for orders placed or performed in visit on 08/20/18   AMB POC EKG ROUTINE W/ 12 LEADS, INTER & REP     Status: None    Narrative    Ventricularly paced rhythm with a rate of 81. This EKG is similar to his tracing of December 13, 2017. Khadijah Coe D.O., F.A.C.C. [facility]  [facility]  [address] [place] [address]. [address]    PLEASE NOTE:  This document has been produced using voice recognition software. Unrecognized errors in transcription may be present.

## 2019-09-12 ENCOUNTER — TELEPHONE ANTICOAG (OUTPATIENT)
Dept: FAMILY MEDICINE CLINIC | Facility: CLINIC | Age: 79
End: 2019-09-12

## 2019-09-12 ENCOUNTER — TELEPHONE (OUTPATIENT)
Dept: FAMILY MEDICINE CLINIC | Facility: CLINIC | Age: 79
End: 2019-09-12

## 2019-09-12 ENCOUNTER — HOSPITAL ENCOUNTER (OUTPATIENT)
Dept: LAB | Age: 79
Discharge: HOME | End: 2019-09-12
Payer: MEDICARE

## 2019-09-12 ENCOUNTER — TELEPHONE (OUTPATIENT)
Dept: CARDIOLOGY CLINIC | Age: 79
End: 2019-09-12

## 2019-09-12 DIAGNOSIS — I48.0 PAROXYSMAL ATRIAL FIBRILLATION (HCC): ICD-10-CM

## 2019-09-12 DIAGNOSIS — I48.20 CHRONIC ATRIAL FIBRILLATION (HCC): ICD-10-CM

## 2019-09-12 DIAGNOSIS — R42 DIZZINESS: ICD-10-CM

## 2019-09-12 LAB
ANION GAP SERPL CALC-SCNC: 5 MMOL/L (ref 3–18)
BASOPHILS # BLD: 0 K/UL (ref 0–0.1)
BASOPHILS NFR BLD: 1 % (ref 0–2)
BUN SERPL-MCNC: 13 MG/DL (ref 7–18)
BUN/CREAT SERPL: 12 (ref 12–20)
CALCIUM SERPL-MCNC: 8.8 MG/DL (ref 8.5–10.1)
CHLORIDE SERPL-SCNC: 106 MMOL/L (ref 100–111)
CO2 SERPL-SCNC: 29 MMOL/L (ref 21–32)
CREAT SERPL-MCNC: 1.11 MG/DL (ref 0.6–1.3)
DIFFERENTIAL METHOD BLD: ABNORMAL
EOSINOPHIL # BLD: 0.3 K/UL (ref 0–0.4)
EOSINOPHIL NFR BLD: 4 % (ref 0–5)
ERYTHROCYTE [DISTWIDTH] IN BLOOD BY AUTOMATED COUNT: 12.9 % (ref 11.6–14.5)
GLUCOSE SERPL-MCNC: 92 MG/DL (ref 74–99)
HCT VFR BLD AUTO: 42.1 % (ref 36–48)
HGB BLD-MCNC: 14.2 G/DL (ref 13–16)
LYMPHOCYTES # BLD: 1.9 K/UL (ref 0.9–3.6)
LYMPHOCYTES NFR BLD: 30 % (ref 21–52)
MCH RBC QN AUTO: 32.3 PG (ref 24–34)
MCHC RBC AUTO-ENTMCNC: 33.7 G/DL (ref 31–37)
MCV RBC AUTO: 95.7 FL (ref 74–97)
MONOCYTES # BLD: 0.8 K/UL (ref 0.05–1.2)
MONOCYTES NFR BLD: 12 % (ref 3–10)
NEUTS SEG # BLD: 3.4 K/UL (ref 1.8–8)
NEUTS SEG NFR BLD: 53 % (ref 40–73)
PLATELET # BLD AUTO: 232 K/UL (ref 135–420)
PMV BLD AUTO: 11.5 FL (ref 9.2–11.8)
POTASSIUM SERPL-SCNC: 4.1 MMOL/L (ref 3.5–5.5)
RBC # BLD AUTO: 4.4 M/UL (ref 4.7–5.5)
SODIUM SERPL-SCNC: 140 MMOL/L (ref 136–145)
WBC # BLD AUTO: 6.4 K/UL (ref 4.6–13.2)

## 2019-09-12 PROCEDURE — 80048 BASIC METABOLIC PNL TOTAL CA: CPT

## 2019-09-12 PROCEDURE — 36415 COLL VENOUS BLD VENIPUNCTURE: CPT

## 2019-09-12 PROCEDURE — 85025 COMPLETE CBC W/AUTO DIFF WBC: CPT

## 2019-09-12 NOTE — PATIENT INSTRUCTIONS
Mr. Andra Davenport was notified 9/11/19 or anticoagulation home monitoring  for the diagnosis of Atrial Fibrillation. His INR goal is 2.0-3.0 and his current Coumadin dose is 2.5 mg x 4 days a week , 5 mg x 3 days a week. .     Today's findings include an INR of 2.2 (normal INR range 0.8-1.2) . Considering Mr. Wilda Mcnulty past history, todays findings, and per the coumadin policy/protocol, Mr. Rosie Coe was instructed to take Coumadin as follows,  Same dose. He was also instructed to schedule an appointifiedment in 1 weeks prior to leaving for an INR check. A full discussion of the nature of anticoagulants has been carried out. A full discussion of the need for frequent and regular monitoring, precise dosage adjustment and compliance was stressed. Side effects of potential bleeding were discussed and Mr. Rosie Coe was instructed to call [phone] if there are any signs of abnormal bleeding. Mr. Rosie Coe was instructed to avoid any OTC items containing aspirin or ibuprofen and prior to starting any new OTC products to consult with his physician or pharmacist to ensure no drug interactions are present. Mr. Rosie Coe was instructed to avoid any major changes in his general diet and to avoid alcohol consumption. .        Mr. Rosie Coe verbalized his understanding of all instructions and will call the office with any questions, concerns, or signs of abnormal bleeding or blood clot.

## 2019-09-12 NOTE — PROGRESS NOTES
I have personally seen and evaluated the device findings. Interrogation reviewed and I agree with assessment.     Onesimo Nissen

## 2019-09-12 NOTE — TELEPHONE ENCOUNTER
I called and talked to the patient about his lab work. His lab work appears normal, so I told him to cut back on his Lasix from 40 mg to 20 mg daily for a while and let his weight come up a few pounds and see if that helps with the dizziness.  ES

## 2019-09-16 ENCOUNTER — HOSPITAL ENCOUNTER (OUTPATIENT)
Dept: LAB | Age: 79
Discharge: HOME | End: 2019-09-16
Payer: MEDICARE

## 2019-09-16 DIAGNOSIS — E78.00 PURE HYPERCHOLESTEROLEMIA: ICD-10-CM

## 2019-09-16 LAB
ALBUMIN SERPL-MCNC: 3.2 G/DL (ref 3.4–5)
ALBUMIN/GLOB SERPL: 1 {RATIO} (ref 0.8–1.7)
ALP SERPL-CCNC: 67 U/L (ref 45–117)
ALT SERPL-CCNC: 23 U/L (ref 16–61)
AST SERPL-CCNC: 19 U/L (ref 10–38)
BILIRUB DIRECT SERPL-MCNC: 0.2 MG/DL (ref 0–0.2)
BILIRUB SERPL-MCNC: 0.6 MG/DL (ref 0.2–1)
CHOLEST SERPL-MCNC: 160 MG/DL
GLOBULIN SER CALC-MCNC: 3.2 G/DL (ref 2–4)
HDLC SERPL-MCNC: 49 MG/DL (ref 40–60)
HDLC SERPL: 3.3 {RATIO} (ref 0–5)
LDLC SERPL CALC-MCNC: 94.2 MG/DL (ref 0–100)
LIPID PROFILE,FLP: NORMAL
PROT SERPL-MCNC: 6.4 G/DL (ref 6.4–8.2)
TRIGL SERPL-MCNC: 84 MG/DL (ref ?–150)
VLDLC SERPL CALC-MCNC: 16.8 MG/DL

## 2019-09-16 PROCEDURE — 36415 COLL VENOUS BLD VENIPUNCTURE: CPT

## 2019-09-16 PROCEDURE — 80076 HEPATIC FUNCTION PANEL: CPT

## 2019-09-16 PROCEDURE — 80061 LIPID PANEL: CPT

## 2019-09-19 NOTE — PROGRESS NOTES
This is much better, but still not optimal LDL of 94 still too high and I would try to get him approved for Praluent or [address]. Please let the patient know.  ES

## 2019-09-26 ENCOUNTER — TELEPHONE (OUTPATIENT)
Dept: CARDIOLOGY CLINIC | Age: 79
End: 2019-09-26

## 2019-09-26 NOTE — TELEPHONE ENCOUNTER
Called patient, verified by two identifiers, name and . Patient notified of lab results and agreeable to try [address] [place] [address] Avenue or Praluent. I believe [address] [place] [address] Avenue is the preferred for his insurance so we will try that fist. All questions answered at time of phone call.

## 2019-09-26 NOTE — TELEPHONE ENCOUNTER
----- Message from Sandra Bah DO sent at 9/19/2019  4:16 PM EDT -----  This is much better, but still not optimal LDL of 94 still too high and I would try to get him approved for Praluent or [place]. Please let the patient know.  ES

## 2019-09-30 ENCOUNTER — CLINICAL SUPPORT (OUTPATIENT)
Dept: FAMILY MEDICINE CLINIC | Facility: CLINIC | Age: 79
End: 2019-09-30

## 2019-09-30 ENCOUNTER — HOSPITAL ENCOUNTER (OUTPATIENT)
Dept: LAB | Age: 79
Discharge: HOME | End: 2019-09-30
Payer: MEDICARE

## 2019-09-30 DIAGNOSIS — E53.8 VITAMIN B 12 DEFICIENCY: Primary | ICD-10-CM

## 2019-09-30 DIAGNOSIS — I10 ESSENTIAL HYPERTENSION: ICD-10-CM

## 2019-09-30 LAB
ANION GAP SERPL CALC-SCNC: 5 MMOL/L (ref 3–18)
BUN SERPL-MCNC: 18 MG/DL (ref 7–18)
BUN/CREAT SERPL: 16 (ref 12–20)
CALCIUM SERPL-MCNC: 9.2 MG/DL (ref 8.5–10.1)
CHLORIDE SERPL-SCNC: 107 MMOL/L (ref 100–111)
CO2 SERPL-SCNC: 28 MMOL/L (ref 21–32)
CREAT SERPL-MCNC: 1.11 MG/DL (ref 0.6–1.3)
GLUCOSE SERPL-MCNC: 102 MG/DL (ref 74–99)
POTASSIUM SERPL-SCNC: 4.5 MMOL/L (ref 3.5–5.5)
SODIUM SERPL-SCNC: 140 MMOL/L (ref 136–145)

## 2019-09-30 PROCEDURE — 80048 BASIC METABOLIC PNL TOTAL CA: CPT

## 2019-09-30 PROCEDURE — 36415 COLL VENOUS BLD VENIPUNCTURE: CPT

## 2019-09-30 RX ORDER — CYANOCOBALAMIN 1000 UG/ML
1000 INJECTION, SOLUTION INTRAMUSCULAR; SUBCUTANEOUS ONCE
Qty: 1 ML | Refills: 0 | Status: SHIPPED | COMMUNITY
Start: 2019-09-30 | End: 2019-09-30

## 2019-10-03 ENCOUNTER — TELEPHONE (OUTPATIENT)
Dept: FAMILY MEDICINE CLINIC | Facility: CLINIC | Age: 79
End: 2019-10-03

## 2019-10-09 ENCOUNTER — TELEPHONE (OUTPATIENT)
Dept: CARDIOLOGY CLINIC | Age: 79
End: 2019-10-09

## 2019-10-09 DIAGNOSIS — R42 DIZZINESS: ICD-10-CM

## 2019-10-09 DIAGNOSIS — R06.02 SHORTNESS OF BREATH: ICD-10-CM

## 2019-10-09 DIAGNOSIS — I50.32 CHRONIC DIASTOLIC CONGESTIVE HEART FAILURE (HCC): Primary | ICD-10-CM

## 2019-10-09 DIAGNOSIS — I47.29 PAROXYSMAL VT: ICD-10-CM

## 2019-10-09 NOTE — TELEPHONE ENCOUNTER
Patient sent in a carelink transmission , Patient device is V pacing 99%. Pt is in chronic atrial fib and is on coumadin. Patient states he is just not feeling well. He states he has episodes were he feels similar to when his device is manually checked in the office. Patient states he also had an episode at the food pantry were his he was very sweaty and sob, he took his blood pressure and it was elevated 180/70's but it went down after couple of minutes. Patient device check did not show any ventricular events. Patient has a 6949 lead and measures are stable. Patient states he saw Dr Beth Barney not to long ago and medications were changed. Patient states blood pressure was getting low. Lotensin was decreased to 10 mg daily and lasix decreased to 20 mg daily. Reviewing patient device further , patient OPvol measure is very elevated. This shows the medication change that was made in August at Dr Beth Barney visit. Patient states he has been losing weight not gaining any. Reviewed all information and symptoms with Dr Kianna Doty at time of phone call. Verbal order and read back per Edgar Paez, DO  Pharm nuc due to Sob   CBC, BMP, BNP. Patient is aware that  will call for the Stress test and he can go to [address] or [facility] for Lab work. Patient aware to call if anything changes with symptoms .

## 2019-10-11 ENCOUNTER — HOSPITAL ENCOUNTER (OUTPATIENT)
Dept: LAB | Age: 79
Discharge: HOME | End: 2019-10-11
Payer: MEDICARE

## 2019-10-11 ENCOUNTER — OFFICE VISIT (OUTPATIENT)
Dept: FAMILY MEDICINE CLINIC | Facility: CLINIC | Age: 79
End: 2019-10-11

## 2019-10-11 VITALS
WEIGHT: 206 LBS | RESPIRATION RATE: 14 BRPM | TEMPERATURE: 97.3 F | HEIGHT: 72 IN | SYSTOLIC BLOOD PRESSURE: 98 MMHG | OXYGEN SATURATION: 97 % | BODY MASS INDEX: 27.9 KG/M2 | HEART RATE: 96 BPM | DIASTOLIC BLOOD PRESSURE: 60 MMHG

## 2019-10-11 DIAGNOSIS — I50.32 CHRONIC DIASTOLIC CONGESTIVE HEART FAILURE (HCC): ICD-10-CM

## 2019-10-11 DIAGNOSIS — I48.20 CHRONIC ATRIAL FIBRILLATION (HCC): ICD-10-CM

## 2019-10-11 DIAGNOSIS — R42 DIZZINESS: ICD-10-CM

## 2019-10-11 DIAGNOSIS — I10 ESSENTIAL HYPERTENSION: Primary | ICD-10-CM

## 2019-10-11 LAB
ANION GAP SERPL CALC-SCNC: 4 MMOL/L (ref 3–18)
BASOPHILS # BLD: 0 K/UL (ref 0–0.1)
BASOPHILS NFR BLD: 0 % (ref 0–2)
BNP SERPL-MCNC: 1456 PG/ML (ref 0–1800)
BUN SERPL-MCNC: 15 MG/DL (ref 7–18)
BUN/CREAT SERPL: 14 (ref 12–20)
CALCIUM SERPL-MCNC: 8.5 MG/DL (ref 8.5–10.1)
CHLORIDE SERPL-SCNC: 108 MMOL/L (ref 100–111)
CO2 SERPL-SCNC: 27 MMOL/L (ref 21–32)
CREAT SERPL-MCNC: 1.11 MG/DL (ref 0.6–1.3)
DIFFERENTIAL METHOD BLD: ABNORMAL
EOSINOPHIL # BLD: 0.2 K/UL (ref 0–0.4)
EOSINOPHIL NFR BLD: 3 % (ref 0–5)
ERYTHROCYTE [DISTWIDTH] IN BLOOD BY AUTOMATED COUNT: 13.1 % (ref 11.6–14.5)
GLUCOSE SERPL-MCNC: 86 MG/DL (ref 74–99)
HCT VFR BLD AUTO: 42.2 % (ref 36–48)
HGB BLD-MCNC: 14.4 G/DL (ref 13–16)
LYMPHOCYTES # BLD: 1.7 K/UL (ref 0.9–3.6)
LYMPHOCYTES NFR BLD: 28 % (ref 21–52)
MCH RBC QN AUTO: 32.3 PG (ref 24–34)
MCHC RBC AUTO-ENTMCNC: 34.1 G/DL (ref 31–37)
MCV RBC AUTO: 94.6 FL (ref 74–97)
MONOCYTES # BLD: 0.7 K/UL (ref 0.05–1.2)
MONOCYTES NFR BLD: 12 % (ref 3–10)
NEUTS SEG # BLD: 3.4 K/UL (ref 1.8–8)
NEUTS SEG NFR BLD: 57 % (ref 40–73)
PLATELET # BLD AUTO: 229 K/UL (ref 135–420)
PMV BLD AUTO: 11.2 FL (ref 9.2–11.8)
POTASSIUM SERPL-SCNC: 4.2 MMOL/L (ref 3.5–5.5)
RBC # BLD AUTO: 4.46 M/UL (ref 4.7–5.5)
SODIUM SERPL-SCNC: 139 MMOL/L (ref 136–145)
WBC # BLD AUTO: 6.1 K/UL (ref 4.6–13.2)

## 2019-10-11 PROCEDURE — 83880 ASSAY OF NATRIURETIC PEPTIDE: CPT

## 2019-10-11 PROCEDURE — 85025 COMPLETE CBC W/AUTO DIFF WBC: CPT

## 2019-10-11 PROCEDURE — 36415 COLL VENOUS BLD VENIPUNCTURE: CPT

## 2019-10-11 PROCEDURE — 80048 BASIC METABOLIC PNL TOTAL CA: CPT

## 2019-10-11 RX ORDER — ACETAMINOPHEN 160 MG/5ML
SUSPENSION, ORAL (FINAL DOSE FORM) ORAL DAILY
COMMUNITY

## 2019-10-11 RX ORDER — AMLODIPINE BESYLATE 2.5 MG/1
TABLET ORAL
Qty: 90 TAB | Refills: 3 | Status: SHIPPED | OUTPATIENT
Start: 2019-10-11 | End: 2020-01-31

## 2019-10-11 RX ORDER — ASPIRIN 81 MG/1
TABLET ORAL DAILY
COMMUNITY

## 2019-10-11 NOTE — PROGRESS NOTES
Mathieu Phelan presents today for   Chief Complaint   Patient presents with    Well Male       Mathieu Phelan preferred language for health care discussion is english. Is someone accompanying this pt? no    Is the patient using any DME equipment during [address]? no    Depression Screening:  3 most recent PHQ Screens 8/22/2019   Little interest or pleasure in doing things Not at all   Feeling down, depressed, irritable, or hopeless Not at all   Total Score PHQ 2 0       Learning Assessment:  Learning Assessment 4/12/2017   PRIMARY LEARNER Patient   HIGHEST LEVEL OF EDUCATION - PRIMARY LEARNER  GRADUATED HIGH SCHOOL OR GED   BARRIERS PRIMARY LEARNER NONE   CO-LEARNER CAREGIVER No   PRIMARY LANGUAGE ENGLISH   LEARNER PREFERENCE PRIMARY DEMONSTRATION   ANSWERED BY arnoldo   RELATIONSHIP SELF       Abuse Screening:  Abuse Screening Questionnaire 1/7/2019   Do you ever feel afraid of your partner? N   Are you in a relationship with someone who physically or mentally threatens you? N   Is it safe for you to go home? Y       Fall Risk  Fall Risk Assessment, last 12 mths 9/11/2019   Able to walk? Yes   Fall in past 12 months? No       Health Maintenance reviewed and discussed and ordered per Provider. Health Maintenance Due   Topic Date Due    Shingrix Vaccine Age 49> (2 of 2) 05/23/2019    Influenza Age 5 to Adult  08/01/2019    GLAUCOMA SCREENING Q2Y  08/16/2019   . Mathieu Phelan is updated on all     Pt currently taking Antiplatelet therapy? no    Coordination of Care:  1. Have you been to the ER, urgent care clinic since your last visit? no Hospitalized since your last visit? no    2. Have you seen or consulted any other health care providers outside of the [address] [place] [address] since your last visit? no Include any pap smears or colon screening.  no

## 2019-10-13 NOTE — PROGRESS NOTES
The patient presents to the office today with the chief complaint of fluctuations of BP    HPI    The patient complains of fluctuations in BP - at times associated with headaches. The headaches occur when the BP goes high. No dizziness. The patient had been off Lasix but she had worsening breathing. He is back on the diuretic. The patient remains on coumadin due to atrial fibrillation. He has had AV node ablation. He has a pacemaker and defibrillator in place. Review of Systems   Respiratory: Negative for shortness of breath. Cardiovascular: Negative for chest pain and leg swelling. Neurological: Positive for headaches. Allergies   Allergen Reactions    Cephalexin Hives and Itching    Zetia [Ezetimibe] Other (comments)     Back pain resolved off Zetia    Lipitor [Atorvastatin] Myalgia    Livalo [Pitavastatin] Other (comments)     Extreme fatigue    5/29/14   PATIENT DENIES    Zocor [Simvastatin] Myalgia     5/29/14 PATIENT DENIES        Current Outpatient Medications   Medication Sig Dispense Refill    coenzyme Q-10 (CO Q-10) 200 mg capsule Take  by mouth daily.  aspirin delayed-release 81 mg tablet Take  by mouth daily.  warfarin (COUMADIN) 5 mg tablet 1 tab on Mon, Wed, Fri.   1/2 tab on the other days 100 Tab 1    metoprolol tartrate (LOPRESSOR) 50 mg tablet Take 1 Tab by mouth two (2) times a day. 180 Tab 3    cyanocobalamin (VITAMIN B12) 1,000 mcg/mL injection INJECT 1 MILLILITER INTRAMUSCULARLY EVERY 3 WEEKS 1 mL 8    ezetimibe (ZETIA) 10 mg tablet Take 1 Tab by mouth daily. 90 Tab 2    benazepril (LOTENSIN) 20 mg tablet Take 1 Tab by mouth daily. 90 Tab 3    furosemide (LASIX) 40 mg tablet TAKE 1 TABLET DAILY 90 Tab 3    rosuvastatin (CRESTOR) 5 mg tablet Take 1 Tab by mouth every Monday, Wednesday, Friday. 90 Tab 3    potassium chloride (KLOR-CON M10) 10 mEq tablet Take 10 mEq by mouth daily.       LUMIGAN 0.01 % ophthalmic drops Administer 1 Drop to both eyes nightly.  AZOPT 1 % ophthalmic suspension Administer 1 Drop to both eyes two (2) times a day. 3    multivitamin (ONE A DAY) tablet Take 1 Tab by mouth daily.  amLODIPine (NORVASC) 2.5 mg tablet 1 tablet daily (For BP). (Stop Benazepril) 90 Tab 3       Past Medical History:   Diagnosis Date    AICD (automatic cardioverter/defibrillator) present     Medtronic  upgrade from pacer in 2007 due to VT    Arthritis     [facility] ([facility]     Cancer ([facility] 02/2019    melenoma on left leg    Cardiac cath 03/19/2007    LM 25% ostial.  Otherwise patent coronaries. LVEDP 20.  EF 50%. Dyssynch. mid anterior contraction. Pacemaker.  Cardiac echocardiogram 03/20/2014    A-fib. EF 65-70%. No RWMA. No RWMA. Mild conc LVH. Mild RVE. RVSP 40-45 mmHg. Mild LAE.  HERNANDEZ. Mild MR. Mod TR.  Cardiac nuclear imaging test 07/29/2014    No ischemia or prior infarction. EF 68%. Nondiagnostic EKG due to V pacing on pharm stress test.  Low risk.  Cardioversion 8/31/2011    Successful defibrillation threshold testing at 18 joules. Patient also had conversion to atrial ventricular pacing.      [facility]     Cobalamin deficiency     Dupuytren contracture 02/08/2010    on the right ring finger     Edema     GERD (gastroesophageal reflux disease)     Hypertension     Hypertrophy of prostate with urinary obstruction and other lower urinary tract symptoms (LUTS)     Ill-defined condition 2007    AICD    Impotence of organic origin     Intolerance of drug     Intolerant high doses of sotalol due to prolonged QT    Mastodynia     [facility]oxysmal [facility] ([facility]     Pure hypercholesterolemia     S/P ablation of atrial fibrillation 05/31/2011    S/P ablation of atrial fibrillation 12/18/2012    Dr. Ozzie Wilkes at [facility] S/P ablation operation for arrhythmia     VT ablation by Dr. Donna Pham near [address] [place] [address] 2/3007    Sick sinus syndrome [facility])        Past Surgical History:   Procedure Laterality Date    COLONOSCOPY N/A 9/12/2018    COLONOSCOPY with Polypectomies and Clip Placement performed by Dre Lopez MD at [address] [place] [address] [address] by Dr. Dilma Barbour  02/08-03/08    bilateral cataract removed    HX CATARACT REMOVAL  2/2008 & 3/2008    bilateral    HX ENDOSCOPY  09/12/2018    Dr. Robert Bella - pathology indicates changes of acid reflux and Garcia's esophagus, repeat EGD in 2 years    HX ORTHOPAEDIC  2/8/10    release of Dupuytren's contraction on ring finger of right hand    HX OTHER SURGICAL  6/2000    atrial lead placement    HX OTHER SURGICAL  6/5/2009    Cardioversion    HX OTHER SURGICAL  10/12/2012    cardioversion    HX PACEMAKER  1998    placement    HX PACEMAKER  6/2000    DDD    HX PACEMAKER  3/27/07    removal of pacemaker & placement of dual chamber defib generator and leads    HX TONSIL AND ADENOIDECTOMY         Social History     Socioeconomic History    Marital status:      Spouse name: Not on file    Number of children: Not on file    Years of education: Not on file    Highest education level: Not on file   Occupational History    Not on file   Social Needs    Financial resource strain: Not on file    Food insecurity:     Worry: Not on file     Inability: Not on file    Transportation needs:     Medical: Not on file     Non-medical: Not on file   Tobacco Use    Smoking status: Never Smoker    Smokeless tobacco: Never Used   Substance and Sexual Activity    Alcohol use:  Yes     Alcohol/week: 10.0 - 12.0 standard drinks     Types: 10 - 12 Glasses of wine per week     Frequency: 4 or more times a week     Drinks per session: 1 or 2     Binge frequency: Never     Comment: 2-3 glasses of white wine about 5-6 days/week)    Drug use: No    Sexual activity: Not Currently     Partners: Female   Lifestyle    Physical activity:     Days per week: Not on file Minutes per session: Not on file    Stress: Not on file   Relationships    Social connections:     Talks on phone: Not on file     Gets together: Not on file     Attends Jew service: Not on file     Active member of club or organization: Not on file     Attends meetings of clubs or organizations: Not on file     Relationship status: Not on file    Intimate partner violence:     Fear of current or ex partner: Not on file     Emotionally abused: Not on file     Physically abused: Not on file     Forced sexual activity: Not on file   Other Topics Concern    Not on file   Social History Narrative    Not on file       Patient does have an advanced directive on file    Visit Vitals  BP 98/60 (BP 1 Location: Right arm, BP Patient Position: Sitting)   Pulse 96   Temp 97.3 °F (36.3 °C) (Tympanic)   Resp 14   Ht 6' (1.829 m)   Wt 206 lb (93.4 kg)   SpO2 97%   BMI 27.94 kg/m²       Physical Exam  No Cervical Lymphadenopathy  No Supraclavicular Lymphadenopathy  Thyroid is Normal  Lungs are normal to percussion. Clear to auscultation   Heart:  S1 S2 are normal, No gallops, No murmurs  No Carotid Bruits  Abdomen:  Normal Bowel Sounds. No tenderness. No masses. No Hepatomegaly or Splenomegaly  LE:  Strong Pedal Pulses. No Edema      BMI:  OK        Assessment / Plan      ICD-10-CM ICD-9-CM    1. Essential hypertension I10 401.9    2. Chronic atrial fibrillation I48.20 427.31        Discontinue Benazepril  Begin Norvasc 2.5 mg daily  he was advised to continue his maintenance medications  Follow headache symptoms      Follow-up and Dispositions    · Return in about 3 months (around 1/11/2020). I asked Christiane Baird if he has any questions and I answered the questions. Christiane Baird states that he understands the treatment plan and agrees with the treatment plan          THIS DOCUMENT WAS CREATED WITH A VOICE ACTIVATED DICTATION SYSTEM.   IT MAY CONTAIN TRANSCRIPTION ERRORS

## 2019-10-14 NOTE — PROGRESS NOTES
Per telephone encounter from 10/09/19'  Patient sent in a carelink transmission , Patient device is V pacing 99%. Pt is in chronic atrial fib and is on coumadin. Patient states he is just not feeling well. He states he has episodes were he feels similar to when his device is manually checked in the office. Patient states he also had an episode at the food pantry were his he was very sweaty and sob, he took his blood pressure and it was elevated 180/70's but it went down after couple of minutes. Patient device check did not show any ventricular events. Patient has a 6949 lead and measures are stable. Patient states he saw Dr Martha Deluca not to long ago and medications were changed. Patient states blood pressure was getting low. Lotensin was decreased to 10 mg daily and lasix decreased to 20 mg daily. Reviewing patient device further , patient OPvol measure is very elevated. This shows the medication change that was made in August at Dr Martha Deluca visit. Patient states he has been losing weight not gaining any. Reviewed all  information and symptoms with Dr Rayo Henry at time of phone call.       Verbal order and read back per Graciela Florentino DO  Pharm nuc due to Sob   CBC, BMP, BNP.      Patient is aware that  will call for the Stress test and he can go to [facility] or [facility] for Lab work.      Patient aware to call if anything changes with symptoms .

## 2019-10-17 ENCOUNTER — HOSPITAL ENCOUNTER (OUTPATIENT)
Dept: NON INVASIVE DIAGNOSTICS | Age: 79
Discharge: HOME | End: 2019-10-17
Attending: INTERNAL MEDICINE
Payer: MEDICARE

## 2019-10-17 VITALS
BODY MASS INDEX: 27.9 KG/M2 | WEIGHT: 206 LBS | DIASTOLIC BLOOD PRESSURE: 87 MMHG | SYSTOLIC BLOOD PRESSURE: 151 MMHG | HEIGHT: 72 IN

## 2019-10-17 DIAGNOSIS — R06.02 SHORTNESS OF BREATH: ICD-10-CM

## 2019-10-17 DIAGNOSIS — I47.29 PAROXYSMAL VT: ICD-10-CM

## 2019-10-17 LAB
STRESS BASELINE DIAS BP: 87 MMHG
STRESS BASELINE HR: 70 BPM
STRESS BASELINE SYS BP: 151 MMHG
STRESS ESTIMATED WORKLOAD: 1 METS
STRESS EXERCISE DUR MIN: NORMAL
STRESS PEAK DIAS BP: 87 MMHG
STRESS PEAK SYS BP: 151 MMHG
STRESS PERCENT HR ACHIEVED: 52 %
STRESS POST PEAK HR: 75 BPM
STRESS RATE PRESSURE PRODUCT: NORMAL BPM*MMHG
STRESS ST DEPRESSION: 0 MM
STRESS ST ELEVATION: 0 MM
STRESS TARGET HR: 143 BPM

## 2019-10-17 PROCEDURE — 93017 CV STRESS TEST TRACING ONLY: CPT

## 2019-10-17 PROCEDURE — 74011250636 HC RX REV CODE- 250/636: Performed by: INTERNAL MEDICINE

## 2019-10-17 RX ORDER — SODIUM CHLORIDE 9 MG/ML
250 INJECTION, SOLUTION INTRAVENOUS ONCE
Status: COMPLETED | OUTPATIENT
Start: 2019-10-17 | End: 2019-10-17

## 2019-10-17 RX ADMIN — REGADENOSON 0.4 MG: 0.08 INJECTION, SOLUTION INTRAVENOUS at 10:35

## 2019-10-17 RX ADMIN — SODIUM CHLORIDE 250 ML: 900 INJECTION, SOLUTION INTRAVENOUS at 10:35

## 2019-10-17 NOTE — PROGRESS NOTES
Patient was given 10.7 milliCuries of 99mTc-Sestamibi for the resting images. Patient was also given 31.4 milliCuries of 99mTc-Sestamibi for the stress images. Injected with 0.4mg Lexiscan. Patient's armband was discarded and shredded.

## 2019-10-19 ENCOUNTER — TELEPHONE (OUTPATIENT)
Dept: CARDIOLOGY CLINIC | Age: 79
End: 2019-10-19

## 2019-10-19 NOTE — TELEPHONE ENCOUNTER
I called and talked to the patient about his labs and his nuclear myocardial perfusion study. His study appeared to be normal and now that he is back on Lasix 40 mg daily he is losing a little weight and feeling a little better. I told him to continue the same and let us know if he doesn't continue to improve or has other CV issues.  ES

## 2019-10-21 ENCOUNTER — CLINICAL SUPPORT (OUTPATIENT)
Dept: FAMILY MEDICINE CLINIC | Facility: CLINIC | Age: 79
End: 2019-10-21

## 2019-10-21 DIAGNOSIS — E53.8 VITAMIN B 12 DEFICIENCY: Primary | ICD-10-CM

## 2019-10-21 RX ORDER — CYANOCOBALAMIN 1000 UG/ML
1000 INJECTION, SOLUTION INTRAMUSCULAR; SUBCUTANEOUS ONCE
Qty: 1 ML | Refills: 0 | Status: SHIPPED | COMMUNITY
Start: 2019-10-21 | End: 2019-10-21

## 2019-10-22 ENCOUNTER — TELEPHONE (OUTPATIENT)
Dept: CARDIOLOGY CLINIC | Age: 79
End: 2019-10-22

## 2019-10-22 DIAGNOSIS — R06.02 SHORTNESS OF BREATH: Primary | ICD-10-CM

## 2019-10-22 NOTE — TELEPHONE ENCOUNTER
Patient called and states he know his results of his stress test and lab work but he is still having shortness of breath.      Verbal order and read back per Dwayne Root DO  Order PFT's      Patient aware of instructions

## 2019-10-24 ENCOUNTER — CLINICAL SUPPORT (OUTPATIENT)
Dept: CARDIOLOGY CLINIC | Age: 79
End: 2019-10-24

## 2019-10-24 DIAGNOSIS — I47.29 PAROXYSMAL VT: Primary | ICD-10-CM

## 2019-10-24 DIAGNOSIS — Z95.810 AICD (AUTOMATIC CARDIOVERTER/DEFIBRILLATOR) PRESENT: ICD-10-CM

## 2019-10-24 NOTE — PROGRESS NOTES
I have personally seen and evaluated the device findings. Interrogation reviewed and I agree with assessment.     Brisa Baron

## 2019-10-25 ENCOUNTER — HOSPITAL ENCOUNTER (OUTPATIENT)
Dept: RESPIRATORY THERAPY | Age: 79
Discharge: HOME | End: 2019-10-25
Attending: INTERNAL MEDICINE
Payer: MEDICARE

## 2019-10-25 DIAGNOSIS — R06.02 SHORTNESS OF BREATH: ICD-10-CM

## 2019-10-25 PROCEDURE — 94726 PLETHYSMOGRAPHY LUNG VOLUMES: CPT

## 2019-10-25 PROCEDURE — 94729 DIFFUSING CAPACITY: CPT

## 2019-10-25 PROCEDURE — 94060 EVALUATION OF WHEEZING: CPT

## 2019-10-28 ENCOUNTER — HOSPITAL ENCOUNTER (OUTPATIENT)
Dept: LAB | Age: 79
Discharge: HOME | End: 2019-10-28
Payer: MEDICARE

## 2019-10-28 ENCOUNTER — TELEPHONE (OUTPATIENT)
Dept: CARDIOLOGY CLINIC | Age: 79
End: 2019-10-28

## 2019-10-28 DIAGNOSIS — E78.00 PURE HYPERCHOLESTEROLEMIA: ICD-10-CM

## 2019-10-28 DIAGNOSIS — R00.2 PALPITATIONS: ICD-10-CM

## 2019-10-28 DIAGNOSIS — R00.2 PALPITATIONS: Primary | ICD-10-CM

## 2019-10-28 DIAGNOSIS — R06.02 SHORTNESS OF BREATH: ICD-10-CM

## 2019-10-28 LAB
CHOLEST SERPL-MCNC: 185 MG/DL
HDLC SERPL-MCNC: 52 MG/DL (ref 40–60)
HDLC SERPL: 3.6 {RATIO} (ref 0–5)
LDLC SERPL CALC-MCNC: 114.4 MG/DL (ref 0–100)
LIPID PROFILE,FLP: ABNORMAL
TRIGL SERPL-MCNC: 93 MG/DL (ref ?–150)
VLDLC SERPL CALC-MCNC: 18.6 MG/DL

## 2019-10-28 PROCEDURE — 84436 ASSAY OF TOTAL THYROXINE: CPT

## 2019-10-28 PROCEDURE — 80061 LIPID PANEL: CPT

## 2019-10-28 PROCEDURE — 36415 COLL VENOUS BLD VENIPUNCTURE: CPT

## 2019-11-01 ENCOUNTER — OFFICE VISIT (OUTPATIENT)
Dept: FAMILY MEDICINE CLINIC | Facility: CLINIC | Age: 79
End: 2019-11-01

## 2019-11-01 VITALS
RESPIRATION RATE: 16 BRPM | WEIGHT: 206 LBS | HEART RATE: 96 BPM | DIASTOLIC BLOOD PRESSURE: 62 MMHG | TEMPERATURE: 96.7 F | BODY MASS INDEX: 27.9 KG/M2 | HEIGHT: 72 IN | OXYGEN SATURATION: 98 % | SYSTOLIC BLOOD PRESSURE: 110 MMHG

## 2019-11-01 DIAGNOSIS — Z95.810 PRESENCE OF COMBINATION INTERNAL CARDIAC DEFIBRILLATOR (ICD) AND PACEMAKER: ICD-10-CM

## 2019-11-01 DIAGNOSIS — I10 ESSENTIAL HYPERTENSION: Primary | ICD-10-CM

## 2019-11-01 DIAGNOSIS — I50.22 CHRONIC SYSTOLIC CONGESTIVE HEART FAILURE (HCC): ICD-10-CM

## 2019-11-01 DIAGNOSIS — I48.20 CHRONIC ATRIAL FIBRILLATION (HCC): ICD-10-CM

## 2019-11-01 LAB
T4 SERPL-MCNC: 7.3 UG/DL (ref 4.5–12.1)
TSH SERPL DL<=0.05 MIU/L-ACNC: 1.68 UIU/ML (ref 0.36–3.74)

## 2019-11-01 NOTE — PROGRESS NOTES
Erica Lei presents today for   Chief Complaint   Patient presents with    Well Male    Hypertension     medication       Yael Avilez Cayden preferred language for health care discussion is english. Is someone accompanying this pt? no    Is the patient using any DME equipment during [address]? no    Depression Screening:  3 most recent PHQ Screens 8/22/2019   Little interest or pleasure in doing things Not at all   Feeling down, depressed, irritable, or hopeless Not at all   Total Score PHQ 2 0       Learning Assessment:  Learning Assessment 4/12/2017   PRIMARY LEARNER Patient   HIGHEST LEVEL OF EDUCATION - PRIMARY LEARNER  GRADUATED HIGH SCHOOL OR GED   BARRIERS PRIMARY LEARNER NONE   CO-LEARNER CAREGIVER No   PRIMARY LANGUAGE ENGLISH   LEARNER PREFERENCE PRIMARY DEMONSTRATION   ANSWERED BY arnoldo   RELATIONSHIP SELF       Abuse Screening:  Abuse Screening Questionnaire 1/7/2019   Do you ever feel afraid of your partner? N   Are you in a relationship with someone who physically or mentally threatens you? N   Is it safe for you to go home? Y       Fall Risk  Fall Risk Assessment, last 12 mths 9/11/2019   Able to walk? Yes   Fall in past 12 months? No       Health Maintenance reviewed and discussed and ordered per Provider. Health Maintenance Due   Topic Date Due    Shingrix Vaccine Age 49> (2 of 2) 05/23/2019    Influenza Age 5 to Adult  08/01/2019    GLAUCOMA SCREENING Q2Y  08/16/2019   . Erica Lei is updated on all     Pt currently taking Antiplatelet therapy? Yes warfarin    Coordination of Care:  1. Have you been to the ER, urgent care clinic since your last visit? no Hospitalized since your last visit? no    2. Have you seen or consulted any other health care providers outside of the [facility] since your last visit? no Include any pap smears or colon screening.  no

## 2019-11-02 NOTE — PROGRESS NOTES
I think the thyroid function and PFT's he is aware of, but I am not sure about lipids. He is supposed to be on Repatha. Please let him know results and see what he has been taking for cholesterol when this was done.  ES

## 2019-11-02 NOTE — PROGRESS NOTES
I talked to [facility] about this who discussed with the patient .  ES

## 2019-11-04 ENCOUNTER — TELEPHONE ANTICOAG (OUTPATIENT)
Dept: FAMILY MEDICINE CLINIC | Facility: CLINIC | Age: 79
End: 2019-11-04

## 2019-11-04 DIAGNOSIS — I48.0 PAROXYSMAL ATRIAL FIBRILLATION (HCC): ICD-10-CM

## 2019-11-04 RX ORDER — CYANOCOBALAMIN 1000 UG/ML
INJECTION, SOLUTION INTRAMUSCULAR; SUBCUTANEOUS
Qty: 1 ML | Refills: 8 | Status: SHIPPED | OUTPATIENT
Start: 2019-11-04 | End: 2020-04-30 | Stop reason: SDUPTHER

## 2019-11-04 NOTE — PROGRESS NOTES
The patient presents to the office today with the chief complaint of hypertension    HPI    The patient remains on Norvasc for hypertension. The patient is feeling better off Lotensin. He feels stronger and is breathing better  At one point the pacemaker was adjusted with an increased heart rate. The patient did worse with increased dyspnea. The implanted defibrillator has not fired. The pacemaker rate was adjusted. The patient's breathing improved. Review of Systems   Respiratory: Positive for shortness of breath (Mild dyspnea). Cardiovascular: Negative for chest pain and leg swelling. Neurological: Negative for dizziness. Allergies   Allergen Reactions    Cephalexin Hives and Itching    Zetia [Ezetimibe] Other (comments)     Back pain resolved off Zetia    Lipitor [Atorvastatin] Myalgia    Livalo [Pitavastatin] Other (comments)     Extreme fatigue    5/29/14   PATIENT DENIES    Zocor [Simvastatin] Myalgia     5/29/14 PATIENT DENIES        Current Outpatient Medications   Medication Sig Dispense Refill    evolocumab (REPATHA SURECLICK) pen injection 1 mL by SubCUTAneous route every fourteen (14) days. 6 Pen 3    coenzyme Q-10 (CO Q-10) 200 mg capsule Take  by mouth daily.  aspirin delayed-release 81 mg tablet Take  by mouth daily.  amLODIPine (NORVASC) 2.5 mg tablet 1 tablet daily (For BP). (Stop Benazepril) 90 Tab 3    warfarin (COUMADIN) 5 mg tablet 1 tab on Mon, Wed, Fri.   1/2 tab on the other days 100 Tab 1    metoprolol tartrate (LOPRESSOR) 50 mg tablet Take 1 Tab by mouth two (2) times a day. 180 Tab 3    ezetimibe (ZETIA) 10 mg tablet Take 1 Tab by mouth daily. 90 Tab 2    furosemide (LASIX) 40 mg tablet TAKE 1 TABLET DAILY 90 Tab 3    rosuvastatin (CRESTOR) 5 mg tablet Take 1 Tab by mouth every Monday, Wednesday, Friday. 90 Tab 3    potassium chloride (KLOR-CON M10) 10 mEq tablet Take 10 mEq by mouth daily.       LUMIGAN 0.01 % ophthalmic drops Administer 1 Drop to both eyes nightly.  AZOPT 1 % ophthalmic suspension Administer 1 Drop to both eyes two (2) times a day. 3    multivitamin (ONE A DAY) tablet Take 1 Tab by mouth daily.  cyanocobalamin (VITAMIN B12) 1,000 mcg/mL injection INJECT 1 ML INTRAMUSCULARLY EVERY 3 WEEKS 1 mL 8       Past Medical History:   Diagnosis Date    AICD (automatic cardioverter/defibrillator) present     Medtronic  upgrade from pacer in 2007 due to VT    Arthritis     [facility]     Cancer ([facility] 02/2019    melenoma on left leg    Cardiac cath 03/19/2007    LM 25% ostial.  Otherwise patent coronaries. LVEDP 20.  EF 50%. Dyssynch. mid anterior contraction. Pacemaker.  Cardiac echocardiogram 03/20/2014    A-fib. EF 65-70%. No RWMA. No RWMA. Mild conc LVH. Mild RVE. RVSP 40-45 mmHg. Mild LAE.  HERNANDEZ. Mild MR. Mod TR.  Cardiac nuclear imaging test 07/29/2014    No ischemia or prior infarction. EF 68%. Nondiagnostic EKG due to V pacing on pharm stress test.  Low risk.  Cardioversion 8/31/2011    Successful defibrillation threshold testing at 18 joules. Patient also had conversion to atrial ventricular pacing.      CHF (congestive heart failure) ([facility]     Cobalamin deficiency     Dupuytren contracture 02/08/2010    on the right ring finger     Edema     GERD (gastroesophageal reflux disease)     Hypertension     Hypertrophy of prostate with urinary obstruction and other lower urinary tract symptoms (LUTS)     Ill-defined condition 2007    AICD    Impotence of organic origin     Intolerance of drug     Intolerant high doses of sotalol due to prolonged QT    Mastodynia     Paroxysmal VT ([facility]     Pure hypercholesterolemia     S/P ablation of atrial fibrillation 05/31/2011    S/P ablation of atrial fibrillation 12/18/2012    Dr. Zackary Lux at [facility] S/P ablation operation for arrhythmia     VT ablation by Dr. Laura Zuñiga near [address] [place] [address] 2/3007    Sick sinus syndrome [facility])        Past Surgical History:   Procedure Laterality Date    COLONOSCOPY N/A 9/12/2018    COLONOSCOPY with Polypectomies and Clip Placement performed by Kym Alves MD at [address] HX [place] HX AFIB ABLATION      [place] by Dr. Otoniel Padron  02/08-03/08    bilateral cataract removed    HX CATARACT REMOVAL  2/2008 & 3/2008    bilateral    HX ENDOSCOPY  09/12/2018    Dr. Gerardo Washington - pathology indicates changes of acid reflux and Garcia's esophagus, repeat EGD in 2 years    HX ORTHOPAEDIC  2/8/10    release of Dupuytren's contraction on ring finger of right hand    HX OTHER SURGICAL  6/2000    atrial lead placement    HX OTHER SURGICAL  6/5/2009    Cardioversion    HX OTHER SURGICAL  10/12/2012    cardioversion    HX PACEMAKER  1998    placement    HX PACEMAKER  6/2000    DDD    HX PACEMAKER  3/27/07    removal of pacemaker & placement of dual chamber defib generator and leads    HX TONSIL AND ADENOIDECTOMY         Social History     Socioeconomic History    Marital status:      Spouse name: Not on file    Number of children: Not on file    Years of education: Not on file    Highest education level: Not on file   Occupational History    Not on file   Social Needs    Financial resource strain: Not on file    Food insecurity:     Worry: Not on file     Inability: Not on file    Transportation needs:     Medical: Not on file     Non-medical: Not on file   Tobacco Use    Smoking status: Never Smoker    Smokeless tobacco: Never Used   Substance and Sexual Activity    Alcohol use:  Yes     Alcohol/week: 10.0 - 12.0 standard drinks     Types: 10 - 12 Glasses of wine per week     Frequency: 4 or more times a week     Drinks per session: 1 or 2     Binge frequency: Never     Comment: 2-3 glasses of white wine about 5-6 days/week)    Drug use: No    Sexual activity: Not Currently     Partners: Female   Lifestyle    Physical activity: Days per week: Not on file     Minutes per session: Not on file    Stress: Not on file   Relationships    Social connections:     Talks on phone: Not on file     Gets together: Not on file     Attends [employer] service: Not on file     Active member of club or organization: Not on file     Attends meetings of clubs or organizations: Not on file     Relationship status: Not on file    Intimate partner violence:     Fear of current or ex partner: Not on file     Emotionally abused: Not on file     Physically abused: Not on file     Forced sexual activity: Not on file   Other Topics Concern    Not on file   Social History Narrative    Not on file       Patient does have an advanced directive on file    Visit Vitals  /62 (BP 1 Location: Left arm, BP Patient Position: Sitting)   Pulse 96   Temp 96.7 °F (35.9 °C) (Tympanic)   Resp 16   Ht 6' (1.829 m)   Wt 206 lb (93.4 kg)   SpO2 98%   BMI 27.94 kg/m²       Physical Exam  No Cervical Lymphadenopathy  No Supraclavicular Lymphadenopathy  Thyroid is Normal  Lungs are normal to percussion. Clear to auscultation   Heart:  S1 S2 are normal, No gallops, No murmurs  No Carotid Bruits  Abdomen:  Normal Bowel Sounds. No tenderness. No masses. No Hepatomegaly or Splenomegaly  LE:  Strong Pedal Pulses. No Edema      BMI:  OK      Assessment / Plan      ICD-10-CM ICD-9-CM    1. Essential hypertension I10 401.9    2. Chronic atrial fibrillation I48.20 427.31    3. Presence of combination internal cardiac defibrillator (ICD) and pacemaker Z95.810 V45.02    4. Chronic systolic congestive heart failure (HCC) I50.22 428.22      428.0        he was advised to continue his maintenance medications      Follow-up and Dispositions    · Return in about 4 months (around 2/19/2020). I asked Alexander Robles if he has any questions and I answered the questions.   Alexander Robles states that he understands the treatment plan and agrees with the treatment plan          THIS DOCUMENT WAS CREATED WITH A VOICE ACTIVATED DICTATION SYSTEM.   IT MAY CONTAIN TRANSCRIPTION ERRORS

## 2019-11-04 NOTE — PATIENT INSTRUCTIONS
Mr. Jorge Doty was notified on 11/01/19 for reading on 10/31/19 for anticoagulation home monitoring for the diagnosis of Atrial Fibrillation. His INR goal is 2.0-3.0 and his current Coumadin dose is 2.5 mg x 4 days a week, 5 mg x 3 days a week. Today's findings include an INR of 2.7 (normal INR range 0.8-1.2) . Considering Mr. Arana Light past history, todays findings, and per the coumadin policy/protocol, Mr. Annalisa Saenz was instructed to take Coumadin as follows,  Same dose. He was also instructed to schedule an appointment in 1 weeks prior to leaving for an INR check. A full discussion of the nature of anticoagulants has been carried out. A full discussion of the need for frequent and regular monitoring, precise dosage adjustment and compliance was stressed. Side effects of potential bleeding were discussed and Mr. Annalisa Saenz was instructed to call [phone] if there are any signs of abnormal bleeding. Mr. Annalisa Saenz was instructed to avoid any OTC items containing aspirin or ibuprofen and prior to starting any new OTC products to consult with his physician or pharmacist to ensure no drug interactions are present. Mr. Annalisa Saenz was instructed to avoid any major changes in his general diet and to avoid alcohol consumption. .        Mr. Annalisa Saenz verbalized his understanding of all instructions and will call the office with any questions, concerns, or signs of abnormal bleeding or blood clot.

## 2019-11-05 ENCOUNTER — TELEPHONE (OUTPATIENT)
Dept: CARDIOLOGY CLINIC | Age: 79
End: 2019-11-05

## 2019-11-05 NOTE — PROGRESS NOTES
I have personally seen and evaluated the device findings. Interrogation reviewed and I agree with assessment.     Jordan Marte

## 2019-11-05 NOTE — TELEPHONE ENCOUNTER
Verified patient name and , patient states he was not aware a script was sent to pharmacy for [address], so therefore has not started the medication. Patient also states he will contact pharmacy to see if medication was approved and get back with our office.

## 2019-11-05 NOTE — TELEPHONE ENCOUNTER
----- Message from Mila Centeno DO sent at 11/2/2019  1:54 PM EDT -----  I think the thyroid function and PFT's he is aware of, but I am not sure about lipids. He is supposed to be on Repatha. Please let him know results and see what he has been taking for cholesterol when this was done.  ES

## 2019-11-05 NOTE — TELEPHONE ENCOUNTER
Patient called back stating the pharmacy inform him more information was needed for his Repatha. Contacted pharmacy whom is faxing over prior auth form.

## 2019-11-07 ENCOUNTER — OFFICE VISIT (OUTPATIENT)
Dept: CARDIOLOGY CLINIC | Age: 79
End: 2019-11-07

## 2019-11-07 VITALS
SYSTOLIC BLOOD PRESSURE: 130 MMHG | OXYGEN SATURATION: 96 % | HEIGHT: 72 IN | DIASTOLIC BLOOD PRESSURE: 60 MMHG | WEIGHT: 209 LBS | HEART RATE: 49 BPM | BODY MASS INDEX: 28.31 KG/M2

## 2019-11-07 DIAGNOSIS — I48.20 CHRONIC ATRIAL FIBRILLATION (HCC): ICD-10-CM

## 2019-11-07 DIAGNOSIS — I50.32 CHRONIC DIASTOLIC CONGESTIVE HEART FAILURE (HCC): ICD-10-CM

## 2019-11-07 DIAGNOSIS — Z95.810 AICD (AUTOMATIC CARDIOVERTER/DEFIBRILLATOR) PRESENT: Primary | ICD-10-CM

## 2019-11-07 DIAGNOSIS — R07.9 CHEST PAIN, UNSPECIFIED TYPE: ICD-10-CM

## 2019-11-07 DIAGNOSIS — I47.29 PAROXYSMAL VT: ICD-10-CM

## 2019-11-07 NOTE — PROGRESS NOTES
HPI: A 72-year old male referred for essentially evaluation for ongoing chest pain. He has been seen by Dr. Lionel Dorman recently, his primary cardiologist, and there was no obvious explanation for his pain. He has had lung function tests as well as recent stress tests in October that were unremarkable. His pain is present at rest but he does tell me it gets worse with exertion at times. It is substernal. He also notes his heart rate decreases down to the high 40s at times and his device was reprogrammed due to occasional PVCs last week but he felt worse when the rate was increased as he is essentially device dependent. He does have a history of reflux as well. He is planning to start [address] [place] [address]. He is tolerating Crestor and Zetia and his pain does not seem to correlate with these medications. Impression/Plan:  1. Ongoing chest pain with both typical and atypical features and stress test 10/17/19 showed normal EF of 59% and a small fixed apical defect likely artifact without obvious ischemia. This was a low risk test.   2. Recent pulmonary function tests showing normal function. 3. Persistent chronic atrial fibrillation with failure of two ablations back in 2011 by Dr. Amanda Harrington and [facility] 2012, rate controlled only. 4. PVCs with recent increase in his AICD rate but he did not feel well and went back to 70 bpm.   5. Dual chamber Medtronic AICD placed February 2016 with pacemaker prior to this. He has a history of ventricular tachycardia. 6. Dyslipidemia currently on Crestor and Zetia but planning to transition to [address] [place] [address]. 7. Hypertension, reasonably controlled today. I had a long discussion about possible etiologies for his chest pain. His stress test overall was low risk and I have reviewed personally. The reasonable option would be to perform heart catheterization to completely exclude underlying coronary artery disease given his risk factors. At this time, he is going to think about it.     In regard to his chest pain and previous ablations and device, I am going to check a chest CT to rule out any structural abnormalities. His x-ray from March was normal.     In regard to his PVCs and atrial fibrillation, he has chronic atrial fibrillation and is essentially device dependent. His most recent EF was normal. Unfortunately, his PVC burden is quite low, but I suspect he may be hyperperfused with them and they might be partially contributory. However, given the infrequent nature, ablation would be an incredibly low yield. I am however going to obtain an event monitor to identify any possible device malfunction and also identify any other sustained arrhythmias that the device may not be capturing. I have asked that he follow up with Dr. Justin Chang and again to consider heart catheterization if his workup is unremarkable. If heart catheterization is normal, it may be reasonable to have him see GI as he does have a history of remote endoscopy about a year ago. Past Medical History:   Diagnosis Date    AICD (automatic cardioverter/defibrillator) present     Medtronic  upgrade from pacer in 2007 due to VT    Arthritis     Atrial fibrillation ([address]     Cancer ([address] 02/2019    melenoma on left leg    Cardiac cath 03/19/2007    LM 25% ostial.  Otherwise patent coronaries. LVEDP 20.  EF 50%. Dyssynch. mid anterior contraction. Pacemaker.  Cardiac echocardiogram 03/20/2014    A-fib. EF 65-70%. No RWMA. No RWMA. Mild conc LVH. Mild RVE. RVSP 40-45 mmHg. Mild LAE.  HERNANDEZ. Mild MR. Mod TR.  Cardiac nuclear imaging test 07/29/2014    No ischemia or prior infarction. EF 68%. Nondiagnostic EKG due to V pacing on pharm stress test.  Low risk.  Cardioversion 8/31/2011    Successful defibrillation threshold testing at 18 joules. Patient also had conversion to atrial ventricular pacing.      CHF (congestive heart failure) ([address]     Cobalamin deficiency     Dupuytren contracture 02/08/2010    on the right ring finger     Edema     GERD (gastroesophageal reflux disease)     Hypertension     Hypertrophy of prostate with urinary obstruction and other lower urinary tract symptoms (LUTS)     Ill-defined condition 2007    AICD    Impotence of organic origin     Intolerance of drug     Intolerant high doses of sotalol due to prolonged QT    Mastodynia     Paroxysmal VT ([address]     Pure hypercholesterolemia     S/P ablation of atrial fibrillation 05/31/2011    S/P ablation of atrial fibrillation 12/18/2012    Dr. Param Riley at [facility] S/P ablation operation for arrhythmia     VT ablation by Dr. Maria Isabel Pardo near [place] 2/3007    Sick sinus syndrome [facility])        Current Outpatient Medications   Medication Sig Dispense Refill    cyanocobalamin (VITAMIN B12) 1,000 mcg/mL injection INJECT 1 ML INTRAMUSCULARLY EVERY 3 WEEKS 1 mL 8    evolocumab (REPATHA SURECLICK) pen injection 1 mL by SubCUTAneous route every fourteen (14) days. 6 Pen 3    coenzyme Q-10 (CO Q-10) 200 mg capsule Take  by mouth daily.  aspirin delayed-release 81 mg tablet Take  by mouth daily.  amLODIPine (NORVASC) 2.5 mg tablet 1 tablet daily (For BP). (Stop Benazepril) 90 Tab 3    warfarin (COUMADIN) 5 mg tablet 1 tab on Mon, Wed, Fri.   1/2 tab on the other days 100 Tab 1    metoprolol tartrate (LOPRESSOR) 50 mg tablet Take 1 Tab by mouth two (2) times a day. 180 Tab 3    ezetimibe (ZETIA) 10 mg tablet Take 1 Tab by mouth daily. 90 Tab 2    furosemide (LASIX) 40 mg tablet TAKE 1 TABLET DAILY 90 Tab 3    rosuvastatin (CRESTOR) 5 mg tablet Take 1 Tab by mouth every Monday, Wednesday, Friday. 90 Tab 3    potassium chloride (KLOR-CON M10) 10 mEq tablet Take 10 mEq by mouth daily.  LUMIGAN 0.01 % ophthalmic drops Administer 1 Drop to both eyes nightly.  AZOPT 1 % ophthalmic suspension Administer 1 Drop to both eyes two (2) times a day. 3    multivitamin (ONE A DAY) tablet Take 1 Tab by mouth daily. Social History   reports that he has never smoked. He has never used smokeless tobacco.   reports that he drinks about 10.0 - 12.0 standard drinks of alcohol per week. Family History  family history includes COPD in his father; Hypertension in an other family member; No Known Problems in his mother. Review of Systems  Except as stated above include:  Constitutional: Negative for fever, chills and malaise/fatigue. HEENT: No congestion or recent URI. Gastrointestinal: No nausea, vomiting, abdominal pain, bloody stools. Pulmonary:  Negative except as stated above. Cardiac:  Negative except as stated above. Musculoskeletal: Negative except as stated above. Neurological:  No localized symptoms. Skin:  Negative except as stated above. Psych:  Negative except as stated above. Endocrine:  Negative except as stated above. PHYSICAL EXAM  BP Readings from Last 3 Encounters:   11/07/19 130/60   11/01/19 110/62   10/17/19 151/87     Pulse Readings from Last 3 Encounters:   11/07/19 (!) 49   11/01/19 96   10/11/19 96     Wt Readings from Last 3 Encounters:   11/07/19 94.8 kg (209 lb)   11/01/19 93.4 kg (206 lb)   10/17/19 93.4 kg (206 lb)     General:   Well developed, well groomed. Head/Neck:   No jugular venous distention     No carotid bruits. No evidence of xanthelasma. Lungs:   No respiratory distress. Clear bilaterally. Heart:    Regular rate and rhythm. Normal S1/S2. Palpation of heart with normal point of maximum impulse. No significant murmurs, rubs or gallops. AICD pocket intact. Abdomen:   Soft and nontender. No palpable abdominal mass or bruits. Extremities:   Intact peripheral pulses. No significant edema. Neurological:   Alert and oriented to person, place, time. No focal neurological deficit visually. Skin:   No obvious rash    Blood Pressure Metric:  Monitor recommended and adjustments stated if needed.

## 2019-11-07 NOTE — PATIENT INSTRUCTIONS
If you have not heard from the central scheduler to schedule your testing in 48 hours, please call [phone].

## 2019-11-07 NOTE — PROGRESS NOTES
Elma Jeffers presents today for   Chief Complaint   Patient presents with    Chest Pain     c/o discomfort    Chest Pain     constant discomfort    Shortness of Breath     exertion       Yael Melendez preferred language for health care discussion is english/other. Is someone accompanying this pt? no    Is the patient using any DME equipment during [address]? no    Depression Screening:  3 most recent PHQ Screens 8/22/2019   Little interest or pleasure in doing things Not at all   Feeling down, depressed, irritable, or hopeless Not at all   Total Score PHQ 2 0       Learning Assessment:  Learning Assessment 4/12/2017   PRIMARY LEARNER Patient   HIGHEST LEVEL OF EDUCATION - PRIMARY LEARNER  GRADUATED HIGH SCHOOL OR GED   BARRIERS PRIMARY LEARNER NONE   CO-LEARNER CAREGIVER No   PRIMARY LANGUAGE ENGLISH   LEARNER PREFERENCE PRIMARY DEMONSTRATION   ANSWERED BY arnoldo   RELATIONSHIP SELF       Abuse Screening:  Abuse Screening Questionnaire 1/7/2019   Do you ever feel afraid of your partner? N   Are you in a relationship with someone who physically or mentally threatens you? N   Is it safe for you to go home? Y       Fall Risk  Fall Risk Assessment, last 12 mths 9/11/2019   Able to walk? Yes   Fall in past 12 months? No       Pt currently taking Anticoagulant therapy? Warfarin     Coordination of Care:  1. Have you been to the ER, urgent care clinic since your last visit? Hospitalized since your last visit? no    2. Have you seen or consulted any other health care providers outside of the [address] [place] [address] since your last visit? Include any pap smears or colon screening.  no

## 2019-11-11 ENCOUNTER — CLINICAL SUPPORT (OUTPATIENT)
Dept: FAMILY MEDICINE CLINIC | Facility: CLINIC | Age: 79
End: 2019-11-11

## 2019-11-11 DIAGNOSIS — E53.8 VITAMIN B 12 DEFICIENCY: Primary | ICD-10-CM

## 2019-11-11 RX ORDER — CYANOCOBALAMIN 1000 UG/ML
1000 INJECTION, SOLUTION INTRAMUSCULAR; SUBCUTANEOUS ONCE
Qty: 1 ML | Refills: 0 | Status: SHIPPED | COMMUNITY
Start: 2019-11-11 | End: 2019-11-11

## 2019-11-13 ENCOUNTER — CLINICAL SUPPORT (OUTPATIENT)
Dept: FAMILY MEDICINE CLINIC | Facility: CLINIC | Age: 79
End: 2019-11-13

## 2019-11-13 DIAGNOSIS — I48.0 PAROXYSMAL ATRIAL FIBRILLATION (HCC): Primary | ICD-10-CM

## 2019-11-13 DIAGNOSIS — Z51.81 ENCOUNTER FOR MEDICATION MONITORING: ICD-10-CM

## 2019-11-13 LAB
INR BLD: 2.8
PT POC: NORMAL
VALID INTERNAL CONTROL?: YES

## 2019-11-14 ENCOUNTER — TELEPHONE ANTICOAG (OUTPATIENT)
Dept: FAMILY MEDICINE CLINIC | Facility: CLINIC | Age: 79
End: 2019-11-14

## 2019-11-14 ENCOUNTER — HOSPITAL ENCOUNTER (OUTPATIENT)
Dept: CT IMAGING | Age: 79
Discharge: HOME | End: 2019-11-14
Attending: INTERNAL MEDICINE
Payer: MEDICARE

## 2019-11-14 DIAGNOSIS — R07.9 CHEST PAIN, UNSPECIFIED TYPE: ICD-10-CM

## 2019-11-14 DIAGNOSIS — I48.0 PAROXYSMAL ATRIAL FIBRILLATION (HCC): ICD-10-CM

## 2019-11-14 PROCEDURE — 71250 CT THORAX DX C-: CPT

## 2019-11-14 NOTE — PATIENT INSTRUCTIONS
Mr. Freeman Glez notified us 11/13/19 for anticoagulation home monitoring for the diagnosis of Atrial Fibrillation. His INR goal is 2.0-3.0 and his current Coumadin dose is 2.5 mg x 3 days a week, 5 mg x 4 days  week. Today's findings include an INR of 2.7 (normal INR range 0.8-1.2) . Considering Mr. Yady Aj past history, todays findings, and per the coumadin policy/protocol, Mr. Andrey Lopez was instructed to take Coumadin as follows,  Same doas. He was also instructed to schedule an appointment in 1 weeks prior to leaving for an INR check. A full discussion of the nature of anticoagulants has been carried out. A full discussion of the need for frequent and regular monitoring, precise dosage adjustment and compliance was stressed. Side effects of potential bleeding were discussed and Mr. Andrey Lopez was instructed to call [phone] if there are any signs of abnormal bleeding. Mr. Andrey Lopez was instructed to avoid any OTC items containing aspirin or ibuprofen and prior to starting any new OTC products to consult with his physician or pharmacist to ensure no drug interactions are present. Mr. Andrey Lopez was instructed to avoid any major changes in his general diet and to avoid alcohol consumption. .    Mr. Andrey Lopez verbalized his understanding of all instructions and will call the office with any questions, concerns, or signs of abnormal bleeding or blood clot.

## 2019-11-15 ENCOUNTER — TELEPHONE (OUTPATIENT)
Dept: CARDIOLOGY CLINIC | Age: 79
End: 2019-11-15

## 2019-11-15 NOTE — TELEPHONE ENCOUNTER
I called and talked to the patient about his CT scan. He has two nodules - one in the left upper lobe which is small and not concerning and the other which is 1 by 2 cm in the mediastinum which is concerning and a PET scan is recommended. He needs an appointment with pulmonary ASAP and probably best if I call and talk to someone next week to see if we should just go ahead and order PET and also to get him in quicker.  ES

## 2019-11-15 NOTE — TELEPHONE ENCOUNTER
----- Message from Siddharth Falcon MD sent at 11/15/2019  9:34 AM EST -----  Jean Claude Chris, this patient wanted to see me for recurrent cp and ectopy, usually sees Dr. Jessa Nelson, I ordered ct just to rule out structural issues. Can you please place referral to pulmonary to evaluate the lung nodules?   rebecca Machado    ----- Message -----  From: Rishi Mayorga Results In  Sent: 11/15/2019   9:05 AM EST  To: Siddharth Falcon MD

## 2019-11-15 NOTE — TELEPHONE ENCOUNTER
----- Message from Vince Nieves MD sent at 11/15/2019  9:34 AM EST -----  Niels Celis, this patient wanted to see me for recurrent cp and ectopy, usually sees Dr. Yasmeen Oneal, I ordered ct just to rule out structural issues. Can you please place referral to pulmonary to evaluate the lung nodules?   thx  Makenzie Giraldo    ----- Message -----  From: Rishi Mayorga Results In  Sent: 11/15/2019   9:05 AM EST  To: Vince Nieves MD

## 2019-12-02 ENCOUNTER — HOSPITAL ENCOUNTER (OUTPATIENT)
Dept: LAB | Age: 79
Discharge: HOME | End: 2019-12-02
Payer: MEDICARE

## 2019-12-02 ENCOUNTER — TELEPHONE (OUTPATIENT)
Dept: CARDIOLOGY CLINIC | Age: 79
End: 2019-12-02

## 2019-12-02 ENCOUNTER — OFFICE VISIT (OUTPATIENT)
Dept: FAMILY MEDICINE CLINIC | Facility: CLINIC | Age: 79
End: 2019-12-02

## 2019-12-02 VITALS
DIASTOLIC BLOOD PRESSURE: 70 MMHG | HEART RATE: 93 BPM | SYSTOLIC BLOOD PRESSURE: 120 MMHG | HEIGHT: 72 IN | TEMPERATURE: 97.5 F | OXYGEN SATURATION: 96 % | WEIGHT: 209 LBS | BODY MASS INDEX: 28.31 KG/M2 | RESPIRATION RATE: 12 BRPM

## 2019-12-02 DIAGNOSIS — I48.0 PAROXYSMAL ATRIAL FIBRILLATION (HCC): ICD-10-CM

## 2019-12-02 DIAGNOSIS — R06.02 SHORTNESS OF BREATH: Primary | ICD-10-CM

## 2019-12-02 DIAGNOSIS — R91.8 OTHER NONSPECIFIC ABNORMAL FINDING OF LUNG FIELD: ICD-10-CM

## 2019-12-02 DIAGNOSIS — R10.32 LEFT LOWER QUADRANT ABDOMINAL PAIN: Primary | ICD-10-CM

## 2019-12-02 DIAGNOSIS — E53.8 VITAMIN B 12 DEFICIENCY: ICD-10-CM

## 2019-12-02 DIAGNOSIS — R10.32 LEFT LOWER QUADRANT ABDOMINAL PAIN: ICD-10-CM

## 2019-12-02 LAB
ALBUMIN SERPL-MCNC: 3.4 G/DL (ref 3.4–5)
ALBUMIN/GLOB SERPL: 1 {RATIO} (ref 0.8–1.7)
ALP SERPL-CCNC: 86 U/L (ref 45–117)
ALT SERPL-CCNC: 22 U/L (ref 16–61)
ANION GAP SERPL CALC-SCNC: 7 MMOL/L (ref 3–18)
APPEARANCE UR: CLEAR
AST SERPL-CCNC: 20 U/L (ref 10–38)
BACTERIA URNS QL MICRO: NEGATIVE /HPF
BILIRUB SERPL-MCNC: 0.8 MG/DL (ref 0.2–1)
BILIRUB UR QL: ABNORMAL
BUN SERPL-MCNC: 16 MG/DL (ref 7–18)
BUN/CREAT SERPL: 15 (ref 12–20)
CALCIUM SERPL-MCNC: 8.9 MG/DL (ref 8.5–10.1)
CHLORIDE SERPL-SCNC: 107 MMOL/L (ref 100–111)
CO2 SERPL-SCNC: 26 MMOL/L (ref 21–32)
COLOR UR: ABNORMAL
CREAT SERPL-MCNC: 1.09 MG/DL (ref 0.6–1.3)
EPITH CASTS URNS QL MICRO: ABNORMAL /LPF (ref 0–5)
GLOBULIN SER CALC-MCNC: 3.4 G/DL (ref 2–4)
GLUCOSE SERPL-MCNC: 111 MG/DL (ref 74–99)
GLUCOSE UR STRIP.AUTO-MCNC: NEGATIVE MG/DL
HGB UR QL STRIP: NEGATIVE
KETONES UR QL STRIP.AUTO: NEGATIVE MG/DL
LEUKOCYTE ESTERASE UR QL STRIP.AUTO: NEGATIVE
NITRITE UR QL STRIP.AUTO: NEGATIVE
PH UR STRIP: 5.5 [PH] (ref 5–8)
POTASSIUM SERPL-SCNC: 3.7 MMOL/L (ref 3.5–5.5)
PROT SERPL-MCNC: 6.8 G/DL (ref 6.4–8.2)
PROT UR STRIP-MCNC: NEGATIVE MG/DL
RBC #/AREA URNS HPF: 0 /HPF (ref 0–5)
SODIUM SERPL-SCNC: 140 MMOL/L (ref 136–145)
SP GR UR REFRACTOMETRY: 1.02 (ref 1–1.03)
UROBILINOGEN UR QL STRIP.AUTO: 1 EU/DL (ref 0.2–1)
WBC URNS QL MICRO: ABNORMAL /HPF (ref 0–4)

## 2019-12-02 PROCEDURE — 80053 COMPREHEN METABOLIC PANEL: CPT

## 2019-12-02 PROCEDURE — 81001 URINALYSIS AUTO W/SCOPE: CPT

## 2019-12-02 PROCEDURE — 36415 COLL VENOUS BLD VENIPUNCTURE: CPT

## 2019-12-02 RX ORDER — CYANOCOBALAMIN 1000 UG/ML
1000 INJECTION, SOLUTION INTRAMUSCULAR; SUBCUTANEOUS ONCE
Qty: 1 ML | Refills: 0 | Status: SHIPPED | COMMUNITY
Start: 2019-12-02 | End: 2019-12-02

## 2019-12-02 NOTE — PROGRESS NOTES
Jessee Ayala presents today for   Chief Complaint   Patient presents with    Groin Pain     left side       Yael Hernandezfarhan Hyunh preferred language for health care discussion is english. Is someone accompanying this pt? no    Is the patient using any DME equipment during [address]? no    Depression Screening:  3 most recent PHQ Screens 12/2/2019   Little interest or pleasure in doing things Not at all   Feeling down, depressed, irritable, or hopeless Not at all   Total Score PHQ 2 0       Learning Assessment:  Learning Assessment 4/12/2017   PRIMARY LEARNER Patient   HIGHEST LEVEL OF EDUCATION - PRIMARY LEARNER  GRADUATED HIGH SCHOOL OR GED   BARRIERS PRIMARY LEARNER NONE   CO-LEARNER CAREGIVER No   PRIMARY LANGUAGE ENGLISH   LEARNER PREFERENCE PRIMARY DEMONSTRATION   ANSWERED BY patien   RELATIONSHIP SELF       Abuse Screening:  Abuse Screening Questionnaire 1/7/2019   Do you ever feel afraid of your partner? N   Are you in a relationship with someone who physically or mentally threatens you? N   Is it safe for you to go home? Y       Fall Risk  Fall Risk Assessment, last 12 mths 12/2/2019   Able to walk? Yes   Fall in past 12 months? No       Health Maintenance reviewed and discussed and ordered per Provider. Health Maintenance Due   Topic Date Due    Shingrix Vaccine Age 49> (2 of 2) 05/23/2019    Influenza Age 5 to Adult  08/01/2019    GLAUCOMA SCREENING Q2Y  08/16/2019   . Jessee Ayala is updated on all     Pt currently taking Antiplatelet therapy? Yes asprin, warfarin    Coordination of Care:  1. Have you been to the ER, urgent care clinic since your last visit? no Hospitalized since your last visit? no    2. Have you seen or consulted any other health care providers outside of the [address] [place] [address] since your last visit? no Include any pap smears or colon screening.  no

## 2019-12-02 NOTE — TELEPHONE ENCOUNTER
Dr Wesley Lopez spoke to pulmonary and   They would like Dr Wesley Lopez to put the order in for the PET scan     Order in chart , central scheduling to call patient

## 2019-12-03 NOTE — PROGRESS NOTES
The patient presents to the office today with the chief complaint of Left lower abdominal pain    HPI    The got out of a chair to go to bed last night. He suddenly developed severe pain in his left lower abdomen which radiated into his left groin. The pain persisted most of the night. It had resolved by the morning. The patient denies fever, chills or hematuria. The patient notes no change in his bowels. The patient remains on Coumadin due to atrial fibrillation. His protimes have been doing well. The patient has B12 deficiency. The patient is due for a B12 shot today. Review of Systems   Respiratory: Negative for shortness of breath. Cardiovascular: Negative for chest pain and leg swelling. Gastrointestinal: Positive for abdominal pain. Genitourinary: Negative for hematuria. Allergies   Allergen Reactions    Cephalexin Hives and Itching    Lipitor [Atorvastatin] Myalgia    Livalo [Pitavastatin] Other (comments)     Extreme fatigue    5/29/14   PATIENT DENIES    Zocor [Simvastatin] Myalgia     5/29/14 PATIENT DENIES        Current Outpatient Medications   Medication Sig Dispense Refill    cyanocobalamin (VITAMIN B-12) 1,000 mcg/mL injection 1 mL by IntraMUSCular route once for 1 dose. 1 mL 0    cyanocobalamin (VITAMIN B12) 1,000 mcg/mL injection INJECT 1 ML INTRAMUSCULARLY EVERY 3 WEEKS 1 mL 8    evolocumab (REPATHA SURECLICK) pen injection 1 mL by SubCUTAneous route every fourteen (14) days. 6 Pen 3    coenzyme Q-10 (CO Q-10) 200 mg capsule Take  by mouth daily.  aspirin delayed-release 81 mg tablet Take  by mouth daily.  amLODIPine (NORVASC) 2.5 mg tablet 1 tablet daily (For BP). (Stop Benazepril) 90 Tab 3    warfarin (COUMADIN) 5 mg tablet 1 tab on Mon, Wed, Fri.   1/2 tab on the other days 100 Tab 1    metoprolol tartrate (LOPRESSOR) 50 mg tablet Take 1 Tab by mouth two (2) times a day. 180 Tab 3    ezetimibe (ZETIA) 10 mg tablet Take 1 Tab by mouth daily.  80 Tab 2    furosemide (LASIX) 40 mg tablet TAKE 1 TABLET DAILY 90 Tab 3    rosuvastatin (CRESTOR) 5 mg tablet Take 1 Tab by mouth every Monday, Wednesday, Friday. 90 Tab 3    potassium chloride (KLOR-CON M10) 10 mEq tablet Take 10 mEq by mouth daily.  LUMIGAN 0.01 % ophthalmic drops Administer 1 Drop to both eyes nightly.  AZOPT 1 % ophthalmic suspension Administer 1 Drop to both eyes two (2) times a day. 3    multivitamin (ONE A DAY) tablet Take 1 Tab by mouth daily. Past Medical History:   Diagnosis Date    AICD (automatic cardioverter/defibrillator) present     [employer]  upgrade from pacer in 2007 due to VT    Arthritis     Atrial fibrillation ([facility] [address]     Cancer ([facility] [address] 02/2019    melenoma on left leg    Cardiac cath 03/19/2007    LM 25% ostial.  Otherwise patent coronaries. LVEDP 20.  EF 50%. Dyssynch. mid anterior contraction. Pacemaker.  Cardiac echocardiogram 03/20/2014    A-fib. EF 65-70%. No RWMA. No RWMA. Mild conc LVH. Mild RVE. RVSP 40-45 mmHg. Mild LAE.  HERNANDEZ. Mild MR. Mod TR.  Cardiac nuclear imaging test 07/29/2014    No ischemia or prior infarction. EF 68%. Nondiagnostic EKG due to V pacing on pharm stress test.  Low risk.  Cardioversion 8/31/2011    Successful defibrillation threshold testing at 18 joules. Patient also had conversion to atrial ventricular pacing.      [facility]est[facility] failure[facility]     Cobalamin deficiency     Dupuytren contracture 02/08/2010    on the right ring finger     Edema     GERD (gastroesophageal reflux disease)     Hypertension     Hypertrophy of prostate with urinary obstruction and other lower urinary tract symptoms (LUTS)     Ill-defined condition 2007    AICD    Impotence of organic origin     Intolerance of drug     Intolerant high doses of sotalol due to prolonged QT    Mastodynia     Paroxysmal VT ([facility]     Pure hypercholesterolemia     S/P ablation of atrial fibrillation 05/31/2011    S/P ablation of atrial fibrillation 12/18/2012    Dr. Ludin Hua at [address] S/P ablation operation for arrhythmia     VT ablation by Dr. Rebeca Samuels near [address] [place] [address] Street 2[address]3007    Sick sinus syndrome [facility])        Past Surgical History:   Procedure Laterality Date    COLONOSCOPY N/A 9/12/2018    COLONOSCOPY with Polypectomies and Clip Placement performed by Miriam Schroeder MD at [address] [place] [address] [facility] by Dr. Sergio Hines  02/08-03/08    bilateral cataract removed    HX CATARACT REMOVAL  2/2008 & 3/2008    bilateral    HX ENDOSCOPY  09/12/2018    Dr. Mario Echavarria - pathology indicates changes of acid reflux and Garcia's esophagus, repeat EGD in 2 years    HX ORTHOPAEDIC  2/8/10    release of Dupuytren's contraction on ring finger of right hand    HX OTHER SURGICAL  6/2000    atrial lead placement    HX OTHER SURGICAL  6/5/2009    Cardioversion    HX OTHER SURGICAL  10/12/2012    cardioversion    HX PACEMAKER  1998    placement    HX PACEMAKER  6/2000    DDD    HX PACEMAKER  3/27/07    removal of pacemaker & placement of dual chamber defib generator and leads    HX TONSIL AND ADENOIDECTOMY         Social History     Socioeconomic History    Marital status:      Spouse name: Not on file    Number of children: Not on file    Years of education: Not on file    Highest education level: Not on file   Occupational History    Not on file   Social Needs    Financial resource strain: Not on file    Food insecurity:     Worry: Not on file     Inability: Not on file    Transportation needs:     Medical: Not on file     Non-medical: Not on file   Tobacco Use    Smoking status: Never Smoker    Smokeless tobacco: Never Used   Substance and Sexual Activity    Alcohol use:  Yes     Alcohol/week: 10.0 - 12.0 standard drinks     Types: 10 - 12 Glasses of wine per week     Frequency: 4 or more times a week     Drinks per session: 1 or 2 Binge frequency: Never     Comment: 2-3 glasses of white wine about 5-6 days/week)    Drug use: No    Sexual activity: Not Currently     Partners: Female   Lifestyle    Physical activity:     Days per week: Not on file     Minutes per session: Not on file    Stress: Not on file   Relationships    Social connections:     Talks on phone: Not on file     Gets together: Not on file     Attends Druze service: Not on file     Active member of club or organization: Not on file     Attends meetings of clubs or organizations: Not on file     Relationship status: Not on file    Intimate partner violence:     Fear of current or ex partner: Not on file     Emotionally abused: Not on file     Physically abused: Not on file     Forced sexual activity: Not on file   Other Topics Concern    Not on file   Social History Narrative    Not on file       Patient does have an advanced directive on file    Visit Vitals  /70 (BP 1 Location: Left arm, BP Patient Position: Sitting)   Pulse 93   Temp 97.5 °F (36.4 °C) (Tympanic)   Resp 12   Ht 6' (1.829 m)   Wt 209 lb (94.8 kg)   SpO2 96%   BMI 28.35 kg/m²       Physical Exam  No Cervical Lymphadenopathy  No Supraclavicular Lymphadenopathy  Thyroid is Normal  Lungs are normal to percussion. Clear to auscultation   Heart:  S1 S2 are normal, No gallops, No murmurs  No Carotid Bruits  Abdomen:  Normal Bowel Sounds. No tenderness. No masses. No Hepatomegaly or Splenomegaly                     There are no inguinal hernias present on the left side  LE:  Strong Pedal Pulses. No Edema      BMI:  OK      .   Results for orders placed or performed during the hospital encounter of 79/63/44   METABOLIC PANEL, COMPREHENSIVE   Result Value Ref Range    Sodium 140 136 - 145 mmol/L    Potassium 3.7 3.5 - 5.5 mmol/L    Chloride 107 100 - 111 mmol/L    CO2 26 21 - 32 mmol/L    Anion gap 7 3.0 - 18 mmol/L    Glucose 111 (H) 74 - 99 mg/dL    BUN 16 7.0 - 18 MG/DL    Creatinine 1.09 0.6 - 1.3 MG/DL    BUN/Creatinine ratio 15 12 - 20      GFR est AA >60 >60 ml/min/1.73m2    GFR est non-AA >60 >60 ml/min/1.73m2    Calcium 8.9 8.5 - 10.1 MG/DL    Bilirubin, total 0.8 0.2 - 1.0 MG/DL    ALT (SGPT) 22 16 - 61 U/L    AST (SGOT) 20 10 - 38 U/L    Alk. phosphatase 86 45 - 117 U/L    Protein, total 6.8 6.4 - 8.2 g/dL    Albumin 3.4 3.4 - 5.0 g/dL    Globulin 3.4 2.0 - 4.0 g/dL    A-G Ratio 1.0 0.8 - 1.7     URINALYSIS W/MICROSCOPIC   Result Value Ref Range    Color DARK YELLOW      Appearance CLEAR      Specific gravity 1.024 1.005 - 1.030      pH (UA) 5.5 5.0 - 8.0      Protein NEGATIVE  NEG mg/dL    Glucose NEGATIVE  NEG mg/dL    Ketone NEGATIVE  NEG mg/dL    Bilirubin SMALL (A) NEG      Blood NEGATIVE  NEG      Urobilinogen 1.0 0.2 - 1.0 EU/dL    Nitrites NEGATIVE  NEG      Leukocyte Esterase NEGATIVE  NEG      WBC 0 to 2 0 - 4 /hpf    RBC 0 0 - 5 /hpf    Epithelial cells FEW 0 - 5 /lpf    Bacteria NEGATIVE  NEG /hpf       Assessment / Plan      ICD-10-CM ICD-9-CM    1. Left lower quadrant abdominal pain T10.75 174.52 METABOLIC PANEL, COMPREHENSIVE      URINALYSIS W/MICROSCOPIC   2. Vitamin B 12 deficiency E53.8 266.2 [place] THER/PROPH/DIAG INJECTION, SUBCUT/IM      cyanocobalamin (VITAMIN B-12) 1,000 mcg/mL injection   3. Paroxysmal atrial fibrillation (HCC) I48.0 427.31        BMP and Urine for UA ordered  he was advised to continue his maintenance medications  Patient will call if the symptoms reoccur    Follow-up and Dispositions    · Return in about 3 months (around 3/2/2020). I asked Candy Gonzales if he has any questions and I answered the questions. Candy Gonzales states that he understands the treatment plan and agrees with the treatment plan          THIS DOCUMENT WAS CREATED WITH A VOICE ACTIVATED DICTATION SYSTEM.   IT MAY CONTAIN TRANSCRIPTION ERRORS

## 2019-12-04 ENCOUNTER — DOCUMENTATION ONLY (OUTPATIENT)
Dept: FAMILY MEDICINE CLINIC | Facility: CLINIC | Age: 79
End: 2019-12-04

## 2019-12-04 NOTE — PATIENT INSTRUCTIONS
Mr. Brook John is here 11/13/19 for anticoagulation monitoring for the diagnosis of Atrial Fibrillation. His INR goal is 2.0-3.0 and his current Coumadin dose is 5 mg wed, 2.5 mg all other days. Today's findings include an INR of 2.8 (normal INR range 0.8-1.2) . Considering Mr. Zac Brunson past history, todays findings, and per the coumadin policy/protocol, Mr. Fatmata Rodarte was instructed to take Coumadin as follows,  Same dose. He was also instructed to schedule an appointment in 2 weeks prior to leaving for an INR check. A full discussion of the nature of anticoagulants has been carried out. A full discussion of the need for frequent and regular monitoring, precise dosage adjustment and compliance was stressed. Side effects of potential bleeding were discussed and Mr. Fatmata Rodarte was instructed to call [phone] if there are any signs of abnormal bleeding. Mr. Fatmata Rodarte was instructed to avoid any OTC items containing aspirin or ibuprofen and prior to starting any new OTC products to consult with his physician or pharmacist to ensure no drug interactions are present. Mr. Fatmata Rodarte was instructed to avoid any major changes in his general diet and to avoid alcohol consumption. .        Mr. Fatmata Rodarte verbalized his understanding of all instructions and will call the office with any questions, concerns, or signs of abnormal bleeding or blood clot.

## 2019-12-04 NOTE — PROGRESS NOTES
Patient was notified regarding his INR home reading of 2.3 and per Dr. Ankita Luz verbal order  to stay on the same dose of5 mg on wed, 2.5 mg all other days.

## 2019-12-05 ENCOUNTER — TELEPHONE (OUTPATIENT)
Dept: CARDIOLOGY CLINIC | Age: 79
End: 2019-12-05

## 2019-12-05 DIAGNOSIS — R93.89 CHEST X-RAY ABNORMALITY: Primary | ICD-10-CM

## 2019-12-18 ENCOUNTER — OFFICE VISIT (OUTPATIENT)
Dept: CARDIOLOGY CLINIC | Age: 79
End: 2019-12-18

## 2019-12-18 DIAGNOSIS — I50.32 CHRONIC DIASTOLIC CONGESTIVE HEART FAILURE (HCC): ICD-10-CM

## 2019-12-18 DIAGNOSIS — Z95.810 AICD (AUTOMATIC CARDIOVERTER/DEFIBRILLATOR) PRESENT: Primary | ICD-10-CM

## 2019-12-19 ENCOUNTER — HOSPITAL ENCOUNTER (OUTPATIENT)
Dept: PET IMAGING | Age: 79
Discharge: HOME | End: 2019-12-19
Attending: INTERNAL MEDICINE
Payer: MEDICARE

## 2019-12-19 DIAGNOSIS — R91.8 OTHER NONSPECIFIC ABNORMAL FINDING OF LUNG FIELD: ICD-10-CM

## 2019-12-19 DIAGNOSIS — R06.02 SHORTNESS OF BREATH: ICD-10-CM

## 2019-12-19 PROCEDURE — A9552 F18 FDG: HCPCS

## 2019-12-20 ENCOUNTER — TELEPHONE (OUTPATIENT)
Dept: CARDIOLOGY CLINIC | Age: 79
End: 2019-12-20

## 2019-12-20 NOTE — TELEPHONE ENCOUNTER
----- Message from Eliazar Romero LPN sent at 76/25/9285  3:33 PM EST -----  Reason testing was ordered\" I called and talked to the patient about his CT scan. He has two nodules - one in the left upper lobe which is small and not concerning and the other which is 1 by 2 cm in the mediastinum which is concerning and a PET scan is recommended. He needs an appointment with pulmonary ASAP and probably best if I call and talk to someone next week to see if we should just go ahead and order PET and also to get him in quicker.  ES

## 2019-12-20 NOTE — PROGRESS NOTES
Reason testing was ordered\" I called and talked to the patient about his CT scan. He has two nodules - one in the left upper lobe which is small and not concerning and the other which is 1 by 2 cm in the mediastinum which is concerning and a PET scan is recommended. He needs an appointment with pulmonary ASAP and probably best if I call and talk to someone next week to see if we should just go ahead and order PET and also to get him in quicker.  ES

## 2019-12-21 NOTE — TELEPHONE ENCOUNTER
I called and talked to the patient about his PET scan. The scan appeared negative except for a nodule in left upper lung which is small and for which a CT in 12 months is recommended.  ES

## 2019-12-23 ENCOUNTER — CLINICAL SUPPORT (OUTPATIENT)
Dept: FAMILY MEDICINE CLINIC | Facility: CLINIC | Age: 79
End: 2019-12-23

## 2019-12-23 DIAGNOSIS — E53.8 VITAMIN B 12 DEFICIENCY: Primary | ICD-10-CM

## 2019-12-23 RX ORDER — CYANOCOBALAMIN 1000 UG/ML
1000 INJECTION, SOLUTION INTRAMUSCULAR; SUBCUTANEOUS ONCE
Qty: 1 ML | Refills: 0 | Status: SHIPPED | COMMUNITY
Start: 2019-12-23 | End: 2019-12-23

## 2019-12-24 ENCOUNTER — TELEPHONE ANTICOAG (OUTPATIENT)
Dept: FAMILY MEDICINE CLINIC | Facility: CLINIC | Age: 79
End: 2019-12-24

## 2019-12-24 DIAGNOSIS — I48.0 PAROXYSMAL ATRIAL FIBRILLATION (HCC): ICD-10-CM

## 2019-12-24 NOTE — PATIENT INSTRUCTIONS
Mr. Ran Fagan was notified on 11/22/19  For home anticoagulation monitoring for 12/18/19 the diagnosis of Atrial Fibrillation. His INR goal is 2.0-3.0 and his current Coumadin dose is 5mg on wed, 2.5 mg all other days. Today's findings include an INR of 2.0 (normal INR range 0.8-1.2) . Considering Mr. Heath Rachel past history, todays findings, and per the coumadin policy/protocol, Mr. Marcia Lewis was instructed to take Coumadin as follows,  Same dose per Dr. Lesly Medina. He was also instructed to schedule an appointment in 1 weeks prior to leaving for an INR check. A full discussion of the nature of anticoagulants has been carried out. A full discussion of the need for frequent and regular monitoring, precise dosage adjustment and compliance was stressed. Side effects of potential bleeding were discussed and Mr. Marcia Lewis was instructed to call [phone] if there are any signs of abnormal bleeding. Mr. Marcia Lewis was instructed to avoid any OTC items containing aspirin or ibuprofen and prior to starting any new OTC products to consult with his physician or pharmacist to ensure no drug interactions are present. Mr. Marcia Lewis was instructed to avoid any major changes in his general diet and to avoid alcohol consumption. .  Mr. Marcia Lewis verbalized his understanding of all instructions and will call the office with any questions, concerns, or signs of abnormal bleeding or blood clot.

## 2019-12-26 NOTE — PROGRESS NOTES
I have personally seen and evaluated the device findings. Interrogation reviewed and I agree with assessment.     Sai Myers

## 2019-12-27 ENCOUNTER — TELEPHONE ANTICOAG (OUTPATIENT)
Dept: FAMILY MEDICINE CLINIC | Facility: CLINIC | Age: 79
End: 2019-12-27

## 2019-12-27 DIAGNOSIS — I48.0 PAROXYSMAL ATRIAL FIBRILLATION (HCC): ICD-10-CM

## 2019-12-27 RX ORDER — WARFARIN SODIUM 5 MG/1
TABLET ORAL
Qty: 100 TAB | Refills: 1 | Status: SHIPPED | OUTPATIENT
Start: 2019-12-27 | End: 2020-07-01 | Stop reason: SDUPTHER

## 2019-12-27 NOTE — TELEPHONE ENCOUNTER
Requested Prescriptions     Pending Prescriptions Disp Refills    warfarin (COUMADIN) 5 mg tablet 100 Tab 1     Si tab on Mon, Wed, Fri.   1/2 tab on the other days

## 2019-12-27 NOTE — PATIENT INSTRUCTIONS
Mr. Ashley Choi was notified today for home anticoagulation monitoring for 12/26/19 the diagnosis of Atrial Fibrillation. His INR goal is 2.0-3.0 and his current Coumadin dose is 5 mg wed, 2.5 mg all other days. Today's findings include an INR of 3.0 (normal INR range 0.8-1.2) . Considering Mr. Beryl Spangler past history, todays findings, and per the coumadin policy/protocol, Mr. Debi Moreno was instructed to take Coumadin as follows,  Continue the same dose. He was also instructed to schedule an appointment in 1 weeks prior to leaving for an INR check. A full discussion of the nature of anticoagulants has been carried out. A full discussion of the need for frequent and regular monitoring, precise dosage adjustment and compliance was stressed. Side effects of potential bleeding were discussed and Mr. Debi Moreno was instructed to call [phone] if there are any signs of abnormal bleeding. Mr. Debi Moreno was instructed to avoid any OTC items containing aspirin or ibuprofen and prior to starting any new OTC products to consult with his physician or pharmacist to ensure no drug interactions are present. Mr. Debi Moreno was instructed to avoid any major changes in his general diet and to avoid alcohol consumption. .        Mr. Debi Moreno verbalized his understanding of all instructions and will call the office with any questions, concerns, or signs of abnormal bleeding or blood clot.

## 2019-12-31 ENCOUNTER — HOSPITAL ENCOUNTER (OUTPATIENT)
Dept: LAB | Age: 79
Discharge: HOME | End: 2019-12-31
Payer: MEDICARE

## 2019-12-31 PROCEDURE — 88305 TISSUE EXAM BY PATHOLOGIST: CPT

## 2020-01-02 ENCOUNTER — TELEPHONE (OUTPATIENT)
Dept: FAMILY MEDICINE CLINIC | Facility: CLINIC | Age: 80
End: 2020-01-02

## 2020-01-02 NOTE — TELEPHONE ENCOUNTER
2 pt identifiers confirmed. Pt INR 2.6 pt stated that he is currently taking 5mg Coumadin Mon/Wed/Frid and 2 1/2mg all other days. Dr Kunal Heramn instructed pt to take continue to take current dose. Left message on pt VM.

## 2020-01-03 RX ORDER — EZETIMIBE 10 MG/1
10 TABLET ORAL DAILY
Qty: 90 TAB | Refills: 3 | Status: SHIPPED | OUTPATIENT
Start: 2020-01-03 | End: 2020-01-08 | Stop reason: SDUPTHER

## 2020-01-08 ENCOUNTER — HOSPITAL ENCOUNTER (OUTPATIENT)
Dept: NON INVASIVE DIAGNOSTICS | Age: 80
Discharge: HOME | End: 2020-01-08
Attending: INTERNAL MEDICINE
Payer: MEDICARE

## 2020-01-08 VITALS
DIASTOLIC BLOOD PRESSURE: 72 MMHG | SYSTOLIC BLOOD PRESSURE: 120 MMHG | WEIGHT: 209 LBS | BODY MASS INDEX: 28.31 KG/M2 | HEIGHT: 72 IN

## 2020-01-08 DIAGNOSIS — R07.9 CHEST PAIN, UNSPECIFIED: ICD-10-CM

## 2020-01-08 DIAGNOSIS — I50.32 CHRONIC DIASTOLIC HEART FAILURE (HCC): ICD-10-CM

## 2020-01-08 DIAGNOSIS — R06.09 OTHER FORM OF DYSPNEA: ICD-10-CM

## 2020-01-08 LAB
AV PEAK GRADIENT: 70.18 MMHG
ECHO AO ROOT DIAM: 3.29 CM
ECHO AV REGURGITANT PHT: 516.9 CM
ECHO LA AREA 4C: 30.6 CM2
ECHO LA VOL 2C: 52.78 ML (ref 18–58)
ECHO LA VOL 4C: 111.52 ML (ref 18–58)
ECHO LA VOL BP: 86.92 ML (ref 18–58)
ECHO LA VOL/BSA BIPLANE: 40.04 ML/M2 (ref 16–28)
ECHO LA VOLUME INDEX A2C: 24.32 ML/M2 (ref 16–28)
ECHO LA VOLUME INDEX A4C: 51.38 ML/M2 (ref 16–28)
ECHO LV E' SEPTAL VELOCITY: 4.31 CM/S
ECHO LV EDV TEICHHOLZ: 0.56 ML
ECHO LV ESV TEICHHOLZ: 0.13 ML
ECHO LV INTERNAL DIMENSION DIASTOLIC: 4.7 CM (ref 4.2–5.9)
ECHO LV INTERNAL DIMENSION SYSTOLIC: 2.57 CM
ECHO LV IVSD: 0.79 CM (ref 0.6–1)
ECHO LV MASS 2D: 156.7 G (ref 88–224)
ECHO LV MASS INDEX 2D: 72.2 G/M2 (ref 49–115)
ECHO LV POSTERIOR WALL DIASTOLIC: 0.95 CM (ref 0.6–1)
ECHO LVOT DIAM: 1.97 CM
ECHO LVOT PEAK GRADIENT: 1.7 MMHG
ECHO LVOT PEAK VELOCITY: 65.1 CM/S
ECHO LVOT VTI: 11.31 CM
ECHO RV TAPSE: 1.58 CM (ref 1.5–2)
ECHO TV REGURGITANT MAX VELOCITY: 218.91 CM/S
ECHO TV REGURGITANT PEAK GRADIENT: 19.2 MMHG
LVFS 2D: 45.44 %
LVOT MG: 0.71 MMHG
LVOT MV: 0.38 CM/S
LVSV (TEICH): 35.84 ML
PISA AR MAX VEL: 418.85 CM/S

## 2020-01-08 PROCEDURE — 93306 TTE W/DOPPLER COMPLETE: CPT

## 2020-01-08 RX ORDER — EZETIMIBE 10 MG/1
10 TABLET ORAL DAILY
Qty: 90 TAB | Refills: 3 | Status: SHIPPED | OUTPATIENT
Start: 2020-01-08 | End: 2020-12-24 | Stop reason: SDUPTHER

## 2020-01-14 ENCOUNTER — CLINICAL SUPPORT (OUTPATIENT)
Dept: FAMILY MEDICINE CLINIC | Facility: CLINIC | Age: 80
End: 2020-01-14

## 2020-01-14 DIAGNOSIS — E53.8 VITAMIN B 12 DEFICIENCY: Primary | ICD-10-CM

## 2020-01-14 RX ORDER — CYANOCOBALAMIN 1000 UG/ML
1000 INJECTION, SOLUTION INTRAMUSCULAR; SUBCUTANEOUS ONCE
Qty: 1 ML | Refills: 0 | Status: SHIPPED | COMMUNITY
Start: 2020-01-14 | End: 2020-01-14

## 2020-01-22 ENCOUNTER — TELEPHONE (OUTPATIENT)
Dept: CARDIOLOGY CLINIC | Age: 80
End: 2020-01-22

## 2020-01-22 ENCOUNTER — OFFICE VISIT (OUTPATIENT)
Dept: CARDIOLOGY CLINIC | Age: 80
End: 2020-01-22

## 2020-01-22 DIAGNOSIS — I47.29 PAROXYSMAL VT: ICD-10-CM

## 2020-01-22 DIAGNOSIS — Z95.810 AICD (AUTOMATIC CARDIOVERTER/DEFIBRILLATOR) PRESENT: Primary | ICD-10-CM

## 2020-01-22 NOTE — TELEPHONE ENCOUNTER
Called and talked to the patient about all of his laboratory tests including his CT of the chest, MRI of the chest, recent echocardiogram, and his nuclear myocardial perfusion study a couple of months ago. I do not see anything concerning in any of these tests except for the lung nodules which were going to be rechecked with a CT in 12 months. Apparently Dr. Van Gonzalez wanted to do another CT scan of his chest now for reasons that are unclear. I will need to review her letter if it is available. The patient would also like to know if his recent transmissions suggested any fluid overload. We will call tomorrow.   ES

## 2020-01-23 ENCOUNTER — TELEPHONE (OUTPATIENT)
Dept: FAMILY MEDICINE CLINIC | Facility: CLINIC | Age: 80
End: 2020-01-23

## 2020-01-23 NOTE — TELEPHONE ENCOUNTER
2 pt identifiers confirmed. Pt stated that he is currently taking Coumadin 5mg Mon/Wed/Fri and 2 1/2mg all other days. Pt INR is 5.9 Dr. Sarah Christie NP stated that pt should hold Coumadin for 3 days. Pt verbalized his understanding.

## 2020-01-31 ENCOUNTER — OFFICE VISIT (OUTPATIENT)
Dept: FAMILY MEDICINE CLINIC | Facility: CLINIC | Age: 80
End: 2020-01-31

## 2020-01-31 VITALS
TEMPERATURE: 96.3 F | RESPIRATION RATE: 12 BRPM | BODY MASS INDEX: 27.77 KG/M2 | HEIGHT: 72 IN | DIASTOLIC BLOOD PRESSURE: 63 MMHG | WEIGHT: 205 LBS | OXYGEN SATURATION: 97 % | SYSTOLIC BLOOD PRESSURE: 119 MMHG | HEART RATE: 80 BPM

## 2020-01-31 DIAGNOSIS — Z12.5 SCREENING FOR PROSTATE CANCER: ICD-10-CM

## 2020-01-31 DIAGNOSIS — E53.8 VITAMIN B 12 DEFICIENCY: ICD-10-CM

## 2020-01-31 DIAGNOSIS — Z00.00 MEDICARE ANNUAL WELLNESS VISIT, SUBSEQUENT: Primary | ICD-10-CM

## 2020-01-31 DIAGNOSIS — I10 ESSENTIAL HYPERTENSION: ICD-10-CM

## 2020-01-31 DIAGNOSIS — E78.00 PURE HYPERCHOLESTEROLEMIA: ICD-10-CM

## 2020-01-31 DIAGNOSIS — I48.0 PAROXYSMAL ATRIAL FIBRILLATION (HCC): ICD-10-CM

## 2020-01-31 RX ORDER — CYANOCOBALAMIN 1000 UG/ML
1000 INJECTION, SOLUTION INTRAMUSCULAR; SUBCUTANEOUS ONCE
Qty: 1 ML | Refills: 0 | Status: SHIPPED | COMMUNITY
Start: 2020-01-31 | End: 2020-01-31

## 2020-01-31 NOTE — PROGRESS NOTES
This is the Subsequent Medicare Annual Wellness Exam, performed 12 months or more after the Initial AWV or the last Subsequent AWV    I have reviewed the patient's medical history in detail and updated the computerized patient record. History     Patient Active Problem List   Diagnosis Code    Essential hypertension I10    Atrial fibrillation ([facility]) I48.91    Dupuytren contracture M72.0    Medtronic AICD, upgrade from pacer in 2007 due to VT Z95.810    Paroxysmal VT ([facility] I47.2    S/P ablation operation for arrhythmia Z98.890, Z86.79    Follow-up exam Z09    Impotence of organic origin N52.9    Hypertrophy of prostate with urinary obstruction and other lower urinary tract symptoms (LUTS) N40.1    Erectile dysfunction N52.9    Palpitations R00.2    Other dysphagia R13.19    Cobalamin deficiency E53.8    Edema R60.9    Pure hypercholesterolemia E78.00    Mastodynia N64.4    AICD at end of battery life Z36.200    CHF (congestive heart failure) ([facility]) I50.9    Bronchitis J40    B12 deficiency E53.8     Past Medical History:   Diagnosis Date    AICD (automatic cardioverter/defibrillator) present     Medtronic  upgrade from pacer in 2007 due to VT    Arthritis     Atrial fibrillation ([facility]     Cancer ([facility] [address] 02/2019    melenoma on left leg    Cardiac cath 03/19/2007    LM 25% ostial.  Otherwise patent coronaries. LVEDP 20.  EF 50%. Dyssynch. mid anterior contraction. Pacemaker.  Cardiac echocardiogram 03/20/2014    A-fib. EF 65-70%. No RWMA. No RWMA. Mild conc LVH. Mild RVE. RVSP 40-45 mmHg. Mild LAE.  HERNANDEZ. Mild MR. Mod TR.  Cardiac nuclear imaging test 07/29/2014    No ischemia or prior infarction. EF 68%. Nondiagnostic EKG due to V pacing on pharm stress test.  Low risk.  Cardioversion 8/31/2011    Successful defibrillation threshold testing at 18 joules. Patient also had conversion to atrial ventricular pacing.      CHF (congestive heart failure) ([facility])     Cobalamin deficiency     Dupuytren contracture 02/08/2010    on the right ring finger     Edema     GERD (gastroesophageal reflux disease)     Hypertension     Hypertrophy of prostate with urinary obstruction and other lower urinary tract symptoms (LUTS)     Ill-defined condition 2007    AICD    Impotence of organic origin     Intolerance of drug     Intolerant high doses of sotalol due to prolonged QT    Mastodynia     Paroxysmal VT ([address]     Pure hypercholesterolemia     S/P ablation of atrial fibrillation 05/31/2011    S/P ablation of atrial fibrillation 12/18/2012    Dr. Eugenia Dalal at [facility] S/P ablation operation for arrhythmia     VT ablation by Dr. Peace Hamilton near [address] [place] [address] 2/3007    Sick sinus syndrome [facility])       Past Surgical History:   Procedure Laterality Date    COLONOSCOPY N/A 9/12/2018    COLONOSCOPY with Polypectomies and Clip Placement performed by Aron Costa MD at [address] [place] [address] [facility] by Dr. Marcellus Tian HX CATARACT REMOVAL  02/08-03/08    bilateral cataract removed    HX CATARACT REMOVAL  2/2008 & 3/2008    bilateral    HX ENDOSCOPY  09/12/2018    Dr. Faiza Fuentes - pathology indicates changes of acid reflux and Garcia's esophagus, repeat EGD in 2 years    HX ORTHOPAEDIC  2/8/10    release of Dupuytren's contraction on ring finger of right hand    HX OTHER SURGICAL  6/2000    atrial lead placement    HX OTHER SURGICAL  6/5/2009    Cardioversion    HX OTHER SURGICAL  10/12/2012    cardioversion    HX PACEMAKER  1998    placement    HX PACEMAKER  6/2000    DDD    HX PACEMAKER  3/27/07    removal of pacemaker & placement of dual chamber defib generator and leads    HX TONSIL AND ADENOIDECTOMY       Current Outpatient Medications   Medication Sig Dispense Refill    ezetimibe (ZETIA) 10 mg tablet Take 1 Tab by mouth daily.  90 Tab 3    warfarin (COUMADIN) 5 mg tablet 1 tab on Mon, Wed, Fri.   1/2 tab on the other days 100 Tab 1    cyanocobalamin (VITAMIN B12) 1,000 mcg/mL injection INJECT 1 ML INTRAMUSCULARLY EVERY 3 WEEKS 1 mL 8    evolocumab (REPATHA SURECLICK) pen injection 1 mL by SubCUTAneous route every fourteen (14) days. 6 Pen 3    coenzyme Q-10 (CO Q-10) 200 mg capsule Take  by mouth daily.  aspirin delayed-release 81 mg tablet Take  by mouth daily.  metoprolol tartrate (LOPRESSOR) 50 mg tablet Take 1 Tab by mouth two (2) times a day. 180 Tab 3    furosemide (LASIX) 40 mg tablet TAKE 1 TABLET DAILY 90 Tab 3    rosuvastatin (CRESTOR) 5 mg tablet Take 1 Tab by mouth every Monday, Wednesday, Friday. 90 Tab 3    LUMIGAN 0.01 % ophthalmic drops Administer 1 Drop to both eyes nightly.  AZOPT 1 % ophthalmic suspension Administer 1 Drop to both eyes two (2) times a day. 3    multivitamin (ONE A DAY) tablet Take 1 Tab by mouth daily. Allergies   Allergen Reactions    Cephalexin Hives and Itching    Lipitor [Atorvastatin] Myalgia    Livalo [Pitavastatin] Other (comments)     Extreme fatigue    5/29/14   PATIENT DENIES    Zocor [Simvastatin] Myalgia     5/29/14 PATIENT DENIES        Family History   Problem Relation Age of Onset    No Known Problems Mother     COPD Father     Hypertension Other      Social History     Tobacco Use    Smoking status: Never Smoker    Smokeless tobacco: Never Used   Substance Use Topics    Alcohol use: Yes     Alcohol/week: 10.0 - 12.0 standard drinks     Types: 10 - 12 Glasses of wine per week     Frequency: 4 or more times a week     Drinks per session: 1 or 2     Binge frequency: Never     Comment: 2-3 glasses of white wine about 5-6 days/week)       Depression Risk Factor Screening:     3 most recent PHQ Screens 1/31/2020   Little interest or pleasure in doing things Not at all   Feeling down, depressed, irritable, or hopeless Not at all   Total Score PHQ 2 0       Alcohol Risk Factor Screening (MALE > 65):    Do you average more 1 drink per night or more than 7 drinks a week: Yes    In the past three months have you have had more than 4 drinks containing alcohol on one occasion: Yes      Functional Ability and Level of Safety:   Hearing: Hearing is good. Activities of Daily Living: The home contains: handrails  Patient does total self care    Ambulation: with no difficulty    Fall Risk:  Fall Risk Assessment, last 12 mths 12/2/2019   Able to walk? Yes   Fall in past 12 months? No       Abuse Screen:  Patient is not abused    Cognitive Screening   Has your family/caregiver stated any concerns about your memory: no  Cognitive Screening: Normal - Clock Drawing Test    Patient Care Team   Patient Care Team:  Dusty Resendez NP as PCP - General (Nurse Practitioner)  Dusty Resendez NP as PCP - [facility]  Harjit Morel MD as Physician (Orthopedic Surgery)  Bernadine Che DO as Physician (Cardiology)  Phoebe Tillman MD as Physician (Ophthalmology)  Marge Dao MD as Physician (Gastroenterology)  Risa Matthews MD (Pulmonary Disease)    Assessment/Plan   Education and counseling provided:  Are appropriate based on today's review and evaluation    Diagnoses and all orders for this visit:    1. Vitamin B 12 deficiency  -     [place] THER/PROPH/DIAG INJECTION, SUBCUT/IM  -     cyanocobalamin (VITAMIN B-12) 1,000 mcg/mL injection; 1 mL by IntraMUSCular route once for 1 dose. 2. Medicare annual wellness visit, subsequent    3. Paroxysmal atrial fibrillation (HCC)    4. Essential hypertension  -     CBC WITH AUTOMATED DIFF; Future  -     METABOLIC PANEL, COMPREHENSIVE; Future  -     LIPID PANEL; Future    5. Pure hypercholesterolemia    6. Screening for prostate cancer  -     PSA SCREENING (SCREENING);  Future        Health Maintenance Due   Topic Date Due    Shingrix Vaccine Age 49> (2 of 2) 05/23/2019    Influenza Age 5 to Adult  08/01/2019    GLAUCOMA SCREENING Q2Y  08/16/2019     Martín VILLALTA Maria M Nichols is a 70 y.o. male presenting today for Annual Wellness Visit (Former patient Dr. Sun Rodriguez) and Hypertension (follow-up)  . HPI:  Toni Finch presents to the office today to establish care with a new provider. Patient is a previous patient of Dr. Destiney Peoples has a past medical history for hypertension, atrial fibrillation, CHF, pacemaker insertion in 2007, hypercholesterolemia, erectile dysfunction and B12 deficiency. He is followed by Dr. Alex Bryan, cardiologist and his last labs were in December, 2019. He presents today feeling well and denies any chest pain or palpitation. He is compliant with taking his medication daily and his blood pressure is controlled. Review of Systems   Constitutional: Negative for malaise/fatigue. Respiratory: Negative for cough and sputum production. Cardiovascular: Negative for chest pain and palpitations. Gastrointestinal: Negative for abdominal pain, nausea and vomiting. Genitourinary: Negative for dysuria. Neurological: Negative for dizziness and headaches. Allergies   Allergen Reactions    Cephalexin Hives and Itching    Lipitor [Atorvastatin] Myalgia    Livalo [Pitavastatin] Other (comments)     Extreme fatigue    5/29/14   PATIENT DENIES    Zocor [Simvastatin] Myalgia     5/29/14 PATIENT DENIES        Current Outpatient Medications   Medication Sig Dispense Refill    ezetimibe (ZETIA) 10 mg tablet Take 1 Tab by mouth daily. 90 Tab 3    warfarin (COUMADIN) 5 mg tablet 1 tab on Mon, Wed, Fri.   1/2 tab on the other days 100 Tab 1    cyanocobalamin (VITAMIN B12) 1,000 mcg/mL injection INJECT 1 ML INTRAMUSCULARLY EVERY 3 WEEKS 1 mL 8    evolocumab (REPATHA SURECLICK) pen injection 1 mL by SubCUTAneous route every fourteen (14) days. 6 Pen 3    coenzyme Q-10 (CO Q-10) 200 mg capsule Take  by mouth daily.  aspirin delayed-release 81 mg tablet Take  by mouth daily.  metoprolol tartrate (LOPRESSOR) 50 mg tablet Take 1 Tab by mouth two (2) times a day.  [address] [address] Tab 3    furosemide (LASIX) 40 mg tablet TAKE 1 TABLET DAILY 90 Tab 3    rosuvastatin (CRESTOR) 5 mg tablet Take 1 Tab by mouth every Monday, Wednesday, Friday. 90 Tab 3    LUMIGAN 0.01 % ophthalmic drops Administer 1 Drop to both eyes nightly.  AZOPT 1 % ophthalmic suspension Administer 1 Drop to both eyes two (2) times a day. 3    multivitamin (ONE A DAY) tablet Take 1 Tab by mouth daily. Past Medical History:   Diagnosis Date    AICD (automatic cardioverter/defibrillator) present     Medtronic  upgrade from pacer in 2007 due to VT    Arthritis     [facility] ([facility]     Cancer ([facility] 02/2019    melenoma on left leg    Cardiac cath 03/19/2007    LM 25% ostial.  Otherwise patent coronaries. LVEDP 20.  EF 50%. Dyssynch. mid anterior contraction. Pacemaker.  Cardiac echocardiogram 03/20/2014    A-fib. EF 65-70%. No RWMA. No RWMA. Mild conc LVH. Mild RVE. RVSP 40-45 mmHg. Mild LAE.  HERNANDEZ. Mild MR. Mod TR.  Cardiac nuclear imaging test 07/29/2014    No ischemia or prior infarction. EF 68%. Nondiagnostic EKG due to V pacing on pharm stress test.  Low risk.  Cardioversion 8/31/2011    Successful defibrillation threshold testing at 18 joules. Patient also had conversion to atrial ventricular pacing.      CHF (congestive heart failure) ([facility]     Cobalamin deficiency     Dupuytren contracture 02/08/2010    on the right ring finger     Edema     GERD (gastroesophageal reflux disease)     Hypertension     Hypertrophy of prostate with urinary obstruction and other lower urinary tract symptoms (LUTS)     Ill-defined condition 2007    AICD    Impotence of organic origin     Intolerance of drug     Intolerant high doses of sotalol due to prolonged QT    Mastodynia     Paroxysmal VT ([facility]     Pure hypercholesterolemia     S/P ablation of atrial fibrillation 05/31/2011    S/P ablation of atrial fibrillation 12/18/2012    Dr. Bishnu Cruz at [facility] S/P ablation operation for arrhythmia     VT ablation by Dr. Jeannie Lo near [address] [place] [address] 2/3007    Sick sinus syndrome [facility])        Past Surgical History:   Procedure Laterality Date    COLONOSCOPY N/A 9/12/2018    COLONOSCOPY with Polypectomies and Clip Placement performed by Trish Jama MD at [address] [place] [address] [facility] by Dr. Marialuisa Vargas  02/08-03/08    bilateral cataract removed    HX CATARACT REMOVAL  2/2008 & 3/2008    bilateral    HX ENDOSCOPY  09/12/2018    Dr. Sheryl Uribe - pathology indicates changes of acid reflux and Garcia's esophagus, repeat EGD in 2 years    HX ORTHOPAEDIC  2/8/10    release of Dupuytren's contraction on ring finger of right hand    HX OTHER SURGICAL  6/2000    atrial lead placement    HX OTHER SURGICAL  6/5/2009    Cardioversion    HX OTHER SURGICAL  10/12/2012    cardioversion    HX PACEMAKER  1998    placement    HX PACEMAKER  6/2000    DDD    HX PACEMAKER  3/27/07    removal of pacemaker & placement of dual chamber defib generator and leads    HX TONSIL AND ADENOIDECTOMY         Social History     Socioeconomic History    Marital status:      Spouse name: Not on file    Number of children: Not on file    Years of education: Not on file    Highest education level: Not on file   Occupational History    Not on file   Social Needs    Financial resource strain: Not on file    Food insecurity:     Worry: Not on file     Inability: Not on file    Transportation needs:     Medical: Not on file     Non-medical: Not on file   Tobacco Use    Smoking status: Never Smoker    Smokeless tobacco: Never Used   Substance and Sexual Activity    Alcohol use:  Yes     Alcohol/week: 10.0 - 12.0 standard drinks     Types: 10 - 12 Glasses of wine per week     Frequency: 4 or more times a week     Drinks per session: 1 or 2     Binge frequency: Never     Comment: 2-3 glasses of white wine about 5-6 days/week)  Drug use: No    Sexual activity: Not Currently     Partners: Female   Lifestyle    Physical activity:     Days per week: Not on file     Minutes per session: Not on file    Stress: Not on file   Relationships    Social connections:     Talks on phone: Not on file     Gets together: Not on file     Attends Spiritism service: Not on file     Active member of club or organization: Not on file     Attends meetings of clubs or organizations: Not on file     Relationship status: Not on file    Intimate partner violence:     Fear of current or ex partner: Not on file     Emotionally abused: Not on file     Physically abused: Not on file     Forced sexual activity: Not on file   Other Topics Concern    Not on file   Social History Narrative    Not on file       Patient does not have an advanced directive on file    Vitals:    01/31/20 1325   BP: 119/63   Pulse: 80   Resp: 12   Temp: 96.3 °F (35.7 °C)   TempSrc: Oral   SpO2: 97%   Weight: 205 lb (93 kg)   Height: 6' (1.829 m)   PainSc:   0 - No pain       Physical Exam  Vitals signs and nursing note reviewed. Constitutional:       Appearance: Normal appearance. Cardiovascular:      Rate and Rhythm: Normal rate. Pulses: Normal pulses. Heart sounds: Normal heart sounds. Pulmonary:      Effort: Pulmonary effort is normal.      Breath sounds: Normal breath sounds. Skin:     General: Skin is warm and dry. Neurological:      Mental Status: He is alert.          Hospital Outpatient Visit on 01/08/2020   Component Date Value Ref Range Status    LA Volume 01/08/2020 86.92  18 - 58 mL Final    LV E' Septal Velocity 01/08/2020 4.31  cm/s Final    Tapse 01/08/2020 1.58  1.5 - 2.0 cm Final    Ao Root D 01/08/2020 3.29  cm Final    LVIDd 01/08/2020 4.70  4.2 - 5.9 cm Final    LVPWd 01/08/2020 0.95  0.6 - 1.0 cm Final    LVIDs 01/08/2020 2.57  cm Final    IVSd 01/08/2020 0.79  0.6 - 1.0 cm Final    LVOT d 01/08/2020 1.97  cm Final    LVOT Peak Velocity 01/08/2020 65.10  cm/s Final    LVOT Peak Gradient 01/08/2020 1.7  mmHg Final    LVOT VTI 01/08/2020 11.31  cm Final    LA Vol 4C 01/08/2020 111.52* 18 - 58 mL Final    LA Vol 2C 01/08/2020 52.78  18 - 58 mL Final    LA Area 4C 01/08/2020 30.6  cm2 Final    LV Mass AL 01/08/2020 156.7  88 - 224 g Final    LV Mass AL Index 01/08/2020 72.2  49 - 115 g/m2 Final    Triscuspid Valve Regurgitation Pea* 01/08/2020 19.2  mmHg Final    Aortic Regurgitant Pressure Half-t* 01/08/2020 516.9  cm Final    TR Max Velocity 01/08/2020 218.91  cm/s Final    LA Vol Index 01/08/2020 40.04  16 - 28 ml/m2 Final    LA Vol Index 01/08/2020 24.32  16 - 28 ml/m2 Final    LA Vol Index 01/08/2020 51.38  16 - 28 ml/m2 Final    AR Max Leonides 01/08/2020 418.85  cm/s Final    Left Ventricular Fractional Shorte* 01/08/2020 58.798852469  % Final    Left Ventricular Outflow Tract Sydnee* 01/08/2020 8.83336173749202  mmHg Final    Left Ventricular Outflow Tract Sydnee* 01/08/2020 2.85901211170344  cm/s Final    AV peak gradient 01/08/2020 73.852332819  mmHg Final    Left Ventricular End Diastolic Vol* 32/26/1827 9.17709327055  mL Final    Left Ventricular End Systolic Volu* 48/38/1710 2.9388081958  mL Final    Left Ventricular Stroke Volume by * 01/08/2020 22.641896699  mL Final   Hospital Outpatient Visit on 12/02/2019   Component Date Value Ref Range Status    Sodium 12/02/2019 140  136 - 145 mmol/L Final    Potassium 12/02/2019 3.7  3.5 - 5.5 mmol/L Final    Chloride 12/02/2019 107  100 - 111 mmol/L Final    CO2 12/02/2019 26  21 - 32 mmol/L Final    Anion gap 12/02/2019 7  3.0 - 18 mmol/L Final    Glucose 12/02/2019 111* 74 - 99 mg/dL Final    BUN 12/02/2019 16  7.0 - 18 MG/DL Final    Creatinine 12/02/2019 1.09  0.6 - 1.3 MG/DL Final    BUN/Creatinine ratio 12/02/2019 15  12 - 20   Final    GFR est AA 12/02/2019 >60  >60 ml/min/1.73m2 Final    GFR est non-AA 12/02/2019 >60  >60 ml/min/1.73m2 Final    Comment: (NOTE)  Estimated GFR is calculated using the Modification of Diet in Renal   Disease (MDRD) Study equation, reported for both  Americans   (GFRAA) and non- Americans (GFRNA), and normalized to 1.73m2   body surface area. The physician must decide which value applies to   the patient. The MDRD study equation should only be used in   individuals age 25 or older. It has not been validated for the   following: pregnant women, patients with serious comorbid conditions,   or on certain medications, or persons with extremes of body size,   muscle mass, or nutritional status.  Calcium 12/02/2019 8.9  8.5 - 10.1 MG/DL Final    Bilirubin, total 12/02/2019 0.8  0.2 - 1.0 MG/DL Final    ALT (SGPT) 12/02/2019 22  16 - 61 U/L Final    AST (SGOT) 12/02/2019 20  10 - 38 U/L Final    Alk.  phosphatase 12/02/2019 86  45 - 117 U/L Final    Protein, total 12/02/2019 6.8  6.4 - 8.2 g/dL Final    Albumin 12/02/2019 3.4  3.4 - 5.0 g/dL Final    Globulin 12/02/2019 3.4  2.0 - 4.0 g/dL Final    A-G Ratio 12/02/2019 1.0  0.8 - 1.7   Final    Color 12/02/2019 DARK YELLOW    Final    Appearance 12/02/2019 CLEAR    Final    Specific gravity 12/02/2019 1.024  1.005 - 1.030   Final    pH (UA) 12/02/2019 5.5  5.0 - 8.0   Final    Protein 12/02/2019 NEGATIVE   NEG mg/dL Final    Glucose 12/02/2019 NEGATIVE   NEG mg/dL Final    Ketone 12/02/2019 NEGATIVE   NEG mg/dL Final    Bilirubin 12/02/2019 SMALL* NEG   Final    Comment: (NOTE)  Positive, unable to confirm with Ictotest.      Blood 12/02/2019 NEGATIVE   NEG   Final    Urobilinogen 12/02/2019 1.0  0.2 - 1.0 EU/dL Final    Nitrites 12/02/2019 NEGATIVE   NEG   Final    Leukocyte Esterase 12/02/2019 NEGATIVE   NEG   Final    WBC 12/02/2019 0 to 2  0 - 4 /hpf Final    RBC 12/02/2019 0  0 - 5 /hpf Final    Epithelial cells 12/02/2019 FEW  0 - 5 /lpf Final    Bacteria 12/02/2019 NEGATIVE   NEG /hpf Final   Clinical Support on 11/13/2019   Component Date Value Ref Range Status    VALID INTERNAL CONTROL POC 11/13/2019 Yes   Final    INR POC 11/13/2019 2.8   Final       .No results found for any visits on 01/31/20. Assessment / Plan:      ICD-10-CM ICD-9-CM    1. Vitamin B 12 deficiency E53.8 266.2 [place] THER/PROPH/DIAG INJECTION, SUBCUT/IM      cyanocobalamin (VITAMIN B-12) 1,000 mcg/mL injection   2. Medicare annual wellness visit, subsequent Z00.00 V70.0    3. Paroxysmal atrial fibrillation (HCC) I48.0 427.31    4. Essential hypertension I10 401.9 CBC WITH AUTOMATED DIFF      METABOLIC PANEL, COMPREHENSIVE      LIPID PANEL   5. Pure hypercholesterolemia E78.00 272.0    6. Screening for prostate cancer Z12.5 V76.44 PSA SCREENING (SCREENING)     Fasting labs next visit  Patient given B12 injection today  Follow-up and Dispositions    · Return in about 6 months (around 7/31/2020), or if symptoms worsen or fail to improve. I asked the patient if he  had any questions and answered his  questions. The patient stated that he understands the treatment plan and agrees with the treatment plan    This document was created with a voice activated dictation system and may contain transcription errors.

## 2020-01-31 NOTE — PROGRESS NOTES
Visit Vitals  /63   Pulse 80   Temp 96.3 °F (35.7 °C) (Oral)   Resp 12   Ht 6' (1.829 m)   Wt 205 lb (93 kg)   SpO2 97%   BMI 27.80 kg/m²     Yael Gabriel presents today for   Chief Complaint   Patient presents with   Sriniizzy Cárdenason Annual Wellness Visit    Hypertension     follow-up       Is someone accompanying this pt? no    Is the patient using any DME equipment during OV? no    Depression Screening:  3 most recent PHQ Screens 1/31/2020   Little interest or pleasure in doing things Not at all   Feeling down, depressed, irritable, or hopeless Not at all   Total Score PHQ 2 0       Learning Assessment:  Learning Assessment 4/12/2017   PRIMARY LEARNER Patient   HIGHEST LEVEL OF EDUCATION - PRIMARY LEARNER  GRADUATED HIGH SCHOOL OR GED   BARRIERS PRIMARY LEARNER NONE   CO-LEARNER CAREGIVER No   PRIMARY LANGUAGE ENGLISH   LEARNER PREFERENCE PRIMARY DEMONSTRATION   ANSWERED BY [email or web address]   RELATIONSHIP SELF       Abuse Screening:  Abuse Screening Questionnaire 1/7/2019   Do you ever feel afraid of your partner? N   Are you in a relationship with someone who physically or mentally threatens you? N   Is it safe for you to go home? Y       Fall Risk  Fall Risk Assessment, last 12 mths 12/2/2019   Able to walk? Yes   Fall in past 12 months? No       Health Maintenance reviewed and discussed and ordered per Provider. Health Maintenance Due   Topic Date Due    Shingrix Vaccine Age 49> (2 of 2) 05/23/2019    Influenza Age 5 to Adult  08/01/2019    GLAUCOMA SCREENING Q2Y  08/16/2019    MEDICARE YEARLY EXAM  01/08/2020           Coordination of Care:  1. Have you been to the ER, urgent care clinic since your last visit? Hospitalized since your last visit? no    2. Have you seen or consulted any other health care providers outside of the [address] [place] [address] since your last visit? Include any pap smears or colon screening.  no

## 2020-01-31 NOTE — PATIENT INSTRUCTIONS
Medicare Wellness Visit, Male The best way to live healthy is to have a lifestyle where you eat a well-balanced diet, exercise regularly, limit alcohol use, and quit all forms of tobacco/nicotine, if applicable. Regular preventive services are another way to keep healthy. Preventive services (vaccines, screening tests, monitoring & exams) can help personalize your care plan, which helps you manage your own care. Screening tests can find health problems at the earliest stages, when they are easiest to treat. [facility] follows the current, evidence-based guidelines published by the [facility] Corina ([facility]) when recommending preventive services for our patients. Because we follow these guidelines, sometimes recommendations change over time as research supports it. (For example, a prostate screening blood test is no longer routinely recommended for men with no symptoms). Of course, you and your doctor may decide to screen more often for some diseases, based on your risk and co-morbidities (chronic disease you are already diagnosed with). Preventive services for you include: - Medicare offers their members a free annual wellness visit, which is time for you and your primary care provider to discuss and plan for your preventive service needs. Take advantage of this benefit every year! 
-All adults over age 72 should receive the recommended pneumonia vaccines. Current USPSTF guidelines recommend a series of two vaccines for the best pneumonia protection.  
-All adults should have a flu vaccine yearly and tetanus vaccine every 10 years. 
-All adults age 48 and older should receive the shingles vaccines (series of two vaccines).       
-All adults age 38-68 who are overweight should have a diabetes screening test once every three years.  
-Other screening tests & preventive services for persons with diabetes include: an eye exam to screen for diabetic retinopathy, a kidney function test, a foot exam, and stricter control over your cholesterol.  
-Cardiovascular screening for adults with routine risk involves an electrocardiogram (ECG) at intervals determined by the provider.  
-Colorectal cancer screening should be done for adults age 54-65 with no increased risk factors for colorectal cancer. There are a number of acceptable methods of screening for this type of cancer. Each test has its own benefits and drawbacks. Discuss with your provider what is most appropriate for you during your annual wellness visit. The different tests include: colonoscopy (considered the best screening method), a fecal occult blood test, a fecal DNA test, and sigmoidoscopy. 
-All adults born between [facility] should be screened once for Hepatitis C. 
-An Abdominal Aortic Aneurysm (AAA) Screening is recommended for men age 73-68 who has ever smoked in their lifetime. Here is a list of your current Health Maintenance items (your personalized list of preventive services) with a due date: 
Health Maintenance Due Topic Date Due  Shingles Vaccine (2 of 2) 05/23/2019  Flu Vaccine  08/01/2019  Glaucoma Screening   08/16/2019 [facility] Annual Well Visit  01/08/2020

## 2020-02-06 ENCOUNTER — TELEPHONE (OUTPATIENT)
Dept: FAMILY MEDICINE CLINIC | Facility: CLINIC | Age: 80
End: 2020-02-06

## 2020-02-06 NOTE — TELEPHONE ENCOUNTER
2 pt identifiers confirmed. Pt confirmed that he is taking Coumadin 5 mg Mon/Wed/Fri. and 2 1/2mg all other days. Pt INR 2.4 Dr. Trey Azevedo NP stated that pt should continue same dose. Pt verbalized his understanding.

## 2020-02-12 ENCOUNTER — TELEPHONE (OUTPATIENT)
Dept: FAMILY MEDICINE CLINIC | Facility: CLINIC | Age: 80
End: 2020-02-12

## 2020-02-12 NOTE — TELEPHONE ENCOUNTER
2 pt identifiers confirmed. Pt verified that he is taking Coumadin 5 mg Mon/Wed/Fri and 2 1/2 mg all other days. Patient INR is 2.9 Dr. Gan Check NP stated patient should continue same dose and repeat INR in 2 weeks. Pt verbalized his understanding.

## 2020-02-24 ENCOUNTER — CLINICAL SUPPORT (OUTPATIENT)
Dept: FAMILY MEDICINE CLINIC | Facility: CLINIC | Age: 80
End: 2020-02-24

## 2020-02-24 ENCOUNTER — HOSPITAL ENCOUNTER (OUTPATIENT)
Dept: LAB | Age: 80
Discharge: HOME | End: 2020-02-24
Payer: MEDICARE

## 2020-02-24 DIAGNOSIS — Z12.5 SCREENING FOR PROSTATE CANCER: ICD-10-CM

## 2020-02-24 DIAGNOSIS — I10 ESSENTIAL HYPERTENSION: ICD-10-CM

## 2020-02-24 DIAGNOSIS — E53.8 VITAMIN B 12 DEFICIENCY: ICD-10-CM

## 2020-02-24 DIAGNOSIS — Z23 ENCOUNTER FOR IMMUNIZATION: Primary | ICD-10-CM

## 2020-02-24 LAB
ALBUMIN SERPL-MCNC: 3.7 G/DL (ref 3.4–5)
ALBUMIN/GLOB SERPL: 1.1 {RATIO} (ref 0.8–1.7)
ALP SERPL-CCNC: 95 U/L (ref 45–117)
ALT SERPL-CCNC: 27 U/L (ref 16–61)
ANION GAP SERPL CALC-SCNC: 8 MMOL/L (ref 3–18)
AST SERPL-CCNC: 22 U/L (ref 10–38)
BASOPHILS # BLD: 0 K/UL (ref 0–0.1)
BASOPHILS NFR BLD: 1 % (ref 0–2)
BILIRUB SERPL-MCNC: 0.8 MG/DL (ref 0.2–1)
BUN SERPL-MCNC: 20 MG/DL (ref 7–18)
BUN/CREAT SERPL: 19 (ref 12–20)
CALCIUM SERPL-MCNC: 8.7 MG/DL (ref 8.5–10.1)
CHLORIDE SERPL-SCNC: 108 MMOL/L (ref 100–111)
CHOLEST SERPL-MCNC: 102 MG/DL
CO2 SERPL-SCNC: 26 MMOL/L (ref 21–32)
CREAT SERPL-MCNC: 1.07 MG/DL (ref 0.6–1.3)
DIFFERENTIAL METHOD BLD: ABNORMAL
EOSINOPHIL # BLD: 0.1 K/UL (ref 0–0.4)
EOSINOPHIL NFR BLD: 2 % (ref 0–5)
ERYTHROCYTE [DISTWIDTH] IN BLOOD BY AUTOMATED COUNT: 13.8 % (ref 11.6–14.5)
GLOBULIN SER CALC-MCNC: 3.5 G/DL (ref 2–4)
GLUCOSE SERPL-MCNC: 85 MG/DL (ref 74–99)
HCT VFR BLD AUTO: 46.4 % (ref 36–48)
HDLC SERPL-MCNC: 54 MG/DL (ref 40–60)
HDLC SERPL: 1.9 {RATIO} (ref 0–5)
HGB BLD-MCNC: 15.7 G/DL (ref 13–16)
LDLC SERPL CALC-MCNC: 37 MG/DL (ref 0–100)
LIPID PROFILE,FLP: NORMAL
LYMPHOCYTES # BLD: 1.6 K/UL (ref 0.9–3.6)
LYMPHOCYTES NFR BLD: 24 % (ref 21–52)
MCH RBC QN AUTO: 32.3 PG (ref 24–34)
MCHC RBC AUTO-ENTMCNC: 33.8 G/DL (ref 31–37)
MCV RBC AUTO: 95.5 FL (ref 74–97)
MONOCYTES # BLD: 0.8 K/UL (ref 0.05–1.2)
MONOCYTES NFR BLD: 12 % (ref 3–10)
NEUTS SEG # BLD: 4 K/UL (ref 1.8–8)
NEUTS SEG NFR BLD: 61 % (ref 40–73)
PLATELET # BLD AUTO: 221 K/UL (ref 135–420)
PMV BLD AUTO: 11 FL (ref 9.2–11.8)
POTASSIUM SERPL-SCNC: 4.1 MMOL/L (ref 3.5–5.5)
PROT SERPL-MCNC: 7.2 G/DL (ref 6.4–8.2)
PSA SERPL-MCNC: 0.9 NG/ML (ref 0–4)
RBC # BLD AUTO: 4.86 M/UL (ref 4.7–5.5)
SODIUM SERPL-SCNC: 142 MMOL/L (ref 136–145)
TRIGL SERPL-MCNC: 55 MG/DL (ref ?–150)
VLDLC SERPL CALC-MCNC: 11 MG/DL
WBC # BLD AUTO: 6.6 K/UL (ref 4.6–13.2)

## 2020-02-24 PROCEDURE — 36415 COLL VENOUS BLD VENIPUNCTURE: CPT

## 2020-02-24 PROCEDURE — 80061 LIPID PANEL: CPT

## 2020-02-24 PROCEDURE — 85025 COMPLETE CBC W/AUTO DIFF WBC: CPT

## 2020-02-24 PROCEDURE — 84153 ASSAY OF PSA TOTAL: CPT

## 2020-02-24 PROCEDURE — 80053 COMPREHEN METABOLIC PANEL: CPT

## 2020-02-24 PROCEDURE — 82607 VITAMIN B-12: CPT

## 2020-02-24 RX ORDER — CYANOCOBALAMIN 1000 UG/ML
1000 INJECTION, SOLUTION INTRAMUSCULAR; SUBCUTANEOUS ONCE
Qty: 1 ML | Refills: 0 | Status: SHIPPED | COMMUNITY
Start: 2020-02-24 | End: 2020-02-24

## 2020-02-25 ENCOUNTER — TELEPHONE (OUTPATIENT)
Dept: FAMILY MEDICINE CLINIC | Facility: CLINIC | Age: 80
End: 2020-02-25

## 2020-02-25 NOTE — TELEPHONE ENCOUNTER
2 pt identifiers confirmed. Pt INR is 2.2 on 02/19/2020. Pt verified that he is taking Coumadin 5mg Mon/Wed/Fri and 2 1/2mg all other days. Dr. Km Quiñonez NP stated patient should continue same dose. Pt verbalized his understanding. Refused

## 2020-02-26 DIAGNOSIS — E53.8 B12 DEFICIENCY: Primary | ICD-10-CM

## 2020-02-28 ENCOUNTER — OFFICE VISIT (OUTPATIENT)
Dept: FAMILY MEDICINE CLINIC | Facility: CLINIC | Age: 80
End: 2020-02-28

## 2020-02-28 ENCOUNTER — TELEPHONE (OUTPATIENT)
Dept: FAMILY MEDICINE CLINIC | Facility: CLINIC | Age: 80
End: 2020-02-28

## 2020-02-28 VITALS
DIASTOLIC BLOOD PRESSURE: 79 MMHG | BODY MASS INDEX: 27.77 KG/M2 | SYSTOLIC BLOOD PRESSURE: 131 MMHG | RESPIRATION RATE: 12 BRPM | WEIGHT: 205 LBS | OXYGEN SATURATION: 99 % | TEMPERATURE: 97 F | HEART RATE: 87 BPM | HEIGHT: 72 IN

## 2020-02-28 DIAGNOSIS — I48.0 PAROXYSMAL ATRIAL FIBRILLATION (HCC): ICD-10-CM

## 2020-02-28 DIAGNOSIS — I10 ESSENTIAL HYPERTENSION: ICD-10-CM

## 2020-02-28 DIAGNOSIS — E78.00 PURE HYPERCHOLESTEROLEMIA: ICD-10-CM

## 2020-02-28 DIAGNOSIS — N64.4 BREAST TENDERNESS: ICD-10-CM

## 2020-02-28 DIAGNOSIS — E53.8 VITAMIN B 12 DEFICIENCY: ICD-10-CM

## 2020-02-28 LAB — VIT B12 SERPL-MCNC: 699 PG/ML (ref 211–911)

## 2020-02-28 NOTE — TELEPHONE ENCOUNTER
2 pt identifiers confirmed. Pt INR is 2.1 on 02/26/2020. Pt verified that he is taking Coumadin 5mg Mon/Wed/Fri and 2 1/2mg all other days. Dr. Yamila Zapata NP stated should continue same dose. Pt verbalized his understanding.

## 2020-02-28 NOTE — PROGRESS NOTES
Sindhu Soler is a 70 y.o. male presenting today for Labs (results) and Breast pain (patient stated that bilateral nipples are sore to the touch.)    HPI:  Sindhu Soler presents to the office today for hypertension, atrial fibrillaton and hyperlipidemia follow-up care. Patient also has a history of B12 deficiency and takes B12 injections weekly. His cardiologist started him on [address] [place] [address] for his hyperlipidemia. Hypertension-his blood pressure is controlled today. This is a chronic condition and his blood pressure today is 131/79 he currently takes metoprolol 50 mg tablet daily. Hyperlipidemia-patient is taking Repatha, Zetia and Crestor for his hyperlipidemia. He denies any myalgia. Atrial fibrillation-patient is on long-term anticoagulation. His last PT/INR was this week and was 2.0. Patient also presents today complaining bilateral nipple soreness. He said he mostly experiences nipples always with friction or when in the shower. He denies starting any new medication other than Redpath approximately 3 weeks ago. Per the patient he previously experienced nipple tenderness and his spironolactone medication was discontinued. This was in 2017. Review of Systems   Constitutional: Negative for malaise/fatigue. Respiratory: Negative for cough and sputum production. Cardiovascular: Negative for chest pain, palpitations and leg swelling. Gastrointestinal: Negative for abdominal pain, nausea and vomiting. Neurological: Negative for dizziness and tingling. Allergies   Allergen Reactions    Cephalexin Hives and Itching    Lipitor [Atorvastatin] Myalgia    Livalo [Pitavastatin] Other (comments)     Extreme fatigue    5/29/14   PATIENT DENIES    Zocor [Simvastatin] Myalgia     5/29/14 PATIENT DENIES        Current Outpatient Medications   Medication Sig Dispense Refill    ezetimibe (ZETIA) 10 mg tablet Take 1 Tab by mouth daily.  90 Tab 3    warfarin (COUMADIN) 5 mg tablet 1 tab on Mon, Wed, Fri.   1/2 tab on the other days 100 Tab 1    cyanocobalamin (VITAMIN B12) 1,000 mcg/mL injection INJECT 1 ML INTRAMUSCULARLY EVERY 3 WEEKS 1 mL 8    evolocumab (REPATHA SURECLICK) pen injection 1 mL by SubCUTAneous route every fourteen (14) days. 6 Pen 3    coenzyme Q-10 (CO Q-10) 200 mg capsule Take  by mouth daily.  aspirin delayed-release 81 mg tablet Take  by mouth daily.  metoprolol tartrate (LOPRESSOR) 50 mg tablet Take 1 Tab by mouth two (2) times a day. 180 Tab 3    furosemide (LASIX) 40 mg tablet TAKE 1 TABLET DAILY 90 Tab 3    rosuvastatin (CRESTOR) 5 mg tablet Take 1 Tab by mouth every Monday, Wednesday, Friday. 90 Tab 3    LUMIGAN 0.01 % ophthalmic drops Administer 1 Drop to both eyes nightly.  AZOPT 1 % ophthalmic suspension Administer 1 Drop to both eyes two (2) times a day. 3    multivitamin (ONE A DAY) tablet Take 1 Tab by mouth daily. Past Medical History:   Diagnosis Date    AICD (automatic cardioverter/defibrillator) present     [employer]  upgrade from pacer in 2007 due to VT    Arthritis     [facility]rial fibrillation ([facility]     Cancer ([facility] 02/2019    melenoma on left leg    Cardiac cath 03/19/2007    LM 25% ostial.  Otherwise patent coronaries. LVEDP 20.  EF 50%. Dyssynch. mid anterior contraction. Pacemaker.  Cardiac echocardiogram 03/20/2014    A-fib. EF 65-70%. No RWMA. No RWMA. Mild conc LVH. Mild RVE. RVSP 40-45 mmHg. Mild LAE.  HERNANDEZ. Mild MR. Mod TR.  Cardiac nuclear imaging test 07/29/2014    No ischemia or prior infarction. EF 68%. Nondiagnostic EKG due to V pacing on pharm stress test.  Low risk.  Cardioversion 8/31/2011    Successful defibrillation threshold testing at 18 joules. Patient also had conversion to atrial ventricular pacing.      CHF (congestive heart failure) (HCC)     Cobalamin deficiency     Dupuytren contracture 02/08/2010    on the right ring finger     Edema     GERD (gastroesophageal reflux disease)     Hypertension     Hypertrophy of prostate with urinary obstruction and other lower urinary tract symptoms (LUTS)     Ill-defined condition 2007    AICD    Impotence of organic origin     Intolerance of drug     Intolerant high doses of sotalol due to prolonged QT    Mastodynia     Paroxysmal VT ([address]     Pure hypercholesterolemia     S/P ablation of atrial fibrillation 05/31/2011    S/P ablation of atrial fibrillation 12/18/2012    Dr. Jorge Mann at [address] S/P ablation operation for arrhythmia     VT ablation by Dr. Adryan Hartmann near AVN 2/3007    Sick sinus syndrome [facility])        Past Surgical History:   Procedure Laterality Date    COLONOSCOPY N/A 9/12/2018    COLONOSCOPY with Polypectomies and Clip Placement performed by Valeria Titus MD at [address] [place] [address] [facility] by Dr. Freda Wolfe HX CATARACT REMOVAL  02/08-03/08    bilateral cataract removed    HX CATARACT REMOVAL  2/2008 & 3/2008    bilateral    HX ENDOSCOPY  09/12/2018    Dr. Cranford Oppenheim - pathology indicates changes of acid reflux and Garcia's esophagus, repeat EGD in 2 years    HX ORTHOPAEDIC  2/8/10    release of Dupuytren's contraction on ring finger of right hand    HX OTHER SURGICAL  6/2000    atrial lead placement    HX OTHER SURGICAL  6/5/2009    Cardioversion    HX OTHER SURGICAL  10/12/2012    cardioversion    HX PACEMAKER  1998    placement    HX PACEMAKER  6/2000    DDD    HX PACEMAKER  3/27/07    removal of pacemaker & placement of dual chamber defib generator and leads    HX TONSIL AND ADENOIDECTOMY         Social History     Socioeconomic History    Marital status:      Spouse name: Not on file    Number of children: Not on file    Years of education: Not on file    Highest education level: Not on file   Occupational History    Not on file   Social Needs    Financial resource strain: Not on file    Food insecurity:     Worry: Not on file     Inability: Not on file    Transportation needs:     Medical: Not on file     Non-medical: Not on file   Tobacco Use    Smoking status: Never Smoker    Smokeless tobacco: Never Used   Substance and Sexual Activity    Alcohol use: Yes     Alcohol/week: 10.0 - 12.0 standard drinks     Types: 10 - 12 Glasses of wine per week     Frequency: 4 or more times a week     Drinks per session: 1 or 2     Binge frequency: Never     Comment: 2-3 glasses of white wine about 5-6 days/week)    Drug use: No    Sexual activity: Not Currently     Partners: Female   Lifestyle    Physical activity:     Days per week: Not on file     Minutes per session: Not on file    Stress: Not on file   Relationships    Social connections:     Talks on phone: Not on file     Gets together: Not on file     Attends [employer] service: Not on file     Active member of club or organization: Not on file     Attends meetings of clubs or organizations: Not on file     Relationship status: Not on file    Intimate partner violence:     Fear of current or ex partner: Not on file     Emotionally abused: Not on file     Physically abused: Not on file     Forced sexual activity: Not on file   Other Topics Concern    Not on file   Social History Narrative    Not on file       Patient does not have an advanced directive on file    Vitals:    02/28/20 0747   BP: 131/79   Pulse: 87   Resp: 12   Temp: 97 °F (36.1 °C)   TempSrc: Oral   SpO2: 99%   Weight: 205 lb (93 kg)   Height: 6' (1.829 m)   PainSc:   0 - No pain       Physical Exam  Vitals signs and nursing note reviewed. Constitutional:       Appearance: Normal appearance. Cardiovascular:      Rate and Rhythm: Normal rate. Pulses: Normal pulses. Heart sounds: Normal heart sounds. Pulmonary:      Effort: Pulmonary effort is normal.      Breath sounds: Normal breath sounds. Skin:     General: Skin is warm and dry. Neurological:      Mental Status: He is alert.          [facility] Outpatient Visit on 02/24/2020   Component Date Value Ref Range Status    WBC 02/24/2020 6.6  4.6 - 13.2 K/uL Final    RBC 02/24/2020 4.86  4.70 - 5.50 M/uL Final    HGB 02/24/2020 15.7  13.0 - 16.0 g/dL Final    HCT 02/24/2020 46.4  36.0 - 48.0 % Final    MCV 02/24/2020 95.5  74.0 - 97.0 FL Final    MCH 02/24/2020 32.3  24.0 - 34.0 PG Final    MCHC 02/24/2020 33.8  31.0 - 37.0 g/dL Final    RDW 02/24/2020 13.8  11.6 - 14.5 % Final    PLATELET 69/90/9654 884  135 - 420 K/uL Final    MPV 02/24/2020 11.0  9.2 - 11.8 FL Final    NEUTROPHILS 02/24/2020 61  40 - 73 % Final    LYMPHOCYTES 02/24/2020 24  21 - 52 % Final    MONOCYTES 02/24/2020 12* 3 - 10 % Final    EOSINOPHILS 02/24/2020 2  0 - 5 % Final    BASOPHILS 02/24/2020 1  0 - 2 % Final    ABS. NEUTROPHILS 02/24/2020 4.0  1.8 - 8.0 K/UL Final    ABS. LYMPHOCYTES 02/24/2020 1.6  0.9 - 3.6 K/UL Final    ABS. MONOCYTES 02/24/2020 0.8  0.05 - 1.2 K/UL Final    ABS. EOSINOPHILS 02/24/2020 0.1  0.0 - 0.4 K/UL Final    ABS. BASOPHILS 02/24/2020 0.0  0.0 - 0.1 K/UL Final    DF 02/24/2020 AUTOMATED    Final    Sodium 02/24/2020 142  136 - 145 mmol/L Final    Potassium 02/24/2020 4.1  3.5 - 5.5 mmol/L Final    Chloride 02/24/2020 108  100 - 111 mmol/L Final    CO2 02/24/2020 26  21 - 32 mmol/L Final    Anion gap 02/24/2020 8  3.0 - 18 mmol/L Final    Glucose 02/24/2020 85  74 - 99 mg/dL Final    BUN 02/24/2020 20* 7.0 - 18 MG/DL Final    Creatinine 02/24/2020 1.07  0.6 - 1.3 MG/DL Final    BUN/Creatinine ratio 02/24/2020 19  12 - 20   Final    GFR est AA 02/24/2020 >60  >60 ml/min/1.73m2 Final    GFR est non-AA 02/24/2020 >60  >60 ml/min/1.73m2 Final    Comment: (NOTE)  Estimated GFR is calculated using the Modification of Diet in Renal   Disease (MDRD) Study equation, reported for both  Americans   (GFRAA) and non- Americans (GFRNA), and normalized to 1.73m2   body surface area.  The physician must decide which value applies to the patient. The MDRD study equation should only be used in   individuals age 25 or older. It has not been validated for the   following: pregnant women, patients with serious comorbid conditions,   or on certain medications, or persons with extremes of body size,   muscle mass, or nutritional status.  Calcium 02/24/2020 8.7  8.5 - 10.1 MG/DL Final    Bilirubin, total 02/24/2020 0.8  0.2 - 1.0 MG/DL Final    ALT (SGPT) 02/24/2020 27  16 - 61 U/L Final    AST (SGOT) 02/24/2020 22  10 - 38 U/L Final    Alk. phosphatase 02/24/2020 95  45 - 117 U/L Final    Protein, total 02/24/2020 7.2  6.4 - 8.2 g/dL Final    Albumin 02/24/2020 3.7  3.4 - 5.0 g/dL Final    Globulin 02/24/2020 3.5  2.0 - 4.0 g/dL Final    A-G Ratio 02/24/2020 1.1  0.8 - 1.7   Final    LIPID PROFILE 02/24/2020        Final    Cholesterol, total 02/24/2020 102  <200 MG/DL Final    Triglyceride 02/24/2020 55  <150 MG/DL Final    Comment: The drugs N-acetylcysteine (NAC) and  Metamiszole have been found to cause falsely  low results in this chemical assay. Please  be sure to submit blood samples obtained  BEFORE administration of either of these  drugs to assure correct results.       HDL Cholesterol 02/24/2020 54  40 - 60 MG/DL Final    LDL, calculated 02/24/2020 37  0 - 100 MG/DL Final    VLDL, calculated 02/24/2020 11  MG/DL Final    CHOL/HDL Ratio 02/24/2020 1.9  0 - 5.0   Final    Prostate Specific Ag 02/24/2020 0.9  0.0 - 4.0 ng/mL Final   Hospital Outpatient Visit on 01/08/2020   Component Date Value Ref Range Status    LA Volume 01/08/2020 86.92  18 - 58 mL Final    LV E' Septal Velocity 01/08/2020 4.31  cm/s Final    Tapse 01/08/2020 1.58  1.5 - 2.0 cm Final    Ao Root D 01/08/2020 3.29  cm Final    LVIDd 01/08/2020 4.70  4.2 - 5.9 cm Final    LVPWd 01/08/2020 0.95  0.6 - 1.0 cm Final    LVIDs 01/08/2020 2.57  cm Final    IVSd 01/08/2020 0.79  0.6 - 1.0 cm Final    LVOT d 01/08/2020 1.97  cm Final    LVOT Peak Velocity 01/08/2020 65.10  cm/s Final    LVOT Peak Gradient 01/08/2020 1.7  mmHg Final    LVOT VTI 01/08/2020 11.31  cm Final    LA Vol 4C 01/08/2020 111.52* 18 - 58 mL Final    LA Vol 2C 01/08/2020 52.78  18 - 58 mL Final    LA Area 4C 01/08/2020 30.6  cm2 Final    LV Mass AL 01/08/2020 156.7  88 - 224 g Final    LV Mass AL Index 01/08/2020 72.2  49 - 115 g/m2 Final    Triscuspid Valve Regurgitation Pea* 01/08/2020 19.2  mmHg Final    Aortic Regurgitant Pressure Half-t* 01/08/2020 516.9  cm Final    TR Max Velocity 01/08/2020 218.91  cm/s Final    LA Vol Index 01/08/2020 40.04  16 - 28 ml/m2 Final    LA Vol Index 01/08/2020 24.32  16 - 28 ml/m2 Final    LA Vol Index 01/08/2020 51.38  16 - 28 ml/m2 Final    AR Max Leonides 01/08/2020 418.85  cm/s Final    Left Ventricular Fractional Shorte* 01/08/2020 20.399720439  % Final    Left Ventricular Outflow Tract Sydnee* 01/08/2020 3.38889512199077  mmHg Final    Left Ventricular Outflow Tract Sydnee* 01/08/2020 9.31992480863993  cm/s Final    AV peak gradient 01/08/2020 55.727393140  mmHg Final    Left Ventricular End Diastolic Vol* 52/96/1217 2.69788175061  mL Final    Left Ventricular End Systolic Volu* 87/35/9239 8.9330226072  mL Final    Left Ventricular Stroke Volume by * 01/08/2020 17.509433324  mL Final   Hospital Outpatient Visit on 12/02/2019   Component Date Value Ref Range Status    Sodium 12/02/2019 140  136 - 145 mmol/L Final    Potassium 12/02/2019 3.7  3.5 - 5.5 mmol/L Final    Chloride 12/02/2019 107  100 - 111 mmol/L Final    CO2 12/02/2019 26  21 - 32 mmol/L Final    Anion gap 12/02/2019 7  3.0 - 18 mmol/L Final    Glucose 12/02/2019 111* 74 - 99 mg/dL Final    BUN 12/02/2019 16  7.0 - 18 MG/DL Final    Creatinine 12/02/2019 1.09  0.6 - 1.3 MG/DL Final    BUN/Creatinine ratio 12/02/2019 15  12 - 20   Final    GFR est AA 12/02/2019 >60  >60 ml/min/1.73m2 Final    GFR est non-AA 12/02/2019 >60  >60 ml/min/1.73m2 Final    Comment: (NOTE)  Estimated GFR is calculated using the Modification of Diet in Renal   Disease (MDRD) Study equation, reported for both  Americans   (GFRAA) and non- Americans (GFRNA), and normalized to 1.73m2   body surface area. The physician must decide which value applies to   the patient. The MDRD study equation should only be used in   individuals age 25 or older. It has not been validated for the   following: pregnant women, patients with serious comorbid conditions,   or on certain medications, or persons with extremes of body size,   muscle mass, or nutritional status.  Calcium 12/02/2019 8.9  8.5 - 10.1 MG/DL Final    Bilirubin, total 12/02/2019 0.8  0.2 - 1.0 MG/DL Final    ALT (SGPT) 12/02/2019 22  16 - 61 U/L Final    AST (SGOT) 12/02/2019 20  10 - 38 U/L Final    Alk.  phosphatase 12/02/2019 86  45 - 117 U/L Final    Protein, total 12/02/2019 6.8  6.4 - 8.2 g/dL Final    Albumin 12/02/2019 3.4  3.4 - 5.0 g/dL Final    Globulin 12/02/2019 3.4  2.0 - 4.0 g/dL Final    A-G Ratio 12/02/2019 1.0  0.8 - 1.7   Final    Color 12/02/2019 DARK YELLOW    Final    Appearance 12/02/2019 CLEAR    Final    Specific gravity 12/02/2019 1.024  1.005 - 1.030   Final    pH (UA) 12/02/2019 5.5  5.0 - 8.0   Final    Protein 12/02/2019 NEGATIVE   NEG mg/dL Final    Glucose 12/02/2019 NEGATIVE   NEG mg/dL Final    Ketone 12/02/2019 NEGATIVE   NEG mg/dL Final    Bilirubin 12/02/2019 SMALL* NEG   Final    Comment: (NOTE)  Positive, unable to confirm with Ictotest.      Blood 12/02/2019 NEGATIVE   NEG   Final    Urobilinogen 12/02/2019 1.0  0.2 - 1.0 EU/dL Final    Nitrites 12/02/2019 NEGATIVE   NEG   Final    Leukocyte Esterase 12/02/2019 NEGATIVE   NEG   Final    WBC 12/02/2019 0 to 2  0 - 4 /hpf Final    RBC 12/02/2019 0  0 - 5 /hpf Final    Epithelial cells 12/02/2019 FEW  0 - 5 /lpf Final    Bacteria 12/02/2019 NEGATIVE   NEG /hpf Final       .No results found for any visits on 02/28/20. Assessment / Plan:      ICD-10-CM ICD-9-CM    1. Breast tenderness N64.4 611.71 US BREASTS LIMITED=<3 QUAD   2. Paroxysmal atrial fibrillation (HCC) I48.0 427.31    3. Vitamin B 12 deficiency E53.8 266.2    4. Essential hypertension I10 401.9    5. Pure hypercholesterolemia E78.00 272.0      Hypertension-no change to current treatment plan  Breast tenderness-ultrasound of bilateral breast  Hyperlipidemia-last lipid panel on February 24, 2020. His HDL was 54 and his LDL was 37. Follow-up in 6 months  Follow-up and Dispositions    · Return in about 6 months (around 8/28/2020), or if symptoms worsen or fail to improve. I asked the patient if he  had any questions and answered his  questions. The patient stated that he understands the treatment plan and agrees with the treatment plan    This document was created with a voice activated dictation system and may contain transcription errors.

## 2020-02-28 NOTE — PROGRESS NOTES
Visit Vitals  /79   Pulse 87   Temp 97 °F (36.1 °C) (Oral)   Resp 12   Ht 6' (1.829 m)   Wt 205 lb (93 kg)   SpO2 99%   BMI 27.80 kg/m²     Yael VILLALTA Antonina Sanford presents today for   Chief Complaint   Patient presents with    Labs     results    Breast pain     patient stated that bilateral nipples are sore to the touch. Is someone accompanying this pt? no    Is the patient using any DME equipment during [address]? no    Depression Screening:  3 most recent PHQ Screens 2/28/2020   Little interest or pleasure in doing things Not at all   Feeling down, depressed, irritable, or hopeless Not at all   Total Score PHQ 2 0       Learning Assessment:  Learning Assessment 4/12/2017   PRIMARY LEARNER Patient   HIGHEST LEVEL OF EDUCATION - PRIMARY LEARNER  GRADUATED HIGH SCHOOL OR GED   BARRIERS PRIMARY LEARNER NONE   CO-LEARNER CAREGIVER No   PRIMARY LANGUAGE ENGLISH   LEARNER PREFERENCE PRIMARY DEMONSTRATION   ANSWERED BY [email or web address]   RELATIONSHIP SELF       Abuse Screening:  Abuse Screening Questionnaire 1/7/2019   Do you ever feel afraid of your partner? N   Are you in a relationship with someone who physically or mentally threatens you? N   Is it safe for you to go home? Y       Fall Risk  Fall Risk Assessment, last 12 mths 2/28/2020   Able to walk? Yes   Fall in past 12 months? No       Health Maintenance reviewed and discussed and ordered per Provider. Health Maintenance Due   Topic Date Due    Shingrix Vaccine Age 49> (2 of 2) 05/23/2019    Influenza Age 5 to Adult  08/01/2019    GLAUCOMA SCREENING Q2Y  08/16/2019           Coordination of Care:  1. Have you been to the ER, urgent care clinic since your last visit? Hospitalized since your last visit? no    2. Have you seen or consulted any other health care providers outside of the [address] [place] [address] since your last visit? Include any pap smears or colon screening.  no

## 2020-02-28 NOTE — TELEPHONE ENCOUNTER
Per Dr. Arenas Neighbor call the patient regarding his Vit B 12 level of 699. This is good and in the normal level. Patient can stop the Vit B12  But he will needs to keep a close check on it and have it check in 6 wks if he stops, and make sure if he starts having symptoms to let us know. I called the patient and left a message to call back on Monday.

## 2020-03-02 ENCOUNTER — TELEPHONE (OUTPATIENT)
Dept: FAMILY MEDICINE CLINIC | Facility: CLINIC | Age: 80
End: 2020-03-02

## 2020-03-02 DIAGNOSIS — N64.4 BREAST TENDERNESS: Primary | ICD-10-CM

## 2020-03-02 NOTE — TELEPHONE ENCOUNTER
Hospital called asking to place a Dx mammogram order in for patient before having the [address] done. Order placed . No further action needed. Patient is aware.

## 2020-03-04 ENCOUNTER — CLINICAL SUPPORT (OUTPATIENT)
Dept: CARDIOLOGY CLINIC | Age: 80
End: 2020-03-04

## 2020-03-04 ENCOUNTER — OFFICE VISIT (OUTPATIENT)
Dept: CARDIOLOGY CLINIC | Age: 80
End: 2020-03-04

## 2020-03-04 VITALS
OXYGEN SATURATION: 99 % | WEIGHT: 206 LBS | DIASTOLIC BLOOD PRESSURE: 74 MMHG | HEART RATE: 83 BPM | BODY MASS INDEX: 27.9 KG/M2 | HEIGHT: 72 IN | SYSTOLIC BLOOD PRESSURE: 126 MMHG

## 2020-03-04 DIAGNOSIS — I48.20 CHRONIC ATRIAL FIBRILLATION (HCC): ICD-10-CM

## 2020-03-04 DIAGNOSIS — I50.32 CHRONIC DIASTOLIC CONGESTIVE HEART FAILURE (HCC): Primary | ICD-10-CM

## 2020-03-04 DIAGNOSIS — I47.29 PAROXYSMAL VT: ICD-10-CM

## 2020-03-04 DIAGNOSIS — I10 ESSENTIAL HYPERTENSION: ICD-10-CM

## 2020-03-04 DIAGNOSIS — Z95.810 AICD (AUTOMATIC CARDIOVERTER/DEFIBRILLATOR) PRESENT: ICD-10-CM

## 2020-03-04 DIAGNOSIS — R07.9 CHEST PAIN, UNSPECIFIED TYPE: ICD-10-CM

## 2020-03-04 DIAGNOSIS — I50.32 CHRONIC DIASTOLIC HEART FAILURE (HCC): Primary | ICD-10-CM

## 2020-03-04 NOTE — PROGRESS NOTES
HPI:  I saw Gabrielle Gamboa in my office today in cardiovascular evaluation regarding his chronic atrial fibrillation and some diastolic failure issues in the past. Mr. Ritchie Hung is a very pleasant 67 year old white male with a rather complex cardiovascular history, including problems with atrial fibrillation and ventricular tachycardia. He's had a very long and complicated past cardiovascular course in terms of management of his ventricular tachycardia and atrial fibrillation, which is well outlined in his chart and my [facility] note of 4/24/13.       He ultimately has gone into atrial fibrillation after numerous atrial fibrillation ablations and the last of which was by Dr. Abbey Duval at [facility] in December of 2012, and we have now been managing him with rate control and the use of Lopressor and Coumadin for anticoagulation.     He comes in today and relates that he is doing reasonably well. He does have some myalgias related to his Crestor which he is taking 3 times a week along with the Zetia which she is taking 5 times a week. However, although he is has these myalgias they are not slowing him up to any significant degree and he is staying fairly active without any real cardiovascular symptoms currently. Encounter Diagnoses   Name Primary?  Chronic atrial fibrillation     Chronic diastolic heart failure (HCC) Yes    Paroxysmal VT ([address]     AICD (automatic cardioverter/defibrillator) present     Chest pain, atypical for angina in the past, but none currently.  Essential hypertension        Discussion: This patient appears to be doing reasonably well at this juncture. He is not having any chest pain issues or other symptoms to suggest symptomatic obstructive coronary artery disease or heart failure issues.     His latest lipid profile was excellent when completed on February 24, 2020 with a total cholesterol of 102, triglycerides of 55, HDL 54, LDL of 37, and VLDL of 11 which I think is excellent control and the patient is complaining of myalgias and would like to get off Crestor possible so organ to stop his Crestor altogether leave him on the Zetia 10 mg 5 days a week and repeat a lipid profile a couple of months. We did check his Medtronic AICD today and he has 4.3 years remaining on the battery with V pacing 94.8% of the time and only 2 ventricular high rates lasting for a second at a time. His blood pressure is well controlled today and he otherwise appears to be doing reasonably well on his current medical regimen so we will simply plan to see him again in several months. PCP: Trinidad Choudhary NP      Past Medical History:   Diagnosis Date    AICD (automatic cardioverter/defibrillator) present     Medtronic  upgrade from pacer in 2007 due to VT    Arthritis     [facility] fibrillation [facility]     Cancer ([facility] 02/2019    melenoma on left leg    Cardiac cath 03/19/2007    LM 25% ostial.  Otherwise patent coronaries. LVEDP 20.  EF 50%. Dyssynch. mid anterior contraction. Pacemaker.  Cardiac echocardiogram 03/20/2014    A-fib. EF 65-70%. No RWMA. No RWMA. Mild conc LVH. Mild RVE. RVSP 40-45 mmHg. Mild LAE.  HERNANDEZ. Mild MR. Mod TR.  Cardiac nuclear imaging test 07/29/2014    No ischemia or prior infarction. EF 68%. Nondiagnostic EKG due to V pacing on pharm stress test.  Low risk.  Cardioversion 8/31/2011    Successful defibrillation threshold testing at 18 joules. Patient also had conversion to atrial ventricular pacing.      [facility]est[facility] failure) [facility]     Cobalamin deficiency     Dupuytren contracture 02/08/2010    on the right ring finger     Edema     GERD (gastroesophageal reflux disease)     Hypertension     Hypertrophy of prostate with urinary obstruction and other lower urinary tract symptoms (LUTS)     Ill-defined condition 2007    AICD    Impotence of organic origin     Intolerance of drug     Intolerant high doses of sotalol due to prolonged QT    Mastodynia     Paroxysmal VT ([address]     Pure hypercholesterolemia     S/P ablation of atrial fibrillation 05/31/2011    S/P ablation of atrial fibrillation 12/18/2012    Dr. Raymond Eason at [facility] S/P ablation operation for arrhythmia     VT ablation by Dr. Felicity Haas near AVN 2/3007    Sick sinus syndrome [facility])          Past Surgical History:   Procedure Laterality Date    COLONOSCOPY N/A 9/12/2018    COLONOSCOPY with Polypectomies and Clip Placement performed by Arti Duff MD at [address] [place] [address] [facility] by Dr. Esteban Curtis [address] [facility]  02/08-03/08    bilateral cataract removed    HX CATARACT REMOVAL  2/2008 & 3/2008    bilateral    HX ENDOSCOPY  09/12/2018    Dr. Jermaine Bello - pathology indicates changes of acid reflux and Garcia's esophagus, repeat EGD in 2 years    HX ORTHOPAEDIC  2/8/10    release of Dupuytren's contraction on ring finger of right hand    HX OTHER SURGICAL  6/2000    atrial lead placement    HX OTHER SURGICAL  6/5/2009    Cardioversion    HX OTHER SURGICAL  10/12/2012    cardioversion    HX PACEMAKER  1998    placement    HX PACEMAKER  6/2000    DDD    HX PACEMAKER  3/27/07    removal of pacemaker & placement of dual chamber defib generator and leads    HX TONSIL AND ADENOIDECTOMY         Current Outpatient Medications   Medication Sig    ezetimibe (ZETIA) 10 mg tablet Take 1 Tab by mouth daily.  warfarin (COUMADIN) 5 mg tablet 1 tab on Mon, Wed, Fri.   1/2 tab on the other days    cyanocobalamin (VITAMIN B12) 1,000 mcg/mL injection INJECT 1 ML INTRAMUSCULARLY EVERY 3 WEEKS    evolocumab (REPATHA SURECLICK) pen injection 1 mL by SubCUTAneous route every fourteen (14) days.  coenzyme Q-10 (CO Q-10) 200 mg capsule Take  by mouth daily.  aspirin delayed-release 81 mg tablet Take  by mouth daily.     metoprolol tartrate (LOPRESSOR) 50 mg tablet Take 1 Tab by mouth two (2) times a day.  furosemide (LASIX) 40 mg tablet TAKE 1 TABLET DAILY    rosuvastatin (CRESTOR) 5 mg tablet Take 1 Tab by mouth every Monday, Wednesday, Friday.  LUMIGAN 0.01 % ophthalmic drops Administer 1 Drop to both eyes nightly.  AZOPT 1 % ophthalmic suspension Administer 1 Drop to both eyes two (2) times a day.  multivitamin (ONE A DAY) tablet Take 1 Tab by mouth daily. No current facility-administered medications for this visit. Allergies   Allergen Reactions    Cephalexin Hives and Itching    Lipitor [Atorvastatin] Myalgia    Livalo [Pitavastatin] Other (comments)     Extreme fatigue    5/29/14   PATIENT DENIES    Zocor [Simvastatin] Myalgia     5/29/14 PATIENT DENIES        Social History:   Social History     Tobacco Use    Smoking status: Never Smoker    Smokeless tobacco: Never Used   Substance Use Topics    Alcohol use: Yes     Alcohol/week: 10.0 - 12.0 standard drinks     Types: 10 - 12 Glasses of wine per week     Frequency: 4 or more times a week     Drinks per session: 1 or 2     Binge frequency: Never     Comment: 2-3 glasses of white wine about 5-6 days/week)           Family history: family history includes COPD in his father; Hypertension in an other family member; No Known Problems in his mother. Review of Systems:   Constitutional: Negative. HENT: Negative. Eyes: Negative. Respiratory: Positive for shortness of breath. Negative for cough, hemoptysis, sputum production and wheezing. Cardiovascular: Negative. Gastrointestinal: Negative. Genitourinary: Negative. Musculoskeletal: Positive for joint pain. Negative for back pain, falls, myalgias and neck pain. Skin: Negative. Neurological: Negative. Endo/Heme/Allergies: Negative. Psychiatric/Behavioral: Negative. Physical Exam:   The patient is an alert, oriented, well developed, well nourished 70 y.o.   male who was in no acute distress at the time of my examination. Visit Vitals  /74 (BP 1 Location: Left arm, BP Patient Position: Sitting)   Pulse 83   Ht 6' (1.829 m)   Wt 93.4 kg (206 lb)   SpO2 99%   BMI 27.94 kg/m²      BP Readings from Last 3 Encounters:   03/04/20 126/74   02/28/20 131/79   01/31/20 119/63        Wt Readings from Last 3 Encounters:   03/04/20 93.4 kg (206 lb)   02/28/20 93 kg (205 lb)   01/31/20 93 kg (205 lb)       HEENT: Conjunctivae pink, sclera clear and white, symmetrical with no lag. Neck: Supple without masses, tenderness or thyromegaly. No jugular venous distention. Carotid upstrokes are full bilaterally, without bruits. Cardiovascular: Normal pacer site in left upper chest.  Chest is symmetrical with good excursion. [place] is not displaced. No lifts, heaves or thrills. S1 and S2 are normal, without appreciable murmurs, rubs, clicks or gallops. Lungs: Clear to auscultation bilaterally. Abdomen: Soft and non-tender. Extremities: No edema with full peripheral pulses     Review of Data: See PMH and Cardiology and Imaging sections for cardiac testing      Results for orders placed or performed in visit on 03/04/20   AMB POC EKG ROUTINE W/ 12 LEADS, INTER & REP     Status: None    Narrative    Atrial fibrillation with underlying right bundle branch block block and ventricular pacing and occasional fusion beats. There is no overall heart rate in the 80s         Jamaal Rangel D.O., F.A.C.C. [facility]  [facility] [place]  [facility]. [address]    PLEASE NOTE:  This document has been produced using voice recognition software. Unrecognized errors in transcription may be present.

## 2020-03-06 ENCOUNTER — TELEPHONE (OUTPATIENT)
Dept: FAMILY MEDICINE CLINIC | Facility: CLINIC | Age: 80
End: 2020-03-06

## 2020-03-06 NOTE — TELEPHONE ENCOUNTER
2 pt identifiers confirmed. Pt INR is 2.8 that was drawn on 03/04/2020. Pt confirmed that he is taking Coumadin 5mg Mon/Wed/Fri and 2 1/2mg all other days. Dr. Linda Barrera NP stated patient should continue same dose. Pt verbalized his understanding.

## 2020-03-10 ENCOUNTER — TELEPHONE (OUTPATIENT)
Dept: FAMILY MEDICINE CLINIC | Facility: CLINIC | Age: 80
End: 2020-03-10

## 2020-03-10 NOTE — TELEPHONE ENCOUNTER
Telephone call to the patient regarding message received from the . Per the patient he is canceling his diagnostic mammogram as well as his ultrasound of his breast.  Per the patient, he discussed it with his wife and they both agree that with the pacemaker he did not want to have a diagnostic mammogram done. The patient admits he continues to have breast tenderness. Informed the patient it was his decision but I would prefer that he did receive the test.  Patient notes he will cancel the study.

## 2020-03-12 ENCOUNTER — HOSPITAL ENCOUNTER (OUTPATIENT)
Dept: MAMMOGRAPHY | Age: 80
Discharge: HOME | End: 2020-03-12
Attending: NURSE PRACTITIONER
Payer: MEDICARE

## 2020-03-12 ENCOUNTER — HOSPITAL ENCOUNTER (OUTPATIENT)
Dept: ULTRASOUND IMAGING | Age: 80
Discharge: HOME | End: 2020-03-12
Attending: NURSE PRACTITIONER
Payer: MEDICARE

## 2020-03-12 ENCOUNTER — TELEPHONE (OUTPATIENT)
Dept: FAMILY MEDICINE CLINIC | Facility: CLINIC | Age: 80
End: 2020-03-12

## 2020-03-12 DIAGNOSIS — N64.4 BREAST TENDERNESS: ICD-10-CM

## 2020-03-12 PROCEDURE — 77066 DX MAMMO INCL CAD BI: CPT

## 2020-03-12 NOTE — PROGRESS NOTES
I have personally seen and evaluated the device findings. Interrogation reviewed and I agree with assessment.     Angelica Diaz

## 2020-03-13 ENCOUNTER — TELEPHONE (OUTPATIENT)
Dept: FAMILY MEDICINE CLINIC | Facility: CLINIC | Age: 80
End: 2020-03-13

## 2020-03-13 NOTE — TELEPHONE ENCOUNTER
Late entry: 2 pt identifiers confirmed. Pt INR is 3.5 on 03/11/2020 spoke with pt on 03/12/2020 informed pt to hold his Coumadin x 2days then resume Coumadin 5mg Mon/Wed and 2 1/2mg all other days per Dr. Nick Fernando NP. Pt verbalized his understanding.

## 2020-03-16 RX ORDER — FUROSEMIDE 40 MG/1
TABLET ORAL
Qty: 90 TAB | Refills: 3 | Status: SHIPPED | OUTPATIENT
Start: 2020-03-16 | End: 2021-01-28 | Stop reason: SDUPTHER

## 2020-03-23 ENCOUNTER — TELEPHONE (OUTPATIENT)
Dept: FAMILY MEDICINE CLINIC | Facility: CLINIC | Age: 80
End: 2020-03-23

## 2020-03-23 NOTE — TELEPHONE ENCOUNTER
Patient states he is due for his B12 injection in 2 weeks and usually gets shot at our office. Please advise.

## 2020-04-02 ENCOUNTER — TELEPHONE (OUTPATIENT)
Dept: FAMILY MEDICINE CLINIC | Facility: CLINIC | Age: 80
End: 2020-04-02

## 2020-04-02 NOTE — TELEPHONE ENCOUNTER
TC made to pt pt verified name an d. o.b per Dora Palma pt will take 5 mg on Thurs. Sat. MonLoren Reina And 2.5 mg all other days. And recheck in 1 week pt made aware. Pt also states that he is due for his B-12 injection on Monday please advise where he can go.

## 2020-04-02 NOTE — TELEPHONE ENCOUNTER
TC made to pt and pt verified name and  pt INR was 1.6 on yesterday pt is taking 5 mg on . And Fri. And 2.5 mg the other 4 days. Please advise. pt also states \" that a message was sent to you about his wife as well about her Diabetes pt didn't go into detail.  Please advise

## 2020-04-06 ENCOUNTER — TELEPHONE (OUTPATIENT)
Dept: FAMILY MEDICINE CLINIC | Age: 80
End: 2020-04-06

## 2020-04-08 ENCOUNTER — TELEPHONE (OUTPATIENT)
Dept: FAMILY MEDICINE CLINIC | Facility: CLINIC | Age: 80
End: 2020-04-08

## 2020-04-08 NOTE — TELEPHONE ENCOUNTER
I spoke to Mr. Viky Black and ask him if anyone can give it to him his B12 at home. Patient stated no but if he could give it in his thigh than he would give it to himself. I asked Dr. Damion Aguilera and he stated yes make sure the patient goes in at an angle when giving it. Patient is aware and will also clean with alcohol and alternate thighs. Patient verbally understood. No further action needed.

## 2020-04-09 NOTE — PROGRESS NOTES
I have personally seen and evaluated the device findings. Interrogation reviewed and I agree with assessment.     Kylie Luevano

## 2020-04-16 ENCOUNTER — TELEPHONE (OUTPATIENT)
Dept: FAMILY MEDICINE CLINIC | Facility: CLINIC | Age: 80
End: 2020-04-16

## 2020-04-16 NOTE — TELEPHONE ENCOUNTER
TC made to pt and pt verified name and  pt was made aware of INR results of 3.0 pt is currently taking 5 mg days a week and 2.5 mg 3 days a week please advise if any changes should be made.

## 2020-04-17 NOTE — TELEPHONE ENCOUNTER
TC was made to pt and I left pt a VM to continue same dose of Coumadin and repeat test in 2 weeks. If pt has any  questions pt can give our office a call back.

## 2020-04-30 NOTE — TELEPHONE ENCOUNTER
Requested Prescriptions     Pending Prescriptions Disp Refills    cyanocobalamin (VITAMIN B12) 1,000 mcg/mL injection 1 mL 8     Sig: INJECT 1 ML INTRAMUSCULARLY EVERY 3 WEEKS

## 2020-05-05 ENCOUNTER — TELEPHONE (OUTPATIENT)
Dept: CARDIOLOGY CLINIC | Age: 80
End: 2020-05-05

## 2020-05-05 DIAGNOSIS — R91.8 OTHER NONSPECIFIC ABNORMAL FINDING OF LUNG FIELD: Primary | ICD-10-CM

## 2020-05-05 DIAGNOSIS — R93.89 ABNORMAL CT SCAN: ICD-10-CM

## 2020-05-05 NOTE — TELEPHONE ENCOUNTER
Patient would like to have CT chest done earlier then 1 year. He saw Dr Kavitha Nunez yesterday and he stated he would not wait until 1 year he would do it in 6 months. Dr Ayaka Ramos referred patient to pulmonary Dr Shoaib Kunz , Patient does not like Dr Shoaib Kunz and would like Dr Ayaka Ramos to order the CT and refer him to another pulmonary physician.      I will forward this to Dr Ayaka Ramos

## 2020-05-05 NOTE — TELEPHONE ENCOUNTER
I talked to the patient and due to some things about the CT which were abnormal including a melanoma on his leg. Dr. Bhupinder Valle suggested that he get the CT after 6 months which would be any time now. Please change the CT of chest that is ordered to be done now rather than November. He understands that routine CT's may not be done for a month or two.  ES

## 2020-05-07 RX ORDER — CYANOCOBALAMIN 1000 UG/ML
INJECTION, SOLUTION INTRAMUSCULAR; SUBCUTANEOUS
Qty: 1 ML | Refills: 8
Start: 2020-05-07 | End: 2020-05-12

## 2020-05-12 RX ORDER — CYANOCOBALAMIN 1000 UG/ML
INJECTION, SOLUTION INTRAMUSCULAR; SUBCUTANEOUS
Qty: 1 ML | Refills: 8 | Status: SHIPPED | OUTPATIENT
Start: 2020-05-12 | End: 2020-11-12

## 2020-05-22 ENCOUNTER — HOSPITAL ENCOUNTER (OUTPATIENT)
Dept: CT IMAGING | Age: 80
Discharge: HOME | End: 2020-05-22
Attending: INTERNAL MEDICINE
Payer: MEDICARE

## 2020-05-22 ENCOUNTER — HOSPITAL ENCOUNTER (OUTPATIENT)
Dept: LAB | Age: 80
Discharge: HOME | End: 2020-05-22
Payer: MEDICARE

## 2020-05-22 DIAGNOSIS — R93.89 ABNORMAL CT SCAN: ICD-10-CM

## 2020-05-22 DIAGNOSIS — R07.9 CHEST PAIN, UNSPECIFIED TYPE: ICD-10-CM

## 2020-05-22 DIAGNOSIS — I47.29 PAROXYSMAL VT: ICD-10-CM

## 2020-05-22 DIAGNOSIS — R91.8 OTHER NONSPECIFIC ABNORMAL FINDING OF LUNG FIELD: ICD-10-CM

## 2020-05-22 DIAGNOSIS — Z95.810 AICD (AUTOMATIC CARDIOVERTER/DEFIBRILLATOR) PRESENT: ICD-10-CM

## 2020-05-22 DIAGNOSIS — I10 ESSENTIAL HYPERTENSION: ICD-10-CM

## 2020-05-22 DIAGNOSIS — I48.20 CHRONIC ATRIAL FIBRILLATION (HCC): ICD-10-CM

## 2020-05-22 DIAGNOSIS — I50.32 CHRONIC DIASTOLIC HEART FAILURE (HCC): ICD-10-CM

## 2020-05-22 LAB
ALBUMIN SERPL-MCNC: 3.7 G/DL (ref 3.4–5)
ALBUMIN/GLOB SERPL: 1 {RATIO} (ref 0.8–1.7)
ALP SERPL-CCNC: 107 U/L (ref 45–117)
ALT SERPL-CCNC: 35 U/L (ref 16–61)
AST SERPL-CCNC: 29 U/L (ref 10–38)
BILIRUB DIRECT SERPL-MCNC: 0.3 MG/DL (ref 0–0.2)
BILIRUB SERPL-MCNC: 0.6 MG/DL (ref 0.2–1)
CHOLEST SERPL-MCNC: 113 MG/DL
CREAT UR-MCNC: 1 MG/DL (ref 0.6–1.3)
GLOBULIN SER CALC-MCNC: 3.7 G/DL (ref 2–4)
HDLC SERPL-MCNC: 58 MG/DL (ref 40–60)
HDLC SERPL: 1.9 {RATIO} (ref 0–5)
LDLC SERPL CALC-MCNC: 40.2 MG/DL (ref 0–100)
LIPID PROFILE,FLP: NORMAL
PROT SERPL-MCNC: 7.4 G/DL (ref 6.4–8.2)
TRIGL SERPL-MCNC: 74 MG/DL (ref ?–150)
VLDLC SERPL CALC-MCNC: 14.8 MG/DL

## 2020-05-22 PROCEDURE — 82565 ASSAY OF CREATININE: CPT

## 2020-05-22 PROCEDURE — 71260 CT THORAX DX C+: CPT

## 2020-05-22 PROCEDURE — 74011636320 HC RX REV CODE- 636/320: Performed by: INTERNAL MEDICINE

## 2020-05-22 PROCEDURE — 36415 COLL VENOUS BLD VENIPUNCTURE: CPT

## 2020-05-22 PROCEDURE — 80061 LIPID PANEL: CPT

## 2020-05-22 PROCEDURE — 80076 HEPATIC FUNCTION PANEL: CPT

## 2020-05-22 RX ADMIN — IOPAMIDOL 80 ML: 612 INJECTION, SOLUTION INTRAVENOUS at 09:07

## 2020-05-26 NOTE — PROGRESS NOTES
Per your telephone note \" I talked to the patient and due to some things about the CT which were abnormal including a melanoma on his leg. Dr. Judye Kayser suggested that he get the CT after 6 months which would be any time now. Please change the CT of chest that is ordered to be done now rather than November. He understands that routine CT's may not be done for a month or two.  ES

## 2020-05-26 NOTE — PROGRESS NOTES
Per your last note\" His latest lipid profile was excellent when completed on February 24, 2020 with a total cholesterol of 102, triglycerides of 55, HDL 54, LDL of 37, and VLDL of 11 which I think is excellent control and the patient is complaining of myalgias and would like to get off Crestor possible so organ to stop his Crestor altogether leave him on the Zetia 10 mg 5 days a week and repeat a lipid profile a couple of months.

## 2020-05-30 ENCOUNTER — TELEPHONE (OUTPATIENT)
Dept: CARDIOLOGY CLINIC | Age: 80
End: 2020-05-30

## 2020-05-30 NOTE — TELEPHONE ENCOUNTER
I called and talked to the patient about his chest CT and his lipid profile. His chest CT shows nodules in the left upper lung which are stable and we just need to repeat his CT in 1 year. His lipid profile was excellent and I would simply continue him on what he is on right now which is Repatha and Crestor 5 mg.  ES

## 2020-05-30 NOTE — TELEPHONE ENCOUNTER
----- Message from Alex Brandt RN sent at 5/26/2020  9:23 AM EDT -----  Per your telephone note \" I talked to the patient and due to some things about the CT which were abnormal including a melanoma on his leg. Dr. Jay Gil suggested that he get the CT after 6 months which would be any time now. Please change the CT of chest that is ordered to be done now rather than November. He understands that routine CT's may not be done for a month or two.  ES

## 2020-06-03 ENCOUNTER — OFFICE VISIT (OUTPATIENT)
Dept: CARDIOLOGY CLINIC | Age: 80
End: 2020-06-03

## 2020-06-03 DIAGNOSIS — Z95.810 AICD (AUTOMATIC CARDIOVERTER/DEFIBRILLATOR) PRESENT: ICD-10-CM

## 2020-06-03 DIAGNOSIS — I48.20 CHRONIC ATRIAL FIBRILLATION (HCC): ICD-10-CM

## 2020-06-03 DIAGNOSIS — I47.29 PAROXYSMAL VT: Primary | ICD-10-CM

## 2020-06-03 NOTE — LETTER
6/10/2020 8:31 AM 
 
Mr. Rosendo Hernández 
[address] [place] [id number] Dear  Luis Carlos Chance: We missed you at your last scheduled at home [facility] appointment on 6/3/2020. We are committed to providing you excellent care and encourage you to contact our office to reschedule your appointment. We appreciate the confidence you've shown by selecting us to provide your healthcare needs and look forward to hearing from you soon. Please call us at [phone] at earliest convenience. Sincerely, JASMINE GAN CSI

## 2020-06-03 NOTE — LETTER
6/10/2020 7:57 AM 
 
Mr. Roddy Duvall 
[address] [place] [id number] Dear Loren Michelle Grady: We missed you at your last scheduled appointment on 6/3/2020. We are committed to providing you excellent care and encourage you to contact our office to reschedule your appointment. We appreciate the confidence you've shown by selecting us to provide your healthcare needs and look forward to hearing from you soon. Please call us at [phone] at earliest convenience. Sincerely, JASMINE GAN CSI

## 2020-06-29 NOTE — PROGRESS NOTES
I have personally seen and evaluated the device findings. Interrogation reviewed and I agree with assessment.     Joselito Burgess

## 2020-07-01 RX ORDER — WARFARIN SODIUM 5 MG/1
TABLET ORAL
Qty: 100 TAB | Refills: 1 | Status: SHIPPED | OUTPATIENT
Start: 2020-07-01 | End: 2021-01-28 | Stop reason: SDUPTHER

## 2020-07-06 RX ORDER — METOPROLOL TARTRATE 50 MG/1
50 TABLET ORAL 2 TIMES DAILY
Qty: 180 TAB | Refills: 3 | Status: SHIPPED | OUTPATIENT
Start: 2020-07-06 | End: 2021-06-30 | Stop reason: SDUPTHER

## 2020-07-09 ENCOUNTER — TELEPHONE (OUTPATIENT)
Dept: FAMILY MEDICINE CLINIC | Facility: CLINIC | Age: 80
End: 2020-07-09

## 2020-07-09 NOTE — TELEPHONE ENCOUNTER
Called left VM informing pt that is INR is 2.8 continue the same dose of Coumadin and recheck in two weeks.

## 2020-07-17 ENCOUNTER — TELEPHONE (OUTPATIENT)
Dept: FAMILY MEDICINE CLINIC | Facility: CLINIC | Age: 80
End: 2020-07-17

## 2020-07-17 NOTE — TELEPHONE ENCOUNTER
Called pt could not leave a VM. Pt INR is 2.2 he can continue same dose and repeat in 2 weeks per Dr. Niko Cooney recommendation.

## 2020-07-23 ENCOUNTER — TELEPHONE (OUTPATIENT)
Dept: FAMILY MEDICINE CLINIC | Facility: CLINIC | Age: 80
End: 2020-07-23

## 2020-07-23 NOTE — TELEPHONE ENCOUNTER
2 pt identifiers confirmed. Pt was made aware per Dr. Aquino Nick NP pt will continue dame dose of Coumadin 2.5 mg 3 days a week and 5 mg all other days.

## 2020-08-06 ENCOUNTER — TELEPHONE (OUTPATIENT)
Dept: FAMILY MEDICINE CLINIC | Facility: CLINIC | Age: 80
End: 2020-08-06

## 2020-08-06 NOTE — TELEPHONE ENCOUNTER
Late entry: I spoke with patient notified patient his INR is 4.2. Patient stated he was out of town and forgot his medication. Patient held for 2 days and resume his regular dose. Dr. Ramirez Small NP was notified.

## 2020-09-01 ENCOUNTER — TELEPHONE (OUTPATIENT)
Dept: FAMILY MEDICINE CLINIC | Facility: CLINIC | Age: 80
End: 2020-09-01

## 2020-09-01 NOTE — TELEPHONE ENCOUNTER
2 patient identifiers confirmed. Patient INR is 2.4. Patient stated he is taking Coumadin 2.5 mg 3 days a week and 5 mg all other days. Recheck in 1 week. Pt verbalized his understanding.

## 2020-09-09 ENCOUNTER — OFFICE VISIT (OUTPATIENT)
Dept: CARDIOLOGY CLINIC | Age: 80
End: 2020-09-09

## 2020-09-09 ENCOUNTER — CLINICAL SUPPORT (OUTPATIENT)
Dept: CARDIOLOGY CLINIC | Age: 80
End: 2020-09-09

## 2020-09-09 VITALS
OXYGEN SATURATION: 99 % | DIASTOLIC BLOOD PRESSURE: 78 MMHG | HEART RATE: 96 BPM | WEIGHT: 207 LBS | HEIGHT: 73 IN | SYSTOLIC BLOOD PRESSURE: 122 MMHG | BODY MASS INDEX: 27.43 KG/M2

## 2020-09-09 DIAGNOSIS — Z95.810 AICD (AUTOMATIC CARDIOVERTER/DEFIBRILLATOR) PRESENT: Primary | ICD-10-CM

## 2020-09-09 DIAGNOSIS — I48.20 CHRONIC ATRIAL FIBRILLATION (HCC): Primary | ICD-10-CM

## 2020-09-09 DIAGNOSIS — I47.29 PAROXYSMAL VT: ICD-10-CM

## 2020-09-09 DIAGNOSIS — Z95.810 AICD (AUTOMATIC CARDIOVERTER/DEFIBRILLATOR) PRESENT: ICD-10-CM

## 2020-09-09 DIAGNOSIS — R07.9 CHEST PAIN, UNSPECIFIED TYPE: ICD-10-CM

## 2020-09-09 DIAGNOSIS — I48.20 CHRONIC ATRIAL FIBRILLATION (HCC): ICD-10-CM

## 2020-09-09 DIAGNOSIS — I50.32 CHRONIC DIASTOLIC CONGESTIVE HEART FAILURE (HCC): ICD-10-CM

## 2020-09-09 DIAGNOSIS — I10 ESSENTIAL HYPERTENSION: ICD-10-CM

## 2020-09-09 NOTE — PROGRESS NOTES
HPI:  I saw Carlos Mcarthur in my office today in cardiovascular evaluation regarding his chronic atrial fibrillation and some diastolic failure issues in the past. Mr. Valerie Mayes is a very pleasant 67 year old white male with a rather complex cardiovascular history, including problems with atrial fibrillation and ventricular tachycardia. He's had a very long and complicated past cardiovascular course in terms of management of his ventricular tachycardia and atrial fibrillation, which is well outlined in his chart and my [facility] note of 4/24/13.       He ultimately has gone into atrial fibrillation after numerous atrial fibrillation ablations and the last of which was by Dr. Benjamin Fierro at [facility] in December of 2012, and we have now been managing him with rate control and the use of Lopressor and Coumadin for anticoagulation.     He comes in today and relates that he is doing reasonably well. He does have some myalgias related to his Crestor which he is taking 3 times a week along with the Zetia which he is taking 5 times a week. However, although he is has these myalgias they are not slowing him up to any significant degree and he is staying fairly active without any real cardiovascular symptoms currently. Encounter Diagnoses   Name Primary?  Chronic atrial fibrillation (HCC) Yes    Chronic diastolic congestive heart failure (HCC)     Paroxysmal VT (HCC)     AICD (automatic cardioverter/defibrillator) present     Chest pain, atypical for angina in the past, but none currently.  Essential hypertension        Discussion: This patient appears to be doing reasonably well at this juncture. He is not having any chest pain issues or other symptoms to suggest symptomatic obstructive coronary artery disease or heart failure issues.     His latest lipid profile was excellent when completed on May 22, 2020 showed a total cholesterol of 113 with triglycerides of 74, HDL of 58, LDL of 40.2, and VLDL of 14.8 which is excellent control off Crestor altogether and on the Zetia 10 mg 5 days a week along with Repatha. We did check his Medtronic AICD today and he has 3.5 years remaining on the battery with V pacing 100 % of the time and only 2 ventricular high rates lasting for 2 seconds. His blood pressure is well controlled today and he otherwise appears to be doing reasonably well on his current medical regimen so have him follow-up in 6 months with Dr. Cherry Chand since I will be retiring. PCP: Cristi Vazquez NP      Past Medical History:   Diagnosis Date    AICD (automatic cardioverter/defibrillator) present     Medtronic  upgrade from pacer in 2007 due to VT    Arthritis     Atrial fibrillation ([address]     Cancer ([facility] 02/2019    melenoma on left leg    Cardiac cath 03/19/2007    LM 25% ostial.  Otherwise patent coronaries. LVEDP 20.  EF 50%. Dyssynch. mid anterior contraction. Pacemaker.  Cardiac echocardiogram 03/20/2014    A-fib. EF 65-70%. No RWMA. No RWMA. Mild conc LVH. Mild RVE. RVSP 40-45 mmHg. Mild LAE.  HERNANDEZ. Mild MR. Mod TR.  Cardiac nuclear imaging test 07/29/2014    No ischemia or prior infarction. EF 68%. Nondiagnostic EKG due to V pacing on pharm stress test.  Low risk.  Cardioversion 8/31/2011    Successful defibrillation threshold testing at 18 joules. Patient also had conversion to atrial ventricular pacing.      CHF [facility]congestive heart failure) ([facility])     Cobalamin deficiency     Dupuytren contracture 02/08/2010    on the right ring finger     Edema     GERD (gastroesophageal reflux disease)     Hypertension     Hypertrophy of prostate with urinary obstruction and other lower urinary tract symptoms (LUTS)     Ill-defined condition 2007    AICD    Impotence of organic origin     Intolerance of drug     Intolerant high doses of sotalol due to prolonged QT    Mastodynia     Paroxysmal VT ([address]     Pure hypercholesterolemia     S/P ablation of atrial fibrillation 05/31/2011    S/P ablation of atrial fibrillation 12/18/2012    Dr. Kedar Pretty at [facility] S/P ablation operation for arrhythmia     VT ablation by Dr. Efrem Crowell near AVN 2/3007    Sick sinus syndrome [facility])          Past Surgical History:   Procedure Laterality Date    COLONOSCOPY N/A 9/12/2018    COLONOSCOPY with Polypectomies and Clip Placement performed by Jacobo Davis MD at [address] [place] [address] [facility] by Dr. Charleen Paiz  02/08-03/08    bilateral cataract removed    HX CATARACT REMOVAL  2/2008 & 3/2008    bilateral    HX ENDOSCOPY  09/12/2018    Dr. Lucy Senior - pathology indicates changes of acid reflux and Garcia's esophagus, repeat EGD in 2 years    HX ORTHOPAEDIC  2/8/10    release of Dupuytren's contraction on ring finger of right hand    HX OTHER SURGICAL  6/2000    atrial lead placement    HX OTHER SURGICAL  6/5/2009    Cardioversion    HX OTHER SURGICAL  10/12/2012    cardioversion    HX PACEMAKER  1998    placement    HX PACEMAKER  6/2000    DDD    HX PACEMAKER  3/27/07    removal of pacemaker & placement of dual chamber defib generator and leads    HX TONSIL AND ADENOIDECTOMY         Current Outpatient Medications   Medication Sig    metoprolol tartrate (Lopressor) 50 mg tablet Take 1 Tab by mouth two (2) times a day.  warfarin (COUMADIN) 5 mg tablet 1 tab on Mon, Wed, Fri.   1/2 tab on the other days    cyanocobalamin (VITAMIN B12) 1,000 mcg/mL injection inject 1 milliliter ( 1000 MCG ) intramuscularly EVERY 3 WEEKS    furosemide (LASIX) 40 mg tablet TAKE 1 TABLET DAILY  Indications: visible water retention    ezetimibe (ZETIA) 10 mg tablet Take 1 Tab by mouth daily.  evolocumab (REPATHA SURECLICK) pen injection 1 mL by SubCUTAneous route every fourteen (14) days.  coenzyme Q-10 (CO Q-10) 200 mg capsule Take  by mouth daily.     aspirin delayed-release 81 mg tablet Take  by mouth daily.  LUMIGAN 0.01 % ophthalmic drops Administer 1 Drop to both eyes nightly.  AZOPT 1 % ophthalmic suspension Administer 1 Drop to both eyes two (2) times a day.  multivitamin (ONE A DAY) tablet Take 1 Tab by mouth daily. No current facility-administered medications for this visit. Allergies   Allergen Reactions    Cephalexin Hives and Itching    Lipitor [Atorvastatin] Myalgia    Livalo [Pitavastatin] Other (comments)     Extreme fatigue    5/29/14   PATIENT DENIES    Zocor [Simvastatin] Myalgia     5/29/14 PATIENT DENIES        Social History:   Social History     Tobacco Use    Smoking status: Never Smoker    Smokeless tobacco: Never Used   Substance Use Topics    Alcohol use: Yes     Alcohol/week: 10.0 - 12.0 standard drinks     Types: 10 - 12 Glasses of wine per week     Frequency: 4 or more times a week     Drinks per session: 1 or 2     Binge frequency: Never     Comment: 2-3 glasses of white wine about 5-6 days/week)           Family history: family history includes COPD in his father; Hypertension in an other family member; No Known Problems in his mother. Review of Systems:   Constitutional: Negative. HENT: Negative. Eyes: Negative. Respiratory: Positive for shortness of breath. Negative for cough, hemoptysis, sputum production and wheezing. Cardiovascular: Negative. Gastrointestinal: Negative. Genitourinary: Negative. Musculoskeletal: Positive for joint pain. Negative for back pain, falls, myalgias and neck pain. Skin: Negative. Neurological: Negative. Endo/Heme/Allergies: Negative. Psychiatric/Behavioral: Negative. Physical Exam:   The patient is an alert, oriented, well developed, well nourished 68 y.o.  male who was in no acute distress at the time of my examination.   Visit Vitals  /78   Pulse 96   Ht 6' 1\" (1.854 m)   Wt 207 lb (93.9 kg)   SpO2 99%   BMI 27.31 kg/m²      BP Readings from Last 3 Encounters:   09/09/20 122/78   03/04/20 126/74   02/28/20 131/79        Wt Readings from Last 3 Encounters:   09/09/20 207 lb (93.9 kg)   03/04/20 206 lb (93.4 kg)   02/28/20 205 lb (93 kg)       HEENT: Conjunctivae pink, sclera clear and white, symmetrical with no lag. Neck: Supple without masses, tenderness or thyromegaly. No jugular venous distention. Carotid upstrokes are full bilaterally, without bruits. Cardiovascular: Normal pacer site in left upper chest.  Chest is symmetrical with good excursion. [place] is not displaced. No lifts, heaves or thrills. S1 and S2 are normal, without appreciable murmurs, rubs, clicks or gallops. Lungs: Clear to auscultation bilaterally. Abdomen: Soft and non-tender. Extremities: No edema with full peripheral pulses     Review of Data: See PMH and Cardiology and Imaging sections for cardiac testing      Results for orders placed or performed in visit on 03/04/20   AMB POC EKG ROUTINE W/ 12 LEADS, INTER & REP     Status: None    Narrative    Atrial fibrillation with underlying right bundle branch block block and ventricular pacing and occasional fusion beats. There is no overall heart rate in the 80s         Natali Hopson D.O., F.A.C.C. [facility]  [facility]  [address] [place] [address]. [address]    PLEASE NOTE:  This document has been produced using voice recognition software. Unrecognized errors in transcription may be present.

## 2020-09-09 NOTE — PROGRESS NOTES
Maxim Becerra presents today for   Chief Complaint   Patient presents with    Pacemaker Check     F/U - no cardiac complaints       Yael Lewis preferred language for health care discussion is english/other. Is someone accompanying this pt? no    Is the patient using any DME equipment during [address]? no    Depression Screening:  3 most recent PHQ Screens 3/4/2020   Little interest or pleasure in doing things Not at all   Feeling down, depressed, irritable, or hopeless Not at all   Total Score PHQ 2 0       Learning Assessment:  Learning Assessment 4/12/2017   PRIMARY LEARNER Patient   HIGHEST LEVEL OF EDUCATION - PRIMARY LEARNER  GRADUATED HIGH SCHOOL OR GED   BARRIERS PRIMARY LEARNER NONE   CO-LEARNER CAREGIVER No   PRIMARY LANGUAGE ENGLISH   LEARNER PREFERENCE PRIMARY DEMONSTRATION   ANSWERED BY patien   RELATIONSHIP SELF       Abuse Screening:  Abuse Screening Questionnaire 1/7/2019   Do you ever feel afraid of your partner? N   Are you in a relationship with someone who physically or mentally threatens you? N   Is it safe for you to go home? Y       Fall Risk  Fall Risk Assessment, last 12 mths 3/4/2020   Able to walk? Yes   Fall in past 12 months? No       Pt currently taking Anticoagulant therapy? yes    Coordination of Care:  1. Have you been to the ER, urgent care clinic since your last visit? Hospitalized since your last visit? no    2. Have you seen or consulted any other health care providers outside of the [address] [place] [address] since your last visit? Include any pap smears or colon screening.  no

## 2020-09-11 ENCOUNTER — TELEPHONE (OUTPATIENT)
Dept: CARDIOLOGY CLINIC | Age: 80
End: 2020-09-11

## 2020-09-11 NOTE — TELEPHONE ENCOUNTER
Patient called with complaints of some numbness to his left shoulder. No pain, no radiating pain left arm, no chest pain or tightness, no shortness of breath, no dizziness, no headache. States he sent a transmission from his AICD. Medtronic transmission reviewed by [facility]. No changes, no new activity. Made patient aware of same. Instructed to call if symptoms continue or change.

## 2020-09-11 NOTE — TELEPHONE ENCOUNTER
I called and talked to the patient about his left shoulder numbness. He also has some numbness in his in his arm and a little weakness in his hand when he tries to hold something so certainly sounds like a nerve issue and non cardiac. If it persists he will follow up with PCP.  ES

## 2020-09-17 ENCOUNTER — TELEPHONE (OUTPATIENT)
Dept: FAMILY MEDICINE CLINIC | Age: 80
End: 2020-09-17

## 2020-09-22 ENCOUNTER — TELEPHONE (OUTPATIENT)
Dept: FAMILY MEDICINE CLINIC | Age: 80
End: 2020-09-22

## 2020-09-22 NOTE — TELEPHONE ENCOUNTER
2 pt identifiers confirmed. Pt INR is 2.5. He is taking Coumadin 5 mg Mon, Wed, Thurs, Sat and 2 1/2 mg all other days. Recheck INR in 1 week. Pt verbalized his understanding.

## 2020-09-25 ENCOUNTER — TELEPHONE (OUTPATIENT)
Dept: FAMILY MEDICINE CLINIC | Age: 80
End: 2020-09-25

## 2020-09-25 NOTE — TELEPHONE ENCOUNTER
Requested Prescriptions     Pending Prescriptions Disp Refills    amLODIPine (NORVASC) 2.5 mg tablet 90 Tab 3     Si tablet daily (For BP).   (Stop Benazepril)

## 2020-09-25 NOTE — TELEPHONE ENCOUNTER
Patient scheduled for 10/14 at [facility] office and woulld like orders for his routine labs placed. Please advise when done.

## 2020-09-29 RX ORDER — AMLODIPINE BESYLATE 2.5 MG/1
TABLET ORAL
Qty: 90 TAB | Refills: 1 | Status: SHIPPED | OUTPATIENT
Start: 2020-09-29 | End: 2021-01-28 | Stop reason: SDUPTHER

## 2020-09-30 DIAGNOSIS — I10 ESSENTIAL HYPERTENSION: Primary | ICD-10-CM

## 2020-10-01 NOTE — PROGRESS NOTES
I have personally seen and evaluated the device findings. Interrogation reviewed and I agree with assessment.     Marcial Iraheta

## 2020-10-09 ENCOUNTER — HOSPITAL ENCOUNTER (OUTPATIENT)
Dept: LAB | Age: 80
Discharge: HOME | End: 2020-10-09
Payer: MEDICARE

## 2020-10-09 DIAGNOSIS — I10 ESSENTIAL HYPERTENSION: ICD-10-CM

## 2020-10-09 LAB
ALBUMIN SERPL-MCNC: 3.5 G/DL (ref 3.4–5)
ALBUMIN/GLOB SERPL: 1 {RATIO} (ref 0.8–1.7)
ALP SERPL-CCNC: 90 U/L (ref 45–117)
ALT SERPL-CCNC: 24 U/L (ref 16–61)
ANION GAP SERPL CALC-SCNC: 5 MMOL/L (ref 3–18)
AST SERPL-CCNC: 24 U/L (ref 10–38)
BASOPHILS # BLD: 0 K/UL (ref 0–0.1)
BASOPHILS NFR BLD: 1 % (ref 0–2)
BILIRUB SERPL-MCNC: 0.8 MG/DL (ref 0.2–1)
BUN SERPL-MCNC: 17 MG/DL (ref 7–18)
BUN/CREAT SERPL: 17 (ref 12–20)
CALCIUM SERPL-MCNC: 9 MG/DL (ref 8.5–10.1)
CHLORIDE SERPL-SCNC: 109 MMOL/L (ref 100–111)
CHOLEST SERPL-MCNC: 118 MG/DL
CO2 SERPL-SCNC: 28 MMOL/L (ref 21–32)
CREAT SERPL-MCNC: 1.01 MG/DL (ref 0.6–1.3)
DIFFERENTIAL METHOD BLD: ABNORMAL
EOSINOPHIL # BLD: 0.1 K/UL (ref 0–0.4)
EOSINOPHIL NFR BLD: 2 % (ref 0–5)
ERYTHROCYTE [DISTWIDTH] IN BLOOD BY AUTOMATED COUNT: 13.1 % (ref 11.6–14.5)
GLOBULIN SER CALC-MCNC: 3.6 G/DL (ref 2–4)
GLUCOSE SERPL-MCNC: 92 MG/DL (ref 74–99)
HCT VFR BLD AUTO: 45.2 % (ref 36–48)
HDLC SERPL-MCNC: 56 MG/DL (ref 40–60)
HDLC SERPL: 2.1 {RATIO} (ref 0–5)
HGB BLD-MCNC: 15.4 G/DL (ref 13–16)
LDLC SERPL CALC-MCNC: 46.2 MG/DL (ref 0–100)
LIPID PROFILE,FLP: NORMAL
LYMPHOCYTES # BLD: 1.7 K/UL (ref 0.9–3.6)
LYMPHOCYTES NFR BLD: 27 % (ref 21–52)
MCH RBC QN AUTO: 32.9 PG (ref 24–34)
MCHC RBC AUTO-ENTMCNC: 34.1 G/DL (ref 31–37)
MCV RBC AUTO: 96.6 FL (ref 74–97)
MONOCYTES # BLD: 0.9 K/UL (ref 0.05–1.2)
MONOCYTES NFR BLD: 15 % (ref 3–10)
NEUTS SEG # BLD: 3.6 K/UL (ref 1.8–8)
NEUTS SEG NFR BLD: 55 % (ref 40–73)
PLATELET # BLD AUTO: 220 K/UL (ref 135–420)
PMV BLD AUTO: 10.9 FL (ref 9.2–11.8)
POTASSIUM SERPL-SCNC: 4.8 MMOL/L (ref 3.5–5.5)
PROT SERPL-MCNC: 7.1 G/DL (ref 6.4–8.2)
RBC # BLD AUTO: 4.68 M/UL (ref 4.7–5.5)
SODIUM SERPL-SCNC: 142 MMOL/L (ref 136–145)
TRIGL SERPL-MCNC: 79 MG/DL (ref ?–150)
VLDLC SERPL CALC-MCNC: 15.8 MG/DL
WBC # BLD AUTO: 6.5 K/UL (ref 4.6–13.2)

## 2020-10-09 PROCEDURE — 80061 LIPID PANEL: CPT

## 2020-10-09 PROCEDURE — 85025 COMPLETE CBC W/AUTO DIFF WBC: CPT

## 2020-10-09 PROCEDURE — 36415 COLL VENOUS BLD VENIPUNCTURE: CPT

## 2020-10-09 PROCEDURE — 80053 COMPREHEN METABOLIC PANEL: CPT

## 2020-10-09 NOTE — PROGRESS NOTES
Please notify patient that lab results were reviewed and we will discuss results at his next appointment

## 2020-10-14 ENCOUNTER — OFFICE VISIT (OUTPATIENT)
Dept: FAMILY MEDICINE CLINIC | Age: 80
End: 2020-10-14
Payer: MEDICARE

## 2020-10-14 VITALS
BODY MASS INDEX: 27.9 KG/M2 | RESPIRATION RATE: 16 BRPM | SYSTOLIC BLOOD PRESSURE: 151 MMHG | OXYGEN SATURATION: 98 % | TEMPERATURE: 97 F | DIASTOLIC BLOOD PRESSURE: 88 MMHG | HEART RATE: 95 BPM | HEIGHT: 72 IN | WEIGHT: 206 LBS

## 2020-10-14 DIAGNOSIS — E53.8 B12 DEFICIENCY: ICD-10-CM

## 2020-10-14 DIAGNOSIS — I10 ESSENTIAL HYPERTENSION: Primary | ICD-10-CM

## 2020-10-14 DIAGNOSIS — E78.00 PURE HYPERCHOLESTEROLEMIA: ICD-10-CM

## 2020-10-14 DIAGNOSIS — I48.91 ATRIAL FIBRILLATION, UNSPECIFIED TYPE (HCC): ICD-10-CM

## 2020-10-14 PROCEDURE — 99213 OFFICE O/P EST LOW 20 MIN: CPT | Performed by: NURSE PRACTITIONER

## 2020-10-14 PROCEDURE — 1101F PT FALLS ASSESS-DOCD LE1/YR: CPT | Performed by: NURSE PRACTITIONER

## 2020-10-14 PROCEDURE — G8536 NO DOC ELDER MAL SCRN: HCPCS | Performed by: NURSE PRACTITIONER

## 2020-10-14 PROCEDURE — G8427 DOCREV CUR MEDS BY ELIG CLIN: HCPCS | Performed by: NURSE PRACTITIONER

## 2020-10-14 PROCEDURE — G8419 CALC BMI OUT NRM PARAM NOF/U: HCPCS | Performed by: NURSE PRACTITIONER

## 2020-10-14 PROCEDURE — G8432 DEP SCR NOT DOC, RNG: HCPCS | Performed by: NURSE PRACTITIONER

## 2020-10-14 PROCEDURE — G8754 DIAS BP LESS 90: HCPCS | Performed by: NURSE PRACTITIONER

## 2020-10-14 PROCEDURE — G0463 HOSPITAL OUTPT CLINIC VISIT: HCPCS | Performed by: NURSE PRACTITIONER

## 2020-10-14 PROCEDURE — G8753 SYS BP > OR = 140: HCPCS | Performed by: NURSE PRACTITIONER

## 2020-10-14 NOTE — PROGRESS NOTES
Dmitry Salomon is a 68 y.o. male presenting today for Hypertension (follow-up) and Labs (results)  . HPI:  Dmitry Salomon presents to the office today for hypertension and hyperlipidemia follow-up care. He feels well today and denies any chest pain, palipation or lower extremity edema. He also carries a medical diagnosis for atrial fibrillation and is prescribed an anticoagulant daily. He is complaining of chronic breast tenderness. He had a mammogram in March, 2020 which showed bilateral gynecomastia. He is complaining of intermittent tenderness over the last couple of weeks. He denies any nipple discharge. Review of Systems   Constitutional: Negative for malaise/fatigue. Respiratory: Negative for cough, sputum production and shortness of breath. Cardiovascular: Negative for chest pain, palpitations and leg swelling. Intermittent breast tenderness   Gastrointestinal: Negative for abdominal pain, nausea and vomiting. Genitourinary: Negative for dysuria. Musculoskeletal: Negative for joint pain and myalgias. Neurological: Negative for dizziness and headaches. Allergies   Allergen Reactions    Cephalexin Hives and Itching    Lipitor [Atorvastatin] Myalgia    Livalo [Pitavastatin] Other (comments)     Extreme fatigue    5/29/14   PATIENT DENIES    Zocor [Simvastatin] Myalgia     5/29/14 PATIENT DENIES        Current Outpatient Medications   Medication Sig Dispense Refill    amLODIPine (NORVASC) 2.5 mg tablet 1 tablet daily (For BP). (Stop Benazepril) 90 Tab 1    metoprolol tartrate (Lopressor) 50 mg tablet Take 1 Tab by mouth two (2) times a day.  180 Tab 3    warfarin (COUMADIN) 5 mg tablet 1 tab on Mon, Wed, Fri.   1/2 tab on the other days 100 Tab 1    cyanocobalamin (VITAMIN B12) 1,000 mcg/mL injection inject 1 milliliter ( 1000 MCG ) intramuscularly EVERY 3 WEEKS 1 mL 8    furosemide (LASIX) 40 mg tablet TAKE 1 TABLET DAILY  Indications: visible water retention 90 Tab 3    ezetimibe (ZETIA) 10 mg tablet Take 1 Tab by mouth daily. 90 Tab 3    evolocumab (REPATHA SURECLICK) pen injection 1 mL by SubCUTAneous route every fourteen (14) days. 6 Pen 3    coenzyme Q-10 (CO Q-10) 200 mg capsule Take  by mouth daily.  aspirin delayed-release 81 mg tablet Take  by mouth daily.  LUMIGAN 0.01 % ophthalmic drops Administer 1 Drop to both eyes nightly.  AZOPT 1 % ophthalmic suspension Administer 1 Drop to both eyes two (2) times a day. 3    multivitamin (ONE A DAY) tablet Take 1 Tab by mouth daily. Past Medical History:   Diagnosis Date    AICD (automatic cardioverter/defibrillator) present     [employer]  upgrade from pacer in 2007 due to VT    Arthritis     Atrial fibrillation ([facility]     Cancer ([facility] 02/2019    melenoma on left leg    Cardiac cath 03/19/2007    LM 25% ostial.  Otherwise patent coronaries. LVEDP 20.  EF 50%. Dyssynch. mid anterior contraction. Pacemaker.  Cardiac echocardiogram 03/20/2014    A-fib. EF 65-70%. No RWMA. No RWMA. Mild conc LVH. Mild RVE. RVSP 40-45 mmHg. Mild LAE.  HERNANDEZ. Mild MR. Mod TR.  Cardiac nuclear imaging test 07/29/2014    No ischemia or prior infarction. EF 68%. Nondiagnostic EKG due to V pacing on pharm stress test.  Low risk.  Cardioversion 8/31/2011    Successful defibrillation threshold testing at 18 joules. Patient also had conversion to atrial ventricular pacing.      CHF (congestive heart failure) (HCC)     Cobalamin deficiency     Dupuytren contracture 02/08/2010    on the right ring finger     Edema     GERD (gastroesophageal reflux disease)     Hypertension     Hypertrophy of prostate with urinary obstruction and other lower urinary tract symptoms (LUTS)     Ill-defined condition 2007    AICD    Impotence of organic origin     Intolerance of drug     Intolerant high doses of sotalol due to prolonged QT    Mastodynia     Paroxysmal VT ([facility]     Pure hypercholesterolemia     S/P ablation of atrial fibrillation 05/31/2011    S/P ablation of atrial fibrillation 12/18/2012    Dr. Zaida Mas at [address] S/P ablation operation for arrhythmia     VT ablation by Dr. Lucian Wallace near [address] [place] [address] Street [address]    Sick sinus syndrome [facility])        Past Surgical History:   Procedure Laterality Date    COLONOSCOPY N/A 9/12/2018    COLONOSCOPY with Polypectomies and Clip Placement performed by Manjula Rose MD at [address] [facility] [place] [address] [address] by Dr. Loni Holcomb  02/08-03/08    bilateral cataract removed    HX CATARACT REMOVAL  2/2008 & 3/2008    bilateral    HX ENDOSCOPY  09/12/2018    Dr. Maicol Cordero - pathology indicates changes of acid reflux and Garcia's esophagus, repeat EGD in 2 years    HX ORTHOPAEDIC  2/8/10    release of Dupuytren's contraction on ring finger of right hand    HX OTHER SURGICAL  6/2000    atrial lead placement    HX OTHER SURGICAL  6/5/2009    Cardioversion    HX OTHER SURGICAL  10/12/2012    cardioversion    HX PACEMAKER  1998    placement    HX PACEMAKER  6/2000    DDD    HX PACEMAKER  3/27/07    removal of pacemaker & placement of dual chamber defib generator and leads    HX TONSIL AND ADENOIDECTOMY         Social History     Socioeconomic History    Marital status:      Spouse name: Not on file    Number of children: Not on file    Years of education: Not on file    Highest education level: Not on file   Occupational History    Not on file   Social Needs    Financial resource strain: Not on file    Food insecurity     Worry: Not on file     Inability: Not on file    Transportation needs     Medical: Not on file     Non-medical: Not on file   Tobacco Use    Smoking status: Never Smoker    Smokeless tobacco: Never Used   Substance and Sexual Activity    Alcohol use:  Yes     Alcohol/week: 10.0 - 12.0 standard drinks     Types: 10 - 12 Glasses of wine per week Frequency: 4 or more times a week     Drinks per session: 1 or 2     Binge frequency: Never     Comment: 2-3 glasses of white wine about 5-6 days/week)    Drug use: No    Sexual activity: Not Currently     Partners: Female   Lifestyle    Physical activity     Days per week: Not on file     Minutes per session: Not on file    Stress: Not on file   Relationships    Social connections     Talks on phone: Not on file     Gets together: Not on file     Attends Sikhism service: Not on file     Active member of club or organization: Not on file     Attends meetings of clubs or organizations: Not on file     Relationship status: Not on file    Intimate partner violence     Fear of current or ex partner: Not on file     Emotionally abused: Not on file     Physically abused: Not on file     Forced sexual activity: Not on file   Other Topics Concern    Not on file   Social History Narrative    Not on file         Vitals:    10/14/20 1507   BP: (!) 151/88   Pulse: 95   Resp: 16   Temp: 97 °F (36.1 °C)   TempSrc: Oral   SpO2: 98%   Weight: 206 lb (93.4 kg)   Height: 6' (1.829 m)   PainSc:   0 - No pain       Physical Exam  Vitals signs and nursing note reviewed. Constitutional:       Appearance: Normal appearance. HENT:      Head: Normocephalic. Cardiovascular:      Rate and Rhythm: Normal rate and regular rhythm. Pulses: Normal pulses. Heart sounds: Normal heart sounds. Comments: Patient has a pacemaker  Pulmonary:      Effort: Pulmonary effort is normal.      Breath sounds: Normal breath sounds. Abdominal:      Palpations: Abdomen is soft. Skin:     General: Skin is warm and dry. Neurological:      General: No focal deficit present. Mental Status: He is alert and oriented to person, place, and time.          Hospital Outpatient Visit on 10/09/2020   Component Date Value Ref Range Status    WBC 10/09/2020 6.5  4.6 - 13.2 K/uL Final    RBC 10/09/2020 4.68* 4.70 - 5.50 M/uL Final    HGB 10/09/2020 15.4  13.0 - 16.0 g/dL Final    HCT 10/09/2020 45.2  36.0 - 48.0 % Final    MCV 10/09/2020 96.6  74.0 - 97.0 FL Final    MCH 10/09/2020 32.9  24.0 - 34.0 PG Final    MCHC 10/09/2020 34.1  31.0 - 37.0 g/dL Final    RDW 10/09/2020 13.1  11.6 - 14.5 % Final    PLATELET 15/68/6508 571  135 - 420 K/uL Final    MPV 10/09/2020 10.9  9.2 - 11.8 FL Final    NEUTROPHILS 10/09/2020 55  40 - 73 % Final    LYMPHOCYTES 10/09/2020 27  21 - 52 % Final    MONOCYTES 10/09/2020 15* 3 - 10 % Final    EOSINOPHILS 10/09/2020 2  0 - 5 % Final    BASOPHILS 10/09/2020 1  0 - 2 % Final    ABS. NEUTROPHILS 10/09/2020 3.6  1.8 - 8.0 K/UL Final    ABS. LYMPHOCYTES 10/09/2020 1.7  0.9 - 3.6 K/UL Final    ABS. MONOCYTES 10/09/2020 0.9  0.05 - 1.2 K/UL Final    ABS. EOSINOPHILS 10/09/2020 0.1  0.0 - 0.4 K/UL Final    ABS. BASOPHILS 10/09/2020 0.0  0.0 - 0.1 K/UL Final    DF 10/09/2020 AUTOMATED    Final    LIPID PROFILE 10/09/2020        Final    Cholesterol, total 10/09/2020 118  <200 MG/DL Final    Triglyceride 10/09/2020 79  <150 MG/DL Final    Comment: The drugs N-acetylcysteine (NAC) and  Metamiszole have been found to cause falsely  low results in this chemical assay. Please  be sure to submit blood samples obtained  BEFORE administration of either of these  drugs to assure correct results.       HDL Cholesterol 10/09/2020 56  40 - 60 MG/DL Final    LDL, calculated 10/09/2020 46.2  0 - 100 MG/DL Final    VLDL, calculated 10/09/2020 15.8  MG/DL Final    CHOL/HDL Ratio 10/09/2020 2.1  0 - 5.0   Final    Sodium 10/09/2020 142  136 - 145 mmol/L Final    Potassium 10/09/2020 4.8  3.5 - 5.5 mmol/L Final    Chloride 10/09/2020 109  100 - 111 mmol/L Final    CO2 10/09/2020 28  21 - 32 mmol/L Final    Anion gap 10/09/2020 5  3.0 - 18 mmol/L Final    Glucose 10/09/2020 92  74 - 99 mg/dL Final    BUN 10/09/2020 17  7.0 - 18 MG/DL Final    Creatinine 10/09/2020 1.01  0.6 - 1.3 MG/DL Final    BUN/Creatinine ratio 10/09/2020 17  12 - 20   Final    GFR est AA 10/09/2020 >60  >60 ml/min/1.73m2 Final    GFR est non-AA 10/09/2020 >60  >60 ml/min/1.73m2 Final    Comment: (NOTE)  Estimated GFR is calculated using the Modification of Diet in Renal   Disease (MDRD) Study equation, reported for both  Americans   (GFRAA) and non- Americans (GFRNA), and normalized to 1.73m2   body surface area. The physician must decide which value applies to   the patient. The MDRD study equation should only be used in   individuals age 25 or older. It has not been validated for the   following: pregnant women, patients with serious comorbid conditions,   or on certain medications, or persons with extremes of body size,   muscle mass, or nutritional status.  Calcium 10/09/2020 9.0  8.5 - 10.1 MG/DL Final    Bilirubin, total 10/09/2020 0.8  0.2 - 1.0 MG/DL Final    ALT (SGPT) 10/09/2020 24  16 - 61 U/L Final    AST (SGOT) 10/09/2020 24  10 - 38 U/L Final    Alk. phosphatase 10/09/2020 90  45 - 117 U/L Final    Protein, total 10/09/2020 7.1  6.4 - 8.2 g/dL Final    Albumin 10/09/2020 3.5  3.4 - 5.0 g/dL Final    Globulin 10/09/2020 3.6  2.0 - 4.0 g/dL Final    A-G Ratio 10/09/2020 1.0  0.8 - 1.7   Final       .No results found for any visits on 10/14/20. Assessment / Plan:      ICD-10-CM ICD-9-CM    1. Essential hypertension  I10 401.9    2. Atrial fibrillation, unspecified type ([facility]  I48.91 427.31    3. Pure hypercholesterolemia  E78.00 272.0    4. B12 deficiency  E53.8 266.2      Patient declined referral to endocrinologist for gynecomastia at this time  HTN- mildly elevated. Will continue to monitor  F/u in 6 months      Follow-up and Dispositions    · Return in about 6 months (around 4/14/2021). I asked the patient if he  had any questions and answered his  questions.   The patient stated that he understands the treatment plan and agrees with the treatment plan    This document was created with a voice activated dictation system and may contain transcription errors.

## 2020-10-14 NOTE — PROGRESS NOTES
Dmitry Salomon presents today for   Chief Complaint   Patient presents with    Hypertension     follow-up    Labs     results       Is someone accompanying this pt? no    Is the patient using any DME equipment during OV? no    Depression Screening:  3 most recent PHQ Screens 10/14/2020   Little interest or pleasure in doing things Not at all   Feeling down, depressed, irritable, or hopeless Not at all   Total Score PHQ 2 0       Learning Assessment:  Learning Assessment 4/12/2017   PRIMARY LEARNER Patient   HIGHEST LEVEL OF EDUCATION - PRIMARY LEARNER  GRADUATED HIGH SCHOOL OR GED   BARRIERS PRIMARY LEARNER NONE   CO-LEARNER CAREGIVER No   PRIMARY LANGUAGE ENGLISH   LEARNER PREFERENCE PRIMARY DEMONSTRATION   ANSWERED BY [email or web address]   RELATIONSHIP SELF       Abuse Screening:  Abuse Screening Questionnaire 1/7/2019   Do you ever feel afraid of your partner? N   Are you in a relationship with someone who physically or mentally threatens you? N   Is it safe for you to go home? Y       Fall Risk  Fall Risk Assessment, last 12 mths 10/14/2020   Able to walk? Yes   Fall in past 12 months? No       Health Maintenance reviewed and discussed and ordered per Provider. Health Maintenance Due   Topic Date Due    Shingrix Vaccine Age 49> (2 of 2) 05/23/2019    GLAUCOMA SCREENING Q2Y  08/16/2019    Flu Vaccine (1) 09/01/2020   . Coordination of Care:  1. Have you been to the ER, urgent care clinic since your last visit? Hospitalized since your last visit? no    2. Have you seen or consulted any other health care providers outside of the [address] [place] [address] since your last visit? Include any pap smears or colon screening.  no

## 2020-10-21 ENCOUNTER — TELEPHONE (OUTPATIENT)
Dept: FAMILY MEDICINE CLINIC | Age: 80
End: 2020-10-21

## 2020-10-21 NOTE — TELEPHONE ENCOUNTER
2 pt identifiers confirmed. Pt verified that he is taking Coumadin 5 mg Mon/Wed/Fri and 2.5 mg all other days. Patient INR is 3.0 on 10/15/2020. Dr. Marika Browne NP stated he should continue same dose. Pt verbalized his understanding.

## 2020-11-03 ENCOUNTER — TELEPHONE (OUTPATIENT)
Dept: FAMILY MEDICINE CLINIC | Age: 80
End: 2020-11-03

## 2020-11-03 NOTE — TELEPHONE ENCOUNTER
2 pt identifiers confirmed. Patient INR was 3.0 on 10/30/2020. Pt verified that he is currently taking Coumadin 5 mg Mon/Wed/Fri and 2.5 mg all other days. Dr. Brionna Kingsley NP stated patient should same dose. Patient verbalized his understanding.

## 2020-11-12 RX ORDER — CYANOCOBALAMIN 1000 UG/ML
INJECTION, SOLUTION INTRAMUSCULAR; SUBCUTANEOUS
Qty: 1 ML | Refills: 8 | Status: SHIPPED | OUTPATIENT
Start: 2020-11-12 | End: 2021-05-17

## 2020-11-16 ENCOUNTER — HOSPITAL ENCOUNTER (OUTPATIENT)
Dept: LAB | Age: 80
Discharge: HOME | End: 2020-11-16
Payer: MEDICARE

## 2020-11-16 PROCEDURE — 87635 SARS-COV-2 COVID-19 AMP PRB: CPT

## 2020-11-17 ENCOUNTER — TELEPHONE (OUTPATIENT)
Dept: FAMILY MEDICINE CLINIC | Age: 80
End: 2020-11-17

## 2020-11-17 NOTE — TELEPHONE ENCOUNTER
I just returned to the office since 11/05/2020. TC to patient he verified that he is currently taking Coumadin 5 mg Mon/Wed/Fri and 2.5 mg all other days. Pt INR was 3.1 on 11/11/2020. Provider Dr. Tali Bang NP stated patient should continue same dose. He verbalized his understanding.

## 2020-11-19 LAB — SARS-COV-2, COV2NT: NOT DETECTED

## 2020-12-23 ENCOUNTER — HOSPITAL ENCOUNTER (OUTPATIENT)
Dept: LAB | Age: 80
Discharge: HOME | End: 2020-12-23
Payer: MEDICARE

## 2020-12-23 PROCEDURE — 87635 SARS-COV-2 COVID-19 AMP PRB: CPT

## 2020-12-24 RX ORDER — EZETIMIBE 10 MG/1
10 TABLET ORAL DAILY
Qty: 90 TAB | Refills: 3 | Status: SHIPPED | OUTPATIENT
Start: 2020-12-24 | End: 2022-01-03 | Stop reason: SDUPTHER

## 2020-12-26 LAB — SARS-COV-2, COV2NT: NOT DETECTED

## 2020-12-27 ENCOUNTER — ANESTHESIA EVENT (OUTPATIENT)
Dept: ENDOSCOPY | Age: 80
End: 2020-12-27
Payer: MEDICARE

## 2020-12-28 ENCOUNTER — ANESTHESIA (OUTPATIENT)
Dept: ENDOSCOPY | Age: 80
End: 2020-12-28
Payer: MEDICARE

## 2020-12-28 ENCOUNTER — HOSPITAL ENCOUNTER (OUTPATIENT)
Age: 80
Setting detail: OUTPATIENT SURGERY
Discharge: HOME | End: 2020-12-28
Attending: INTERNAL MEDICINE | Admitting: INTERNAL MEDICINE
Payer: MEDICARE

## 2020-12-28 VITALS
RESPIRATION RATE: 16 BRPM | HEIGHT: 72 IN | TEMPERATURE: 97.8 F | BODY MASS INDEX: 27.09 KG/M2 | HEART RATE: 74 BPM | DIASTOLIC BLOOD PRESSURE: 77 MMHG | OXYGEN SATURATION: 96 % | WEIGHT: 200 LBS | SYSTOLIC BLOOD PRESSURE: 121 MMHG

## 2020-12-28 PROCEDURE — 74011250636 HC RX REV CODE- 250/636: Performed by: NURSE ANESTHETIST, CERTIFIED REGISTERED

## 2020-12-28 PROCEDURE — 74011250637 HC RX REV CODE- 250/637

## 2020-12-28 PROCEDURE — 2709999900 HC NON-CHARGEABLE SUPPLY: Performed by: INTERNAL MEDICINE

## 2020-12-28 PROCEDURE — 88342 IMHCHEM/IMCYTCHM 1ST ANTB: CPT

## 2020-12-28 PROCEDURE — 74011000258 HC RX REV CODE- 258: Performed by: ANESTHESIOLOGY

## 2020-12-28 PROCEDURE — 00731 ANES UPR GI NDSC PX NOS: CPT | Performed by: ANESTHESIOLOGY

## 2020-12-28 PROCEDURE — 77030019988 HC FCPS ENDOSC DISP BSC -B: Performed by: INTERNAL MEDICINE

## 2020-12-28 PROCEDURE — 88305 TISSUE EXAM BY PATHOLOGIST: CPT

## 2020-12-28 PROCEDURE — 76060000031 HC ANESTHESIA FIRST 0.5 HR: Performed by: INTERNAL MEDICINE

## 2020-12-28 PROCEDURE — 99100 ANES PT EXTEME AGE<1 YR&>70: CPT | Performed by: ANESTHESIOLOGY

## 2020-12-28 PROCEDURE — 76040000019: Performed by: INTERNAL MEDICINE

## 2020-12-28 PROCEDURE — 77030008565 HC TBNG SUC IRR ERBE -B: Performed by: INTERNAL MEDICINE

## 2020-12-28 PROCEDURE — 74011250636 HC RX REV CODE- 250/636: Performed by: ANESTHESIOLOGY

## 2020-12-28 RX ORDER — SODIUM CHLORIDE, SODIUM LACTATE, POTASSIUM CHLORIDE, CALCIUM CHLORIDE 600; 310; 30; 20 MG/100ML; MG/100ML; MG/100ML; MG/100ML
75 INJECTION, SOLUTION INTRAVENOUS CONTINUOUS
Status: DISCONTINUED | OUTPATIENT
Start: 2020-12-28 | End: 2020-12-28 | Stop reason: HOSPADM

## 2020-12-28 RX ORDER — FAMOTIDINE 20 MG/1
TABLET, FILM COATED ORAL
Status: COMPLETED
Start: 2020-12-28 | End: 2020-12-28

## 2020-12-28 RX ORDER — SODIUM CHLORIDE 0.9 % (FLUSH) 0.9 %
5-40 SYRINGE (ML) INJECTION EVERY 8 HOURS
Status: DISCONTINUED | OUTPATIENT
Start: 2020-12-28 | End: 2020-12-28 | Stop reason: HOSPADM

## 2020-12-28 RX ORDER — SODIUM CHLORIDE 0.9 % (FLUSH) 0.9 %
5-40 SYRINGE (ML) INJECTION AS NEEDED
Status: CANCELLED | OUTPATIENT
Start: 2020-12-28

## 2020-12-28 RX ORDER — PROPOFOL 10 MG/ML
INJECTION, EMULSION INTRAVENOUS AS NEEDED
Status: DISCONTINUED | OUTPATIENT
Start: 2020-12-28 | End: 2020-12-28 | Stop reason: HOSPADM

## 2020-12-28 RX ORDER — SODIUM CHLORIDE, SODIUM LACTATE, POTASSIUM CHLORIDE, CALCIUM CHLORIDE 600; 310; 30; 20 MG/100ML; MG/100ML; MG/100ML; MG/100ML
INJECTION, SOLUTION INTRAVENOUS
Status: DISCONTINUED | OUTPATIENT
Start: 2020-12-28 | End: 2020-12-28 | Stop reason: HOSPADM

## 2020-12-28 RX ORDER — SODIUM CHLORIDE 0.9 % (FLUSH) 0.9 %
5-40 SYRINGE (ML) INJECTION AS NEEDED
Status: DISCONTINUED | OUTPATIENT
Start: 2020-12-28 | End: 2020-12-28 | Stop reason: HOSPADM

## 2020-12-28 RX ORDER — SODIUM CHLORIDE 0.9 % (FLUSH) 0.9 %
5-40 SYRINGE (ML) INJECTION EVERY 8 HOURS
Status: CANCELLED | OUTPATIENT
Start: 2020-12-28

## 2020-12-28 RX ORDER — FAMOTIDINE 20 MG/1
20 TABLET, FILM COATED ORAL ONCE
Status: COMPLETED | OUTPATIENT
Start: 2020-12-28 | End: 2020-12-28

## 2020-12-28 RX ADMIN — PROPOFOL 100 MG: 10 INJECTION, EMULSION INTRAVENOUS at 08:25

## 2020-12-28 RX ADMIN — FAMOTIDINE 20 MG: 20 TABLET, FILM COATED ORAL at 07:59

## 2020-12-28 RX ADMIN — SODIUM CHLORIDE, SODIUM LACTATE, POTASSIUM CHLORIDE, AND CALCIUM CHLORIDE: 600; 310; 30; 20 INJECTION, SOLUTION INTRAVENOUS at 08:10

## 2020-12-28 RX ADMIN — PROPOFOL 150 MG: 10 INJECTION, EMULSION INTRAVENOUS at 08:17

## 2020-12-28 RX ADMIN — SODIUM CHLORIDE, SODIUM LACTATE, POTASSIUM CHLORIDE, AND CALCIUM CHLORIDE 75 ML/HR: 600; 310; 30; 20 INJECTION, SOLUTION INTRAVENOUS at 08:00

## 2020-12-28 RX ADMIN — SODIUM CHLORIDE 100 MCG: 9 INJECTION, SOLUTION INTRAVENOUS at 08:34

## 2020-12-28 NOTE — DISCHARGE INSTRUCTIONS
Patient Education   Upper GI Endoscopy: What to Expect at Home  Your Recovery  You had an upper GI endoscopy. Your doctor used a thin, lighted tube that bends to look at the inside of your esophagus, your stomach, and the first part of the small intestine, called the duodenum.  After you have an endoscopy, you will stay at the hospital or clinic for 1 to 2 hours. This will allow the medicine to wear off. You will be able to go home after your doctor or nurse checks to make sure that you're not having any problems.  You may have to stay overnight if you had treatment during the test. You may have a sore throat for a day or two after the test.  This care sheet gives you a general idea about what to expect after the test.  How can you care for yourself at home?  Activity   · Rest as much as you need to after you go home.  · You should be able to go back to your usual activities the day after the test.  Diet   · Follow your doctor's directions for eating after the test.  · Drink plenty of fluids (unless your doctor has told you not to).  Medications   · If you have a sore throat the day after the test, use an over-the-counter spray to numb your throat.  Follow-up care is a key part of your treatment and safety. Be sure to make and go to all appointments, and call your doctor if you are having problems. It's also a good idea to know your test results and keep a list of the medicines you take.  When should you call for help?   Call 911 anytime you think you may need emergency care. For example, call if:    · You passed out (lost consciousness).     · You have trouble breathing.     · You pass maroon or bloody stools.   Call your doctor now or seek immediate medical care if:    · You have pain that does not get better after your take pain medicine.     · You have new or worse belly pain.     · You have blood in your stools.     · You are sick to your stomach and cannot keep fluids down.     · You have a fever.     · You  cannot pass stools or gas. Watch closely for changes in your health, and be sure to contact your doctor if:    · Your throat still hurts after a day or two.     · You do not get better as expected. Where can you learn more? Go to http://www.liz.com/  Enter J454 in the search box to learn more about \"Upper GI Endoscopy: What to Expect at Home. \"  Current as of: April 15, 2020               Content Version: 12.6  © 2006-2020 [employer]. Care instructions adapted under license by [employer] (which disclaims liability or warranty for this information). If you have questions about a medical condition or this instruction, always ask your healthcare professional. [address] any warranty or liability for your use of this information. Patient Education   Esophageal Dilation: What to Expect at Home  Your Recovery  After you have esophageal dilation, you will stay at the hospital or surgery center for 1 to 2 hours. This will allow the medicine to wear off. You will be able to go home after your doctor or nurse checks to make sure you are not having any problems. This care sheet gives you a general idea about how long it will take for you to recover. But each person recovers at a different pace. Follow the steps below to get better as quickly as possible. How can you care for yourself at home? Activity    · Rest as much as you need to after you go home.     · You should be able to go back to your usual activities the day after the procedure. Diet    · Follow your doctor's directions for eating after the procedure.     · Drink plenty of fluids (unless your doctor has told you not to). Medicines    · Your doctor will tell you if and when you can restart your medicines.  He or she will also give you instructions about taking any new medicines.     · If you take aspirin or some other blood thinner, ask your doctor if and when to start taking it again. Make sure that you understand exactly what your doctor wants you to do.     · If you have a sore throat the day after the procedure, use an over-the-counter spray to numb your throat. Sucking on throat lozenges and gargling with warm salt water may also help relieve your symptoms. Follow-up care is a key part of your treatment and safety. Be sure to make and go to all appointments, and call your doctor if you are having problems. It's also a good idea to know your test results and keep a list of the medicines you take. When should you call for help? Call 911 anytime you think you may need emergency care. For example, call if:    · You passed out (lost consciousness).     · You have trouble breathing.     · Your stools are maroon or very bloody   Call your doctor now or seek immediate medical care if:    · You have new or worse belly pain.     · You have a fever.     · You have new or more blood in your stools.     · You are sick to your stomach and cannot drink fluids.     · You cannot pass stools or gas.     · You have pain that does not get better after you take pain medicine. Watch closely for changes in your health, and be sure to contact your doctor if:    · Your throat still hurts after a day or two.     · You do not get better as expected. Where can you learn more? Go to http://www.gray.com/  Enter J014 in the search box to learn more about \"Esophageal Dilation: What to Expect at Home. \"  Current as of: April 15, 2020               Content Version: 12.6  © 0724-1844 [employer]. Care instructions adapted under license by [employer] (which disclaims liability or warranty for this information). If you have questions about a medical condition or this instruction, always ask your healthcare professional. [place] [address] any warranty or liability for your use of this information.      Patient Education   Gastritis: Care Instructions  Your Care Instructions     Gastritis is a sore and upset stomach. It happens when something irritates the stomach lining. Many things can cause it. These include an infection such as the flu or something you ate or drank. Medicines or a sore on the lining of the stomach (ulcer) also can cause it. Your belly may bloat and ache. You may belch, vomit, and feel sick to your stomach. You should be able to relieve the problem by taking medicine. And it may help to change your diet. If gastritis lasts, your doctor may prescribe medicine. Follow-up care is a key part of your treatment and safety. Be sure to make and go to all appointments, and call your doctor if you are having problems. It's also a good idea to know your test results and keep a list of the medicines you take. How can you care for yourself at home? · If your doctor prescribed antibiotics, take them as directed. Do not stop taking them just because you feel better. You need to take the full course of antibiotics. · Be safe with medicines. If your doctor prescribed medicine to decrease stomach acid, take it as directed. Call your doctor if you think you are having a problem with your medicine. · Do not take any other medicine, including over-the-counter pain relievers, without talking to your doctor first.  · If your doctor recommends over-the-counter medicine to reduce stomach acid, such as Pepcid AC (famotidine), Prilosec (omeprazole), or Tagamet HB (cimetidine) follow the directions on the label. · Drink plenty of fluids (enough so that your urine is light yellow or clear like water) to prevent dehydration. Choose water and other caffeine-free clear liquids. If you have kidney, heart, or liver disease and have to limit fluids, talk with your doctor before you increase the amount of fluids you drink. · Limit how much alcohol you drink. · Avoid coffee, tea, cola drinks, chocolate, and other foods with caffeine.  They increase stomach acid.  When should you call for help? Call 911 anytime you think you may need emergency care. For example, call if:    · You vomit blood or what looks like coffee grounds.     · You pass maroon or very bloody stools. Call your doctor now or seek immediate medical care if:    · You start breathing fast and have not produced urine in the last 8 hours.     · You cannot keep fluids down. Watch closely for changes in your health, and be sure to contact your doctor if:    · You do not get better as expected. Where can you learn more? Go to http://www.liz.com/  Enter Z536 in the search box to learn more about \"Gastritis: Care Instructions. \"  Current as of: April 15, 2020               Content Version: 12.6  © 3345-4864 [employer]. Care instructions adapted under license by [employer] (which disclaims liability or warranty for this information). If you have questions about a medical condition or this instruction, always ask your healthcare professional. [address] any warranty or liability for your use of this information.

## 2020-12-28 NOTE — PROGRESS NOTES
Patient may resume coumadin therapy in 48 hours. Arleene Ganser, MD  [facility] [address]  cell - [phone]  [email or web address]

## 2020-12-28 NOTE — PROCEDURES
[email or web address]  [phone]        Brief Procedure Note    Marge Freeman  1940  [id number]    Date of Procedure: 12/28/2020    Preoperative diagnosis: Garcia's esophagus - SSNDBE:   530.85 - K22.70  Dysphagia, unspecified:   787.20 - R13.10  GERD:   530.81 - K21.9      Postoperative diagnosis: EGD: non obstructive dysphagia - dilated 48 Fr; irregular z-line - biopsied at 43 cm; gastritis - biopsied    Description of Findings: same    Sedation/Anesthesia: Monitored Anesthesia Care; See Anesthesia Note    Procedure: Procedure(s):  UPPER ENDOSCOPY w/bxs & dilation 48 villalta    :  Dr. Chloé Velasquez MD    Assistant(s): Endoscopy Technician-1: Harrison Rowell  Endoscopy RN-1: Brandie Higginbotham RN    EBL:None    Specimens:   ID Type Source Tests Collected by Time Destination   1 : gastric bxs Preservative Gastric  Tiffany Norton MD 12/28/2020 7183 Pathology   2 : esophageal bxs (43 cm) Preservative Esophagus  Tiffany Norton MD 12/28/2020 0831 Pathology       Findings: See printed and scanned procedure note    Complications: None    Tissue Implant Device: None    Dr. Chloé Velasquez MD  12/28/2020  8:37 AM    Chloé Velasquez MD  [facility] [address] - [phone]  [facility] [phone]  [email or web address]

## 2020-12-28 NOTE — ANESTHESIA POSTPROCEDURE EVALUATION
Procedure(s):  UPPER ENDOSCOPY w/bxs & dilation 48 villalta. MAC    Anesthesia Post Evaluation      Multimodal analgesia: multimodal analgesia used between 6 hours prior to anesthesia start to PACU discharge  Patient location during evaluation: bedside  Patient participation: complete - patient cannot participate  Level of consciousness: awake  Pain score: 1  Pain management: adequate  Airway patency: patent  Anesthetic complications: no  Cardiovascular status: acceptable  Respiratory status: acceptable  Hydration status: acceptable  Post anesthesia nausea and vomiting:  none      INITIAL Post-op Vital signs:   Vitals Value Taken Time   BP 99/66 12/28/20 0847   Temp     Pulse 70 12/28/20 0847   Resp 17 12/28/20 0845   SpO2 99 % 12/28/20 0847   Vitals shown include unvalidated device data.

## 2020-12-28 NOTE — ANESTHESIA PREPROCEDURE EVALUATION
Anesthetic History   No history of anesthetic complications            Review of Systems / Medical History  Patient summary reviewed and pertinent labs reviewed    Pulmonary                   Neuro/Psych              Cardiovascular    Hypertension      CHF: NYHA Classification II  Dysrhythmias : atrial fibrillation  Pacemaker and hyperlipidemia      Comments: Paroxysmal VT   GI/Hepatic/Renal     GERD           Endo/Other        Arthritis     Other Findings              Physical Exam    Airway  Mallampati: III    Neck ROM: short neck   Mouth opening: Normal     Cardiovascular  Regular rate and rhythm,  S1 and S2 normal,  no murmur, click, rub, or gallop             Dental         Pulmonary  Breath sounds clear to auscultation               Abdominal  GI exam deferred       Other Findings            Anesthetic Plan    ASA: 3  Anesthesia type: MAC          Induction: Intravenous  Anesthetic plan and risks discussed with: Patient

## 2020-12-28 NOTE — H&P
[email or web address]  [phone]      History and Physical    Patient: Essie Calvillo MRN: [id number]  SSN: [id number]    YOB: 1940  Age: 68 y.o. Sex: male      Subjective:      Essie Calvillo is a 68 y.o. male who presents with SSNDBE, due for surveillance, with recurrent dysphagia and GERD. Past Medical History:   Diagnosis Date    AICD (automatic cardioverter/defibrillator) present     Medtronic  upgrade from pacer in 2007 due to VT    Arthritis     Atrial fibrillation ([facility]     Cancer ([facility] 02/2019    melenoma on left leg    Cardiac cath 03/19/2007    LM 25% ostial.  Otherwise patent coronaries. LVEDP 20.  EF 50%. Dyssynch. mid anterior contraction. Pacemaker.  Cardiac echocardiogram 03/20/2014    A-fib. EF 65-70%. No RWMA. No RWMA. Mild conc LVH. Mild RVE. RVSP 40-45 mmHg. Mild LAE.  HERNANDEZ. Mild MR. Mod TR.  Cardiac nuclear imaging test 07/29/2014    No ischemia or prior infarction. EF 68%. Nondiagnostic EKG due to V pacing on pharm stress test.  Low risk.  Cardioversion 8/31/2011    Successful defibrillation threshold testing at 18 joules. Patient also had conversion to atrial ventricular pacing.      CH[facility]congestive heart failure) ([facility]     Cobalamin deficiency     Dupuytren contracture 02/08/2010    on the right ring finger     Edema     GERD (gastroesophageal reflux disease)     Hypertension     Hypertrophy of prostate with urinary obstruction and other lower urinary tract symptoms (LUTS)     Ill-defined condition 2007    AICD    Impotence of organic origin     Intolerance of drug     Intolerant high doses of sotalol due to prolonged QT    Mastodynia     Paroxysmal VT ([facility]     Pure hypercholesterolemia     S/P ablation of atrial fibrillation 05/31/2011    S/P ablation of atrial fibrillation 12/18/2012    Dr. Martha Bee at [address] [facility] S/P ablation operation for arrhythmia     VT ablation by Dr. Chelsey Lucas near [address] [place] [address]    [facility] sinus syndrome [facility])      Past Surgical History:   Procedure Laterality Date    COLONOSCOPY N/A 9/12/2018    COLONOSCOPY with Polypectomies and Clip Placement performed by Roxie Stein MD at [address] [place] HX AFIB ABLATION      [facility] by Dr. Chino Abdi  02/08-03/08    bilateral cataract removed    HX CATARACT REMOVAL  2/2008 & 3/2008    bilateral    HX ENDOSCOPY  09/12/2018    Dr. Jose Gramajo - pathology indicates changes of acid reflux and Garcia's esophagus, repeat EGD in 2 years    HX ORTHOPAEDIC  2/8/10    release of Dupuytren's contraction on ring finger of right hand    HX OTHER SURGICAL  6/2000    atrial lead placement    HX OTHER SURGICAL  6/5/2009    Cardioversion    HX OTHER SURGICAL  10/12/2012    cardioversion    HX PACEMAKER  1998    placement    HX PACEMAKER  6/2000    DDD    HX PACEMAKER  3/27/07    removal of pacemaker & placement of dual chamber defib generator and leads    HX TONSIL AND ADENOIDECTOMY        Family History   Problem Relation Age of Onset    No Known Problems Mother     COPD Father     Hypertension Other      Social History     Tobacco Use    Smoking status: Never Smoker    Smokeless tobacco: Never Used   Substance Use Topics    Alcohol use: Yes     Alcohol/week: 10.0 - 12.0 standard drinks     Types: 10 - 12 Glasses of wine per week     Frequency: 4 or more times a week     Drinks per session: 1 or 2     Binge frequency: Never     Comment: 2-3 glasses of white wine about 5-6 days/week)      Prior to Admission medications    Medication Sig Start Date End Date Taking? Authorizing Provider   ezetimibe (ZETIA) 10 mg tablet Take 1 Tab by mouth daily.  12/24/20   Ji Ballard MD   cyanocobalamin (VITAMIN B12) 1,000 mcg/mL injection inject 1 milliliter ( 1000 MCG ) intramuscularly EVERY 3 WEEKS 11/12/20   Eusebio Cameron NP   evolocumab (REPATHA SURECLICK) pen injection 1 mL by SubCUTAneous route every fourteen (14) days. 10/27/20   Leonard Romero,    amLODIPine (NORVASC) 2.5 mg tablet 1 tablet daily (For BP). (Stop Benazepril) 9/29/20   Evelyn Patrick NP   metoprolol tartrate (Lopressor) 50 mg tablet Take 1 Tab by mouth two (2) times a day. 7/6/20   Lidia Braga MD   warfarin (COUMADIN) 5 mg tablet 1 tab on Mon, Wed, Fri.   1/2 tab on the other days 7/1/20   Keiry Lechuga MD   furosemide (LASIX) 40 mg tablet TAKE 1 TABLET DAILY  Indications: visible water retention 3/16/20   Aram Kowalski NP   coenzyme Q-10 (CO Q-10) 200 mg capsule Take  by mouth daily. Provider, Historical   aspirin delayed-release 81 mg tablet Take  by mouth daily. Provider, Historical   LUMIGAN 0.01 % ophthalmic drops Administer 1 Drop to both eyes nightly. 8/23/18   Provider, Historical   AZOPT 1 % ophthalmic suspension Administer 1 Drop to both eyes two (2) times a day. 9/21/15   Provider, Historical   multivitamin (ONE A DAY) tablet Take 1 Tab by mouth daily. Provider, Historical        Allergies   Allergen Reactions    Cephalexin Hives and Itching    Lipitor [Atorvastatin] Myalgia    Livalo [Pitavastatin] Other (comments)     Extreme fatigue    5/29/14   PATIENT DENIES    Zocor [Simvastatin] Myalgia     5/29/14 PATIENT DENIES        Review of Systems:  A comprehensive review of systems was negative except for that written in the History of Present Illness. Objective: There were no vitals filed for this visit. Physical Exam:  GENERAL: alert, cooperative, no distress, appears stated age  LUNG: clear to auscultation bilaterally  HEART: regular rate and rhythm, S1, S2 normal, no murmur, click, rub or gallop  ABDOMEN: soft, non-tender. Bowel sounds normal. No masses,  no organomegaly  NEUROLOGIC: alert & oriented x 3    Assessment:     1. SSNDBE  2. GERD  3. Dysphagia    Plan:     1.  EGD    Signed By: Lata Perry MD     December 28, 2020      Lata Perry MD  [facility] [address] - [phone]  [email or web address]

## 2020-12-31 ENCOUNTER — TELEPHONE (OUTPATIENT)
Dept: FAMILY MEDICINE CLINIC | Age: 80
End: 2020-12-31

## 2020-12-31 NOTE — TELEPHONE ENCOUNTER
Phone call to patient's cell phone regarding critical PT/INR received. The patient INR is 1.2. Spoke with patient who notes he had a endoscopy procedure and stopped his Coumadin prior to the procedure. He notes he resumed his normal dose starting 12/30/2020. Patient informed to continue current dose of medication and to repeat the INR lab next week.   Patient verbalized understanding

## 2021-01-08 ENCOUNTER — TELEPHONE (OUTPATIENT)
Dept: FAMILY MEDICINE CLINIC | Age: 81
End: 2021-01-08

## 2021-01-08 NOTE — TELEPHONE ENCOUNTER
TC to patient his INR is 1.9. He verified that he is currently taking Coumadin 5 mg Mon/Wed/Fri and 2.5 mg all other days. Provider Dr. Chelsi Tomlin NP stated patient should continue same dose. Patient verbalized his understanding.

## 2021-01-14 ENCOUNTER — TELEPHONE (OUTPATIENT)
Dept: FAMILY MEDICINE CLINIC | Age: 81
End: 2021-01-14

## 2021-01-14 NOTE — TELEPHONE ENCOUNTER
TC to patient his INR is 2.2. He verified that he is currently taking Coumadin 5 mg Mon/Wed/Fri and 2.5 mg all other days. Provider Dr. Sharon Arroyo stated patient should continue same dose. Patient verbalized his understanding.

## 2021-01-21 ENCOUNTER — TELEPHONE (OUTPATIENT)
Dept: FAMILY MEDICINE CLINIC | Age: 81
End: 2021-01-21

## 2021-01-28 ENCOUNTER — TELEPHONE (OUTPATIENT)
Dept: FAMILY MEDICINE CLINIC | Age: 81
End: 2021-01-28

## 2021-01-28 RX ORDER — AMLODIPINE BESYLATE 2.5 MG/1
TABLET ORAL
Qty: 90 TAB | Refills: 3 | Status: SHIPPED | OUTPATIENT
Start: 2021-01-28 | End: 2022-03-22 | Stop reason: SDUPTHER

## 2021-01-28 RX ORDER — WARFARIN SODIUM 5 MG/1
TABLET ORAL
Qty: 100 TAB | Refills: 3 | Status: SHIPPED | OUTPATIENT
Start: 2021-01-28 | End: 2022-03-22 | Stop reason: SDUPTHER

## 2021-01-28 RX ORDER — FUROSEMIDE 40 MG/1
TABLET ORAL
Qty: 90 TAB | Refills: 3 | Status: SHIPPED | OUTPATIENT
Start: 2021-01-28 | End: 2022-02-10 | Stop reason: SDUPTHER

## 2021-02-04 ENCOUNTER — TELEPHONE (OUTPATIENT)
Dept: FAMILY MEDICINE CLINIC | Age: 81
End: 2021-02-04

## 2021-02-04 NOTE — TELEPHONE ENCOUNTER
Late entry: Patient INR was 3.9 on 01/27/2021. Patient held dose for 2 days and continue Coumadin 5 mg Mon/Wed/Fri and 2.5 mg all other days.

## 2021-02-05 ENCOUNTER — TELEPHONE (OUTPATIENT)
Dept: FAMILY MEDICINE CLINIC | Age: 81
End: 2021-02-05

## 2021-02-11 ENCOUNTER — TELEPHONE (OUTPATIENT)
Dept: FAMILY MEDICINE CLINIC | Age: 81
End: 2021-02-11

## 2021-02-11 NOTE — TELEPHONE ENCOUNTER
Patient called asking to speak with Cal Jerez about the sore breast.  It went away and is back now.   Please call him back at [phone] or [phone]

## 2021-02-12 ENCOUNTER — TELEPHONE (OUTPATIENT)
Dept: FAMILY MEDICINE CLINIC | Age: 81
End: 2021-02-12

## 2021-02-12 NOTE — TELEPHONE ENCOUNTER
TC to patient his INR is 2.7 on 02/10/2021. He is currently taking Coumadin 5 mg Mon/Wed/Fri and 2.5 mg all other days. Provider stated no changes to dosage. Patient verbalized his understanding.

## 2021-02-12 NOTE — TELEPHONE ENCOUNTER
TC to patient, UTR. Left message with patient wife and she noted she would inform the patient that the writer called.

## 2021-02-17 ENCOUNTER — OFFICE VISIT (OUTPATIENT)
Dept: FAMILY MEDICINE CLINIC | Age: 81
End: 2021-02-17
Payer: MEDICARE

## 2021-02-17 VITALS
WEIGHT: 208 LBS | OXYGEN SATURATION: 99 % | HEART RATE: 101 BPM | BODY MASS INDEX: 28.17 KG/M2 | TEMPERATURE: 98 F | RESPIRATION RATE: 16 BRPM | DIASTOLIC BLOOD PRESSURE: 86 MMHG | HEIGHT: 72 IN | SYSTOLIC BLOOD PRESSURE: 137 MMHG

## 2021-02-17 DIAGNOSIS — Z12.5 PROSTATE CANCER SCREENING: ICD-10-CM

## 2021-02-17 DIAGNOSIS — I10 ESSENTIAL HYPERTENSION: ICD-10-CM

## 2021-02-17 DIAGNOSIS — Z00.00 MEDICARE ANNUAL WELLNESS VISIT, SUBSEQUENT: Primary | ICD-10-CM

## 2021-02-17 DIAGNOSIS — I48.91 ATRIAL FIBRILLATION, UNSPECIFIED TYPE (HCC): ICD-10-CM

## 2021-02-17 DIAGNOSIS — E53.8 B12 DEFICIENCY: ICD-10-CM

## 2021-02-17 DIAGNOSIS — Z13.39 SCREENING FOR ALCOHOLISM: ICD-10-CM

## 2021-02-17 DIAGNOSIS — N62 GYNECOMASTIA: ICD-10-CM

## 2021-02-17 PROCEDURE — G8536 NO DOC ELDER MAL SCRN: HCPCS | Performed by: NURSE PRACTITIONER

## 2021-02-17 PROCEDURE — G0463 HOSPITAL OUTPT CLINIC VISIT: HCPCS | Performed by: NURSE PRACTITIONER

## 2021-02-17 PROCEDURE — G8419 CALC BMI OUT NRM PARAM NOF/U: HCPCS | Performed by: NURSE PRACTITIONER

## 2021-02-17 PROCEDURE — G8754 DIAS BP LESS 90: HCPCS | Performed by: NURSE PRACTITIONER

## 2021-02-17 PROCEDURE — G0439 PPPS, SUBSEQ VISIT: HCPCS | Performed by: NURSE PRACTITIONER

## 2021-02-17 PROCEDURE — G8427 DOCREV CUR MEDS BY ELIG CLIN: HCPCS | Performed by: NURSE PRACTITIONER

## 2021-02-17 PROCEDURE — 99213 OFFICE O/P EST LOW 20 MIN: CPT | Performed by: NURSE PRACTITIONER

## 2021-02-17 PROCEDURE — G8432 DEP SCR NOT DOC, RNG: HCPCS | Performed by: NURSE PRACTITIONER

## 2021-02-17 PROCEDURE — 1101F PT FALLS ASSESS-DOCD LE1/YR: CPT | Performed by: NURSE PRACTITIONER

## 2021-02-17 PROCEDURE — G8752 SYS BP LESS 140: HCPCS | Performed by: NURSE PRACTITIONER

## 2021-02-17 NOTE — PROGRESS NOTES
Zbigniew Molina presents today for   Chief Complaint   Patient presents with    Annual Wellness Visit    Chest Pain     soreness that comes and goes. Patient stated it started a year ago       Is someone accompanying this pt? no    Is the patient using any DME equipment during [address]? no    Depression Screening:  3 most recent PHQ Screens 2/17/2021   Little interest or pleasure in doing things Not at all   Feeling down, depressed, irritable, or hopeless Not at all   Total Score PHQ 2 0       Learning Assessment:  Learning Assessment 4/12/2017   PRIMARY LEARNER Patient   HIGHEST LEVEL OF EDUCATION - PRIMARY LEARNER  GRADUATED HIGH SCHOOL OR GED   BARRIERS PRIMARY LEARNER NONE   CO-LEARNER CAREGIVER No   PRIMARY LANGUAGE ENGLISH   LEARNER PREFERENCE PRIMARY DEMONSTRATION   ANSWERED BY arnoldo   RELATIONSHIP SELF       Abuse Screening:  Abuse Screening Questionnaire 1/7/2019   Do you ever feel afraid of your partner? N   Are you in a relationship with someone who physically or mentally threatens you? N   Is it safe for you to go home? Y       Fall Risk  Fall Risk Assessment, last 12 mths 2/17/2021   Able to walk? Yes   Fall in past 12 months? 0   Do you feel unsteady? 0   Are you worried about falling 0       Health Maintenance reviewed and discussed and ordered per Provider. Health Maintenance Due   Topic Date Due    COVID-19 Vaccine (1 of 2) 03/16/1956    GLAUCOMA SCREENING Q2Y  08/16/2019    Medicare Yearly Exam  01/31/2021   . Coordination of Care:  1. Have you been to the ER, urgent care clinic since your last visit? Hospitalized since your last visit? no    2. Have you seen or consulted any other health care providers outside of the [address] [place] [address] since your last visit? Include any pap smears or colon screening.  no

## 2021-02-17 NOTE — PROGRESS NOTES
Sukhwinder Nicole is a 68 y.o. male presenting today for Annual Wellness Visit and Chest Pain (soreness that comes and goes. Patient stated it started a year ago)  . HPI:  Sukhwinder Nicole presents to the office today for annual wellness follow-up visit. Patient also presents today for complaints of bilateral breast tenderness. This is a chronic condition. The patient has been complaining of chronic breast tenderness. He had a mammogram in March 2020 which showed bilateral gynecomastia. He was seen in the practice on October 14, 2020 and at that time declined referral to endocrinology for evaluation of gynecomastia/breast tenderness. He presents back today noting that he had an episode of tenderness to the breast that has resolved today. He is interested in a referral to endocrinology at this time. Hypertension-patient BP today 137/86. He is not up-to-date with his fasting blood work. He is compliant with taking his medication daily and denies any chest pain or palpitation. Atrial fibrillation-patient does home PT/INR levels. His previous PT/INR this week was 2.7. His cardiologist is Dr. Beckie Carney. ROS  ROS:  History obtained from the patient intake forms which are reviewed with the patient  · General: negative for - chills, fever, weight changes or malaise  · HEENT: no sore throat, nasal congestion, vision problems or ear problems  · Respiratory: no cough, shortness of breath, or wheezing  · Cardiovascular: no chest pain, palpitations, or dyspnea on exertion  · Chest-bilateral gynecomastia-breast tenderness  · Gastrointestinal: no abdominal pain, N/V, change in bowel habits, or black or bloody stools  · Musculoskeletal: no back pain, joint pain, joint stiffness, muscle pain or muscle weakness  · Neurological: no numbness, tingling, headache or dizziness  · Endo:  No polyuria or polydipsia. · : no hematuria, dysuria, frequency, hesitancy, or nocturia.     · Psychological: negative for - anxiety, depression, sleep disturbances, suicidal or homicidal ideations    Allergies   Allergen Reactions    Cephalexin Hives and Itching    Lipitor [Atorvastatin] Myalgia    Livalo [Pitavastatin] Other (comments)     Extreme fatigue    5/29/14   PATIENT DENIES    Zocor [Simvastatin] Myalgia     5/29/14 PATIENT DENIES        Current Outpatient Medications   Medication Sig Dispense Refill    warfarin (COUMADIN) 5 mg tablet 1 tab on Mon, Wed, Fri.   1/2 tab on the other days 100 Tab 3    furosemide (LASIX) 40 mg tablet TAKE 1 TABLET DAILY  Indications: visible water retention 90 Tab 3    ezetimibe (ZETIA) 10 mg tablet Take 1 Tab by mouth daily. 90 Tab 3    cyanocobalamin (VITAMIN B12) 1,000 mcg/mL injection inject 1 milliliter ( 1000 MCG ) intramuscularly EVERY 3 WEEKS 1 mL 8    evolocumab (REPATHA SURECLICK) pen injection 1 mL by SubCUTAneous route every fourteen (14) days. 6 Pen 3    metoprolol tartrate (Lopressor) 50 mg tablet Take 1 Tab by mouth two (2) times a day. 180 Tab 3    coenzyme Q-10 (CO Q-10) 200 mg capsule Take  by mouth daily.  aspirin delayed-release 81 mg tablet Take  by mouth daily.  LUMIGAN 0.01 % ophthalmic drops Administer 1 Drop to both eyes nightly.  AZOPT 1 % ophthalmic suspension Administer 1 Drop to both eyes two (2) times a day. 3    multivitamin (ONE A DAY) tablet Take 1 Tab by mouth daily.  amLODIPine (NORVASC) 2.5 mg tablet 1 tablet daily (For BP). (Stop Benazepril) 90 Tab 3       Past Medical History:   Diagnosis Date    AICD (automatic cardioverter/defibrillator) present     Medtronic  upgrade from pacer in 2007 due to VT    Arthritis     [facility]     Cancer ([facility] 02/2019    melenoma on left leg    Cardiac cath 03/19/2007    LM 25% ostial.  Otherwise patent coronaries. LVEDP 20.  EF 50%. Dyssynch. mid anterior contraction. Pacemaker.  Cardiac echocardiogram 03/20/2014    A-fib. EF 65-70%. No RWMA. No RWMA. Mild conc LVH.   Mild RVE.  RVSP 40-45 mmHg. Mild LAE.  HERNANDEZ. Mild MR. Mod TR.  Cardiac nuclear imaging test 07/29/2014    No ischemia or prior infarction. EF 68%. Nondiagnostic EKG due to V pacing on pharm stress test.  Low risk.  Cardioversion 8/31/2011    Successful defibrillation threshold testing at 18 joules. Patient also had conversion to atrial ventricular pacing.      CHF (congestive heart failure) ([facility]     Cobalamin deficiency     Dupuytren contracture 02/08/2010    on the right ring finger     Edema     GERD (gastroesophageal reflux disease)     Hypertension     Hypertrophy of prostate with urinary obstruction and other lower urinary tract symptoms (LUTS)     Ill-defined condition 2007    AICD    Impotence of organic origin     Intolerance of drug     Intolerant high doses of sotalol due to prolonged QT    Mastodynia     Paroxysmal VT ([facility]     Pure hypercholesterolemia     S/P ablation of atrial fibrillation 05/31/2011    S/P ablation of atrial fibrillation 12/18/2012    Dr. My Byrd at [facility] S/P ablation operation for arrhythmia     VT ablation by Dr. Ruben Jaimes near [address] [place] [address] 2/3007    Sick sinus syndrome [facility])        Past Surgical History:   Procedure Laterality Date    COLONOSCOPY N/A 9/12/2018    COLONOSCOPY with Polypectomies and Clip Placement performed by Pippa Frausto MD at [address] [place] [address] [facility] by Dr. Gill Morris HX CATARACT REMOVAL  02/08-03/08    bilateral cataract removed    HX CATARACT REMOVAL  2/2008 & 3/2008    bilateral    HX ENDOSCOPY  09/12/2018    Dr. Tariq Lancaster - pathology indicates changes of acid reflux and Garcia's esophagus, repeat EGD in 2 years    HX ORTHOPAEDIC  2/8/10    release of Dupuytren's contraction on ring finger of right hand    HX OTHER SURGICAL  6/2000    atrial lead placement    HX OTHER SURGICAL  6/5/2009    Cardioversion    HX OTHER SURGICAL  10/12/2012    cardioversion  HX PACEMAKER  1998    placement    HX PACEMAKER  6/2000    DDD    HX PACEMAKER  3/27/07    removal of pacemaker & placement of dual chamber defib generator and leads    HX TONSIL AND ADENOIDECTOMY         Social History     Socioeconomic History    Marital status:      Spouse name: Not on file    Number of children: Not on file    Years of education: Not on file    Highest education level: Not on file   Occupational History    Not on file   Social Needs    Financial resource strain: Not on file    Food insecurity     Worry: Not on file     Inability: Not on file    Transportation needs     Medical: Not on file     Non-medical: Not on file   Tobacco Use    Smoking status: Never Smoker    Smokeless tobacco: Never Used   Substance and Sexual Activity    Alcohol use:  Yes     Alcohol/week: 10.0 - 12.0 standard drinks     Types: 10 - 12 Glasses of wine per week     Frequency: 4 or more times a week     Drinks per session: 1 or 2     Binge frequency: Never     Comment: 2-3 glasses of white wine about 5-6 days/week)    Drug use: No    Sexual activity: Not Currently     Partners: Female   Lifestyle    Physical activity     Days per week: Not on file     Minutes per session: Not on file    Stress: Not on file   Relationships    Social connections     Talks on phone: Not on file     Gets together: Not on file     Attends Nondenominational service: Not on file     Active member of club or organization: Not on file     Attends meetings of clubs or organizations: Not on file     Relationship status: Not on file    Intimate partner violence     Fear of current or ex partner: Not on file     Emotionally abused: Not on file     Physically abused: Not on file     Forced sexual activity: Not on file   Other Topics Concern    Not on file   Social History Narrative    Not on file         Vitals:    02/17/21 0827   BP: 137/86   Pulse: (!) 101   Resp: 16   Temp: 98 °F (36.7 °C)   TempSrc: Oral   SpO2: 99% Weight: 208 lb (94.3 kg)   Height: 6' (1.829 m)   PainSc:   0 - No pain       Physical Exam  HENT:      Head: Normocephalic. Neck:      Musculoskeletal: Neck supple. No neck rigidity. Cardiovascular:      Rate and Rhythm: Normal rate and regular rhythm. Pulses: Normal pulses. Heart sounds: Normal heart sounds. Pulmonary:      Effort: Pulmonary effort is normal.   Abdominal:      General: Bowel sounds are normal.      Palpations: Abdomen is soft. Lymphadenopathy:      Cervical: No cervical adenopathy. Skin:     General: Skin is warm and dry. Neurological:      General: No focal deficit present. Hospital Outpatient Visit on 12/23/2020   Component Date Value Ref Range Status    SARS-CoV-2 12/23/2020 Not Detected  Not Detected   Final    Comment: (NOTE)  This nucleic acid amplification test was developed and its  performance characteristics determined by [employer]. Nucleic acid amplification tests include PCR and TMA. This test has  not been FDA cleared or approved. This test has been authorized by  FDA under an Emergency Use Authorization (EUA). This test is only  authorized for the duration of time the declaration that  circumstances exist justifying the authorization of the emergency use  of in vitro diagnostic tests for detection of SARS-CoV-2 virus and/or  diagnosis of COVID-19 infection under section 564(b)(1) of the Act,  21 U. S.C. 138AFS-1(U) (1), unless the authorization is terminated or  revoked sooner. When diagnostic testing is negative, the possibility of a false  negative result should be considered in the context of a patient's  recent exposures and the presence of clinical signs and symptoms  consistent with COVID-19. An individual without symptoms of COVID-  19 and who is not shedding SARS-CoV-2 vi                           emil would expect to have a  negative (not detected) result in this assay.   Performed At: [facility] [address] [place], [place] [id number]  Nishant Gutierrez MD [id number]         . No results found for any visits on 02/17/21. Assessment / Plan:      ICD-10-CM ICD-9-CM    1. Medicare annual wellness visit, subsequent  Z00.00 V70.0    2. Gynecomastia  N62 611.1 REFERRAL TO ENDOCRINOLOGY   3. Essential hypertension  I10 401.9 CBC WITH AUTOMATED DIFF      LIPID PANEL      METABOLIC PANEL, COMPREHENSIVE   4. Atrial fibrillation, unspecified type ([facility]  I48.91 427.31    5. Prostate cancer screening  Z12.5 V76.44 PSA SCREENING (SCREENING)   6. B12 deficiency  E53.8 266.2 VITAMIN B12   7. Screening for alcoholism  Z13.39 V79.1          Hypertension-no change to current treatment plan  Gynecomastia-referral to Dr. Lubin/endocrinology  Fasting labs ordered  History of vitamin B12 deficiency-B12 level  Referral screening for prostate cancer          I askd the patient if he  had any questions and answered his  questions. The patient stated that he understands the treatment plan and agrees with the treatment plan    This document was created with a voice activated dictation system and may contain transcription errors.

## 2021-02-17 NOTE — PROGRESS NOTES
This is the Subsequent Medicare Annual Wellness Exam, performed 12 months or more after the Initial AWV or the last Subsequent AWV    I have reviewed the patient's medical history in detail and updated the computerized patient record. Depression Risk Factor Screening:     3 most recent PHQ Screens 2/17/2021   Little interest or pleasure in doing things Not at all   Feeling down, depressed, irritable, or hopeless Not at all   Total Score PHQ 2 0       Alcohol Risk Screen    Do you average more than 1 drink per night or more than 7 drinks a week: Yes yes    In the past three months have you have had more than 4 drinks containing alcohol on one occasion: Yes yes        Functional Ability and Level of Safety:    Hearing: Hearing is good. yes      Activities of Daily Living: The home contains: no safety equipment. Patient does total self care      Ambulation: with no difficulty     Fall Risk:  Fall Risk Assessment, last 12 mths 2/17/2021   Able to walk? Yes   Fall in past 12 months? 0   Do you feel unsteady? 0   Are you worried about falling 0      Abuse Screen:  Patient is not abused       Cognitive Screening    Has your family/caregiver stated any concerns about your memory: no    Cognitive Screening: Normal - Clock Drawing Test    Assessment/Plan   Education and counseling provided:  Are appropriate based on today's review and evaluation    Diagnoses and all orders for this visit:    1. Gynecomastia  -     REFERRAL TO ENDOCRINOLOGY    2. Essential hypertension  -     CBC WITH AUTOMATED DIFF; Future  -     LIPID PANEL; Future  -     METABOLIC PANEL, COMPREHENSIVE; Future    3. Atrial fibrillation, unspecified type ([facility]    4. Prostate cancer screening  -     PSA SCREENING (SCREENING); Future    5.  B12 deficiency  -     VITAMIN B12; Future        Health Maintenance Due     Health Maintenance Due   Topic Date Due    Hepatitis C Screening  1940    COVID-19 Vaccine (1 of 2) 03/16/1958    GLAUCOMA SCREENING Q2Y  08/16/2019    Medicare Yearly Exam  01/31/2021       Patient Care Team   Patient Care Team:  Roseanne Zamarripa NP as PCP - General (Nurse Practitioner)  Roseanne Zamarripa NP as PCP - [address] [place] [address]  [facility] Provider  Irena Browne MD as Physician (Orthopedic Surgery)  Pdero Mac DO as Physician (Cardiology)  Ra Krause MD as Physician (Ophthalmology)  Grace Marquez MD as Physician (Gastroenterology)  Lester Zamora MD (Pulmonary Disease)    History     Patient Active Problem List   Diagnosis Code    Essential hypertension I10    Atrial fibrillation ([facility] I48.91    Dupuytren contracture M72.0    Medtronic AICD, upgrade from pacer in 2007 due to VT Z95.810    Paroxysmal VT ([facility] I47.2    S/P ablation operation for arrhythmia Z98.890, Z86.79    Follow-up exam Z09    Impotence of organic origin N52.9    Hypertrophy of prostate with urinary obstruction and other lower urinary tract symptoms (LUTS) N40.1    Erectile dysfunction N52.9    Palpitations R00.2    Other dysphagia R13.19    Cobalamin deficiency E53.8    Edema R60.9    Pure hypercholesterolemia E78.00    Mastodynia N64.4    AICD at end of battery life Z45.02    CHF [facility]congestive heart failure) ([facility] I50.9    Bronchitis J40    B12 deficiency E53.8     Past Medical History:   Diagnosis Date    AICD (automatic cardioverter/defibrillator) present     Medtronic  upgrade from pacer in 2007 due to VT    Arthritis     Atrial fibrillation ([facility] [facility]     Cancer ([facility] [address] [facility] 02/2019    melenoma on left leg    Cardiac cath 03/19/2007    LM 25% ostial.  Otherwise patent coronaries. LVEDP 20.  EF 50%. Dyssynch. mid anterior contraction. Pacemaker.  Cardiac echocardiogram 03/20/2014    A-fib. EF 65-70%. No RWMA. No RWMA. Mild conc LVH. Mild RVE. RVSP 40-45 mmHg. Mild LAE.  HERNANDEZ. Mild MR. Mod TR.  Cardiac nuclear imaging test 07/29/2014    No ischemia or prior infarction. EF 68%.   Nondiagnostic EKG due to V pacing on pharm stress test.  Low risk.  Cardioversion 8/31/2011    Successful defibrillation threshold testing at 18 joules. Patient also had conversion to atrial ventricular pacing.      CHF (congestive heart failure) ([address]     Cobalamin deficiency     Dupuytren contracture 02/08/2010    on the right ring finger     Edema     GERD (gastroesophageal reflux disease)     Hypertension     Hypertrophy of prostate with urinary obstruction and other lower urinary tract symptoms (LUTS)     Ill-defined condition 2007    AICD    Impotence of organic origin     Intolerance of drug     Intolerant high doses of sotalol due to prolonged QT    Mastodynia     Paroxysmal VT ([address]     Pure hypercholesterolemia     S/P ablation of atrial fibrillation 05/31/2011    S/P ablation of atrial fibrillation 12/18/2012    Dr. Martha Bee at [facility] S/P ablation operation for arrhythmia     VT ablation by Dr. Chelsey Lucas near [address] [place] [address] 2/3007    Sick sinus syndrome [facility])       Past Surgical History:   Procedure Laterality Date    COLONOSCOPY N/A 9/12/2018    COLONOSCOPY with Polypectomies and Clip Placement performed by Austin Christina MD at [address] [place], [place] [address] [address] [facility] by Dr. Cathie Sorto HX CATARACT REMOVAL  02/08-03/08    bilateral cataract removed    HX CATARACT REMOVAL  2/2008 & 3/2008    bilateral    HX ENDOSCOPY  09/12/2018    Dr. Crys Miner - pathology indicates changes of acid reflux and Garcia's esophagus, repeat EGD in 2 years    HX ORTHOPAEDIC  2/8/10    release of Dupuytren's contraction on ring finger of right hand    HX OTHER SURGICAL  6/2000    atrial lead placement    HX OTHER SURGICAL  6/5/2009    Cardioversion    HX OTHER SURGICAL  10/12/2012    cardioversion    HX PACEMAKER  1998    placement    HX PACEMAKER  6/2000    DDD    HX PACEMAKER  3/27/07    removal of pacemaker & placement of dual chamber defib generator and leads    HX TONSIL AND ADENOIDECTOMY       Current Outpatient Medications   Medication Sig Dispense Refill    warfarin (COUMADIN) 5 mg tablet 1 tab on Mon, Wed, Fri.   1/2 tab on the other days 100 Tab 3    furosemide (LASIX) 40 mg tablet TAKE 1 TABLET DAILY  Indications: visible water retention 90 Tab 3    ezetimibe (ZETIA) 10 mg tablet Take 1 Tab by mouth daily. 90 Tab 3    cyanocobalamin (VITAMIN B12) 1,000 mcg/mL injection inject 1 milliliter ( 1000 MCG ) intramuscularly EVERY 3 WEEKS 1 mL 8    evolocumab (REPATHA SURECLICK) pen injection 1 mL by SubCUTAneous route every fourteen (14) days. 6 Pen 3    metoprolol tartrate (Lopressor) 50 mg tablet Take 1 Tab by mouth two (2) times a day. 180 Tab 3    coenzyme Q-10 (CO Q-10) 200 mg capsule Take  by mouth daily.  aspirin delayed-release 81 mg tablet Take  by mouth daily.  LUMIGAN 0.01 % ophthalmic drops Administer 1 Drop to both eyes nightly.  AZOPT 1 % ophthalmic suspension Administer 1 Drop to both eyes two (2) times a day. 3    multivitamin (ONE A DAY) tablet Take 1 Tab by mouth daily.  amLODIPine (NORVASC) 2.5 mg tablet 1 tablet daily (For BP). (Stop Benazepril) 90 Tab 3     Allergies   Allergen Reactions    Cephalexin Hives and Itching    Lipitor [Atorvastatin] Myalgia    Livalo [Pitavastatin] Other (comments)     Extreme fatigue    5/29/14   PATIENT DENIES    Zocor [Simvastatin] Myalgia     5/29/14 PATIENT DENIES        Family History   Problem Relation Age of Onset    No Known Problems Mother     COPD Father     Hypertension Other      Social History     Tobacco Use    Smoking status: Never Smoker    Smokeless tobacco: Never Used   Substance Use Topics    Alcohol use:  Yes     Alcohol/week: 10.0 - 12.0 standard drinks     Types: 10 - 12 Glasses of wine per week     Frequency: 4 or more times a week     Drinks per session: 1 or 2     Binge frequency: Never     Comment: 2-3 glasses of white wine about 5-6 days/week)       Lorena Cowart who was evaluated through a synchronous (real-time) audio only encounter, and/or his healthcare decision maker, is aware that it is a billable service, with coverage as determined by his insurance carrier. He provided verbal consent to proceed: n/a- consent obtained within past 12 months, and patient identification was verified. It was conducted pursuant to the emergency declaration under the [address] [place] [address] authority and the [employer] Act. A caregiver was present when appropriate. Ability to conduct physical exam was limited. I was in the office. The patient was in the office.     Carole Welch NP

## 2021-02-18 ENCOUNTER — TELEPHONE (OUTPATIENT)
Dept: FAMILY MEDICINE CLINIC | Age: 81
End: 2021-02-18

## 2021-02-18 NOTE — TELEPHONE ENCOUNTER
Patient INR is 2.9 taken on 02/17/2021. Patient is currently taking Coumadin 5 mg Mon/Wed/Fri and 2.5 mg all other days.

## 2021-02-22 ENCOUNTER — TELEPHONE (OUTPATIENT)
Dept: FAMILY MEDICINE CLINIC | Age: 81
End: 2021-02-22

## 2021-02-22 NOTE — TELEPHONE ENCOUNTER
Mr. Lisa Gregory called asking to speak with Ilsa Hernandez in reference to CT of chest.  Please call him back at [phone]  Or [phone]

## 2021-02-23 NOTE — PATIENT INSTRUCTIONS
Medicare Wellness Visit, Male The best way to live healthy is to have a lifestyle where you eat a well-balanced diet, exercise regularly, limit alcohol use, and quit all forms of tobacco/nicotine, if applicable. Regular preventive services are another way to keep healthy. Preventive services (vaccines, screening tests, monitoring & exams) can help personalize your care plan, which helps you manage your own care. Screening tests can find health problems at the earliest stages, when they are easiest to treat. [facility] follows the current, evidence-based guidelines published by the [facility] Skyler Corina ([facility]) when recommending preventive services for our patients. Because we follow these guidelines, sometimes recommendations change over time as research supports it. (For example, a prostate screening blood test is no longer routinely recommended for men with no symptoms). Of course, you and your doctor may decide to screen more often for some diseases, based on your risk and co-morbidities (chronic disease you are already diagnosed with). Preventive services for you include: - Medicare offers their members a free annual wellness visit, which is time for you and your primary care provider to discuss and plan for your preventive service needs. Take advantage of this benefit every year! 
-All adults over age 72 should receive the recommended pneumonia vaccines. Current USPSTF guidelines recommend a series of two vaccines for the best pneumonia protection.  
-All adults should have a flu vaccine yearly and tetanus vaccine every 10 years. 
-All adults age 48 and older should receive the shingles vaccines (series of two vaccines). -All adults age 38-68 who are overweight should have a diabetes screening test once every three years. -Other screening tests & preventive services for persons with diabetes include: an eye exam to screen for diabetic retinopathy, a kidney function test, a foot exam, and stricter control over your cholesterol.  
-Cardiovascular screening for adults with routine risk involves an electrocardiogram (ECG) at intervals determined by the provider.  
-Colorectal cancer screening should be done for adults age 54-65 with no increased risk factors for colorectal cancer. There are a number of acceptable methods of screening for this type of cancer. Each test has its own benefits and drawbacks. Discuss with your provider what is most appropriate for you during your annual wellness visit. The different tests include: colonoscopy (considered the best screening method), a fecal occult blood test, a fecal DNA test, and sigmoidoscopy. 
-All adults born between [facility] should be screened once for Hepatitis C. 
-An Abdominal Aortic Aneurysm (AAA) Screening is recommended for men age 73-68 who has ever smoked in their lifetime. Here is a list of your current Health Maintenance items (your personalized list of preventive services) with a due date: 
Health Maintenance Due Topic Date Due  
 Hepatitis C Test  1940  COVID-19 Vaccine (1 of 2) 03/16/1958  Glaucoma Screening   08/16/2019

## 2021-02-25 NOTE — TELEPHONE ENCOUNTER
TC to patient and answered questions  Referred patient back to ordering provider. Verbalized understanding.

## 2021-03-02 ENCOUNTER — TELEPHONE (OUTPATIENT)
Dept: FAMILY MEDICINE CLINIC | Age: 81
End: 2021-03-02

## 2021-03-04 ENCOUNTER — TELEPHONE (OUTPATIENT)
Dept: FAMILY MEDICINE CLINIC | Age: 81
End: 2021-03-04

## 2021-03-04 NOTE — TELEPHONE ENCOUNTER
2 pt identifiers confirmed. Patient INR is 1.7 on 03/03/2021. He takes Coumadin 5mg Mon/Wed/Fri and 2mg all other days. Provider stated that he needs to continue same dose.

## 2021-03-08 ENCOUNTER — HOSPITAL ENCOUNTER (OUTPATIENT)
Dept: GENERAL RADIOLOGY | Age: 81
Discharge: HOME | End: 2021-03-08
Payer: MEDICARE

## 2021-03-08 ENCOUNTER — TRANSCRIBE ORDER (OUTPATIENT)
Dept: REGISTRATION | Age: 81
End: 2021-03-08

## 2021-03-08 DIAGNOSIS — C43.72 MALIGNANT MELANOMA OF LEFT LOWER EXTREMITY INCLUDING HIP (HCC): ICD-10-CM

## 2021-03-08 DIAGNOSIS — C43.72 MALIGNANT MELANOMA OF LEFT LOWER EXTREMITY INCLUDING HIP (HCC): Primary | ICD-10-CM

## 2021-03-08 PROCEDURE — 71046 X-RAY EXAM CHEST 2 VIEWS: CPT

## 2021-03-11 ENCOUNTER — TELEPHONE (OUTPATIENT)
Dept: FAMILY MEDICINE CLINIC | Age: 81
End: 2021-03-11

## 2021-03-11 NOTE — TELEPHONE ENCOUNTER
2 pt identifiers confirmed. Patient INR 2.2 on 03/10/2021. He is taking Coumadin 5 mg Mon/Wed/Fri and 2 mg all other days. Provider stated pt should continue same dose.

## 2021-03-17 ENCOUNTER — OFFICE VISIT (OUTPATIENT)
Dept: CARDIOLOGY CLINIC | Age: 81
End: 2021-03-17
Payer: MEDICARE

## 2021-03-17 ENCOUNTER — CLINICAL SUPPORT (OUTPATIENT)
Dept: CARDIOLOGY CLINIC | Age: 81
End: 2021-03-17
Payer: MEDICARE

## 2021-03-17 VITALS
SYSTOLIC BLOOD PRESSURE: 120 MMHG | OXYGEN SATURATION: 100 % | DIASTOLIC BLOOD PRESSURE: 76 MMHG | BODY MASS INDEX: 28.71 KG/M2 | HEART RATE: 105 BPM | WEIGHT: 212 LBS | HEIGHT: 72 IN

## 2021-03-17 DIAGNOSIS — I10 ESSENTIAL HYPERTENSION: ICD-10-CM

## 2021-03-17 DIAGNOSIS — Z95.810 AICD (AUTOMATIC CARDIOVERTER/DEFIBRILLATOR) PRESENT: ICD-10-CM

## 2021-03-17 DIAGNOSIS — I48.20 CHRONIC ATRIAL FIBRILLATION (HCC): Primary | ICD-10-CM

## 2021-03-17 DIAGNOSIS — R93.89 ABNORMAL CT SCAN: ICD-10-CM

## 2021-03-17 DIAGNOSIS — I47.29 PAROXYSMAL VT: ICD-10-CM

## 2021-03-17 DIAGNOSIS — Z79.01 ANTICOAGULATED ON COUMADIN: ICD-10-CM

## 2021-03-17 PROCEDURE — G8754 DIAS BP LESS 90: HCPCS | Performed by: INTERNAL MEDICINE

## 2021-03-17 PROCEDURE — G8510 SCR DEP NEG, NO PLAN REQD: HCPCS | Performed by: INTERNAL MEDICINE

## 2021-03-17 PROCEDURE — G8536 NO DOC ELDER MAL SCRN: HCPCS | Performed by: INTERNAL MEDICINE

## 2021-03-17 PROCEDURE — G8419 CALC BMI OUT NRM PARAM NOF/U: HCPCS | Performed by: INTERNAL MEDICINE

## 2021-03-17 PROCEDURE — 99215 OFFICE O/P EST HI 40 MIN: CPT | Performed by: INTERNAL MEDICINE

## 2021-03-17 PROCEDURE — G8752 SYS BP LESS 140: HCPCS | Performed by: INTERNAL MEDICINE

## 2021-03-17 PROCEDURE — G8427 DOCREV CUR MEDS BY ELIG CLIN: HCPCS | Performed by: INTERNAL MEDICINE

## 2021-03-17 PROCEDURE — 1101F PT FALLS ASSESS-DOCD LE1/YR: CPT | Performed by: INTERNAL MEDICINE

## 2021-03-17 RX ORDER — OMEPRAZOLE 40 MG/1
40 CAPSULE, DELAYED RELEASE ORAL DAILY
COMMUNITY
End: 2021-12-27

## 2021-03-17 RX ORDER — FAMOTIDINE 40 MG/1
40 TABLET, FILM COATED ORAL DAILY
COMMUNITY
End: 2022-08-31

## 2021-03-17 NOTE — PROGRESS NOTES
History of Present Illness:  80year old male here for follow up and to establish care. He has a history of chronic atrial fibrillation with multiple ablations at [facility]. He also had a remote VT ablation back in 2007. He ultimately had a pacemaker upgrade to AICD, last change out 2016. He is doing relatively well with some mild fatigue at times and he will get a little short winded when he walks too far or bends over for 15-20 seconds. No significant palpitations. Today he was a little concerned about his chest x-ray showing some aortic atherosclerosis. He was also asking about the lung nodules that had been seen by previous CT scan. Impression:  1. Chronic atrial fibrillation with remote ablations 2011, 2012 at [facility]. 2. History of remote VT with ablation back in 2007. 3. Single chamber Medtronic AICD with three years remaining. 4. Chronic Coumadin use, followed by Dr. Chloe Yepez. 5. Chronic diastolic heart failure, on Lasix, compensated. 6. Hypertension, very well controlled. 7. Dyslipidemia, very well controlled, last LDL 40 May 2020.  8. Remote melanoma of the left leg. 9. Pulmonary nodules, not more than 5 mm, with recent chest x-ray, followed by Dr. Reina Miranda. Plan: Today we talked about his atherosclerosis on chest x-ray and his last cholesterol was very well controlled, he is on a statin. He has had no new symptoms of other vascular disease or chest pain. I stated that if he does develop any new symptoms, I would further evaluate with a stress test.  He is also taking an aspirin. His Coumadin is checked through his primary physician. He has not had any bleeding issues. He has been on it for many years. His AICD is functioning normally and he has about 3.3 years remaining on the battery. All questions answered and I will see back in six months.       Past Medical History:   Diagnosis Date    AICD (automatic cardioverter/defibrillator) present     Medtronic  upgrade from pacer in 2007 due to VT    Arthritis     Atrial fibrillation ([facility]     [facility] 02[facility]2019    melenoma on left leg    Cardiac cath 03/19/2007    LM 25% ostial.  Otherwise patent coronaries. LVEDP 20.  EF 50%. Dyssynch. mid anterior contraction. Pacemaker.  Cardiac echocardiogram 03/20/2014    A-fib. EF 65-70%. No RWMA. No RWMA. Mild conc LVH. Mild RVE. RVSP 40-45 mmHg. Mild LAE.  HERNANDEZ. Mild MR. Mod TR.  Cardiac nuclear imaging test 07/29/2014    No ischemia or prior infarction. EF 68%. Nondiagnostic EKG due to V pacing on pharm stress test.  Low risk.  Cardioversion 8/31/2011    Successful defibrillation threshold testing at 18 joules. Patient also had conversion to atrial ventricular pacing.  CHF (congestive heart failure) ([facility]     Cobalamin deficiency     Dupuytren contracture 02/08/2010    on the right ring finger     Edema     GERD (gastroesophageal reflux disease)     Hypertension     Hypertrophy of prostate with urinary obstruction and other lower urinary tract symptoms (LUTS)     Ill-defined condition 2007    AICD    Impotence of organic origin     Intolerance of drug     Intolerant high doses of sotalol due to prolonged QT    Mastodynia     Paroxysmal VT ([facility]     Pure hypercholesterolemia     S/P ablation of atrial fibrillation 05/31/2011    S/P ablation of atrial fibrillation 12/18/2012    Dr[facility] Lauro Lesch at [facility] S/P ablation operation for arrhythmia     VT ablation by Dr. Ric Patricia near [place] 2/3007    Sick sinus syndrome [facility])        Current Outpatient Medications   Medication Sig Dispense Refill    omeprazole (PRILOSEC) 40 mg capsule Take 40 mg by mouth daily.  famotidine (PEPCID) 40 mg tablet Take 40 mg by mouth daily.       warfarin (COUMADIN) 5 mg tablet 1 tab on Mon, Wed, Fri.   1/2 tab on the other days 100 Tab 3    furosemide (LASIX) 40 mg tablet TAKE 1 TABLET DAILY  Indications: visible water retention 90 Tab 3    amLODIPine (NORVASC) 2.5 mg tablet 1 tablet daily (For BP). (Stop Benazepril) 90 Tab 3    ezetimibe (ZETIA) 10 mg tablet Take 1 Tab by mouth daily. 90 Tab 3    cyanocobalamin (VITAMIN B12) 1,000 mcg/mL injection inject 1 milliliter ( 1000 MCG ) intramuscularly EVERY 3 WEEKS 1 mL 8    evolocumab (REPATHA SURECLICK) pen injection 1 mL by SubCUTAneous route every fourteen (14) days. 6 Pen 3    metoprolol tartrate (Lopressor) 50 mg tablet Take 1 Tab by mouth two (2) times a day. 180 Tab 3    coenzyme Q-10 (CO Q-10) 200 mg capsule Take  by mouth daily.  aspirin delayed-release 81 mg tablet Take  by mouth daily.  LUMIGAN 0.01 % ophthalmic drops Administer 1 Drop to both eyes nightly.  AZOPT 1 % ophthalmic suspension Administer 1 Drop to both eyes two (2) times a day. 3    multivitamin (ONE A DAY) tablet Take 1 Tab by mouth daily. Social History   reports that he has never smoked. He has never used smokeless tobacco.   reports current alcohol use of about 10.0 - 12.0 standard drinks of alcohol per week. Family History  family history includes COPD in his father; Hypertension in an other family member; No Known Problems in his mother. Review of Systems  Except as stated above include:  Constitutional: Negative for fever, chills and malaise/fatigue. HEENT: No congestion or recent URI. Gastrointestinal: No nausea, vomiting, abdominal pain, bloody stools. Pulmonary:  Negative except as stated above. Cardiac:  Negative except as stated above. Musculoskeletal: Negative except as stated above. Neurological:  No localized symptoms. Skin:  Negative except as stated above. Psych:  Negative except as stated above. Endocrine:  Negative except as stated above.     PHYSICAL EXAM  BP Readings from Last 3 Encounters:   03/17/21 120/76   02/17/21 137/86   12/28/20 121/77     Pulse Readings from Last 3 Encounters:   03/17/21 (!) 105   02/17/21 (!) 101   12/28/20 74     Wt Readings from Last 3 Encounters:   03/17/21 96.2 kg (212 lb)   02/17/21 94.3 kg (208 lb)   12/28/20 90.7 kg (200 lb)     General:   Well developed, well groomed. Head/Neck:   No obvious jugular venous distention     No obvious carotid pulsations. No evidence of xanthelasma. Lungs:   No respiratory distress. Clear bilaterally. Heart:  Regular rate and rhythm. Normal S1/S2. Palpation grossly normal.    No significant murmurs, rubs or gallops. AICD pocket intact. Abdomen:   Non-acute abdomen. No obvious pulsations. Extremities:   Intact peripheral pulses. No significant edema. Neurological:   Alert and oriented to person, place, time. No focal neurological deficit visually. Skin:   No obvious rash    Blood Pressure Metric:  Monitor recommended and adjustments stated if needed.

## 2021-03-17 NOTE — PROGRESS NOTES
Roddy Duvall presents today for   Chief Complaint   Patient presents with    Follow-up     Alexandria Robles Pt., 6 month follow up   Jenny Rasmussen preferred language for health care discussion is english/other. Is someone accompanying this pt? no    Is the patient using any DME equipment during [address]? no    Depression Screening:  3 most recent PHQ Screens 3/17/2021   Little interest or pleasure in doing things Not at all   Feeling down, depressed, irritable, or hopeless Not at all   Total Score PHQ 2 0       Learning Assessment:  Learning Assessment 3/17/2021   PRIMARY LEARNER Patient   HIGHEST LEVEL OF EDUCATION - PRIMARY LEARNER  -   BARRIERS PRIMARY LEARNER -   CO-LEARNER CAREGIVER -   PRIMARY LANGUAGE ENGLISH   LEARNER PREFERENCE PRIMARY DEMONSTRATION   ANSWERED BY patient   RELATIONSHIP SELF       Abuse Screening:  Abuse Screening Questionnaire 3/17/2021   Do you ever feel afraid of your partner? N   Are you in a relationship with someone who physically or mentally threatens you? N   Is it safe for you to go home? Y       Fall Risk  Fall Risk Assessment, last 12 mths 3/17/2021   Able to walk? Yes   Fall in past 12 months? 0   Do you feel unsteady? 0   Are you worried about falling 0       Pt currently taking Anticoagulant therapy? Warfarin 2.5 mg daily except 5 mg, 3x a week. Coordination of Care:  1. Have you been to the ER, urgent care clinic since your last visit? Hospitalized since your last visit? no    2. Have you seen or consulted any other health care providers outside of the [address] [place] [address] since your last visit? Include any pap smears or colon screening.  no

## 2021-03-18 ENCOUNTER — TELEPHONE (OUTPATIENT)
Dept: FAMILY MEDICINE CLINIC | Age: 81
End: 2021-03-18

## 2021-03-18 NOTE — TELEPHONE ENCOUNTER
2 pt identifiers confirmed. Patient INR 3.0 on 03/17/2021. He is currently taking Coumadin 5 mg Mon/Wed/Fri and 2 mg all other days. Provider stated no change in dose.

## 2021-03-22 PROCEDURE — 93282 PRGRMG EVAL IMPLANTABLE DFB: CPT | Performed by: INTERNAL MEDICINE

## 2021-03-25 NOTE — TELEPHONE ENCOUNTER
2 pt identifiers confirmed. Patient INR 2.7 on 03/24/2021. He is currently taking Coumadin 5 mg Mon/Wed/Fri and 2 mg all other days. Provider stated no change in dose.

## 2021-04-01 ENCOUNTER — TELEPHONE (OUTPATIENT)
Dept: FAMILY MEDICINE CLINIC | Age: 81
End: 2021-04-01

## 2021-04-01 NOTE — TELEPHONE ENCOUNTER
2 pt identifiers confirmed. Patient INR 2.7 on 03/31/2021. He is currently taking Coumadin 5 mg Mon/Wed/Fri and 2 mg all other days. Provider stated no change in dose.

## 2021-04-08 ENCOUNTER — TELEPHONE (OUTPATIENT)
Dept: FAMILY MEDICINE CLINIC | Age: 81
End: 2021-04-08

## 2021-04-08 NOTE — TELEPHONE ENCOUNTER
TC to patient left message with his wife. Informed her that his INR is 3.0 and continue the same dose. Coumdain 5 mg Mon/Wed/Fri and 2 1/2 mg all other days. Bertin Wilson verbalized her understanding.

## 2021-04-20 ENCOUNTER — TELEPHONE (OUTPATIENT)
Dept: FAMILY MEDICINE CLINIC | Age: 81
End: 2021-04-20

## 2021-04-20 NOTE — TELEPHONE ENCOUNTER
TC to patient he confirmed that he is taking Coumadin 5 mg Mon/Wed/Fri and 2 1/2 mg all other days. Patient INR is 3.0 provider stated patient should continue same dose. Patient verbalized his understanding.

## 2021-04-22 ENCOUNTER — TELEPHONE (OUTPATIENT)
Dept: FAMILY MEDICINE CLINIC | Age: 81
End: 2021-04-22

## 2021-04-22 NOTE — TELEPHONE ENCOUNTER
TC to patient left VM to continue same dose of Coumadin INR is 2.7. Coumadin 5 mg Mon/Wed/Fri and 2 1/2 mg all other days.

## 2021-05-06 ENCOUNTER — TELEPHONE (OUTPATIENT)
Dept: FAMILY MEDICINE CLINIC | Age: 81
End: 2021-05-06

## 2021-05-06 NOTE — TELEPHONE ENCOUNTER
TC to patient he was informed that his INR 3.4. He was instructed to hold Coumadin x 2 days. Restart Coumadin Mon/Fri 5 mg and 2.5mg all other days.

## 2021-05-12 ENCOUNTER — TRANSCRIBE ORDER (OUTPATIENT)
Dept: SCHEDULING | Age: 81
End: 2021-05-12

## 2021-05-12 DIAGNOSIS — E66.3 OVERWEIGHT: ICD-10-CM

## 2021-05-12 DIAGNOSIS — R09.89 CHOKING SENSATION: ICD-10-CM

## 2021-05-12 DIAGNOSIS — R05.9 COUGH: ICD-10-CM

## 2021-05-12 DIAGNOSIS — K22.70 BARRETT'S ESOPHAGUS: Primary | ICD-10-CM

## 2021-05-13 ENCOUNTER — TRANSCRIBE ORDER (OUTPATIENT)
Dept: SCHEDULING | Age: 81
End: 2021-05-13

## 2021-05-13 DIAGNOSIS — R13.10 DYSPHAGIA: Primary | ICD-10-CM

## 2021-05-13 DIAGNOSIS — E66.3 OVERWEIGHT: ICD-10-CM

## 2021-05-14 ENCOUNTER — TELEPHONE (OUTPATIENT)
Dept: FAMILY MEDICINE CLINIC | Age: 81
End: 2021-05-14

## 2021-05-14 NOTE — TELEPHONE ENCOUNTER
TC to patient he was informed his INR is 2.1. He was instructed to continue same dose of Coumadin Mon/Fri 5 mg and 2.5 mg all other days. Patient verbalized his understanding.

## 2021-05-17 RX ORDER — CYANOCOBALAMIN 1000 UG/ML
INJECTION, SOLUTION INTRAMUSCULAR; SUBCUTANEOUS
Qty: 1 ML | Refills: 8 | Status: SHIPPED | OUTPATIENT
Start: 2021-05-17 | End: 2021-11-15

## 2021-05-20 ENCOUNTER — TELEPHONE (OUTPATIENT)
Dept: FAMILY MEDICINE CLINIC | Age: 81
End: 2021-05-20

## 2021-05-20 NOTE — TELEPHONE ENCOUNTER
TC to patient he was informed his INR is 1.9. He was instructed to continue same dose of Coumadin Mon/Fri and 2.5 mg all other days. Repeat INR in 1 week.

## 2021-05-26 ENCOUNTER — TELEPHONE (OUTPATIENT)
Dept: FAMILY MEDICINE CLINIC | Age: 81
End: 2021-05-26

## 2021-06-03 ENCOUNTER — TELEPHONE (OUTPATIENT)
Dept: FAMILY MEDICINE CLINIC | Age: 81
End: 2021-06-03

## 2021-06-09 ENCOUNTER — HOSPITAL ENCOUNTER (OUTPATIENT)
Dept: GENERAL RADIOLOGY | Age: 81
Discharge: HOME | End: 2021-06-09
Attending: INTERNAL MEDICINE
Payer: MEDICARE

## 2021-06-09 DIAGNOSIS — R13.10 DYSPHAGIA, UNSPECIFIED TYPE: ICD-10-CM

## 2021-06-09 DIAGNOSIS — E66.3 OVERWEIGHT: ICD-10-CM

## 2021-06-09 DIAGNOSIS — R13.10 DYSPHAGIA: ICD-10-CM

## 2021-06-09 PROCEDURE — 74230 X-RAY XM SWLNG FUNCJ C+: CPT

## 2021-06-09 PROCEDURE — 92611 MOTION FLUOROSCOPY/SWALLOW: CPT

## 2021-06-09 PROCEDURE — 74011000250 HC RX REV CODE- 250: Performed by: INTERNAL MEDICINE

## 2021-06-09 RX ADMIN — BARIUM SULFATE 700 MG: 700 TABLET ORAL at 09:45

## 2021-06-09 RX ADMIN — BARIUM SULFATE 30 ML: 400 PASTE ORAL at 09:45

## 2021-06-09 RX ADMIN — BARIUM SULFATE 75 G: 960 POWDER, FOR SUSPENSION ORAL at 09:45

## 2021-06-09 NOTE — PROGRESS NOTES
[address] OUTPATIENT MODIFIED BARIUM SWALLOW STUDY    Patient: Joeann Phoenix (81 y.o. male)  Date: 6/9/2021  Primary Diagnosis: Dysphagia [R13.10]  Overweight [E66.3]  Precautions: Aspiration        Assessment:  Based on the objective data described below, the patient presents with min oropharyngeal dysphagia. Pt reporting globus sensation with dry food (especiallly bread) and hx of esophageal dilation in the past.  Pt tolerating all consistencies presented (thin +/- straw, pudding, regular solids and 13 mm Ba pill with thin liquid wash) with positive airway protection noted across multiple trials. Deficits include min decreased base of tongue strength resulting in mild vallecular residuals and decreased opening of upper esophageal sphincter resulting in mild pyriform sinus residuals. Pt able to clear with double swallow. Pt safe for continued regular diet with thin liquids. Recommend double swallow per bite/sip to improve globus sensation/promote pharyngeal clearance. Results/recommendations discussed with pt with video feedback with pt able to verbalize understanding. Recommendations:   Regular diet with thin liquids  Aspiration precautions  HOB >45 during po intake, remain >30 for 30-45 minutes after po   Small bites/sips; Double swallow per bite/sip    Oral care TID  Meds per pt preference     SUBJECTIVE:   Patient stated It happens with bread\"      OBJECTIVE:     Past Medical History:   Diagnosis Date    AICD (automatic cardioverter/defibrillator) present     Medtronic  upgrade from pacer in 2007 due to VT    Arthritis     [facility]     Cancer ([facility] 02/2019    melenoma on left leg    Cardiac cath 03/19/2007    LM 25% ostial.  Otherwise patent coronaries. LVEDP 20.  EF 50%. Dyssynch. mid anterior contraction. Pacemaker.  Cardiac echocardiogram 03/20/2014    A-fib. EF 65-70%. No RWMA. No RWMA. Mild conc LVH.   Mild RVE.  RVSP 40-45 mmHg. Mild LAE.  HERNANDEZ. Mild MR. Mod TR.  Cardiac nuclear imaging test 07/29/2014    No ischemia or prior infarction. EF 68%. Nondiagnostic EKG due to V pacing on pharm stress test.  Low risk.  Cardioversion 8/31/2011    Successful defibrillation threshold testing at 18 joules. Patient also had conversion to atrial ventricular pacing.      [facility]est[facility] failure) [facility]     Cobalamin deficiency     Dupuytren contracture 02/08/2010    on the right ring finger     Edema     GERD (gastroesophageal reflux disease)     Hypertension     Hypertrophy of prostate with urinary obstruction and other lower urinary tract symptoms (LUTS)     Ill-defined condition 2007    AICD    Impotence of organic origin     Intolerance of drug     Intolerant high doses of sotalol due to prolonged QT    Mastodynia     Paroxysmal VT ([facility]     Pure hypercholesterolemia     S/P ablation of atrial fibrillation 05/31/2011    S/P ablation of atrial fibrillation 12/18/2012    Dr. Melina Rodriguez at [address] [facility] S/P ablation operation for arrhythmia     VT ablation by Dr. Toi Jaimes near [address] [place] [address] 2/3007    Sick sinus syndrome [facility])      Past Surgical History:   Procedure Laterality Date    COLONOSCOPY N/A 9/12/2018    COLONOSCOPY with Polypectomies and Clip Placement performed by Bennett Thomson MD at [address] [place] [id number] [facility] by Dr. Andie Sy HX CATARACT REMOVAL  02/08-03/08    bilateral cataract removed    HX CATARACT REMOVAL  2/2008 & 3/2008    bilateral    HX ENDOSCOPY  09/12/2018    Dr. Modesto Cherry - pathology indicates changes of acid reflux and Garcia's esophagus, repeat EGD in 2 years    HX ORTHOPAEDIC  2/8/10    release of Dupuytren's contraction on ring finger of right hand    HX OTHER SURGICAL  6/2000    atrial lead placement    HX OTHER SURGICAL  6/5/2009    Cardioversion    HX OTHER SURGICAL  10/12/2012    cardioversion    HX PACEMAKER  1998    placement    HX PACEMAKER  6/2000    DDD    HX PACEMAKER  3/27/07    removal of pacemaker & placement of dual chamber defib generator and leads    HX TONSIL AND ADENOIDECTOMY       Current Diet:  Regular diet with thin liquids    Radiology:  Film Views: Lateral;Fluoro  Patient Position: 90 in chair    Trial 1:   Consistency Presented: Thin liquid;Pudding; Solid (13 mm Ba pill with thin liquid wash)   How Presented: Self-fed/presented;Cup/sip;Spoon; Successive swallows;Straw   Bolus Acceptance: No impairment   Bolus Formation/Control: No impairment:     Propulsion: No impairment   Oral Residue: None   Initiation of Swallow: No impairment   Timing: No impairment   Penetration: None   Aspiration/Timing: No evidence of aspiration   Pharyngeal Clearance: Vallecular residue;Pyriform residue ; Less than 10%   Attempted Modifications: Double swallow   Effective Modifications: Double swallow   Cues for Modifications: Minimal     Decreased Tongue Base Retraction?: Yes  Laryngeal Elevation: WFL (within functional limits)  Aspiration/Penetration Score: 1 (No penetration or aspiration-Contrast does not enter the airway)  Pharyngeal Symmetry: Not assessed  Pharyngeal-Esophageal Segment: Decreased relaxation of upper esophageal segment (min)  Pharyngeal Dysfunction: Crico-pharyngeal dysfunction;Decreased tongue base retraction (min)  Oral Phase Severity: Minimal  Pharyngeal Phase Severity: Minimal    8-point Penetration-Aspiration Scale: Score 1    PAIN:  Pt reports 0/10 pain or discomfort prior to MBS. Pt reports 0/10 pain or discomfort post MBS. COMMUNICATION/EDUCATION:   [x]  Education provided post diagnostic testing including oropharyngeal anatomy/physiology, MBS results, diet recommendations and        compensatory strategies/positioning. [x]  Video feedback utilized. []  Handout regarding diet recommendations and thickener instructions provided.   [x]  Patient/family have participated as able in goal setting and plan of care. []  Patient/family agree to work toward stated goals and plan of care. []  Patient understands intent and goals of therapy, but is neutral about his/her participation. []  Patient is unable to participate in goal setting and plan of care.     Thank you for this referral,  Kala Worley M.S., [address] [facility]  Speech-Language Pathologist

## 2021-06-17 ENCOUNTER — TELEPHONE (OUTPATIENT)
Dept: FAMILY MEDICINE CLINIC | Age: 81
End: 2021-06-17

## 2021-06-17 NOTE — TELEPHONE ENCOUNTER
2 pt identifiers confirmed. Patient INR is 3.7 he is currently taking Coumadin 5 mg Mon/Wed/ Fri and 2 1/2 mg all other days. Provider stated patient should hold dose x 2 days and resume previous dose. Recheck INR in 1 week. Patient verbalized his understanding.

## 2021-06-21 ENCOUNTER — DOCUMENTATION ONLY (OUTPATIENT)
Dept: CARDIOLOGY CLINIC | Age: 81
End: 2021-06-21

## 2021-06-21 NOTE — PROGRESS NOTES
Patient called to confirm appointment for Wednesday. Patient has a carelink appointment scheduled. Patient informed that he would not have to come into the office for this appointment, but that it is something that is done from home. Patient voiced understanding.

## 2021-06-23 ENCOUNTER — OFFICE VISIT (OUTPATIENT)
Dept: CARDIOLOGY CLINIC | Age: 81
End: 2021-06-23
Payer: MEDICARE

## 2021-06-23 DIAGNOSIS — I48.20 CHRONIC ATRIAL FIBRILLATION (HCC): Primary | ICD-10-CM

## 2021-06-23 DIAGNOSIS — Z95.810 AICD (AUTOMATIC CARDIOVERTER/DEFIBRILLATOR) PRESENT: ICD-10-CM

## 2021-06-23 DIAGNOSIS — I50.32 CHRONIC DIASTOLIC CONGESTIVE HEART FAILURE (HCC): ICD-10-CM

## 2021-06-23 PROCEDURE — 93296 REM INTERROG EVL PM/IDS: CPT | Performed by: INTERNAL MEDICINE

## 2021-06-23 PROCEDURE — 93295 DEV INTERROG REMOTE 1/2/MLT: CPT | Performed by: INTERNAL MEDICINE

## 2021-06-25 NOTE — PROGRESS NOTES
I have personally seen and evaluated the device findings. Interrogation reviewed and I agree with assessment.     Mima Vincent

## 2021-06-25 NOTE — PROGRESS NOTES
Single Chamber AICD. 2.7 years left on battery. Lead impedance and threshold WNL, V paced 98 %. OP Vol WNL. 4 nonsustained VT, longest lasting 4 sec. Patient in persistent afib, on coumadin.

## 2021-06-30 RX ORDER — METOPROLOL TARTRATE 50 MG/1
50 TABLET ORAL 2 TIMES DAILY
Qty: 180 TABLET | Refills: 3 | Status: SHIPPED | OUTPATIENT
Start: 2021-06-30 | End: 2022-06-29 | Stop reason: SDUPTHER

## 2021-07-02 ENCOUNTER — TELEPHONE (OUTPATIENT)
Dept: FAMILY MEDICINE CLINIC | Age: 81
End: 2021-07-02

## 2021-07-02 NOTE — TELEPHONE ENCOUNTER
2 pt identifiers confirmed. TC to patient he was notified that his INR is 2.4. Patient is currently taking Coumadin 5 mg Mon/Wed/Fri and 2 1/2 mg all other days. Provider stated patient should continue same dose. Recheck INR in 1 week. Patient verbalized his understanding.

## 2021-07-06 ENCOUNTER — OFFICE VISIT (OUTPATIENT)
Dept: CARDIOLOGY CLINIC | Age: 81
End: 2021-07-06
Payer: MEDICARE

## 2021-07-06 VITALS
DIASTOLIC BLOOD PRESSURE: 72 MMHG | HEART RATE: 62 BPM | OXYGEN SATURATION: 98 % | BODY MASS INDEX: 27.77 KG/M2 | WEIGHT: 205 LBS | HEIGHT: 72 IN | SYSTOLIC BLOOD PRESSURE: 122 MMHG

## 2021-07-06 DIAGNOSIS — I10 ESSENTIAL HYPERTENSION: Primary | ICD-10-CM

## 2021-07-06 PROCEDURE — G8427 DOCREV CUR MEDS BY ELIG CLIN: HCPCS | Performed by: NURSE PRACTITIONER

## 2021-07-06 PROCEDURE — G8419 CALC BMI OUT NRM PARAM NOF/U: HCPCS | Performed by: NURSE PRACTITIONER

## 2021-07-06 PROCEDURE — G8432 DEP SCR NOT DOC, RNG: HCPCS | Performed by: NURSE PRACTITIONER

## 2021-07-06 PROCEDURE — 99213 OFFICE O/P EST LOW 20 MIN: CPT | Performed by: NURSE PRACTITIONER

## 2021-07-06 PROCEDURE — 1101F PT FALLS ASSESS-DOCD LE1/YR: CPT | Performed by: NURSE PRACTITIONER

## 2021-07-06 PROCEDURE — G8752 SYS BP LESS 140: HCPCS | Performed by: NURSE PRACTITIONER

## 2021-07-06 PROCEDURE — G8536 NO DOC ELDER MAL SCRN: HCPCS | Performed by: NURSE PRACTITIONER

## 2021-07-06 PROCEDURE — G8754 DIAS BP LESS 90: HCPCS | Performed by: NURSE PRACTITIONER

## 2021-07-06 RX ORDER — DOXYCYCLINE 100 MG/1
CAPSULE ORAL
COMMUNITY
Start: 2021-07-01 | End: 2021-12-27

## 2021-07-06 RX ORDER — TRIAMCINOLONE ACETONIDE 1 MG/G
CREAM TOPICAL
COMMUNITY
Start: 2021-07-01

## 2021-07-06 NOTE — PROGRESS NOTES
Chief Complaint : elevated blood pressures. HPI:  Josh Cody is a 80 y.o. male with PMHx significant for hypertension, atrial fibrillation with multiple ablations, ventricular tachycardia ablation, ultimately had a pacemaker upgrade to AICD, last change out 4253, diastolic HF, and dyslipidemia. Patient was last seen in the office by Dr. Alisha Tyson back in April 2021. At time of visit he was doing relatively well with some mild fatigue and shortness of breath. Patient presents today for further evaluation of elevated blood pressures. Patient reports he was recently seen at [facility] approximately a week ago for headaches and a tick bite. He reports his bp was elevated on exam, 149/90 which was alarming to him as his bps are usually in the 1-teens-120s/70s. Since his visit to [facility], his blood pressures have been well controlled. Reporting home readings in the 1-teens-120s/70s with very few readings greater than 510 systolic. Patient reports he is doing well with some mild shortness of breath and lightheadedness. His bp is well controlled on exam, 122/72. Patient reports compliance with his current medication regimen. Patient denies chest pain, tightness, heaviness, and palpitations. Denies shortness of breath at rest, dyspnea on exertion, orthopnea and PND. Denies lightheadedness, dizziness, and syncope. Denies lower extremity edema and claudication. Past Medical History:  Past Medical History:   Diagnosis Date    AICD (automatic cardioverter/defibrillator) present     Medtronic  upgrade from pacer in 2007 due to VT    Arthritis     Atrial fibrillation ([facility]     Cancer ([facility] [address] [facility] 02/2019    melenoma on left leg    Cardiac cath 03/19/2007    LM 25% ostial.  Otherwise patent coronaries. LVEDP 20.  EF 50%. Dyssynch. mid anterior contraction. Pacemaker.  Cardiac echocardiogram 03/20/2014    A-fib. EF 65-70%. No RWMA. No RWMA. Mild conc LVH. Mild RVE.   RVSP 40-45 mmHg.  Mild LAE.  HERNANDEZ. Mild MR. Mod TR.  Cardiac nuclear imaging test 07/29/2014    No ischemia or prior infarction. EF 68%. Nondiagnostic EKG due to V pacing on pharm stress test.  Low risk.  Cardioversion 8/31/2011    Successful defibrillation threshold testing at 18 joules. Patient also had conversion to atrial ventricular pacing.      CHF (congestive heart failure) ([facility]     Cobalamin deficiency     Dupuytren contracture 02/08/2010    on the right ring finger     Edema     GERD (gastroesophageal reflux disease)     Hypertension     Hypertrophy of prostate with urinary obstruction and other lower urinary tract symptoms (LUTS)     Ill-defined condition 2007    AICD    Impotence of organic origin     Intolerance of drug     Intolerant high doses of sotalol due to prolonged QT    Mastodynia     Paroxysmal VT ([facility]     Pure hypercholesterolemia     S/P ablation of atrial fibrillation 05/31/2011    S/P ablation of atrial fibrillation 12/18/2012    Dr. Williams Corona at [address] S/P ablation operation for arrhythmia     VT ablation by Dr. Isaías Mon near [address] [place] [address] 2/3007    Sick sinus syndrome [facility])        Surgical History:  Past Surgical History:   Procedure Laterality Date    COLONOSCOPY N/A 9/12/2018    COLONOSCOPY with Polypectomies and Clip Placement performed by Rudi Ortiz MD at [address] [place] [address] [facility] by Dr. Ivette Jones HX CATARACT REMOVAL  02/08-03/08    bilateral cataract removed    HX CATARACT REMOVAL  2/2008 & 3/2008    bilateral    HX ENDOSCOPY  09/12/2018    Dr. Osiris Lugo - pathology indicates changes of acid reflux and Garcia's esophagus, repeat EGD in 2 years    HX ORTHOPAEDIC  2/8/10    release of Dupuytren's contraction on ring finger of right hand    HX OTHER SURGICAL  6/2000    atrial lead placement    HX OTHER SURGICAL  6/5/2009    Cardioversion    HX OTHER SURGICAL  10/12/2012    cardioversion  HX PACEMAKER  1998    placement    HX PACEMAKER  6/2000    DDD    HX PACEMAKER  3/27/07    removal of pacemaker & placement of dual chamber defib generator and leads    HX TONSIL AND ADENOIDECTOMY          Social History:  Social History     Socioeconomic History    Marital status:      Spouse name: Not on file    Number of children: Not on file    Years of education: Not on file    Highest education level: Not on file   Occupational History    Not on file   Tobacco Use    Smoking status: Never Smoker    Smokeless tobacco: Never Used   Substance and Sexual Activity    Alcohol use: Yes     Alcohol/week: 10.0 - 12.0 standard drinks     Types: 10 - 12 Glasses of wine per week     Comment: 2-3 glasses of white wine about 5-6 days/week)    Drug use: No    Sexual activity: Not Currently     Partners: Female   Other Topics Concern    Not on file   Social History Narrative    Not on file     Social Determinants of Health     Financial Resource Strain:     Difficulty of Paying Living Expenses:    Food Insecurity:     Worried About Running Out of Food in the Last Year:     Ran Out of Food in the Last Year:    Transportation Needs:     Lack of Transportation (Medical):      Lack of Transportation (Non-Medical):    Physical Activity:     Days of Exercise per Week:     Minutes of Exercise per Session:    Stress:     Feeling of Stress :    Social Connections:     Frequency of Communication with Friends and Family:     Frequency of Social Gatherings with Friends and Family:     Attends Sikh Services:     Active Member of Clubs or Organizations:     Attends Club or Organization Meetings:     Marital Status:    Intimate Partner Violence:     Fear of Current or Ex-Partner:     Emotionally Abused:     Physically Abused:     Sexually Abused:         Family History:  Family History   Problem Relation Age of Onset    No Known Problems Mother     COPD Father     Hypertension Other Allergies: Allergies   Allergen Reactions    Cephalexin Hives and Itching    Lipitor [Atorvastatin] Myalgia    Livalo [Pitavastatin] Other (comments)     Extreme fatigue    5/29/14   PATIENT DENIES    Zocor [Simvastatin] Myalgia     5/29/14 PATIENT DENIES         Current Medications:  Current Outpatient Medications   Medication Sig Dispense Refill    metoprolol tartrate (Lopressor) 50 mg tablet Take 1 Tablet by mouth two (2) times a day. 180 Tablet 3    cyanocobalamin (VITAMIN B12) 1,000 mcg/mL injection inject 1 milliliter ( 1000 MCG ) intramuscularly EVERY 3 WEEKS 1 mL 8    omeprazole (PRILOSEC) 40 mg capsule Take 40 mg by mouth daily.  famotidine (PEPCID) 40 mg tablet Take 40 mg by mouth daily.  warfarin (COUMADIN) 5 mg tablet 1 tab on Mon, Wed, Fri.   1/2 tab on the other days 100 Tab 3    furosemide (LASIX) 40 mg tablet TAKE 1 TABLET DAILY  Indications: visible water retention 90 Tab 3    amLODIPine (NORVASC) 2.5 mg tablet 1 tablet daily (For BP). (Stop Benazepril) 90 Tab 3    ezetimibe (ZETIA) 10 mg tablet Take 1 Tab by mouth daily. 90 Tab 3    evolocumab (REPATHA SURECLICK) pen injection 1 mL by SubCUTAneous route every fourteen (14) days. 6 Pen 3    coenzyme Q-10 (CO Q-10) 200 mg capsule Take  by mouth daily.  aspirin delayed-release 81 mg tablet Take  by mouth daily.  LUMIGAN 0.01 % ophthalmic drops Administer 1 Drop to both eyes nightly.  AZOPT 1 % ophthalmic suspension Administer 1 Drop to both eyes two (2) times a day. 3    multivitamin (ONE A DAY) tablet Take 1 Tab by mouth daily. Review of systems:  Review of Systems   Constitutional: Negative for malaise/fatigue. Respiratory: Positive for shortness of breath. Cardiovascular: Negative for chest pain, palpitations, orthopnea, claudication, leg swelling and PND. Gastrointestinal: Negative for blood in stool. Musculoskeletal: Negative for falls and myalgias.    Neurological: Positive for dizziness. Wt Readings from Last 3 Encounters:   21 96.2 kg (212 lb)   21 94.3 kg (208 lb)   20 90.7 kg (200 lb)     BP Readings from Last 3 Encounters:   21 120/76   21 137/86   20 121/77     Pulse Readings from Last 3 Encounters:   21 (!) 105   21 (!) 101   20 74     20    ECHO ADULT COMPLETE 2020    Interpretation Summary  · Image quality for this study was technically difficult. · Normal cavity size, wall thickness and systolic function (ejection fraction normal). Estimated left ventricular ejection fraction is 55 - 60%. Visually measured ejection fraction. No regional wall motion abnormality noted. Inconclusive left ventricular diastolic function. · Aortic valve sclerosis. Mild to moderate aortic valve regurgitation is present. · Severely dilated left atrium. · Mild mitral valve regurgitation is present. · Moderately dilated right ventricle. Borderline low systolic function. Pacing wire present Moderator band present. · Moderate tricuspid valve regurgitation is present. · Dilated right atrium. · Pulmonary arterial systolic pressure is 22 mmHg. Signed by: Uziel Garzon DO on 2020  1:06 PM      EK.6.21: No Ekg performed today. Physical Exam:  Physical Exam  Constitutional:       Appearance: Normal appearance. HENT:      Head: Normocephalic and atraumatic. Eyes:      Extraocular Movements: Extraocular movements intact. Pupils: Pupils are equal, round, and reactive to light. Cardiovascular:      Rate and Rhythm: Normal rate and regular rhythm. Pulses: Normal pulses. Heart sounds: No murmur heard. No friction rub. No gallop. Pulmonary:      Effort: Pulmonary effort is normal.      Breath sounds: Normal breath sounds. No wheezing, rhonchi or rales. Chest:      Chest wall: No tenderness. Abdominal:      General: Bowel sounds are normal.      Palpations: Abdomen is soft. Musculoskeletal:         General: Normal range of motion. Cervical back: Normal range of motion and neck supple. Right lower leg: No edema. Left lower leg: No edema. Skin:     General: Skin is warm and dry. Neurological:      Mental Status: He is alert and oriented to person, place, and time. Labs  Lab Results   Component Value Date/Time    WBC 6.5 10/09/2020 08:53 AM    HGB 15.4 10/09/2020 08:53 AM    HCT 45.2 10/09/2020 08:53 AM    PLATELET 778 78/45/8502 08:53 AM    MCV 96.6 10/09/2020 08:53 AM     Lab Results   Component Value Date/Time    Sodium 142 10/09/2020 08:53 AM    Potassium 4.8 10/09/2020 08:53 AM    Chloride 109 10/09/2020 08:53 AM    CO2 28 10/09/2020 08:53 AM    Anion gap 5 10/09/2020 08:53 AM    Glucose 92 10/09/2020 08:53 AM    BUN 17 10/09/2020 08:53 AM    Creatinine 1.01 10/09/2020 08:53 AM    BUN/Creatinine ratio 17 10/09/2020 08:53 AM    GFR est AA >60 10/09/2020 08:53 AM    GFR est non-AA >60 10/09/2020 08:53 AM    Calcium 9.0 10/09/2020 08:53 AM     Lab Results   Component Value Date/Time    Cholesterol, total 118 10/09/2020 08:53 AM    Cholesterol, total 113 05/22/2020 08:35 AM    Cholesterol, total 102 02/24/2020 08:19 AM    HDL Cholesterol 56 10/09/2020 08:53 AM    HDL Cholesterol 58 05/22/2020 08:35 AM    HDL Cholesterol 54 02/24/2020 08:19 AM    LDL, calculated 46.2 10/09/2020 08:53 AM    LDL, calculated 40.2 05/22/2020 08:35 AM    LDL, calculated 37 02/24/2020 08:19 AM    Triglyceride 79 10/09/2020 08:53 AM    Triglyceride 74 05/22/2020 08:35 AM    Triglyceride 55 02/24/2020 08:19 AM    CHOL/HDL Ratio 2.1 10/09/2020 08:53 AM    CHOL/HDL Ratio 1.9 05/22/2020 08:35 AM    CHOL/HDL Ratio 1.9 02/24/2020 08:19 AM      Lab Results   Component Value Date/Time    NT pro-BNP 1,456 10/11/2019 08:13 AM        Impression/Plan:    1. Chronic atrial fibrillation with remote ablations in 2011 and 2012 at [address] [place] [address] metoprolol 50mg twice daily.    - Continue coumadin, managed by primary care. 2. History of remote VT with ablation back in 2007    3. Single chamber Medtronic AICD with three years remaining    4. Chronic diastolic heart failure  - Currently, there is no evidence of decompensated HF noted. - Continue lasix 40mg daily. 5. Hypertension  - Bp well controlled, 122/72.  - Continue amlodipine 2.5mg daily. - Continue metoprolol 50mg twice daily. 6. Dyslipidemia  -  Managed by primary care physician. - LDL should be less than 70 from a cardiac standpoint. 7. Pulmonary nodules  - No more than 5 mm with recent chest x-ray, followed by Dr. Ebony Caba. Follow up with Dr. Bryce Rosenbaum as scheduled. Patient encouraged to follow up sooner if symptoms worsen or fail to improve. Thank you for allowing me to participate in the care of your patient. Please do not hesitate to call with questions or concerns.      Alex Redmond NP

## 2021-07-06 NOTE — PROGRESS NOTES
Donn Carl presents today for   Chief Complaint   Patient presents with    Follow-up     4 month f/u; BP Check       Yael Egan preferred language for health care discussion is english/other. Is someone accompanying this pt? no    Is the patient using any DME equipment during [address]? no    Depression Screening:  3 most recent PHQ Screens 7/6/2021   Little interest or pleasure in doing things Not at all   Feeling down, depressed, irritable, or hopeless Not at all   Total Score PHQ 2 0       Learning Assessment:  Learning Assessment 3/17/2021   PRIMARY LEARNER Patient   HIGHEST LEVEL OF EDUCATION - PRIMARY LEARNER  -   BARRIERS PRIMARY LEARNER -   CO-LEARNER CAREGIVER -   PRIMARY LANGUAGE ENGLISH   LEARNER PREFERENCE PRIMARY DEMONSTRATION   ANSWERED BY patient   RELATIONSHIP SELF       Abuse Screening:  Abuse Screening Questionnaire 7/6/2021   Do you ever feel afraid of your partner? N   Are you in a relationship with someone who physically or mentally threatens you? N   Is it safe for you to go home? Y       Fall Risk  Fall Risk Assessment, last 12 mths 7/6/2021   Able to walk? Yes   Fall in past 12 months? 0   Do you feel unsteady? 0   Are you worried about falling 0       Pt currently taking Anticoagulant therapy? Warfarin 5mg    Coordination of Care:  1. Have you been to the ER, urgent care clinic since your last visit? Hospitalized since your last visit? no    2. Have you seen or consulted any other health care providers outside of the [address] [place] [address] since your last visit? Include any pap smears or colon screening.  no

## 2021-07-06 NOTE — PATIENT INSTRUCTIONS
Plan:     - Your blood pressure is well controlled on exam.  Therefore. .    - Continue metoprolol 50mg twice daily. - Continue amlodipine 2.5mg daily. - Systolic (top number) bp goal: <263    - Diastolic (bottom number) bp goal: <90    - Follow up with Dr. Sylvester Cunningham as scheduled. - You are encouraged to follow up sooner if symptoms worsen or fail to improve.

## 2021-07-13 ENCOUNTER — TELEPHONE (OUTPATIENT)
Dept: FAMILY MEDICINE CLINIC | Age: 81
End: 2021-07-13

## 2021-07-13 NOTE — TELEPHONE ENCOUNTER
Late entry: TC to patient he received his INR is 3.1 07/07/21. He confirmed he is taking Coumadin 5 mg Mon/Wed/Fri and 2 1/2 mg all other days. Provider stated patient should continue same dose. Recheck INR in 1 week. Patient verbalized his understanding.

## 2021-07-16 ENCOUNTER — TELEPHONE (OUTPATIENT)
Dept: FAMILY MEDICINE CLINIC | Age: 81
End: 2021-07-16

## 2021-07-16 NOTE — TELEPHONE ENCOUNTER
TC to patient he confirmed he is taking Coumadin 5 mg Mon/Wed/Fri and 2 1/2 mg all other days. INR is 2.4 on 07/14/2021. Provider stated patient should continue same dose. Recheck INR in 1 week. Patient verbalized his understanding.

## 2021-07-21 ENCOUNTER — TELEPHONE (OUTPATIENT)
Dept: FAMILY MEDICINE CLINIC | Age: 81
End: 2021-07-21

## 2021-07-21 NOTE — TELEPHONE ENCOUNTER
TC to patient he confirmed he is taking Coumadin 5 mg Mon/Wed/Fri and 2 1/2 mg all other days. His INR is 2.9 on 07/21/2021. Provider stated patient should continue same dose and Recheck INR in 1 week. Patient verbalized his understanding.

## 2021-07-30 ENCOUNTER — TELEPHONE (OUTPATIENT)
Dept: FAMILY MEDICINE CLINIC | Age: 81
End: 2021-07-30

## 2021-07-30 NOTE — TELEPHONE ENCOUNTER
TC to patient he confirmed he is taking Coumadin 5 mg Mon/Wed/Fri and 2 1/2 mg all other days. His INR is 2.9 on 07/28/2021. Provider stated patient should continue same dose and recheck INR in 1 week. Patient verbalized his understanding.

## 2021-08-12 ENCOUNTER — TELEPHONE (OUTPATIENT)
Dept: FAMILY MEDICINE CLINIC | Age: 81
End: 2021-08-12

## 2021-08-17 ENCOUNTER — TRANSCRIBE ORDER (OUTPATIENT)
Dept: SCHEDULING | Age: 81
End: 2021-08-17

## 2021-08-17 DIAGNOSIS — C43.70: Primary | ICD-10-CM

## 2021-08-19 ENCOUNTER — TELEPHONE (OUTPATIENT)
Dept: FAMILY MEDICINE CLINIC | Age: 81
End: 2021-08-19

## 2021-08-19 NOTE — TELEPHONE ENCOUNTER
2 pt identifiers confirmed. Patient INR is 2.8 on 08/19/2021. He confirmed taking Coumadin 5 mg Mon/Wed/Fri and 2 1/2 mg all other days. Provider stated patient should continue same dose and recheck in 1 week. Patient verbalized his understanding.

## 2021-08-26 ENCOUNTER — TELEPHONE (OUTPATIENT)
Dept: FAMILY MEDICINE CLINIC | Age: 81
End: 2021-08-26

## 2021-08-26 NOTE — TELEPHONE ENCOUNTER
2 pt identifiers confirmed. Patient INR is 2.2, he confirmed he taking Coumadin 5 mg Mon/Wed/Fri and 2 1/2 mg all other days. Provider stated patient should continue same dose and recheck in 1 week. Patient verbalized his understanding.

## 2021-09-02 ENCOUNTER — TRANSCRIBE ORDER (OUTPATIENT)
Dept: SCHEDULING | Age: 81
End: 2021-09-02

## 2021-09-02 DIAGNOSIS — C43.70: Primary | ICD-10-CM

## 2021-09-03 ENCOUNTER — TELEPHONE (OUTPATIENT)
Dept: FAMILY MEDICINE CLINIC | Age: 81
End: 2021-09-03

## 2021-09-03 ENCOUNTER — HOSPITAL ENCOUNTER (OUTPATIENT)
Dept: LAB | Age: 81
Discharge: HOME | End: 2021-09-03
Payer: MEDICARE

## 2021-09-03 DIAGNOSIS — I10 ESSENTIAL HYPERTENSION: ICD-10-CM

## 2021-09-03 DIAGNOSIS — E53.8 B12 DEFICIENCY: ICD-10-CM

## 2021-09-03 DIAGNOSIS — Z12.5 PROSTATE CANCER SCREENING: ICD-10-CM

## 2021-09-03 LAB
ALBUMIN SERPL-MCNC: 3.5 G/DL (ref 3.4–5)
ALBUMIN/GLOB SERPL: 0.9 {RATIO} (ref 0.8–1.7)
ALP SERPL-CCNC: 98 U/L (ref 45–117)
ALT SERPL-CCNC: 32 U/L (ref 16–61)
ANION GAP SERPL CALC-SCNC: 5 MMOL/L (ref 3–18)
AST SERPL-CCNC: 27 U/L (ref 10–38)
BASOPHILS # BLD: 0.1 K/UL (ref 0–0.1)
BASOPHILS NFR BLD: 1 % (ref 0–2)
BILIRUB SERPL-MCNC: 0.9 MG/DL (ref 0.2–1)
BUN SERPL-MCNC: 19 MG/DL (ref 7–18)
BUN/CREAT SERPL: 18 (ref 12–20)
CALCIUM SERPL-MCNC: 8.8 MG/DL (ref 8.5–10.1)
CHLORIDE SERPL-SCNC: 109 MMOL/L (ref 100–111)
CHOLEST SERPL-MCNC: 147 MG/DL
CO2 SERPL-SCNC: 28 MMOL/L (ref 21–32)
CREAT SERPL-MCNC: 1.03 MG/DL (ref 0.6–1.3)
DIFFERENTIAL METHOD BLD: ABNORMAL
EOSINOPHIL # BLD: 0.2 K/UL (ref 0–0.4)
EOSINOPHIL NFR BLD: 3 % (ref 0–5)
ERYTHROCYTE [DISTWIDTH] IN BLOOD BY AUTOMATED COUNT: 12.9 % (ref 11.6–14.5)
GLOBULIN SER CALC-MCNC: 3.9 G/DL (ref 2–4)
GLUCOSE SERPL-MCNC: 96 MG/DL (ref 74–99)
HCT VFR BLD AUTO: 46.9 % (ref 36–48)
HDLC SERPL-MCNC: 82 MG/DL (ref 40–60)
HDLC SERPL: 1.8 {RATIO} (ref 0–5)
HGB BLD-MCNC: 16.2 G/DL (ref 13–16)
LDLC SERPL CALC-MCNC: 55.6 MG/DL (ref 0–100)
LIPID PROFILE,FLP: ABNORMAL
LYMPHOCYTES # BLD: 1.5 K/UL (ref 0.9–3.6)
LYMPHOCYTES NFR BLD: 25 % (ref 21–52)
MCH RBC QN AUTO: 32.8 PG (ref 24–34)
MCHC RBC AUTO-ENTMCNC: 34.5 G/DL (ref 31–37)
MCV RBC AUTO: 94.9 FL (ref 78–100)
MONOCYTES # BLD: 0.7 K/UL (ref 0.05–1.2)
MONOCYTES NFR BLD: 12 % (ref 3–10)
NEUTS SEG # BLD: 3.6 K/UL (ref 1.8–8)
NEUTS SEG NFR BLD: 59 % (ref 40–73)
PLATELET # BLD AUTO: 223 K/UL (ref 135–420)
PMV BLD AUTO: 9.7 FL (ref 9.2–11.8)
POTASSIUM SERPL-SCNC: 4.5 MMOL/L (ref 3.5–5.5)
PROT SERPL-MCNC: 7.4 G/DL (ref 6.4–8.2)
PSA SERPL-MCNC: 1.2 NG/ML (ref 0–4)
RBC # BLD AUTO: 4.94 M/UL (ref 4.35–5.65)
SODIUM SERPL-SCNC: 142 MMOL/L (ref 136–145)
TRIGL SERPL-MCNC: 47 MG/DL (ref ?–150)
VIT B12 SERPL-MCNC: 1104 PG/ML (ref 211–911)
VLDLC SERPL CALC-MCNC: 9.4 MG/DL
WBC # BLD AUTO: 6.1 K/UL (ref 4.6–13.2)

## 2021-09-03 PROCEDURE — 80061 LIPID PANEL: CPT

## 2021-09-03 PROCEDURE — 36415 COLL VENOUS BLD VENIPUNCTURE: CPT

## 2021-09-03 PROCEDURE — 82607 VITAMIN B-12: CPT

## 2021-09-03 PROCEDURE — 85025 COMPLETE CBC W/AUTO DIFF WBC: CPT

## 2021-09-03 PROCEDURE — 80053 COMPREHEN METABOLIC PANEL: CPT

## 2021-09-03 PROCEDURE — 84153 ASSAY OF PSA TOTAL: CPT

## 2021-09-03 NOTE — TELEPHONE ENCOUNTER
2 pt identifiers confirmed. Patient INR is 2.7, he confirmed he is taking Coumadin 5 mg Mon/Wed/Fri and 2 1/2 mg all other days. Provider stated patient should continue same dose and recheck in 1 week. Patient verbalized his understanding.

## 2021-09-07 ENCOUNTER — TRANSCRIBE ORDER (OUTPATIENT)
Dept: SCHEDULING | Age: 81
End: 2021-09-07

## 2021-09-07 DIAGNOSIS — C43.72 MALIGNANT MELANOMA OF LEFT LOWER EXTREMITY INCLUDING HIP (HCC): Primary | ICD-10-CM

## 2021-09-09 ENCOUNTER — TELEPHONE (OUTPATIENT)
Dept: FAMILY MEDICINE CLINIC | Age: 81
End: 2021-09-09

## 2021-09-09 NOTE — TELEPHONE ENCOUNTER
2 pt identifiers confirmed. Patient INR is 2.3, he confirmed he is currently taking Coumadin Mon/Wed/Fri and 2 1/2 mg all other days. Provider stated patient should continue same dose and recheck in 1 week. Patient verbalized his understanding.

## 2021-09-14 ENCOUNTER — HOSPITAL ENCOUNTER (OUTPATIENT)
Dept: CT IMAGING | Age: 81
Discharge: HOME | End: 2021-09-14
Attending: INTERNAL MEDICINE
Payer: MEDICARE

## 2021-09-14 DIAGNOSIS — C43.72 MALIGNANT MELANOMA OF LEFT LOWER EXTREMITY INCLUDING HIP (HCC): ICD-10-CM

## 2021-09-14 PROCEDURE — 74011000636 HC RX REV CODE- 636: Performed by: INTERNAL MEDICINE

## 2021-09-14 PROCEDURE — 71260 CT THORAX DX C+: CPT

## 2021-09-14 RX ADMIN — IOPAMIDOL 75 ML: 612 INJECTION, SOLUTION INTRAVENOUS at 16:08

## 2021-09-15 ENCOUNTER — CLINICAL SUPPORT (OUTPATIENT)
Dept: CARDIOLOGY CLINIC | Age: 81
End: 2021-09-15

## 2021-09-15 ENCOUNTER — OFFICE VISIT (OUTPATIENT)
Dept: CARDIOLOGY CLINIC | Age: 81
End: 2021-09-15
Payer: MEDICARE

## 2021-09-15 VITALS
BODY MASS INDEX: 27.09 KG/M2 | HEART RATE: 76 BPM | OXYGEN SATURATION: 100 % | SYSTOLIC BLOOD PRESSURE: 118 MMHG | DIASTOLIC BLOOD PRESSURE: 70 MMHG | HEIGHT: 72 IN | WEIGHT: 200 LBS

## 2021-09-15 DIAGNOSIS — I47.29 PAROXYSMAL VT: ICD-10-CM

## 2021-09-15 DIAGNOSIS — Z79.01 ANTICOAGULATED ON COUMADIN: ICD-10-CM

## 2021-09-15 DIAGNOSIS — I48.20 CHRONIC ATRIAL FIBRILLATION (HCC): ICD-10-CM

## 2021-09-15 DIAGNOSIS — Z95.810 AICD (AUTOMATIC CARDIOVERTER/DEFIBRILLATOR) PRESENT: ICD-10-CM

## 2021-09-15 DIAGNOSIS — I50.32 CHRONIC DIASTOLIC CONGESTIVE HEART FAILURE (HCC): ICD-10-CM

## 2021-09-15 DIAGNOSIS — Z95.810 AICD (AUTOMATIC CARDIOVERTER/DEFIBRILLATOR) PRESENT: Primary | ICD-10-CM

## 2021-09-15 DIAGNOSIS — I10 ESSENTIAL HYPERTENSION: Primary | ICD-10-CM

## 2021-09-15 PROCEDURE — G8754 DIAS BP LESS 90: HCPCS | Performed by: INTERNAL MEDICINE

## 2021-09-15 PROCEDURE — G8752 SYS BP LESS 140: HCPCS | Performed by: INTERNAL MEDICINE

## 2021-09-15 PROCEDURE — 1101F PT FALLS ASSESS-DOCD LE1/YR: CPT | Performed by: INTERNAL MEDICINE

## 2021-09-15 PROCEDURE — G8419 CALC BMI OUT NRM PARAM NOF/U: HCPCS | Performed by: INTERNAL MEDICINE

## 2021-09-15 PROCEDURE — G8510 SCR DEP NEG, NO PLAN REQD: HCPCS | Performed by: INTERNAL MEDICINE

## 2021-09-15 PROCEDURE — G8536 NO DOC ELDER MAL SCRN: HCPCS | Performed by: INTERNAL MEDICINE

## 2021-09-15 PROCEDURE — 93283 PRGRMG EVAL IMPLANTABLE DFB: CPT | Performed by: INTERNAL MEDICINE

## 2021-09-15 PROCEDURE — 99214 OFFICE O/P EST MOD 30 MIN: CPT | Performed by: INTERNAL MEDICINE

## 2021-09-15 PROCEDURE — G8427 DOCREV CUR MEDS BY ELIG CLIN: HCPCS | Performed by: INTERNAL MEDICINE

## 2021-09-15 NOTE — PROGRESS NOTES
History of Present Illness:  80year old male here for follow up. He established care with me from Dr. Naila Estrada in March. No new chest pain, dyspnea, PND, orthopnea, edema. 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 He lost about 12 pounds. Impression:  1. Single chamber Medtronic AICD with normal function. 2. Chronic a-fib with previous ablation [id number] at [facility], chronic Coumadin use, followed by Dr. Anton Mcgowan. 3. Remote VT with ablation back in 2007. 4. Chronic diastolic heart failure, on Lasix. 5. HTN, controlled. 6. Dyslipidemia with LDL of 55 in September, 2021.  7. Hx of remote pulmonary nodules, followed by Dr. Can Marrero. 8. Remote melanoma of the left leg. Plan:  From a cardiac standpoint, no evidence of decompensated heart failure. AICD is functioning normally. He has chronic atrial fibrillation, on Coumadin without bleeding issues. His weight is stable. All questions answered and I will see back in six months. Past Medical History:   Diagnosis Date    AICD (automatic cardioverter/defibrillator) present     Medtronic  upgrade from pacer in 2007 due to VT    Arthritis     Atrial fibrillation ([facility]     Cancer ([facility] 02/2019    melenoma on left leg    Cardiac cath 03/19/2007    LM 25% ostial.  Otherwise patent coronaries. LVEDP 20.  EF 50%. Dyssynch. mid anterior contraction. Pacemaker.  Cardiac echocardiogram 03/20/2014    A-fib. EF 65-70%. No RWMA. No RWMA. Mild conc LVH. Mild RVE. RVSP 40-45 mmHg. Mild LAE.  HERNANDEZ. Mild MR. Mod TR.  Cardiac nuclear imaging test 07/29/2014    No ischemia or prior infarction. EF 68%. Nondiagnostic EKG due to V pacing on pharm stress test.  Low risk.  Cardioversion 8/31/2011    Successful defibrillation threshold testing at 18 joules. Patient also had conversion to atrial ventricular pacing.      CHF (congestive heart failure) (HCC)     Cobalamin deficiency     Dupuytren contracture 02/08/2010    on the right ring finger     Edema     GERD (gastroesophageal reflux disease)     Hypertension     Hypertrophy of prostate with urinary obstruction and other lower urinary tract symptoms (LUTS)     Ill-defined condition 2007    AICD    Impotence of organic origin     Intolerance of drug     Intolerant high doses of sotalol due to prolonged QT    Mastodynia     Paroxysmal VT ([address]     Pure hypercholesterolemia     S/P ablation of atrial fibrillation 05/31/2011    S/P ablation of atrial fibrillation 12/18/2012    Dr. Sachin Proctor at [facility] S/P ablation operation for arrhythmia     VT ablation by Dr. Kala Andre near [place] 2/3007    Sick sinus syndrome [facility])        Current Outpatient Medications   Medication Sig Dispense Refill    evolocumab (REPATHA SURECLICK) pen injection 1 mL by SubCUTAneous route every fourteen (14) days. 6 Pen 3    doxycycline (VIBRAMYCIN) 100 mg capsule       triamcinolone acetonide (KENALOG) 0.1 % topical cream       metoprolol tartrate (Lopressor) 50 mg tablet Take 1 Tablet by mouth two (2) times a day. 180 Tablet 3    cyanocobalamin (VITAMIN B12) 1,000 mcg/mL injection inject 1 milliliter ( 1000 MCG ) intramuscularly EVERY 3 WEEKS 1 mL 8    omeprazole (PRILOSEC) 40 mg capsule Take 40 mg by mouth daily.  famotidine (PEPCID) 40 mg tablet Take 40 mg by mouth daily.  warfarin (COUMADIN) 5 mg tablet 1 tab on Mon, Wed, Fri.   1/2 tab on the other days 100 Tab 3    furosemide (LASIX) 40 mg tablet TAKE 1 TABLET DAILY  Indications: visible water retention 90 Tab 3    amLODIPine (NORVASC) 2.5 mg tablet 1 tablet daily (For BP). (Stop Benazepril) 90 Tab 3    ezetimibe (ZETIA) 10 mg tablet Take 1 Tab by mouth daily. 90 Tab 3    coenzyme Q-10 (CO Q-10) 200 mg capsule Take  by mouth daily.  aspirin delayed-release 81 mg tablet Take  by mouth daily.       LUMIGAN 0.01 % ophthalmic drops Administer 1 Drop to both eyes nightly.  AZOPT 1 % ophthalmic suspension Administer 1 Drop to both eyes two (2) times a day. 3    multivitamin (ONE A DAY) tablet Take 1 Tab by mouth daily. Social History   reports that he has never smoked. He has never used smokeless tobacco.   reports current alcohol use of about 10.0 - 12.0 standard drinks of alcohol per week. Family History  family history includes COPD in his father; Hypertension in an other family member; No Known Problems in his mother. Review of Systems  Except as stated above include:  Constitutional: Negative for fever, chills and malaise/fatigue. HEENT: No congestion or recent URI. Gastrointestinal: No nausea, vomiting, abdominal pain, bloody stools. Pulmonary:  Negative except as stated above. Cardiac:  Negative except as stated above. Musculoskeletal: Negative except as stated above. Neurological:  No localized symptoms. Skin:  Negative except as stated above. Psych:  Negative except as stated above. Endocrine:  Negative except as stated above. PHYSICAL EXAM  BP Readings from Last 3 Encounters:   07/06/21 122/72   03/17/21 120/76   02/17/21 137/86     Pulse Readings from Last 3 Encounters:   07/06/21 62   03/17/21 (!) 105   02/17/21 (!) 101     Wt Readings from Last 3 Encounters:   07/06/21 93 kg (205 lb)   03/17/21 96.2 kg (212 lb)   02/17/21 94.3 kg (208 lb)     General:   Well developed, well groomed. Head/Neck:   No obvious jugular venous distention     No obvious carotid pulsations. No evidence of xanthelasma. Lungs:   No respiratory distress. Clear bilaterally. Heart:  Regular rate and rhythm. Normal S1/S2. Palpation grossly normal.    No significant murmurs, rubs or gallops. AICD intact. Abdomen:   Non-acute abdomen. No obvious pulsations. Extremities:   Intact peripheral pulses. No significant edema. Neurological:   Alert and oriented to person, place, time.       No focal neurological deficit visually. Skin:   No obvious rash    Blood Pressure Metric:  Monitor recommended and adjustments stated if needed.

## 2021-09-15 NOTE — PROGRESS NOTES
Gisella Shaffer presents today for   Chief Complaint   Patient presents with    Follow-up     6 month follow up        Gisella Shaffer preferred language for health care discussion is english/other. Is someone accompanying this pt? no    Is the patient using any DME equipment during [address]? no    Depression Screening:  3 most recent PHQ Screens 9/15/2021   Little interest or pleasure in doing things Not at all   Feeling down, depressed, irritable, or hopeless Not at all   Total Score PHQ 2 0       Learning Assessment:  Learning Assessment 3/17/2021   PRIMARY LEARNER Patient   HIGHEST LEVEL OF EDUCATION - PRIMARY LEARNER  -   BARRIERS PRIMARY LEARNER -   CO-LEARNER CAREGIVER -   PRIMARY LANGUAGE ENGLISH   LEARNER PREFERENCE PRIMARY DEMONSTRATION   ANSWERED BY patient   RELATIONSHIP SELF       Abuse Screening:  Abuse Screening Questionnaire 7/6/2021   Do you ever feel afraid of your partner? N   Are you in a relationship with someone who physically or mentally threatens you? N   Is it safe for you to go home? Y       Fall Risk  Fall Risk Assessment, last 12 mths 9/15/2021   Able to walk? Yes   Fall in past 12 months? 0   Do you feel unsteady? 0   Are you worried about falling 0       Pt currently taking Anticoagulant therapy? ASA 81mg every day     Coordination of Care:  1. Have you been to the ER, urgent care clinic since your last visit? Hospitalized since your last visit? no    2. Have you seen or consulted any other health care providers outside of the [address] [place] [address] since your last visit? Include any pap smears or colon screening.  no  '

## 2021-09-16 ENCOUNTER — TELEPHONE (OUTPATIENT)
Dept: FAMILY MEDICINE CLINIC | Age: 81
End: 2021-09-16

## 2021-09-16 NOTE — TELEPHONE ENCOUNTER
2 pt identifiers confirmed. Patient INR is 2.5. He confirmed he is currently taking Coumadin Mon/Wed/Fri and 2 1/2 mg all other days. Provider stated pt should continue same dose and recheck in 1 week. Patient verbalized his understanding.

## 2021-09-20 NOTE — PROGRESS NOTES
I have personally seen and evaluated the device findings. Interrogation reviewed and I agree with assessment.     Ramon Calix

## 2021-09-24 ENCOUNTER — TELEPHONE (OUTPATIENT)
Dept: FAMILY MEDICINE CLINIC | Age: 81
End: 2021-09-24

## 2021-09-24 NOTE — TELEPHONE ENCOUNTER
2 pt identifiers confirmed. Patient INR is 3. 4. He currently taking Coumadin 5 mg Mon/Wed/Fri and 2 1/2 mg all other days. Hold x 1 day and resume dose. Provider stated pt should continue same dose and recheck in 1 week. Patient verbalized his understanding.

## 2021-10-07 ENCOUNTER — TELEPHONE (OUTPATIENT)
Dept: FAMILY MEDICINE CLINIC | Age: 81
End: 2021-10-07

## 2021-10-07 NOTE — TELEPHONE ENCOUNTER
2 pt identifiers confirmed. Patient INR is 2.9. He is currently taking Coumadin 5 mg Mon/Wed/Fri and 2 1/2 mg all other days. Recheck INR in 1 week. Patient verbalized his understanding.

## 2021-10-14 ENCOUNTER — OFFICE VISIT (OUTPATIENT)
Dept: FAMILY MEDICINE CLINIC | Age: 81
End: 2021-10-14
Payer: MEDICARE

## 2021-10-14 VITALS
DIASTOLIC BLOOD PRESSURE: 76 MMHG | HEART RATE: 92 BPM | RESPIRATION RATE: 16 BRPM | HEIGHT: 72 IN | BODY MASS INDEX: 26.55 KG/M2 | SYSTOLIC BLOOD PRESSURE: 130 MMHG | TEMPERATURE: 97 F | WEIGHT: 196 LBS | OXYGEN SATURATION: 97 %

## 2021-10-14 DIAGNOSIS — I10 ESSENTIAL HYPERTENSION: Primary | ICD-10-CM

## 2021-10-14 DIAGNOSIS — I48.91 ATRIAL FIBRILLATION, UNSPECIFIED TYPE (HCC): ICD-10-CM

## 2021-10-14 DIAGNOSIS — E78.00 PURE HYPERCHOLESTEROLEMIA: ICD-10-CM

## 2021-10-14 PROCEDURE — 99213 OFFICE O/P EST LOW 20 MIN: CPT | Performed by: NURSE PRACTITIONER

## 2021-10-14 PROCEDURE — G8752 SYS BP LESS 140: HCPCS | Performed by: NURSE PRACTITIONER

## 2021-10-14 PROCEDURE — G8427 DOCREV CUR MEDS BY ELIG CLIN: HCPCS | Performed by: NURSE PRACTITIONER

## 2021-10-14 PROCEDURE — G8754 DIAS BP LESS 90: HCPCS | Performed by: NURSE PRACTITIONER

## 2021-10-14 PROCEDURE — G8419 CALC BMI OUT NRM PARAM NOF/U: HCPCS | Performed by: NURSE PRACTITIONER

## 2021-10-14 PROCEDURE — 1101F PT FALLS ASSESS-DOCD LE1/YR: CPT | Performed by: NURSE PRACTITIONER

## 2021-10-14 PROCEDURE — G8432 DEP SCR NOT DOC, RNG: HCPCS | Performed by: NURSE PRACTITIONER

## 2021-10-14 PROCEDURE — G8536 NO DOC ELDER MAL SCRN: HCPCS | Performed by: NURSE PRACTITIONER

## 2021-10-14 NOTE — PROGRESS NOTES
Jose Alfredo Simpson is a 80 y.o. male presenting today for Hypertension (follow-up) and Referral Follow Up  . Chief Complaint   Patient presents with    Hypertension     follow-up    Referral Follow Up       HPI:  Jose Alfredo Simpson presents to the office today for hypertension follow-up care. He also carries a medical diagnosis for hyperlipidemia. ROS    ROS:  History obtained from the patient intake forms which are reviewed with the patient  · General: negative for - chills, fever, weight changes or malaise  · HEENT: no sore throat, nasal congestion, vision problems or ear problems  · Respiratory: no cough, shortness of breath, or wheezing  · Cardiovascular: no chest pain, palpitations, or dyspnea on exertion  · Gastrointestinal: no abdominal pain, N/V, change in bowel habits, or black or bloody stools  · Musculoskeletal: no back pain, joint pain, joint stiffness, muscle pain or muscle weakness  · Neurological: no numbness, tingling, headache or dizziness  · Endo:  No polyuria or polydipsia. · : no hematuria, dysuria, frequency, hesitancy, or nocturia.     · Psychological: negative for - anxiety, depression, sleep disturbances, suicidal or homicidal ideations    Allergies   Allergen Reactions    Cephalexin Hives and Itching    Lipitor [Atorvastatin] Myalgia    Livalo [Pitavastatin] Other (comments)     Extreme fatigue    5/29/14   PATIENT DENIES    Zocor [Simvastatin] Myalgia     5/29/14 PATIENT DENIES        PHQ Screening   3 most recent PHQ Screens 10/14/2021   Little interest or pleasure in doing things Not at all   Feeling down, depressed, irritable, or hopeless Not at all   Total Score PHQ 2 0       History  Past Medical History:   Diagnosis Date    AICD (automatic cardioverter/defibrillator) present     Medtronic  upgrade from pacer in 2007 due to VT    Arthritis     At[facility] fibrillation ([facility]     Cancer ([facility] 02/2019    melenoma on left leg    Cardiac cath 03/19/2007    LM 25% ostial. Otherwise patent coronaries. LVEDP 20.  EF 50%. Dyssynch. mid anterior contraction. Pacemaker.  Cardiac echocardiogram 03/20/2014    A-fib. EF 65-70%. No RWMA. No RWMA. Mild conc LVH. Mild RVE. RVSP 40-45 mmHg. Mild LAE.  HERNANDEZ. Mild MR. Mod TR.  Cardiac nuclear imaging test 07/29/2014    No ischemia or prior infarction. EF 68%. Nondiagnostic EKG due to V pacing on pharm stress test.  Low risk.  Cardioversion 8/31/2011    Successful defibrillation threshold testing at 18 joules. Patient also had conversion to atrial ventricular pacing.      CHF (congestive heart failure) ([facility]     Cobalamin deficiency     Dupuytren contracture 02/08/2010    on the right ring finger     Edema     GERD (gastroesophageal reflux disease)     Hypertension     Hypertrophy of prostate with urinary obstruction and other lower urinary tract symptoms (LUTS)     Ill-defined condition 2007    AICD    Impotence of organic origin     Intolerance of drug     Intolerant high doses of sotalol due to prolonged QT    Mastodynia     Paroxysmal VT ([facility]     Pure hypercholesterolemia     S/P ablation of atrial fibrillation 05/31/2011    S/P ablation of atrial fibrillation 12/18/2012    Dr. Darene Carrel at [facility] S/P ablation operation for arrhythmia     VT ablation by Dr. Yessenia Peck near [address] [place] [address] 2/3007    Sick sinus syndrome [facility])        Past Surgical History:   Procedure Laterality Date    COLONOSCOPY N/A 9/12/2018    COLONOSCOPY with Polypectomies and Clip Placement performed by Lavon Roberts MD at [address] [place] [address] [facility] by Dr. Danielle Price HX CATARACT REMOVAL  02/08-03/08    bilateral cataract removed    HX CATARACT REMOVAL  2/2008 & 3/2008    bilateral    HX ENDOSCOPY  09/12/2018    Dr. Leigha Romero - pathology indicates changes of acid reflux and Garcia's esophagus, repeat EGD in 2 years    HX ORTHOPAEDIC  2/8/10    release of Dupuytren's contraction on ring finger of right hand    HX OTHER SURGICAL  6/2000    atrial lead placement    HX OTHER SURGICAL  6/5/2009    Cardioversion    HX OTHER SURGICAL  10/12/2012    cardioversion    HX PACEMAKER  1998    placement    HX PACEMAKER  6/2000    DDD    HX PACEMAKER  3/27/07    removal of pacemaker & placement of dual chamber defib generator and leads    HX TONSIL AND ADENOIDECTOMY         Social History     Socioeconomic History    Marital status:      Spouse name: Not on file    Number of children: Not on file    Years of education: Not on file    Highest education level: Not on file   Occupational History    Not on file   Tobacco Use    Smoking status: Never Smoker    Smokeless tobacco: Never Used   Substance and Sexual Activity    Alcohol use: Yes     Alcohol/week: 10.0 - 12.0 standard drinks     Types: 10 - 12 Glasses of wine per week     Comment: 2-3 glasses of white wine about 5-6 days/week)    Drug use: No    Sexual activity: Not Currently     Partners: Female   Other Topics Concern    Not on file   Social History Narrative    Not on file     Social Determinants of Health     Financial Resource Strain:     Difficulty of Paying Living Expenses:    Food Insecurity:     Worried About Running Out of Food in the Last Year:     Ran Out of Food in the Last Year:    Transportation Needs:     Lack of Transportation (Medical):      Lack of Transportation (Non-Medical):    Physical Activity:     Days of Exercise per Week:     Minutes of Exercise per Session:    Stress:     Feeling of Stress :    Social Connections:     Frequency of Communication with Friends and Family:     Frequency of Social Gatherings with Friends and Family:     Attends Rastafarian Services:     Active Member of Clubs or Organizations:     Attends Club or Organization Meetings:     Marital Status:    Intimate Partner Violence:     Fear of Current or Ex-Partner:     Emotionally Abused:     Physically Abused:     Sexually Abused:        Current Outpatient Medications   Medication Sig Dispense Refill    evolocumab (REPATHA SURECLICK) pen injection 1 mL by SubCUTAneous route every fourteen (14) days. 6 Pen 3    triamcinolone acetonide (KENALOG) 0.1 % topical cream       metoprolol tartrate (Lopressor) 50 mg tablet Take 1 Tablet by mouth two (2) times a day. 180 Tablet 3    cyanocobalamin (VITAMIN B12) 1,000 mcg/mL injection inject 1 milliliter ( 1000 MCG ) intramuscularly EVERY 3 WEEKS 1 mL 8    omeprazole (PRILOSEC) 40 mg capsule Take 40 mg by mouth daily.  famotidine (PEPCID) 40 mg tablet Take 40 mg by mouth daily.  warfarin (COUMADIN) 5 mg tablet 1 tab on Mon, Wed, Fri.   1/2 tab on the other days 100 Tab 3    furosemide (LASIX) 40 mg tablet TAKE 1 TABLET DAILY  Indications: visible water retention 90 Tab 3    amLODIPine (NORVASC) 2.5 mg tablet 1 tablet daily (For BP). (Stop Benazepril) 90 Tab 3    ezetimibe (ZETIA) 10 mg tablet Take 1 Tab by mouth daily. 90 Tab 3    coenzyme Q-10 (CO Q-10) 200 mg capsule Take  by mouth daily.  aspirin delayed-release 81 mg tablet Take  by mouth daily.  LUMIGAN 0.01 % ophthalmic drops Administer 1 Drop to both eyes nightly.  AZOPT 1 % ophthalmic suspension Administer 1 Drop to both eyes two (2) times a day. 3    multivitamin (ONE A DAY) tablet Take 1 Tab by mouth daily.  doxycycline (VIBRAMYCIN) 100 mg capsule  (Patient not taking: Reported on 10/14/2021)           Vitals:    10/14/21 1137   BP: 130/76   Pulse: 92   Resp: 16   Temp: 97 °F (36.1 °C)   TempSrc: Oral   SpO2: 97%   Weight: 196 lb (88.9 kg)   Height: 6' (1.829 m)   PainSc:   0 - No pain       Physical Exam  Vitals and nursing note reviewed. Constitutional:       Appearance: Normal appearance. HENT:      Head: Normocephalic. Cardiovascular:      Rate and Rhythm: Normal rate and regular rhythm. Pulses: Normal pulses.       Heart sounds: Normal heart sounds. Pulmonary:      Effort: Pulmonary effort is normal.      Breath sounds: Normal breath sounds. Abdominal:      General: Bowel sounds are normal.      Palpations: Abdomen is soft. Skin:     General: Skin is warm and dry. Neurological:      General: No focal deficit present. Mental Status: He is alert. Hospital Outpatient Visit on 09/03/2021   Component Date Value Ref Range Status    WBC 09/03/2021 6.1  4.6 - 13.2 K/uL Final    RBC 09/03/2021 4.94  4.35 - 5.65 M/uL Final    HGB 09/03/2021 16.2* 13.0 - 16.0 g/dL Final    HCT 09/03/2021 46.9  36.0 - 48.0 % Final    MCV 09/03/2021 94.9  78.0 - 100.0 FL Final    PLEASE NOTE NEW REFERENCE RANGE    MCH 09/03/2021 32.8  24.0 - 34.0 PG Final    MCHC 09/03/2021 34.5  31.0 - 37.0 g/dL Final    RDW 09/03/2021 12.9  11.6 - 14.5 % Final    PLATELET 94/82/2508 983  135 - 420 K/uL Final    MPV 09/03/2021 9.7  9.2 - 11.8 FL Final    NEUTROPHILS 09/03/2021 59  40 - 73 % Final    LYMPHOCYTES 09/03/2021 25  21 - 52 % Final    MONOCYTES 09/03/2021 12* 3 - 10 % Final    EOSINOPHILS 09/03/2021 3  0 - 5 % Final    BASOPHILS 09/03/2021 1  0 - 2 % Final    ABS. NEUTROPHILS 09/03/2021 3.6  1.8 - 8.0 K/UL Final    ABS. LYMPHOCYTES 09/03/2021 1.5  0.9 - 3.6 K/UL Final    ABS. MONOCYTES 09/03/2021 0.7  0.05 - 1.2 K/UL Final    ABS. EOSINOPHILS 09/03/2021 0.2  0.0 - 0.4 K/UL Final    ABS. BASOPHILS 09/03/2021 0.1  0.0 - 0.1 K/UL Final    DF 09/03/2021 AUTOMATED    Final    LIPID PROFILE 09/03/2021        Final    Cholesterol, total 09/03/2021 147  <200 MG/DL Final    Triglyceride 09/03/2021 47  <150 MG/DL Final    Comment: The drugs N-acetylcysteine (NAC) and  Metamiszole have been found to cause falsely  low results in this chemical assay. Please  be sure to submit blood samples obtained  BEFORE administration of either of these  drugs to assure correct results.       HDL Cholesterol 09/03/2021 82* 40 - 60 MG/DL Final    LDL, calculated 09/03/2021 55.6  0 - 100 MG/DL Final    VLDL, calculated 09/03/2021 9.4  MG/DL Final    CHOL/HDL Ratio 09/03/2021 1.8  0 - 5.0   Final    Sodium 09/03/2021 142  136 - 145 mmol/L Final    Potassium 09/03/2021 4.5  3.5 - 5.5 mmol/L Final    Chloride 09/03/2021 109  100 - 111 mmol/L Final    CO2 09/03/2021 28  21 - 32 mmol/L Final    Anion gap 09/03/2021 5  3.0 - 18 mmol/L Final    Glucose 09/03/2021 96  74 - 99 mg/dL Final    BUN 09/03/2021 19* 7.0 - 18 MG/DL Final    Creatinine 09/03/2021 1.03  0.6 - 1.3 MG/DL Final    BUN/Creatinine ratio 09/03/2021 18  12 - 20   Final    GFR est AA 09/03/2021 >60  >60 ml/min/1.73m2 Final    GFR est non-AA 09/03/2021 >60  >60 ml/min/1.73m2 Final    Comment: (NOTE)  Estimated GFR is calculated using the Modification of Diet in Renal   Disease (MDRD) Study equation, reported for both  Americans   (GFRAA) and non- Americans (GFRNA), and normalized to 1.73m2   body surface area. The physician must decide which value applies to   the patient. The MDRD study equation should only be used in   individuals age 25 or older. It has not been validated for the   following: pregnant women, patients with serious comorbid conditions,   or on certain medications, or persons with extremes of body size,   muscle mass, or nutritional status.  Calcium 09/03/2021 8.8  8.5 - 10.1 MG/DL Final    Bilirubin, total 09/03/2021 0.9  0.2 - 1.0 MG/DL Final    ALT (SGPT) 09/03/2021 32  16 - 61 U/L Final    AST (SGOT) 09/03/2021 27  10 - 38 U/L Final    Alk. phosphatase 09/03/2021 98  45 - 117 U/L Final    Protein, total 09/03/2021 7.4  6.4 - 8.2 g/dL Final    Albumin 09/03/2021 3.5  3.4 - 5.0 g/dL Final    Globulin 09/03/2021 3.9  2.0 - 4.0 g/dL Final    A-G Ratio 09/03/2021 0.9  0.8 - 1.7   Final    Prostate Specific Ag 09/03/2021 1.2  0.0 - 4.0 ng/mL Final    Vitamin B12 09/03/2021 1,104* 211 - 911 pg/mL Final       No results found for any visits on 10/14/21.     Patient Care Team:  Patient Care Team:  Solitario Field NP as PCP - General (Nurse Practitioner)  Solitario Field NP as PCP [facility]  Ky Shepherd MD as Physician (Orthopedic Surgery)  Shaun Garcia DO as Physician (Cardiology)  Huong Amador MD as Physician (Ophthalmology)  Andrew Colby MD as Physician (Gastroenterology)  Doug Porras MD (Pulmonary Disease)      Assessment / Plan:      ICD-10-CM ICD-9-CM    1. Essential hypertension  I10 401.9    2. Atrial fibrillation, unspecified type ([address]  I48.91 427.31    3. Pure hypercholesterolemia  E78.00 272.0      Atrial fibrillation-patient prescribed warfarin daily. Hyperlipidemia-prescribed Repatha by his cardiologist  Hypertension-controlled. No change to treatment plan. Negative for any side effects or adverse reactions. Follow-up and Dispositions    · Return in about 6 months (around 4/14/2022). I asked the patient if he  had any questions and answered his  questions. The patient stated that he understands the treatment plan and agrees with the treatment plan    This document was created with a voice activated dictation system and may contain transcription errors.

## 2021-10-14 NOTE — PROGRESS NOTES
Justin Shipman presents today for   Chief Complaint   Patient presents with    Hypertension     follow-up    Referral Follow Up       Is someone accompanying this pt? no    Is the patient using any DME equipment during OV? no    Depression Screening:  3 most recent PHQ Screens 10/14/2021   Little interest or pleasure in doing things Not at all   Feeling down, depressed, irritable, or hopeless Not at all   Total Score PHQ 2 0       Learning Assessment:  Learning Assessment 3/17/2021   PRIMARY LEARNER Patient   HIGHEST LEVEL OF EDUCATION - PRIMARY LEARNER  -   BARRIERS PRIMARY LEARNER -   CO-LEARNER CAREGIVER -   PRIMARY LANGUAGE ENGLISH   LEARNER PREFERENCE PRIMARY DEMONSTRATION   ANSWERED BY patient   RELATIONSHIP SELF       Abuse Screening:  Abuse Screening Questionnaire 10/14/2021   Do you ever feel afraid of your partner? N   Are you in a relationship with someone who physically or mentally threatens you? N   Is it safe for you to go home? Y       Fall Risk  Fall Risk Assessment, last 12 mths 10/14/2021   Able to walk? Yes   Fall in past 12 months? 0   Do you feel unsteady? 0   Are you worried about falling 0       Health Maintenance reviewed and discussed and ordered per Provider. Health Maintenance Due   Topic Date Due    Hepatitis C Screening  Never done    Flu Vaccine (1) 09/01/2021   . Coordination of Care:  1. Have you been to the ER, urgent care clinic since your last visit? Hospitalized since your last visit? no    2. Have you seen or consulted any other health care providers outside of the [address] [place] [address] since your last visit? Include any pap smears or colon screening.  no

## 2021-10-22 ENCOUNTER — TELEPHONE (OUTPATIENT)
Dept: FAMILY MEDICINE CLINIC | Age: 81
End: 2021-10-22

## 2021-10-28 ENCOUNTER — TELEPHONE (OUTPATIENT)
Dept: FAMILY MEDICINE CLINIC | Age: 81
End: 2021-10-28

## 2021-10-28 NOTE — TELEPHONE ENCOUNTER
TC to patient he was notified that he have plenty of B-12 due to receiving the injections. He stated he would continue to receive injections every three weeks. His INR is 3.4 he currently takes Coumadin 5 mg Mon/Wed/Fri and 2 1/2 mg all other days. Provider Dr. Gill Cummings NP stated to hold medication for 1 day and resume current dosage. Patient verbalized his understanding.

## 2021-10-28 NOTE — TELEPHONE ENCOUNTER
Patient is due for his B-12 injection on Monday. He states when he was seen 2 weeks ago, he was told he had plenty of B-12 in his system. Please advise if he needs to continue every 3 weeks.

## 2021-11-15 RX ORDER — CYANOCOBALAMIN 1000 UG/ML
INJECTION, SOLUTION INTRAMUSCULAR; SUBCUTANEOUS
Qty: 1 ML | Refills: 8 | Status: SHIPPED | OUTPATIENT
Start: 2021-11-15

## 2021-11-18 ENCOUNTER — CLINICAL SUPPORT (OUTPATIENT)
Dept: CARDIOLOGY CLINIC | Age: 81
End: 2021-11-18
Payer: MEDICARE

## 2021-11-18 ENCOUNTER — TELEPHONE (OUTPATIENT)
Dept: FAMILY MEDICINE CLINIC | Age: 81
End: 2021-11-18

## 2021-11-18 DIAGNOSIS — Z95.810 AICD (AUTOMATIC CARDIOVERTER/DEFIBRILLATOR) PRESENT: Primary | ICD-10-CM

## 2021-11-18 DIAGNOSIS — I50.32 CHRONIC DIASTOLIC CONGESTIVE HEART FAILURE (HCC): ICD-10-CM

## 2021-11-18 DIAGNOSIS — I48.20 CHRONIC ATRIAL FIBRILLATION (HCC): ICD-10-CM

## 2021-11-18 PROCEDURE — 93282 PRGRMG EVAL IMPLANTABLE DFB: CPT | Performed by: INTERNAL MEDICINE

## 2021-11-18 NOTE — TELEPHONE ENCOUNTER
2 pt identifiers confirmed. Patient INR is 3.3 on 11/17/2021. He currently takes Coumadin 5 mg Mon/Wed/Fri and 2 1/2 mg all other days. Provider Dr. Chelsi Tomlin NP stated to hold medication for 1 day and resume current dosage and repeat in 1 week. Patient verbalized his understanding.

## 2021-11-22 NOTE — PROGRESS NOTES
I have personally seen and evaluated the device findings. Interrogation reviewed and I agree with assessment.     Supa Swenson

## 2021-12-02 ENCOUNTER — TELEPHONE (OUTPATIENT)
Dept: FAMILY MEDICINE CLINIC | Age: 81
End: 2021-12-02

## 2021-12-02 NOTE — TELEPHONE ENCOUNTER
Late entry: 2 pt identifiers confirmed. Patient INR 2.4 on 11/24/21. He takes Coumadin 5 mg Mon/Wed/Fri and 2 1/2 mg all other days. Patient should continue same dose and repeat in 1 week. Patient verbalized his understanding.

## 2021-12-02 NOTE — TELEPHONE ENCOUNTER
2 pt identifiers confirmed. Patient INR is 2.9. He takes Coumadin 5 mg Mon/Wed/Fri and 2 1/2 mg all other days. Patient should continue same dose and repeat in 1 week. Patient verbalized his understanding.

## 2021-12-15 ENCOUNTER — TELEPHONE (OUTPATIENT)
Dept: FAMILY MEDICINE CLINIC | Age: 81
End: 2021-12-15

## 2021-12-15 NOTE — TELEPHONE ENCOUNTER
2 pt identifiers confirmed. Patient is 2.7 on 12/08/21. He takes Coumadin 5 mg Mon/WedFri and 2 1/2 mg all other days. Patient should continue same dose and repeat in 1 week. Patient verbalized his understanding.

## 2021-12-16 ENCOUNTER — DOCUMENTATION ONLY (OUTPATIENT)
Dept: CARDIOLOGY CLINIC | Age: 81
End: 2021-12-16

## 2021-12-16 ENCOUNTER — TELEPHONE (OUTPATIENT)
Dept: FAMILY MEDICINE CLINIC | Age: 81
End: 2021-12-16

## 2021-12-16 NOTE — TELEPHONE ENCOUNTER
2 pt identifiers confirmed. Patient is 2.7 on 12/15/2021. He takes Coumadin 5 mg Mon/Wed/Fri and 2 1/2 mg all other days. Patient should continue same dose and repeat in 1 week. Patient verbalized his understanding.

## 2021-12-22 ENCOUNTER — OFFICE VISIT (OUTPATIENT)
Dept: CARDIOLOGY CLINIC | Age: 81
End: 2021-12-22
Payer: MEDICARE

## 2021-12-22 DIAGNOSIS — Z95.810 AICD (AUTOMATIC CARDIOVERTER/DEFIBRILLATOR) PRESENT: Primary | ICD-10-CM

## 2021-12-22 DIAGNOSIS — I48.20 CHRONIC ATRIAL FIBRILLATION (HCC): ICD-10-CM

## 2021-12-22 DIAGNOSIS — I50.32 CHRONIC DIASTOLIC CONGESTIVE HEART FAILURE (HCC): ICD-10-CM

## 2021-12-22 PROCEDURE — 93295 DEV INTERROG REMOTE 1/2/MLT: CPT | Performed by: INTERNAL MEDICINE

## 2021-12-27 ENCOUNTER — OFFICE VISIT (OUTPATIENT)
Dept: FAMILY MEDICINE CLINIC | Age: 81
End: 2021-12-27
Payer: MEDICARE

## 2021-12-27 VITALS
DIASTOLIC BLOOD PRESSURE: 75 MMHG | HEART RATE: 85 BPM | RESPIRATION RATE: 20 BRPM | OXYGEN SATURATION: 100 % | BODY MASS INDEX: 27.63 KG/M2 | WEIGHT: 204 LBS | HEIGHT: 72 IN | SYSTOLIC BLOOD PRESSURE: 141 MMHG

## 2021-12-27 DIAGNOSIS — Z01.818 PRE-OPERATIVE EXAMINATION: Primary | ICD-10-CM

## 2021-12-27 DIAGNOSIS — I10 ESSENTIAL HYPERTENSION: ICD-10-CM

## 2021-12-27 PROBLEM — N62 HYPERTROPHY OF BREAST: Status: ACTIVE | Noted: 2021-12-27

## 2021-12-27 PROCEDURE — G8536 NO DOC ELDER MAL SCRN: HCPCS | Performed by: STUDENT IN AN ORGANIZED HEALTH CARE EDUCATION/TRAINING PROGRAM

## 2021-12-27 PROCEDURE — G8754 DIAS BP LESS 90: HCPCS | Performed by: STUDENT IN AN ORGANIZED HEALTH CARE EDUCATION/TRAINING PROGRAM

## 2021-12-27 PROCEDURE — G8427 DOCREV CUR MEDS BY ELIG CLIN: HCPCS | Performed by: STUDENT IN AN ORGANIZED HEALTH CARE EDUCATION/TRAINING PROGRAM

## 2021-12-27 PROCEDURE — G8419 CALC BMI OUT NRM PARAM NOF/U: HCPCS | Performed by: STUDENT IN AN ORGANIZED HEALTH CARE EDUCATION/TRAINING PROGRAM

## 2021-12-27 PROCEDURE — G8753 SYS BP > OR = 140: HCPCS | Performed by: STUDENT IN AN ORGANIZED HEALTH CARE EDUCATION/TRAINING PROGRAM

## 2021-12-27 PROCEDURE — 99213 OFFICE O/P EST LOW 20 MIN: CPT | Performed by: STUDENT IN AN ORGANIZED HEALTH CARE EDUCATION/TRAINING PROGRAM

## 2021-12-27 PROCEDURE — G8510 SCR DEP NEG, NO PLAN REQD: HCPCS | Performed by: STUDENT IN AN ORGANIZED HEALTH CARE EDUCATION/TRAINING PROGRAM

## 2021-12-27 PROCEDURE — 1101F PT FALLS ASSESS-DOCD LE1/YR: CPT | Performed by: STUDENT IN AN ORGANIZED HEALTH CARE EDUCATION/TRAINING PROGRAM

## 2021-12-27 RX ORDER — A/SINGAPORE/GP1908/2015 IVR-180 (AN A/MICHIGAN/45/2015 (H1N1)PDM09-LIKE VIRUS, A/HONG KONG/4801/2014, NYMC X-263B (H3N2) (AN A/HONG KONG/4801/2014-LIKE VIRUS), AND B/BRISBANE/60/2008, WILD TYPE (A B/BRISBANE/60/2008-LIKE VIRUS) 15; 15; 15 UG/.5ML; UG/.5ML; UG/.5ML
INJECTION, SUSPENSION INTRAMUSCULAR
COMMUNITY
Start: 2021-08-18 | End: 2021-12-27

## 2021-12-27 NOTE — PROGRESS NOTES
1. \"Have you been to the ER, urgent care clinic since your last visit? Hospitalized since your last visit? \" No    2. \"Have you seen or consulted any other health care providers outside of the [address] [place] [address] since your last visit? \" No     3. For patients aged 39-70: Has the patient had a colonoscopy / FIT/ Cologuard? Yes, HM satisfied with blue hyperlink     If the patient is female:    4. For patients aged 41-77: Has the patient had a mammogram within the past 2 years? NA based on age or sex    11. For patients aged 21-65: Has the patient had a pap smear?  NA based on age or sex

## 2021-12-27 NOTE — PROGRESS NOTES
Ida Head is a 80 y.o. male presenting today for Pre-op Exam  .     Chief Complaint   Patient presents with    Pre-op Exam       HPI:  Ida Head presents to the office today for Pre-operative exam.    PCP: Dr. Chelsi Tomlin    Patient has a PMHx significant for HTN, HLD, atrial fibrillation with multiple ablations, ventricular tachycardia ablation, ultimately had a pacemaker upgrade to AICD, HFpEF and OA. Patient is scheduled for a Left Total knee replacement on 1/7/21 at [facility].     Patient states aside from Lt knee pain which is making it difficult for him to walk up a flight of stairs, he has no active complaints. Denies shortness of breath, chest pain, palpitations, orthopnea or PND. He states he had been walking and lifting weights with no complaints aside from the Lt knee pain. Labs reviewed. Echo 1/2020:  · Image quality for this study was technically difficult. · Normal cavity size, wall thickness and systolic function (ejection fraction normal). Estimated left ventricular ejection fraction is 55 - 60%. Visually measured ejection fraction. No regional wall motion abnormality noted. Inconclusive left ventricular diastolic function. · Aortic valve sclerosis. Mild to moderate aortic valve regurgitation is present. · Severely dilated left atrium. · Mild mitral valve regurgitation is present. · Moderately dilated right ventricle. Borderline low systolic function. Pacing wire present Moderator band present. · Moderate tricuspid valve regurgitation is present. · Dilated right atrium. · Pulmonary arterial systolic pressure is 22 mmHg. Review of Systems   Constitutional: Negative for chills, diaphoresis, fever, malaise/fatigue and weight loss. HENT: Negative for congestion, ear discharge, ear pain, hearing loss, nosebleeds, sinus pain, sore throat and tinnitus. Eyes: Negative for blurred vision, double vision and photophobia.    Respiratory: Negative for cough, sputum production, shortness of breath, wheezing and stridor. Cardiovascular: Negative for chest pain, palpitations, orthopnea, claudication, leg swelling and PND. Gastrointestinal: Negative for abdominal pain, constipation, diarrhea, heartburn, nausea and vomiting. Genitourinary: Negative for dysuria, flank pain, frequency, hematuria and urgency. Musculoskeletal: Positive for joint pain. Skin: Negative for rash. Neurological: Negative for tingling, tremors, sensory change, speech change, focal weakness, seizures, weakness and headaches. Psychiatric/Behavioral: Negative for depression. The patient is not nervous/anxious. All other systems reviewed and are negative.       Allergies   Allergen Reactions    Cephalexin Hives and Itching    Lipitor [Atorvastatin] Myalgia    Livalo [Pitavastatin] Other (comments)     Extreme fatigue    5/29/14   PATIENT DENIES    Zocor [Simvastatin] Myalgia     5/29/14 PATIENT DENIES        PHQ Screening   3 most recent PHQ Screens 12/27/2021   Little interest or pleasure in doing things Not at all   Feeling down, depressed, irritable, or hopeless Not at all   Total Score PHQ 2 0   Trouble falling or staying asleep, or sleeping too much Not at all   Feeling tired or having little energy Not at all   Poor appetite, weight loss, or overeating Not at all   Feeling bad about yourself - or that you are a failure or have let yourself or your family down Not at all   Trouble concentrating on things such as school, work, reading, or watching TV Not at all   Moving or speaking so slowly that other people could have noticed; or the opposite being so fidgety that others notice Not at all   Thoughts of being better off dead, or hurting yourself in some way Not at all   PHQ 9 Score 0   How difficult have these problems made it for you to do your work, take care of your home and get along with others Not difficult at all       History  Past Medical History:   Diagnosis Date    AICD (automatic cardioverter/defibrillator) present     [employer]  upgrade from pacer in 2007 due to VT    Arthritis     [facility]     Cancer ([facility] 02/2019    melenoma on left leg    Cardiac cath 03/19/2007    LM 25% ostial.  Otherwise patent coronaries. LVEDP 20.  EF 50%. Dyssynch. mid anterior contraction. Pacemaker.  Cardiac echocardiogram 03/20/2014    A-fib. EF 65-70%. No RWMA. No RWMA. Mild conc LVH. Mild RVE. RVSP 40-45 mmHg. Mild LAE.  HERNANDEZ. Mild MR. Mod TR.  Cardiac nuclear imaging test 07/29/2014    No ischemia or prior infarction. EF 68%. Nondiagnostic EKG due to V pacing on pharm stress test.  Low risk.  Cardioversion 8/31/2011    Successful defibrillation threshold testing at 18 joules. Patient also had conversion to atrial ventricular pacing.      CHF (congestive heart failure) ([facility]     Cobalamin deficiency     Dupuytren contracture 02/08/2010    on the right ring finger     Edema     GERD (gastroesophageal reflux disease)     Hypertension     Hypertrophy of prostate with urinary obstruction and other lower urinary tract symptoms (LUTS)     Ill-defined condition 2007    AICD    Impotence of organic origin     Intolerance of drug     Intolerant high doses of sotalol due to prolonged QT    Mastodynia     Paroxysmal VT ([facility]     Pure hypercholesterolemia     S/P ablation of atrial fibrillation 05/31/2011    S/P ablation of atrial fibrillation 12/18/2012    Dr. Aris Santos at [facility] S/P ablation operation for arrhythmia     VT ablation by Dr. Radha Delgado near [address] [place] [address] 2/3007    Sick sinus syndrome [facility])        Past Surgical History:   Procedure Laterality Date    COLONOSCOPY N/A 9/12/2018    COLONOSCOPY with Polypectomies and Clip Placement performed by Chanel Merida MD at [address] HX [place] HX AFIB [address] by Dr. Dago Beckman HX CATARACT REMOVAL  02/08-03/08    bilateral cataract removed    HX CATARACT REMOVAL  2/2008 & 3/2008    bilateral    HX ENDOSCOPY  09/12/2018    Dr. Jose Gramajo - pathology indicates changes of acid reflux and Garcia's esophagus, repeat EGD in 2 years    HX ORTHOPAEDIC  2/8/10    release of Dupuytren's contraction on ring finger of right hand    HX OTHER SURGICAL  6/2000    atrial lead placement    HX OTHER SURGICAL  6/5/2009    Cardioversion    HX OTHER SURGICAL  10/12/2012    cardioversion    HX PACEMAKER  1998    placement    HX PACEMAKER  6/2000    DDD    HX PACEMAKER  3/27/07    removal of pacemaker & placement of dual chamber defib generator and leads    HX TONSIL AND ADENOIDECTOMY         Social History     Socioeconomic History    Marital status:      Spouse name: Not on file    Number of children: Not on file    Years of education: Not on file    Highest education level: Not on file   Occupational History    Not on file   Tobacco Use    Smoking status: Never Smoker    Smokeless tobacco: Never Used   Vaping Use    Vaping Use: Not on file   Substance and Sexual Activity    Alcohol use: Yes     Alcohol/week: 10.0 - 12.0 standard drinks     Types: 10 - 12 Glasses of wine per week     Comment: 2-3 glasses of white wine about 5-6 days/week)    Drug use: No    Sexual activity: Not Currently     Partners: Female   Other Topics Concern    Not on file   Social History Narrative    Not on file     Social Determinants of Health     Financial Resource Strain:     Difficulty of Paying Living Expenses: Not on file   Food Insecurity:     Worried About Running Out of Food in the Last Year: Not on file    Ella of Food in the Last Year: Not on file   Transportation Needs:     Lack of Transportation (Medical): Not on file    Lack of Transportation (Non-Medical):  Not on file   Physical Activity:     Days of Exercise per Week: Not on file    Minutes of Exercise per Session: Not on file   Stress:     Feeling of Stress : Not on file Social Connections:     Frequency of Communication with Friends and Family: Not on file    Frequency of Social Gatherings with Friends and Family: Not on file    Attends Bahai Services: Not on file    Active Member of Clubs or Organizations: Not on file    Attends Club or Organization Meetings: Not on file    Marital Status: Not on file   Intimate Partner Violence:     Fear of Current or Ex-Partner: Not on file    Emotionally Abused: Not on file    Physically Abused: Not on file    Sexually Abused: Not on file   Housing Stability:     Unable to Pay for Housing in the Last Year: Not on file    Number of [place] in the Last Year: Not on file    Unstable Housing in the Last Year: Not on file       Current Outpatient Medications   Medication Sig Dispense Refill    cyanocobalamin (VITAMIN B12) 1,000 mcg/mL injection inject 1 milliliter ( 1000 MCG ) intramuscularly EVERY 3 WEEKS 1 mL 8    evolocumab (REPATHA SURECLICK) pen injection 1 mL by SubCUTAneous route every fourteen (14) days. 6 Pen 3    triamcinolone acetonide (KENALOG) 0.1 % topical cream       metoprolol tartrate (Lopressor) 50 mg tablet Take 1 Tablet by mouth two (2) times a day. 180 Tablet 3    famotidine (PEPCID) 40 mg tablet Take 40 mg by mouth daily.  warfarin (COUMADIN) 5 mg tablet 1 tab on Mon, Wed, Fri.   1/2 tab on the other days 100 Tab 3    furosemide (LASIX) 40 mg tablet TAKE 1 TABLET DAILY  Indications: visible water retention 90 Tab 3    amLODIPine (NORVASC) 2.5 mg tablet 1 tablet daily (For BP). (Stop Benazepril) 90 Tab 3    ezetimibe (ZETIA) 10 mg tablet Take 1 Tab by mouth daily. 90 Tab 3    coenzyme Q-10 (CO Q-10) 200 mg capsule Take  by mouth daily.  aspirin delayed-release 81 mg tablet Take  by mouth daily.  LUMIGAN 0.01 % ophthalmic drops Administer 1 Drop to both eyes nightly.  AZOPT 1 % ophthalmic suspension Administer 1 Drop to both eyes two (2) times a day.   3    multivitamin (ONE A DAY) tablet Take 1 Tab by mouth daily. Vitals:    12/27/21 1351 12/27/21 1418   BP: (!) 141/77 (!) 141/75   Pulse: 85    Resp: 20    SpO2: 100%    Weight: 204 lb (92.5 kg)    Height: 6' (1.829 m)    PainSc:   0 - No pain        Physical Exam  Vitals and nursing note reviewed. Constitutional:       General: He is not in acute distress. Appearance: Normal appearance. He is normal weight. He is not ill-appearing, toxic-appearing or diaphoretic. HENT:      Head: Normocephalic and atraumatic. Nose: Nose normal. No congestion or rhinorrhea. Mouth/Throat:      Mouth: Mucous membranes are moist.      Pharynx: Oropharynx is clear. No oropharyngeal exudate or posterior oropharyngeal erythema. Eyes:      General: No scleral icterus. Extraocular Movements: Extraocular movements intact. Conjunctiva/sclera: Conjunctivae normal.      Pupils: Pupils are equal, round, and reactive to light. Cardiovascular:      Rate and Rhythm: Normal rate. Rhythm irregular. Pulses: Normal pulses. Heart sounds: Normal heart sounds. No murmur heard. No gallop. Pulmonary:      Effort: Pulmonary effort is normal. No respiratory distress. Breath sounds: Normal breath sounds. No stridor. No wheezing, rhonchi or rales. Abdominal:      General: Bowel sounds are normal. There is no distension. Palpations: Abdomen is soft. Tenderness: There is no abdominal tenderness. There is no guarding. Musculoskeletal:      Cervical back: Normal range of motion and neck supple. Right lower leg: No edema. Left lower leg: No edema. Skin:     General: Skin is warm and dry. Coloration: Skin is not jaundiced or pale. Neurological:      General: No focal deficit present. Mental Status: He is alert and oriented to person, place, and time. Mental status is at baseline. Cranial Nerves: No cranial nerve deficit. Motor: No weakness.    Psychiatric:         Mood and Affect: Mood normal.         Behavior: Behavior normal.         Thought Content: Thought content normal.         Judgment: Judgment normal.         No visits with results within 3 Month(s) from this visit. Latest known visit with results is:   Hospital Outpatient Visit on 09/03/2021   Component Date Value Ref Range Status    WBC 09/03/2021 6.1  4.6 - 13.2 K/uL Final    RBC 09/03/2021 4.94  4.35 - 5.65 M/uL Final    HGB 09/03/2021 16.2* 13.0 - 16.0 g/dL Final    HCT 09/03/2021 46.9  36.0 - 48.0 % Final    MCV 09/03/2021 94.9  78.0 - 100.0 FL Final    PLEASE NOTE NEW REFERENCE RANGE    MCH 09/03/2021 32.8  24.0 - 34.0 PG Final    MCHC 09/03/2021 34.5  31.0 - 37.0 g/dL Final    RDW 09/03/2021 12.9  11.6 - 14.5 % Final    PLATELET 06/71/2089 046  135 - 420 K/uL Final    MPV 09/03/2021 9.7  9.2 - 11.8 FL Final    NEUTROPHILS 09/03/2021 59  40 - 73 % Final    LYMPHOCYTES 09/03/2021 25  21 - 52 % Final    MONOCYTES 09/03/2021 12* 3 - 10 % Final    EOSINOPHILS 09/03/2021 3  0 - 5 % Final    BASOPHILS 09/03/2021 1  0 - 2 % Final    ABS. NEUTROPHILS 09/03/2021 3.6  1.8 - 8.0 K/UL Final    ABS. LYMPHOCYTES 09/03/2021 1.5  0.9 - 3.6 K/UL Final    ABS. MONOCYTES 09/03/2021 0.7  0.05 - 1.2 K/UL Final    ABS. EOSINOPHILS 09/03/2021 0.2  0.0 - 0.4 K/UL Final    ABS. BASOPHILS 09/03/2021 0.1  0.0 - 0.1 K/UL Final    DF 09/03/2021 AUTOMATED    Final    LIPID PROFILE 09/03/2021        Final    Cholesterol, total 09/03/2021 147  <200 MG/DL Final    Triglyceride 09/03/2021 47  <150 MG/DL Final    Comment: The drugs N-acetylcysteine (NAC) and  Metamiszole have been found to cause falsely  low results in this chemical assay. Please  be sure to submit blood samples obtained  BEFORE administration of either of these  drugs to assure correct results.       HDL Cholesterol 09/03/2021 82* 40 - 60 MG/DL Final    LDL, calculated 09/03/2021 55.6  0 - 100 MG/DL Final    VLDL, calculated 09/03/2021 9.4  MG/DL Final    CHOL/HDL Ratio 09/03/2021 1.8  0 - 5.0   Final    Sodium 09/03/2021 142  136 - 145 mmol/L Final    Potassium 09/03/2021 4.5  3.5 - 5.5 mmol/L Final    Chloride 09/03/2021 109  100 - 111 mmol/L Final    CO2 09/03/2021 28  21 - 32 mmol/L Final    Anion gap 09/03/2021 5  3.0 - 18 mmol/L Final    Glucose 09/03/2021 96  74 - 99 mg/dL Final    BUN 09/03/2021 19* 7.0 - 18 MG/DL Final    Creatinine 09/03/2021 1.03  0.6 - 1.3 MG/DL Final    BUN/Creatinine ratio 09/03/2021 18  12 - 20   Final    GFR est AA 09/03/2021 >60  >60 ml/min/1.73m2 Final    GFR est non-AA 09/03/2021 >60  >60 ml/min/1.73m2 Final    Comment: (NOTE)  Estimated GFR is calculated using the Modification of Diet in Renal   Disease (MDRD) Study equation, reported for both  Americans   (GFRAA) and non- Americans (GFRNA), and normalized to 1.73m2   body surface area. The physician must decide which value applies to   the patient. The MDRD study equation should only be used in   individuals age 25 or older. It has not been validated for the   following: pregnant women, patients with serious comorbid conditions,   or on certain medications, or persons with extremes of body size,   muscle mass, or nutritional status.  Calcium 09/03/2021 8.8  8.5 - 10.1 MG/DL Final    Bilirubin, total 09/03/2021 0.9  0.2 - 1.0 MG/DL Final    ALT (SGPT) 09/03/2021 32  16 - 61 U/L Final    AST (SGOT) 09/03/2021 27  10 - 38 U/L Final    Alk. phosphatase 09/03/2021 98  45 - 117 U/L Final    Protein, total 09/03/2021 7.4  6.4 - 8.2 g/dL Final    Albumin 09/03/2021 3.5  3.4 - 5.0 g/dL Final    Globulin 09/03/2021 3.9  2.0 - 4.0 g/dL Final    A-G Ratio 09/03/2021 0.9  0.8 - 1.7   Final    Prostate Specific Ag 09/03/2021 1.2  0.0 - 4.0 ng/mL Final    Vitamin B12 09/03/2021 1,104* 211 - 911 pg/mL Final       No results found for any visits on 12/27/21.     Patient Care Team:  Patient Care Team:  Solitario Field NP as PCP - General (Nurse Practitioner)  Flaquita Hawley NP as PCP - [facility] Empaneled Provider  Wilian Mchugh MD as Physician (Orthopedic Surgery)  Apoorva Mendez DO as Physician (Cardiology)  Keith Sauer MD as Physician (Ophthalmology)  April Marquis MD as Physician (Gastroenterology)  Qamar Torres MD (Pulmonary Disease)      Assessment / Plan:      ICD-10-CM ICD-9-CM    1. Pre-operative examination  Z01.818 V72.84    2. Essential hypertension  I10 401.9      Pre-operative clearance:  Labs and Echo reviewed. Patient is stable with no signs of decompensated HF. Denies any chest pain, shortness of breath etc.     Patient is medically cleared for orthopedic surgery. Advised to :  - stop taking Coumadin 5 days before the surgery. - Take his Metoprolol on the day of the surgery    Follow-up and Dispositions    · Return if symptoms worsen or fail to improve. I asked the patient if he  had any questions and answered his  questions.   The patient stated that he understands the treatment plan and agrees with the treatment plan

## 2021-12-28 ENCOUNTER — TELEPHONE (OUTPATIENT)
Dept: FAMILY MEDICINE CLINIC | Age: 81
End: 2021-12-28

## 2021-12-28 NOTE — TELEPHONE ENCOUNTER
Late entry: 2 pt identifiers confirmed. Patient INR 3.2 on 12/22/2021. Patient held one dose and resumed, Coumadin 5 mg Mon/Wed/Fri and 2 mg all other days. Patient should continue same dose and repeat in 1 week.

## 2022-01-03 RX ORDER — EZETIMIBE 10 MG/1
10 TABLET ORAL DAILY
Qty: 90 TABLET | Refills: 3 | Status: SHIPPED | OUTPATIENT
Start: 2022-01-03

## 2022-01-04 NOTE — PROGRESS NOTES
I have personally seen and evaluated the device findings. Interrogation reviewed and I agree with assessment.     Wil Palomino

## 2022-01-10 ENCOUNTER — TELEPHONE (OUTPATIENT)
Dept: FAMILY MEDICINE CLINIC | Age: 82
End: 2022-01-10

## 2022-01-10 NOTE — TELEPHONE ENCOUNTER
----- Message from Julieta Schmitz sent at 1/10/2022  9:37 AM EST -----  Subject: Message to Provider    QUESTIONS  Information for Provider? Pts wife Ozzie Oreilly is calling in states pt had a   knee replacement on Friday 01/07/22. Pt is having hiccups and problems   sleeping and would like PCP Mariza Rodriguez to call just incase its a   side effect to the anesthesia. Please advise.   ---------------------------------------------------------------------------  --------------  CALL BACK INFO  What is the best way for the office to contact you? OK to leave message on   voicemail  Preferred Call Back Phone Number? [phone]  ---------------------------------------------------------------------------  --------------  SCRIPT ANSWERS  Relationship to Patient? Other  Representative Name? Shari Restrepo  Is the Representative on the appropriate HIPAA document in Epic?  Yes

## 2022-01-12 ENCOUNTER — TELEPHONE (OUTPATIENT)
Dept: FAMILY MEDICINE CLINIC | Age: 82
End: 2022-01-12

## 2022-01-12 NOTE — TELEPHONE ENCOUNTER
Home health nurse  Called and needed to know when  to check his PT INR again and the dose of the medication to be given please advise

## 2022-01-13 ENCOUNTER — TELEPHONE (OUTPATIENT)
Dept: FAMILY MEDICINE CLINIC | Age: 82
End: 2022-01-13

## 2022-01-18 ENCOUNTER — TELEPHONE (OUTPATIENT)
Dept: FAMILY MEDICINE CLINIC | Age: 82
End: 2022-01-18

## 2022-01-18 NOTE — TELEPHONE ENCOUNTER
Home health reports patient's PT 24.4/INR 2.0. Current dose 5 mg Monday, Wednesday, Friday; 2.5 alternate days.

## 2022-01-20 ENCOUNTER — TELEPHONE (OUTPATIENT)
Dept: FAMILY MEDICINE CLINIC | Age: 82
End: 2022-01-20

## 2022-01-20 NOTE — TELEPHONE ENCOUNTER
TC to patient, INR is 1.5 he is currently taking Coumadin 5 mg Mon/Wed/Fri and 2 mg all other days. Provider stated patient should increase dose to 5 mg daily and recheck in 1 week. He verbalized his understanding.

## 2022-01-26 ENCOUNTER — TELEPHONE (OUTPATIENT)
Dept: FAMILY MEDICINE CLINIC | Age: 82
End: 2022-01-26

## 2022-01-26 NOTE — TELEPHONE ENCOUNTER
032 Jennifer Wood called to report pt INR 3.3 and PT 39.5. Provider stated he should hold dose x 1 day and resume 2 mg on Wednesday and 5 mg all other days. Recheck in 1 week.

## 2022-01-27 ENCOUNTER — TELEPHONE (OUTPATIENT)
Dept: FAMILY MEDICINE CLINIC | Age: 82
End: 2022-01-27

## 2022-01-27 NOTE — TELEPHONE ENCOUNTER
TC to patient his INR is 3.3 provider stated to hold x 1 day then resume 2.5 mg Mon/Wed and 5 mg all other days. Recheck INR in 1 week.

## 2022-02-02 ENCOUNTER — TELEPHONE (OUTPATIENT)
Dept: FAMILY MEDICINE CLINIC | Age: 82
End: 2022-02-02

## 2022-02-03 NOTE — TELEPHONE ENCOUNTER
TC to patient, his INR 2.1. He was instructed to continue Coumadin 2.5 mg Mon/Wed and 5 mg all other days. Recheck INR in 1 week.

## 2022-02-09 ENCOUNTER — TRANSCRIBE ORDER (OUTPATIENT)
Dept: SCHEDULING | Age: 82
End: 2022-02-09

## 2022-02-09 ENCOUNTER — TELEPHONE (OUTPATIENT)
Dept: FAMILY MEDICINE CLINIC | Age: 82
End: 2022-02-09

## 2022-02-09 DIAGNOSIS — C43.72 MALIGNANT MELANOMA OF LEFT LOWER EXTREMITY INCLUDING HIP (HCC): Primary | ICD-10-CM

## 2022-02-09 NOTE — TELEPHONE ENCOUNTER
Home health reports PT 33.9/INR 2.8. Current dose 5 mg Monday, Wednesday, Friday, 2.5 mg other days. Patient will be discharged from home health on Thursday and will resume checking on his own.

## 2022-02-09 NOTE — TELEPHONE ENCOUNTER
TC to patient, his INR is 2.8. he currently takes Coumadin 5 mg Monday, Wednesday, Friday and 2.5 mg all other days. Provider stated to continue same dose and repeat in 1 week.

## 2022-02-10 ENCOUNTER — TELEPHONE (OUTPATIENT)
Dept: FAMILY MEDICINE CLINIC | Age: 82
End: 2022-02-10

## 2022-02-10 NOTE — TELEPHONE ENCOUNTER
TC to patient, his INR is 2.8 on 2/10/22. He currently takes Coumadin 5 mg Monday, Wednesday, Friday and 2.5 mg all other days. Provider stated to continue same dose and repeat in 1 week.

## 2022-02-11 RX ORDER — FUROSEMIDE 40 MG/1
TABLET ORAL
Qty: 90 TABLET | Refills: 3 | Status: SHIPPED | OUTPATIENT
Start: 2022-02-11

## 2022-02-17 ENCOUNTER — TELEPHONE (OUTPATIENT)
Dept: FAMILY MEDICINE CLINIC | Age: 82
End: 2022-02-17

## 2022-02-17 NOTE — TELEPHONE ENCOUNTER
TC to patient his INR 3.5 on 2/16/22. Hold x 2 days then restart Coumadin 5 mg two days and 2 1/2 mg all other days. Repeat INR in 1 week.

## 2022-02-24 ENCOUNTER — TELEPHONE (OUTPATIENT)
Dept: FAMILY MEDICINE CLINIC | Age: 82
End: 2022-02-24

## 2022-02-24 NOTE — TELEPHONE ENCOUNTER
TC to patient his INR 2.4 on 02/23/22. Continue to take Coumadin 5 mg two days and 2 1/2 mg all other days. Repeat in 1 week. Patient verbalized his understanding.

## 2022-03-03 ENCOUNTER — TELEPHONE (OUTPATIENT)
Dept: FAMILY MEDICINE CLINIC | Age: 82
End: 2022-03-03

## 2022-03-07 ENCOUNTER — HOSPITAL ENCOUNTER (OUTPATIENT)
Dept: CT IMAGING | Age: 82
Discharge: HOME | End: 2022-03-07
Attending: PHYSICIAN ASSISTANT
Payer: MEDICARE

## 2022-03-07 DIAGNOSIS — C43.72 MALIGNANT MELANOMA OF LEFT LOWER EXTREMITY INCLUDING HIP (HCC): ICD-10-CM

## 2022-03-07 LAB — CREAT UR-MCNC: 0.9 MG/DL (ref 0.6–1.3)

## 2022-03-07 PROCEDURE — 74177 CT ABD & PELVIS W/CONTRAST: CPT

## 2022-03-07 PROCEDURE — 74011000636 HC RX REV CODE- 636: Performed by: PHYSICIAN ASSISTANT

## 2022-03-07 PROCEDURE — 82565 ASSAY OF CREATININE: CPT

## 2022-03-07 RX ADMIN — IOPAMIDOL 100 ML: 612 INJECTION, SOLUTION INTRAVENOUS at 09:41

## 2022-03-16 ENCOUNTER — CLINICAL SUPPORT (OUTPATIENT)
Dept: CARDIOLOGY CLINIC | Age: 82
End: 2022-03-16

## 2022-03-16 ENCOUNTER — OFFICE VISIT (OUTPATIENT)
Dept: CARDIOLOGY CLINIC | Age: 82
End: 2022-03-16
Payer: MEDICARE

## 2022-03-16 VITALS
BODY MASS INDEX: 26.41 KG/M2 | WEIGHT: 195 LBS | HEIGHT: 72 IN | OXYGEN SATURATION: 98 % | SYSTOLIC BLOOD PRESSURE: 130 MMHG | DIASTOLIC BLOOD PRESSURE: 62 MMHG | HEART RATE: 95 BPM

## 2022-03-16 DIAGNOSIS — E78.5 HYPERLIPIDEMIA, UNSPECIFIED HYPERLIPIDEMIA TYPE: ICD-10-CM

## 2022-03-16 DIAGNOSIS — Z95.810 AICD (AUTOMATIC CARDIOVERTER/DEFIBRILLATOR) PRESENT: Primary | ICD-10-CM

## 2022-03-16 DIAGNOSIS — I48.20 CHRONIC ATRIAL FIBRILLATION (HCC): ICD-10-CM

## 2022-03-16 DIAGNOSIS — Z79.01 ANTICOAGULATED ON COUMADIN: ICD-10-CM

## 2022-03-16 DIAGNOSIS — Z79.01 WARFARIN ANTICOAGULATION: ICD-10-CM

## 2022-03-16 DIAGNOSIS — I50.32 CHRONIC DIASTOLIC CONGESTIVE HEART FAILURE (HCC): ICD-10-CM

## 2022-03-16 DIAGNOSIS — I47.29 PAROXYSMAL VT: ICD-10-CM

## 2022-03-16 DIAGNOSIS — I10 ESSENTIAL HYPERTENSION: ICD-10-CM

## 2022-03-16 PROCEDURE — 93283 PRGRMG EVAL IMPLANTABLE DFB: CPT | Performed by: INTERNAL MEDICINE

## 2022-03-16 PROCEDURE — G8427 DOCREV CUR MEDS BY ELIG CLIN: HCPCS | Performed by: INTERNAL MEDICINE

## 2022-03-16 PROCEDURE — G8510 SCR DEP NEG, NO PLAN REQD: HCPCS | Performed by: INTERNAL MEDICINE

## 2022-03-16 PROCEDURE — G8536 NO DOC ELDER MAL SCRN: HCPCS | Performed by: INTERNAL MEDICINE

## 2022-03-16 PROCEDURE — 1101F PT FALLS ASSESS-DOCD LE1/YR: CPT | Performed by: INTERNAL MEDICINE

## 2022-03-16 PROCEDURE — G8754 DIAS BP LESS 90: HCPCS | Performed by: INTERNAL MEDICINE

## 2022-03-16 PROCEDURE — G8419 CALC BMI OUT NRM PARAM NOF/U: HCPCS | Performed by: INTERNAL MEDICINE

## 2022-03-16 PROCEDURE — G8752 SYS BP LESS 140: HCPCS | Performed by: INTERNAL MEDICINE

## 2022-03-16 PROCEDURE — 99214 OFFICE O/P EST MOD 30 MIN: CPT | Performed by: INTERNAL MEDICINE

## 2022-03-16 NOTE — PROGRESS NOTES
History of Present Illness:  [de-identified]year old male here for follow up. Overall he is doing quite well. He had followed with Dr. Marcus Barney for many years. He has a [place] [facility] that he goes to regularly. He has some mild dyspnea at times, which is really unchanged. His functional status is not limited and he goes to the food bank, doing lifting at [employer], no limitation. No new chest pain, PND, orthopnea or edema. No bleeding issues. Impression:  1. Single chamber Medtronic AICD with normal function today. 2. History of a-fib with ablation in 2011, 2012 at [facility], on chronic Coumadin, followed by his primary provider. 3. Remote VT with ablation back in 2007, no recurrence. 4. Chronic diastolic heart failure, on Lasix. 5. Echo January, 2020 with normal function. He has some mild dyspnea, which is unchanged. 6. Dyslipidemia with previous LDL 55 September, 2021.  7. HTN, controlled. 8. History of remote melanoma of the left leg, as well as remote pulmonary nodules, but recent CT scan of chest, abdomen and pelvis without any new changes. Plan:  From a cardiac standpoint, AICD is functioning normally, no new symptoms of heart failure. When I see him back in six months, I will consider repeating an echocardiogram given his history. His OptiVol is not elevated today. He remains on Coumadin without bleeding issues. Past Medical History:   Diagnosis Date    AICD (automatic cardioverter/defibrillator) present     Medtronic  upgrade from pacer in 2007 due to VT    Arthritis     [facility]     Cancer ([facility] [address] 02/2019    melenoma on left leg    Cardiac cath 03/19/2007    LM 25% ostial.  Otherwise patent coronaries. LVEDP 20.  EF 50%. Dyssynch. mid anterior contraction. Pacemaker.  Cardiac echocardiogram 03/20/2014    A-fib. EF 65-70%. No RWMA. No RWMA. Mild conc LVH. Mild RVE. RVSP 40-45 mmHg. Mild LAE.  HERNANDEZ. Mild MR. Mod TR.       Cardiac nuclear imaging test 07/29/2014    No ischemia or prior infarction. EF 68%. Nondiagnostic EKG due to V pacing on pharm stress test.  Low risk.  Cardioversion 8/31/2011    Successful defibrillation threshold testing at 18 joules. Patient also had conversion to atrial ventricular pacing.  CHF (congestive heart failure) ([facility] [address]     Cobalamin deficiency     Dupuytren contracture 02/08/2010    on the right ring finger     Edema     GERD (gastroesophageal reflux disease)     Hypertension     Hypertrophy of prostate with urinary obstruction and other lower urinary tract symptoms (LUTS)     Ill-defined condition 2007    AICD    Impotence of organic origin     Intolerance of drug     Intolerant high doses of sotalol due to prolonged QT    Mastodynia     Paroxysmal VT ([facility] [address]     Pure hypercholesterolemia     S/P ablation of atrial fibrillation 05/31/2011    S/P ablation of atrial fibrillation 12/18/2012    Dr. Bushra Delvalle at [facility] S/P ablation operation for arrhythmia     VT ablation by Dr. Beni Leal near AVN 2/3007    Sick sinus syndrome [facility])        Current Outpatient Medications   Medication Sig Dispense Refill    furosemide (LASIX) 40 mg tablet TAKE 1 TABLET DAILY  Indications: visible water retention 90 Tablet 3    ezetimibe (ZETIA) 10 mg tablet Take 1 Tablet by mouth daily. 90 Tablet 3    cyanocobalamin (VITAMIN B12) 1,000 mcg/mL injection inject 1 milliliter ( 1000 MCG ) intramuscularly EVERY 3 WEEKS 1 mL 8    evolocumab (REPATHA SURECLICK) pen injection 1 mL by SubCUTAneous route every fourteen (14) days. 6 Pen 3    triamcinolone acetonide (KENALOG) 0.1 % topical cream       metoprolol tartrate (Lopressor) 50 mg tablet Take 1 Tablet by mouth two (2) times a day. 180 Tablet 3    famotidine (PEPCID) 40 mg tablet Take 40 mg by mouth daily.       warfarin (COUMADIN) 5 mg tablet 1 tab on Mon, Wed, Fri.   1/2 tab on the other days 100 Tab 3    amLODIPine (NORVASC) 2.5 mg tablet 1 tablet daily (For BP). (Stop Benazepril) 90 Tab 3    coenzyme Q-10 (CO Q-10) 200 mg capsule Take  by mouth daily.  aspirin delayed-release 81 mg tablet Take  by mouth daily.  LUMIGAN 0.01 % ophthalmic drops Administer 1 Drop to both eyes nightly.  AZOPT 1 % ophthalmic suspension Administer 1 Drop to both eyes two (2) times a day. 3    multivitamin (ONE A DAY) tablet Take 1 Tab by mouth daily. Social History   reports that he has never smoked. He has never used smokeless tobacco.   reports current alcohol use of about 10.0 - 12.0 standard drinks of alcohol per week. Family History  family history includes COPD in his father; Hypertension in an other family member; No Known Problems in his mother. Review of Systems  Except as stated above include:  Constitutional: Negative for fever, chills and malaise/fatigue. HEENT: No congestion or recent URI. Gastrointestinal: No nausea, vomiting, abdominal pain, bloody stools. Pulmonary:  Negative except as stated above. Cardiac:  Negative except as stated above. Musculoskeletal: Negative except as stated above. Neurological:  No localized symptoms. Skin:  Negative except as stated above. Psych:  Negative except as stated above. Endocrine:  Negative except as stated above. PHYSICAL EXAM  BP Readings from Last 3 Encounters:   03/16/22 130/62   12/27/21 (!) 141/75   10/14/21 130/76     Pulse Readings from Last 3 Encounters:   03/16/22 95   12/27/21 85   10/14/21 92     Wt Readings from Last 3 Encounters:   03/16/22 88.5 kg (195 lb)   12/27/21 92.5 kg (204 lb)   10/14/21 88.9 kg (196 lb)     General:   Well developed, well groomed. Head/Neck:   No obvious jugular venous distention     No obvious carotid pulsations. No evidence of xanthelasma. Lungs:   No respiratory distress. Clear bilaterally. Heart:  Regular rate and rhythm. Normal S1/S2. Palpation grossly normal.    No significant murmurs, rubs or gallops.     AICD pocket intact. Abdomen:   Non-acute abdomen. No obvious pulsations. Extremities:   Intact peripheral pulses. No significant edema. Neurological:   Alert and oriented to person, place, time. No focal neurological deficit visually. Skin:   No obvious rash    Blood Pressure Metric:  Monitor recommended and adjustments stated if needed.

## 2022-03-16 NOTE — PROGRESS NOTES
Jose Ramirez presents today for   Chief Complaint   Patient presents with    Follow-up     6 months        [place] preferred language for health care discussion is english/other. Is someone accompanying this pt? no    Is the patient using any DME equipment during [address]? no    Depression Screening:  3 most recent PHQ Screens 3/16/2022   Little interest or pleasure in doing things Not at all   Feeling down, depressed, irritable, or hopeless Not at all   Total Score PHQ 2 0   Trouble falling or staying asleep, or sleeping too much -   Feeling tired or having little energy -   Poor appetite, weight loss, or overeating -   Feeling bad about yourself - or that you are a failure or have let yourself or your family down -   Trouble concentrating on things such as school, work, reading, or watching TV -   Moving or speaking so slowly that other people could have noticed; or the opposite being so fidgety that others notice -   Thoughts of being better off dead, or hurting yourself in some way -   PHQ 9 Score -   How difficult have these problems made it for you to do your work, take care of your home and get along with others -       Learning Assessment:  Learning Assessment 3/17/2021   PRIMARY LEARNER Patient   HIGHEST LEVEL OF EDUCATION - PRIMARY LEARNER  -   BARRIERS PRIMARY LEARNER -   CO-LEARNER CAREGIVER -   PRIMARY LANGUAGE ENGLISH   LEARNER PREFERENCE PRIMARY DEMONSTRATION   ANSWERED BY patient   RELATIONSHIP SELF       Abuse Screening:  Abuse Screening Questionnaire 10/14/2021   Do you ever feel afraid of your partner? N   Are you in a relationship with someone who physically or mentally threatens you? N   Is it safe for you to go home? Y       Fall Risk  Fall Risk Assessment, last 12 mths 3/16/2022   Able to walk? Yes   Fall in past 12 months? 0   Do you feel unsteady? 0   Are you worried about falling 0       Pt currently taking Anticoagulant therapy?  ASA 81mg every day and Warfarin     Coordination of Care:  1. Have you been to the ER, urgent care clinic since your last visit? Hospitalized since your last visit? no    2. Have you seen or consulted any other health care providers outside of the [address] [place] [address] since your last visit? Include any pap smears or colon screening.  no

## 2022-03-17 ENCOUNTER — TELEPHONE (OUTPATIENT)
Dept: FAMILY MEDICINE CLINIC | Age: 82
End: 2022-03-17

## 2022-03-17 NOTE — TELEPHONE ENCOUNTER
TC to patient INR 2.6 on 3/17/22. He currently taking Coumadin 5 mg two days and 2 1/2 mg all other days. Repeat in 1 week. Pt verbalized his understanding.

## 2022-03-18 PROBLEM — J40 BRONCHITIS: Status: ACTIVE | Noted: 2017-04-28

## 2022-03-19 PROBLEM — N62 HYPERTROPHY OF BREAST: Status: ACTIVE | Noted: 2021-12-27

## 2022-03-19 PROBLEM — E53.8 B12 DEFICIENCY: Status: ACTIVE | Noted: 2017-06-15

## 2022-03-22 RX ORDER — AMLODIPINE BESYLATE 2.5 MG/1
TABLET ORAL
Qty: 90 TABLET | Refills: 3 | Status: SHIPPED | OUTPATIENT
Start: 2022-03-22

## 2022-03-22 RX ORDER — WARFARIN SODIUM 5 MG/1
TABLET ORAL
Qty: 135 TABLET | Refills: 3 | Status: SHIPPED | OUTPATIENT
Start: 2022-03-22

## 2022-03-24 ENCOUNTER — TELEPHONE (OUTPATIENT)
Dept: FAMILY MEDICINE CLINIC | Age: 82
End: 2022-03-24

## 2022-03-24 NOTE — TELEPHONE ENCOUNTER
TC to patient INR is 3.1 on 03/23/2022. He held x 1 day. Currently taking 5 mg two days and 2 1/2 mg all other days. Continue same dose. Repeat in 1 week. Pt verbalized his understanding.

## 2022-03-31 ENCOUNTER — TELEPHONE (OUTPATIENT)
Dept: FAMILY MEDICINE CLINIC | Age: 82
End: 2022-03-31

## 2022-03-31 NOTE — TELEPHONE ENCOUNTER
TC to patient INR is 2.6 on 03/31/2022. He currently take 5 mg two days and 2 1/2 mg all other days. Repeat in 1 week. Pt verbalized his understanding.

## 2022-04-07 ENCOUNTER — TELEPHONE (OUTPATIENT)
Dept: FAMILY MEDICINE CLINIC | Age: 82
End: 2022-04-07

## 2022-04-07 NOTE — TELEPHONE ENCOUNTER
2 pt identifiers confirmed. INR 3.4 patient held last night dose 04/06/2022. He recently finished Prednisone. Dose have been changed to 5 mg 1 day week and 2 1/2 mg all other days. Repeat INR in 1 week.

## 2022-04-14 ENCOUNTER — TELEPHONE (OUTPATIENT)
Dept: FAMILY MEDICINE CLINIC | Age: 82
End: 2022-04-14

## 2022-04-14 NOTE — TELEPHONE ENCOUNTER
TC to patient, his INR 2.0 on 04/13/2022. Currently taking Coumadin 5 mg x 1 day and 2 1/2 mg all other days. Repeat INR in one week. Patient verbalized his understanding.

## 2022-04-21 ENCOUNTER — TELEPHONE (OUTPATIENT)
Dept: FAMILY MEDICINE CLINIC | Age: 82
End: 2022-04-21

## 2022-04-21 NOTE — TELEPHONE ENCOUNTER
TC to patient, his INR is 2.0 on 04/20/2022. He is currently taking Coumadin 5 mg x 1 day and 2 1/2 mg all other days. Repeat INR in one week. Pt verbalized his understanding.

## 2022-04-28 ENCOUNTER — TELEPHONE (OUTPATIENT)
Dept: FAMILY MEDICINE CLINIC | Age: 82
End: 2022-04-28

## 2022-04-28 NOTE — TELEPHONE ENCOUNTER
TC to patient his INR is 2.5 on 04/27/2022. He is currently taking Coumadin 5 mg x 1 day and 2 1/2 mg all other days. Repeat INR in one week. Pt verbalized his understanding.

## 2022-06-16 ENCOUNTER — TELEPHONE ANTICOAG (OUTPATIENT)
Dept: FAMILY MEDICINE CLINIC | Age: 82
End: 2022-06-16

## 2022-06-16 DIAGNOSIS — I48.21 PERMANENT ATRIAL FIBRILLATION (HCC): Primary | ICD-10-CM

## 2022-06-16 NOTE — PROGRESS NOTES
INR 6/15 3.4. patient to hold Coumadin today  Tomorrow patient to continue coumadin  5 mg x1 day and 2 1/2 all other days. Recheck 1 week. Can have extra greens tonight.

## 2022-06-22 ENCOUNTER — TELEPHONE (OUTPATIENT)
Dept: FAMILY MEDICINE CLINIC | Age: 82
End: 2022-06-22

## 2022-06-22 ENCOUNTER — OFFICE VISIT (OUTPATIENT)
Dept: CARDIOLOGY CLINIC | Age: 82
End: 2022-06-22
Payer: MEDICARE

## 2022-06-22 DIAGNOSIS — Z95.810 AICD (AUTOMATIC CARDIOVERTER/DEFIBRILLATOR) PRESENT: Primary | ICD-10-CM

## 2022-06-22 DIAGNOSIS — I50.32 CHRONIC DIASTOLIC CONGESTIVE HEART FAILURE (HCC): ICD-10-CM

## 2022-06-22 DIAGNOSIS — I10 ESSENTIAL HYPERTENSION: ICD-10-CM

## 2022-06-22 PROCEDURE — 93295 DEV INTERROG REMOTE 1/2/MLT: CPT | Performed by: INTERNAL MEDICINE

## 2022-06-22 NOTE — TELEPHONE ENCOUNTER
Patient to continue with current dose of warfarin. INR today 2.2.   Recheck INR next week  Thanks  Delano Schilder Halecki NP-C

## 2022-06-23 ENCOUNTER — TELEPHONE ANTICOAG (OUTPATIENT)
Dept: FAMILY MEDICINE CLINIC | Age: 82
End: 2022-06-23

## 2022-06-23 DIAGNOSIS — I48.11 LONGSTANDING PERSISTENT ATRIAL FIBRILLATION (HCC): Primary | ICD-10-CM

## 2022-06-29 RX ORDER — METOPROLOL TARTRATE 50 MG/1
50 TABLET ORAL 2 TIMES DAILY
Qty: 180 TABLET | Refills: 3 | Status: SHIPPED | OUTPATIENT
Start: 2022-06-29

## 2022-07-01 ENCOUNTER — TELEPHONE (OUTPATIENT)
Dept: FAMILY MEDICINE CLINIC | Age: 82
End: 2022-07-01

## 2022-07-04 NOTE — TELEPHONE ENCOUNTER
Ok per Dr Isaias Mendoza to place referral for shortness of breath.
Patient needs a doctor to doctor referral to Dr Sarah Flores the Pulmonologist.  Please call him at [phone] to let him know when done.
No

## 2022-07-18 NOTE — PROGRESS NOTES
I have personally seen and evaluated the device findings. Interrogation reviewed and I agree with assessment.     Jelani Lorenzana

## 2022-07-28 ENCOUNTER — TRANSCRIBE ORDER (OUTPATIENT)
Dept: SCHEDULING | Age: 82
End: 2022-07-28

## 2022-07-28 DIAGNOSIS — C43.72 MALIGNANT MELANOMA OF SKIN OF LOWER LIMB, INCLUDING HIP, LEFT (HCC): Primary | ICD-10-CM

## 2022-08-03 NOTE — PROGRESS NOTES
Patient called having funny feeling in chart was concerned. His op vol is elevated but no arrhythmias on device.

## 2022-08-05 ENCOUNTER — HOSPITAL ENCOUNTER (OUTPATIENT)
Dept: CT IMAGING | Age: 82
Discharge: HOME | End: 2022-08-05
Attending: PHYSICIAN ASSISTANT
Payer: MEDICARE

## 2022-08-05 DIAGNOSIS — C43.72 MALIGNANT MELANOMA OF SKIN OF LOWER LIMB, INCLUDING HIP, LEFT (HCC): ICD-10-CM

## 2022-08-05 LAB — CREAT UR-MCNC: 1.1 MG/DL (ref 0.6–1.3)

## 2022-08-05 PROCEDURE — 71260 CT THORAX DX C+: CPT

## 2022-08-05 PROCEDURE — 74011000636 HC RX REV CODE- 636: Performed by: PHYSICIAN ASSISTANT

## 2022-08-05 PROCEDURE — 82565 ASSAY OF CREATININE: CPT

## 2022-08-05 RX ADMIN — IOPAMIDOL 75 ML: 612 INJECTION, SOLUTION INTRAVENOUS at 13:55

## 2022-08-31 ENCOUNTER — OFFICE VISIT (OUTPATIENT)
Dept: CARDIOLOGY CLINIC | Age: 82
End: 2022-08-31
Payer: MEDICARE

## 2022-08-31 VITALS
HEIGHT: 72 IN | OXYGEN SATURATION: 97 % | HEART RATE: 97 BPM | BODY MASS INDEX: 27.09 KG/M2 | DIASTOLIC BLOOD PRESSURE: 80 MMHG | SYSTOLIC BLOOD PRESSURE: 120 MMHG | WEIGHT: 200 LBS

## 2022-08-31 DIAGNOSIS — Z79.01 ANTICOAGULATED ON COUMADIN: ICD-10-CM

## 2022-08-31 DIAGNOSIS — I50.32 CHRONIC DIASTOLIC CONGESTIVE HEART FAILURE (HCC): ICD-10-CM

## 2022-08-31 DIAGNOSIS — I10 ESSENTIAL HYPERTENSION: Primary | ICD-10-CM

## 2022-08-31 DIAGNOSIS — Z95.810 AICD (AUTOMATIC CARDIOVERTER/DEFIBRILLATOR) PRESENT: ICD-10-CM

## 2022-08-31 DIAGNOSIS — R06.02 SOB (SHORTNESS OF BREATH): ICD-10-CM

## 2022-08-31 DIAGNOSIS — R53.83 FATIGUE, UNSPECIFIED TYPE: ICD-10-CM

## 2022-08-31 DIAGNOSIS — E78.5 HYPERLIPIDEMIA, UNSPECIFIED HYPERLIPIDEMIA TYPE: ICD-10-CM

## 2022-08-31 PROCEDURE — G8432 DEP SCR NOT DOC, RNG: HCPCS | Performed by: INTERNAL MEDICINE

## 2022-08-31 PROCEDURE — 99215 OFFICE O/P EST HI 40 MIN: CPT | Performed by: INTERNAL MEDICINE

## 2022-08-31 PROCEDURE — G8427 DOCREV CUR MEDS BY ELIG CLIN: HCPCS | Performed by: INTERNAL MEDICINE

## 2022-08-31 PROCEDURE — G8417 CALC BMI ABV UP PARAM F/U: HCPCS | Performed by: INTERNAL MEDICINE

## 2022-08-31 PROCEDURE — G8536 NO DOC ELDER MAL SCRN: HCPCS | Performed by: INTERNAL MEDICINE

## 2022-08-31 PROCEDURE — G8752 SYS BP LESS 140: HCPCS | Performed by: INTERNAL MEDICINE

## 2022-08-31 PROCEDURE — 1101F PT FALLS ASSESS-DOCD LE1/YR: CPT | Performed by: INTERNAL MEDICINE

## 2022-08-31 PROCEDURE — 1123F ACP DISCUSS/DSCN MKR DOCD: CPT | Performed by: INTERNAL MEDICINE

## 2022-08-31 PROCEDURE — G8754 DIAS BP LESS 90: HCPCS | Performed by: INTERNAL MEDICINE

## 2022-08-31 RX ORDER — NETARSUDIL AND LATANOPROST OPHTHALMIC SOLUTION, 0.02%/0.005% .2; .05 MG/ML; MG/ML
SOLUTION/ DROPS OPHTHALMIC; TOPICAL
COMMUNITY
Start: 2022-08-06

## 2022-08-31 RX ORDER — OMEPRAZOLE 40 MG/1
CAPSULE, DELAYED RELEASE ORAL
COMMUNITY
Start: 2022-08-02

## 2022-08-31 NOTE — PROGRESS NOTES
Bentley Faisal presents today for   Chief Complaint   Patient presents with    Follow-up     C/o low bp        [place] Malone preferred language for health care discussion is english/other. Is someone accompanying this pt? wife    Is the patient using any DME equipment during [address]? no    Depression Screening:  3 most recent PHQ Screens 3/16/2022   Little interest or pleasure in doing things Not at all   Feeling down, depressed, irritable, or hopeless Not at all   Total Score PHQ 2 0   Trouble falling or staying asleep, or sleeping too much -   Feeling tired or having little energy -   Poor appetite, weight loss, or overeating -   Feeling bad about yourself - or that you are a failure or have let yourself or your family down -   Trouble concentrating on things such as school, work, reading, or watching TV -   Moving or speaking so slowly that other people could have noticed; or the opposite being so fidgety that others notice -   Thoughts of being better off dead, or hurting yourself in some way -   PHQ 9 Score -   How difficult have these problems made it for you to do your work, take care of your home and get along with others -       Learning Assessment:  Learning Assessment 3/17/2021   PRIMARY LEARNER Patient   HIGHEST LEVEL OF EDUCATION - PRIMARY LEARNER  -   BARRIERS PRIMARY LEARNER -   CO-LEARNER CAREGIVER -   PRIMARY LANGUAGE ENGLISH   LEARNER PREFERENCE PRIMARY DEMONSTRATION   ANSWERED BY patient   RELATIONSHIP SELF       Abuse Screening:  Abuse Screening Questionnaire 10/14/2021   Do you ever feel afraid of your partner? N   Are you in a relationship with someone who physically or mentally threatens you? N   Is it safe for you to go home? Y       Fall Risk  Fall Risk Assessment, last 12 mths 3/16/2022   Able to walk? Yes   Fall in past 12 months? 0   Do you feel unsteady? 0   Are you worried about falling 0       Pt currently taking Anticoagulant therapy? no    Coordination of Care:  1.  Have you been to the ER, urgent care clinic since your last visit? Hospitalized since your last visit? no    2. Have you seen or consulted any other health care providers outside of the [address] [place] [address] since your last visit? Include any pap smears or colon screening.  no

## 2022-08-31 NOTE — PROGRESS NOTES
History of Present Illness:  [de-identified] YOM here with concerns for increasing fatigue. He states he just does not feel right over the past month. Initially there was concern he may have had inner ear issue and had ears cleaned out and felt better for a while, but then returned. No specific fevers or chills or cough. No GI symptoms. He was out in the yard and had some chest pain. Impression:  Recent generalized feeling of malaise of unclear etiology. Mild chest pain when out in the yard. Single chamber Medtronic AICD with normal function today. History of persistent a-fib with ablation 2011/2012 at [facility], on chronic Coumadin, followed by his primary. Remote VT with ablation in 2007, no known recurrence  Chronic diastolic heart failure, on Lasix with echo January 2020 with normal function. History of dyslipidemia. HTN, controlled. Remote melanoma of the left leg, as well as remote pulmonary nodules, stable by report. Plan:  I do not have a good etiology for his symptoms, but he is not feeling well. There is no evidence of decompensated heart failure on exam today. Device has no events and he has a history of atrial fibrillation, which is unchanged. He had blood work and report was unremarkable. I am going to obtain an echocardiogram and pharmacological cardiac nuclear stress test for completeness and see him back. All questions answered. Past Medical History:   Diagnosis Date    AICD (automatic cardioverter/defibrillator) present     Medtronic  upgrade from pacer in 2007 due to VT    Arthritis     Atrial fibrillation ([facility] [address]     Cancer ([facility] [address] 02/2019    melenoma on left leg    Cardiac cath 03/19/2007    LM 25% ostial.  Otherwise patent coronaries. LVEDP 20.  EF 50%. Dyssynch. mid anterior contraction. Pacemaker. Cardiac echocardiogram 03/20/2014    A-fib. EF 65-70%. No RWMA. No RWMA. Mild conc LVH. Mild RVE. RVSP 40-45 mmHg. Mild LAE.  HERNANDEZ. Mild MR. Mod TR.       Cardiac nuclear imaging test 07/29/2014    No ischemia or prior infarction. EF 68%. Nondiagnostic EKG due to V pacing on pharm stress test.  Low risk. Cardioversion 8/31/2011    Successful defibrillation threshold testing at 18 joules. Patient also had conversion to atrial ventricular pacing. CHF (congestive heart failure) ([facility])     Cobalamin deficiency     Dupuytren contracture 02/08/2010    on the right ring finger     Edema     GERD (gastroesophageal reflux disease)     Hypertension     Hypertrophy of prostate with urinary obstruction and other lower urinary tract symptoms (LUTS)     Ill-defined condition 2007    AICD    Impotence of organic origin     Intolerance of drug     Intolerant high doses of sotalol due to prolonged QT    Mastodynia     Paroxysmal VT ([facility] [address]     Pure hypercholesterolemia     S/P ablation of atrial fibrillation 05/31/2011    S/P ablation of atrial fibrillation 12/18/2012    Dr. Keely Reed at [facility]    S/P ablation operation for arrhythmia     VT ablation by Dr. Pk Collins near [place] 2/3007    Sick sinus syndrome [facility])        Current Outpatient Medications   Medication Sig Dispense Refill    omeprazole (PRILOSEC) 40 mg capsule take 1 capsule by mouth every morning      Rocklatan 0.02-0.005 % drop       evolocumab (REPATHA SURECLICK) pen injection 1 mL by SubCUTAneous route every fourteen (14) days. 6 Pen 3    metoprolol tartrate (Lopressor) 50 mg tablet Take 1 Tablet by mouth two (2) times a day. (Patient taking differently: Take 25 mg by mouth two (2) times a day.) 180 Tablet 3    warfarin (COUMADIN) 5 mg tablet 1 tab on Mon, Wed, Fri.   1/2 tab on the other days 135 Tablet 3    furosemide (LASIX) 40 mg tablet TAKE 1 TABLET DAILY  Indications: visible water retention 90 Tablet 3    ezetimibe (ZETIA) 10 mg tablet Take 1 Tablet by mouth daily.  90 Tablet 3    cyanocobalamin (VITAMIN B12) 1,000 mcg/mL injection inject 1 milliliter ( 1000 MCG ) intramuscularly EVERY 3 WEEKS 1 mL 8 triamcinolone acetonide (KENALOG) 0.1 % topical cream       coenzyme Q-10 (CO Q-10) 200 mg capsule Take  by mouth daily. aspirin delayed-release 81 mg tablet Take  by mouth daily. AZOPT 1 % ophthalmic suspension Administer 1 Drop to both eyes two (2) times a day. 3    multivitamin (ONE A DAY) tablet Take 1 Tab by mouth daily. amLODIPine (NORVASC) 2.5 mg tablet 1 tablet daily (For BP). (Stop Benazepril) (Patient not taking: Reported on 8/31/2022) 90 Tablet 3       Social History   reports that he has never smoked. He has never used smokeless tobacco.   reports current alcohol use of about 10.0 - 12.0 standard drinks per week. Family History  family history includes COPD in his father; Hypertension in an other family member; No Known Problems in his mother. Review of Systems  Except as stated above include:  Constitutional: Negative for fever, chills and malaise/fatigue. HEENT: No congestion or recent URI. Gastrointestinal: No nausea, vomiting, abdominal pain, bloody stools. Pulmonary:  Negative except as stated above. Cardiac:  Negative except as stated above. Musculoskeletal: Negative except as stated above. Neurological:  No localized symptoms. Skin:  Negative except as stated above. Psych:  Negative except as stated above. Endocrine:  Negative except as stated above. PHYSICAL EXAM  BP Readings from Last 3 Encounters:   08/31/22 120/80   03/16/22 130/62   12/27/21 (!) 141/75     Pulse Readings from Last 3 Encounters:   08/31/22 97   03/16/22 95   12/27/21 85     Wt Readings from Last 3 Encounters:   08/31/22 90.7 kg (200 lb)   03/16/22 88.5 kg (195 lb)   12/27/21 92.5 kg (204 lb)     General:   Well developed, well groomed. Head/Neck:   No obvious jugular venous distention     No obvious carotid pulsations. No evidence of xanthelasma. Lungs:   No respiratory distress. Clear bilaterally. Heart:  Regular rate and rhythm. Normal S1/S2.       Palpation grossly normal.    No significant murmurs, rubs or gallops. AICD pocket intact. Abdomen:   Non-acute abdomen. No obvious pulsations. Extremities:   Intact peripheral pulses. No significant edema. Neurological:   Alert and oriented to person, place, time. No focal neurological deficit visually. Skin:   No obvious rash    Blood Pressure Metric:  Monitor recommended and adjustments stated if needed.

## 2022-09-20 ENCOUNTER — TRANSCRIBE ORDER (OUTPATIENT)
Dept: SCHEDULING | Age: 82
End: 2022-09-20

## 2022-09-20 DIAGNOSIS — C43.72 MALIGNANT MELANOMA OF LEFT LOWER EXTREMITY INCLUDING HIP (HCC): Primary | ICD-10-CM

## 2022-09-21 ENCOUNTER — TELEPHONE (OUTPATIENT)
Dept: CARDIOLOGY CLINIC | Age: 82
End: 2022-09-21

## 2022-09-30 ENCOUNTER — HOSPITAL ENCOUNTER (OUTPATIENT)
Dept: PET IMAGING | Age: 82
Discharge: HOME | End: 2022-09-30
Attending: INTERNAL MEDICINE
Payer: MEDICARE

## 2022-09-30 DIAGNOSIS — C43.72 MALIGNANT MELANOMA OF LEFT LOWER EXTREMITY INCLUDING HIP (HCC): ICD-10-CM

## 2022-09-30 PROCEDURE — A9552 F18 FDG: HCPCS

## 2022-09-30 PROCEDURE — 74011000250 HC RX REV CODE- 250: Performed by: INTERNAL MEDICINE

## 2022-09-30 RX ORDER — BARIUM SULFATE 20 MG/ML
700 SUSPENSION ORAL
Status: COMPLETED | OUTPATIENT
Start: 2022-09-30 | End: 2022-09-30

## 2022-09-30 RX ORDER — FLUDEOXYGLUCOSE F-18 200 MCI/ML
5-10 INJECTION INTRAVENOUS ONCE
Status: COMPLETED | OUTPATIENT
Start: 2022-09-30 | End: 2022-09-30

## 2022-09-30 RX ADMIN — FLUDEOXYGLUCOSE F-18 11.29 MILLICURIE: 200 INJECTION INTRAVENOUS at 14:21

## 2022-09-30 RX ADMIN — BARIUM SULFATE 700 ML: 20 SUSPENSION ORAL at 15:27

## 2022-10-05 ENCOUNTER — OFFICE VISIT (OUTPATIENT)
Dept: CARDIOLOGY CLINIC | Age: 82
End: 2022-10-05

## 2022-10-05 ENCOUNTER — CLINICAL SUPPORT (OUTPATIENT)
Dept: CARDIOLOGY CLINIC | Age: 82
End: 2022-10-05
Payer: MEDICARE

## 2022-10-05 VITALS
BODY MASS INDEX: 27.22 KG/M2 | OXYGEN SATURATION: 98 % | HEIGHT: 72 IN | SYSTOLIC BLOOD PRESSURE: 142 MMHG | HEART RATE: 95 BPM | DIASTOLIC BLOOD PRESSURE: 80 MMHG | WEIGHT: 201 LBS

## 2022-10-05 DIAGNOSIS — I48.20 CHRONIC ATRIAL FIBRILLATION (HCC): ICD-10-CM

## 2022-10-05 DIAGNOSIS — Z79.01 ANTICOAGULATED ON COUMADIN: ICD-10-CM

## 2022-10-05 DIAGNOSIS — Z95.810 AICD (AUTOMATIC CARDIOVERTER/DEFIBRILLATOR) PRESENT: Primary | ICD-10-CM

## 2022-10-05 DIAGNOSIS — I47.29 PAROXYSMAL VT: ICD-10-CM

## 2022-10-05 DIAGNOSIS — I10 ESSENTIAL HYPERTENSION: ICD-10-CM

## 2022-10-05 DIAGNOSIS — R53.83 FATIGUE, UNSPECIFIED TYPE: ICD-10-CM

## 2022-10-05 PROCEDURE — 1101F PT FALLS ASSESS-DOCD LE1/YR: CPT | Performed by: INTERNAL MEDICINE

## 2022-10-05 PROCEDURE — 1123F ACP DISCUSS/DSCN MKR DOCD: CPT | Performed by: INTERNAL MEDICINE

## 2022-10-05 PROCEDURE — G8536 NO DOC ELDER MAL SCRN: HCPCS | Performed by: INTERNAL MEDICINE

## 2022-10-05 PROCEDURE — G8754 DIAS BP LESS 90: HCPCS | Performed by: INTERNAL MEDICINE

## 2022-10-05 PROCEDURE — G8432 DEP SCR NOT DOC, RNG: HCPCS | Performed by: INTERNAL MEDICINE

## 2022-10-05 PROCEDURE — G8753 SYS BP > OR = 140: HCPCS | Performed by: INTERNAL MEDICINE

## 2022-10-05 PROCEDURE — 99214 OFFICE O/P EST MOD 30 MIN: CPT | Performed by: INTERNAL MEDICINE

## 2022-10-05 PROCEDURE — G8427 DOCREV CUR MEDS BY ELIG CLIN: HCPCS | Performed by: INTERNAL MEDICINE

## 2022-10-05 PROCEDURE — G8417 CALC BMI ABV UP PARAM F/U: HCPCS | Performed by: INTERNAL MEDICINE

## 2022-10-05 PROCEDURE — 93289 INTERROG DEVICE EVAL HEART: CPT | Performed by: INTERNAL MEDICINE

## 2022-10-05 NOTE — PROGRESS NOTES
Dominga Griffin presents today for   Chief Complaint   Patient presents with    Follow-up     After testing        Dominga Griffin preferred language for health care discussion is english/other. Is someone accompanying this pt? no    Is the patient using any DME equipment during [address]? no    Depression Screening:  3 most recent PHQ Screens 3/16/2022   Little interest or pleasure in doing things Not at all   Feeling down, depressed, irritable, or hopeless Not at all   Total Score PHQ 2 0   Trouble falling or staying asleep, or sleeping too much -   Feeling tired or having little energy -   Poor appetite, weight loss, or overeating -   Feeling bad about yourself - or that you are a failure or have let yourself or your family down -   Trouble concentrating on things such as school, work, reading, or watching TV -   Moving or speaking so slowly that other people could have noticed; or the opposite being so fidgety that others notice -   Thoughts of being better off dead, or hurting yourself in some way -   PHQ 9 Score -   How difficult have these problems made it for you to do your work, take care of your home and get along with others -       Learning Assessment:  Learning Assessment 3/17/2021   PRIMARY LEARNER Patient   HIGHEST LEVEL OF EDUCATION - PRIMARY LEARNER  -   BARRIERS PRIMARY LEARNER -   CO-LEARNER CAREGIVER -   PRIMARY LANGUAGE ENGLISH   LEARNER PREFERENCE PRIMARY DEMONSTRATION   ANSWERED BY patient   RELATIONSHIP SELF       Abuse Screening:  Abuse Screening Questionnaire 10/14/2021   Do you ever feel afraid of your partner? N   Are you in a relationship with someone who physically or mentally threatens you? N   Is it safe for you to go home? Y       Fall Risk  Fall Risk Assessment, last 12 mths 3/16/2022   Able to walk? Yes   Fall in past 12 months? 0   Do you feel unsteady? 0   Are you worried about falling 0       Pt currently taking Anticoagulant therapy?  Warfarin and ASA 81mg     Coordination of Care:  1. Have you been to the ER, urgent care clinic since your last visit? Hospitalized since your last visit? no    2. Have you seen or consulted any other health care providers outside of the [address] [place] [address] since your last visit? Include any pap smears or colon screening.  no

## 2022-10-05 NOTE — PROGRESS NOTES
History of Present Illness:  [de-identified] YOM here for follow up. I last saw him end of August.  He was having increasing fatigue of unclear etiology. He also had some inner ear issues and his ears were cleaned. [place] has a house [place] in the [place] in [facility] that he is going to in the next week or two. He actually feels a little better. He had an echocardiogram and stress test and we are reviewing the results today. Impression:  Recent generalized feeling of malaise of unclear etiology, improved. This was August 2022. Dual chamber Medtronic AICD, essentially single chamber functioning with ventricular paced 99%. History of persistent chronic atrial fibrillation with remote ablation in 2011/2012, subsequent AV node ablation, on chronic Coumadin, followed by his primary physician, Dr. Arsalan Germain. Remote VT with ablation 2007, no recurrence. Chronic diastolic heart failure, on Lasix. Echo September 2022 with EF 50-55%. Dyslipidemia. Nuclear stress test September 2022 without ischemia. HTN, reasonably controlled. Remote melanoma of left leg, as well as remote pulmonary nodule, stable by history. Plan:  He seems to be feeling better, there is no evidence of decompensated heart failure. He has chronic atrial fibrillation and we discussed the difference between this and the use of biventricular pacing long term. Given his improvement in symptoms with relatively normal EF, I would not pursue biventricular pacing. Echo and stress test reviewed and I will see back in six months. Past Medical History:   Diagnosis Date    AICD (automatic cardioverter/defibrillator) present     Medtronic  upgrade from pacer in 2007 due to VT    Arthritis     Atrial fibrillation ([facility] [address]     Cancer ([facility] 02/2019    melenoma on left leg    Cardiac cath 03/19/2007    LM 25% ostial.  Otherwise patent coronaries. LVEDP 20.  EF 50%. Dyssynch. mid anterior contraction. Pacemaker. Cardiac echocardiogram 03/20/2014    A-fib. EF 65-70%. No RWMA. No RWMA. Mild conc LVH. Mild RVE. RVSP 40-45 mmHg. Mild LAE.  HERNANDEZ. Mild MR. Mod TR. Cardiac nuclear imaging test 07/29/2014    No ischemia or prior infarction. EF 68%. Nondiagnostic EKG due to V pacing on pharm stress test.  Low risk. Cardioversion 8/31/2011    Successful defibrillation threshold testing at 18 joules. Patient also had conversion to atrial ventricular pacing. CHF (congestive heart failure) (HCC)     Cobalamin deficiency     Dupuytren contracture 02/08/2010    on the right ring finger     Edema     GERD (gastroesophageal reflux disease)     Hypertension     Hypertrophy of prostate with urinary obstruction and other lower urinary tract symptoms (LUTS)     Ill-defined condition 2007    AICD    Impotence of organic origin     Intolerance of drug     Intolerant high doses of sotalol due to prolonged QT    Mastodynia     Paroxysmal VT ([address]     Pure hypercholesterolemia     S/P ablation of atrial fibrillation 05/31/2011    S/P ablation of atrial fibrillation 12/18/2012    Dr. Liliana Bhardwaj at [facility]    S/P ablation operation for arrhythmia     VT ablation by Dr. Erenstina Kahn near AVN 2/3007    Sick sinus syndrome [facility])        Current Outpatient Medications   Medication Sig Dispense Refill    omeprazole (PRILOSEC) 40 mg capsule take 1 capsule by mouth every morning      Rocklatan 0.02-0.005 % drop       evolocumab (REPATHA SURECLICK) pen injection 1 mL by SubCUTAneous route every fourteen (14) days. 6 Pen 3    metoprolol tartrate (Lopressor) 50 mg tablet Take 1 Tablet by mouth two (2) times a day. (Patient taking differently: Take 25 mg by mouth two (2) times a day.) 180 Tablet 3    amLODIPine (NORVASC) 2.5 mg tablet 1 tablet daily (For BP). (Stop Benazepril) (Patient taking differently: 1 tablet daily (For BP).   (Stop Benazepril)) 90 Tablet 3    warfarin (COUMADIN) 5 mg tablet 1 tab on Mon, Wed, Fri.   1/2 tab on the other days 135 Tablet 3 furosemide (LASIX) 40 mg tablet TAKE 1 TABLET DAILY  Indications: visible water retention 90 Tablet 3    ezetimibe (ZETIA) 10 mg tablet Take 1 Tablet by mouth daily. 90 Tablet 3    cyanocobalamin (VITAMIN B12) 1,000 mcg/mL injection inject 1 milliliter ( 1000 MCG ) intramuscularly EVERY 3 WEEKS 1 mL 8    triamcinolone acetonide (KENALOG) 0.1 % topical cream       coenzyme Q-10 (CO Q-10) 200 mg capsule Take  by mouth daily. aspirin delayed-release 81 mg tablet Take  by mouth daily. AZOPT 1 % ophthalmic suspension Administer 1 Drop to both eyes two (2) times a day. 3    multivitamin (ONE A DAY) tablet Take 1 Tab by mouth daily. Social History   reports that he has never smoked. He has never used smokeless tobacco.   reports current alcohol use of about 10.0 - 12.0 standard drinks per week. Family History  family history includes COPD in his father; Hypertension in an other family member; No Known Problems in his mother. Review of Systems  Except as stated above include:  Constitutional: Negative for fever, chills and malaise/fatigue. HEENT: No congestion or recent URI. Gastrointestinal: No nausea, vomiting, abdominal pain, bloody stools. Pulmonary:  Negative except as stated above. Cardiac:  Negative except as stated above. Musculoskeletal: Negative except as stated above. Neurological:  No localized symptoms. Skin:  Negative except as stated above. Psych:  Negative except as stated above. Endocrine:  Negative except as stated above. PHYSICAL EXAM  BP Readings from Last 3 Encounters:   10/05/22 (!) 142/80   09/22/22 120/80   09/22/22 130/70     Pulse Readings from Last 3 Encounters:   10/05/22 95   08/31/22 97   03/16/22 95     Wt Readings from Last 3 Encounters:   10/05/22 91.2 kg (201 lb)   09/22/22 90.7 kg (200 lb)   09/22/22 90.7 kg (200 lb)     General:   Well developed, well groomed. Head/Neck:   No obvious jugular venous distention     No obvious carotid pulsations. No evidence of xanthelasma. Lungs:   No respiratory distress. Clear bilaterally. Heart:  Regular rate and rhythm. Normal S1/S2. Palpation grossly normal.    No significant murmurs, rubs or gallops. Device pocket intact. Abdomen:   Non-acute abdomen. No obvious pulsations. Extremities:   Intact peripheral pulses. No significant edema. Neurological:   Alert and oriented to person, place, time. No focal neurological deficit visually. Skin:   No obvious rash    Blood Pressure Metric:  Monitor recommended and adjustments stated if needed.

## 2022-10-06 NOTE — PROGRESS NOTES
I have personally seen and evaluated the device findings. Interrogation reviewed and I agree with assessment.     Rigo Rojas

## 2022-12-14 ENCOUNTER — HOSPITAL ENCOUNTER (OUTPATIENT)
Dept: LAB | Age: 82
Discharge: HOME | End: 2022-12-14
Payer: MEDICARE

## 2022-12-14 ENCOUNTER — TRANSCRIBE ORDER (OUTPATIENT)
Dept: REGISTRATION | Age: 82
End: 2022-12-14

## 2022-12-14 DIAGNOSIS — M79.2 PAROXYSMAL NERVE PAIN: ICD-10-CM

## 2022-12-14 DIAGNOSIS — M79.2 PAROXYSMAL NERVE PAIN: Primary | ICD-10-CM

## 2022-12-14 LAB
25(OH)D3 SERPL-MCNC: 62.1 NG/ML (ref 30–100)
ALBUMIN SERPL-MCNC: 3.7 G/DL (ref 3.4–5)
ALBUMIN/GLOB SERPL: 0.9 {RATIO} (ref 0.8–1.7)
ALP SERPL-CCNC: 120 U/L (ref 45–117)
ALT SERPL-CCNC: 27 U/L (ref 16–61)
ANION GAP SERPL CALC-SCNC: 6 MMOL/L (ref 3–18)
AST SERPL-CCNC: 23 U/L (ref 10–38)
BASOPHILS # BLD: 0.1 K/UL (ref 0–0.1)
BASOPHILS NFR BLD: 1 % (ref 0–2)
BILIRUB SERPL-MCNC: 1.1 MG/DL (ref 0.2–1)
BUN SERPL-MCNC: 16 MG/DL (ref 7–18)
BUN/CREAT SERPL: 15 (ref 12–20)
CALCIUM SERPL-MCNC: 9.1 MG/DL (ref 8.5–10.1)
CHLORIDE SERPL-SCNC: 102 MMOL/L (ref 100–111)
CO2 SERPL-SCNC: 31 MMOL/L (ref 21–32)
CREAT SERPL-MCNC: 1.09 MG/DL (ref 0.6–1.3)
CRP SERPL-MCNC: 0.9 MG/DL (ref 0–0.3)
DIFFERENTIAL METHOD BLD: ABNORMAL
EOSINOPHIL # BLD: 0.1 K/UL (ref 0–0.4)
EOSINOPHIL NFR BLD: 2 % (ref 0–5)
ERYTHROCYTE [DISTWIDTH] IN BLOOD BY AUTOMATED COUNT: 12.9 % (ref 11.6–14.5)
ERYTHROCYTE [SEDIMENTATION RATE] IN BLOOD: 10 MM/HR (ref 0–20)
FOLATE SERPL-MCNC: >20 NG/ML (ref 3.1–17.5)
GLOBULIN SER CALC-MCNC: 3.9 G/DL (ref 2–4)
GLUCOSE SERPL-MCNC: 89 MG/DL (ref 74–99)
HCT VFR BLD AUTO: 44.4 % (ref 36–48)
HGB BLD-MCNC: 15.2 G/DL (ref 13–16)
IMM GRANULOCYTES # BLD AUTO: 0 K/UL (ref 0–0.04)
IMM GRANULOCYTES NFR BLD AUTO: 0 % (ref 0–0.5)
LYMPHOCYTES # BLD: 1.9 K/UL (ref 0.9–3.6)
LYMPHOCYTES NFR BLD: 28 % (ref 21–52)
MCH RBC QN AUTO: 32.5 PG (ref 24–34)
MCHC RBC AUTO-ENTMCNC: 34.2 G/DL (ref 31–37)
MCV RBC AUTO: 94.9 FL (ref 78–100)
MONOCYTES # BLD: 0.9 K/UL (ref 0.05–1.2)
MONOCYTES NFR BLD: 13 % (ref 3–10)
NEUTS SEG # BLD: 4 K/UL (ref 1.8–8)
NEUTS SEG NFR BLD: 57 % (ref 40–73)
NRBC # BLD: 0 K/UL (ref 0–0.01)
NRBC BLD-RTO: 0 PER 100 WBC
PLATELET # BLD AUTO: 240 K/UL (ref 135–420)
PMV BLD AUTO: 10.8 FL (ref 9.2–11.8)
POTASSIUM SERPL-SCNC: 4.5 MMOL/L (ref 3.5–5.5)
PROT SERPL-MCNC: 7.6 G/DL (ref 6.4–8.2)
RBC # BLD AUTO: 4.68 M/UL (ref 4.35–5.65)
RHEUMATOID FACT SERPL-ACNC: <10 IU/ML
SODIUM SERPL-SCNC: 139 MMOL/L (ref 136–145)
VIT B12 SERPL-MCNC: 860 PG/ML (ref 211–911)
WBC # BLD AUTO: 7 K/UL (ref 4.6–13.2)

## 2022-12-14 PROCEDURE — 80053 COMPREHEN METABOLIC PANEL: CPT

## 2022-12-14 PROCEDURE — 36415 COLL VENOUS BLD VENIPUNCTURE: CPT

## 2022-12-14 PROCEDURE — 82306 VITAMIN D 25 HYDROXY: CPT

## 2022-12-14 PROCEDURE — 85025 COMPLETE CBC W/AUTO DIFF WBC: CPT

## 2022-12-14 PROCEDURE — 86140 C-REACTIVE PROTEIN: CPT

## 2022-12-14 PROCEDURE — 86225 DNA ANTIBODY NATIVE: CPT

## 2022-12-14 PROCEDURE — 85652 RBC SED RATE AUTOMATED: CPT

## 2022-12-14 PROCEDURE — 86431 RHEUMATOID FACTOR QUANT: CPT

## 2022-12-14 PROCEDURE — 82607 VITAMIN B-12: CPT

## 2022-12-15 LAB
CENTROMERE B AB SER-ACNC: <0.2 AI (ref 0–0.9)
CHROMATIN AB SERPL-ACNC: <0.2 AI (ref 0–0.9)
DSDNA AB SER-ACNC: <1 IU/ML (ref 0–9)
ENA JO1 AB SER-ACNC: <0.2 AI (ref 0–0.9)
ENA RNP AB SER-ACNC: <0.2 AI (ref 0–0.9)
ENA SCL70 AB SER-ACNC: <0.2 AI (ref 0–0.9)
ENA SM AB SER-ACNC: <0.2 AI (ref 0–0.9)
ENA SS-A AB SER-ACNC: 0.3 AI (ref 0–0.9)
ENA SS-B AB SER-ACNC: <0.2 AI (ref 0–0.9)
SEE BELOW, 164869: NORMAL

## 2022-12-15 RX ORDER — EZETIMIBE 10 MG/1
TABLET ORAL
Qty: 90 TABLET | Refills: 3 | Status: SHIPPED | OUTPATIENT
Start: 2022-12-15

## 2022-12-29 ENCOUNTER — TRANSCRIBE ORDER (OUTPATIENT)
Dept: SCHEDULING | Age: 82
End: 2022-12-29

## 2022-12-29 DIAGNOSIS — C43.72: Primary | ICD-10-CM

## 2022-12-29 DIAGNOSIS — R91.1 SOLITARY PULMONARY NODULE: ICD-10-CM

## 2023-01-04 ENCOUNTER — OFFICE VISIT (OUTPATIENT)
Dept: CARDIOLOGY CLINIC | Age: 83
End: 2023-01-04
Payer: MEDICARE

## 2023-01-04 DIAGNOSIS — Z95.810 AICD (AUTOMATIC CARDIOVERTER/DEFIBRILLATOR) PRESENT: ICD-10-CM

## 2023-01-04 DIAGNOSIS — I47.29 PAROXYSMAL VT: Primary | ICD-10-CM

## 2023-01-17 RX ORDER — FUROSEMIDE 40 MG/1
TABLET ORAL
Qty: 90 TABLET | Refills: 3 | Status: SHIPPED | OUTPATIENT
Start: 2023-01-17

## 2023-01-25 NOTE — TELEPHONE ENCOUNTER
[facility] ED NOTE    Patient : Alex Luo Age: 48 year old Sex: male   MRN: [id number] Encounter Date: 1/25/2023    History of Present Illness      Chief Complaint   Patient presents with   • Wrist Pain     Right side     Patient is a 48-year-old right-hand-dominant male who presents for evaluation of right wrist pain.  Patient states he pushed open a door awkwardly several days ago.  Ever since then he has been having pain to the ulnar aspect of the right wrist.  No previous injuries to the area.  Patient describes the pain as a burning type pain.  Patient has not been taking any medication for symptoms.  No weakness in the right upper extremity.  No pain in the elbow shoulder.          Review of Systems   Constitutional: Negative.    HENT: Negative.    Eyes: Negative.    Respiratory: Negative.    Cardiovascular: Negative.    Gastrointestinal: Negative.    Endocrine: Negative.    Genitourinary: Negative.    Musculoskeletal:        Right wrist pain   Skin: Negative.    Allergic/Immunologic: Negative.    Neurological: Negative.    Hematological: Negative.    Psychiatric/Behavioral: Negative.        Past Medical History     No Known Allergies    Past Medical History:   Diagnosis Date   • Diabetes mellitus (CMS/HCC)        Past Surgical History:   Procedure Laterality Date   • NO PAST SURGERIES         No family history on file.    Social History     Tobacco Use   • Smoking status: Never   • Smokeless tobacco: Never   Substance Use Topics   • Alcohol use: Not Currently   • Drug use: Never       Physical Exam     ED Triage Vitals [01/25/23 1657]   ED Triage Vitals Group      Temp 97.5 °F (36.4 °C)      Heart Rate 90      Resp 18      /82      SpO2 97 %      EtCO2 mmHg       Height 5' 3\" (1.6 m)      Weight 176 lb 9.4 oz (80.1 kg)      Weight Scale Used Standing scale      BMI (Calculated) 31.28      IBW/kg (Calculated) 56.9     Pulse oximetry reviewed and is normal for patient.    Physical  Requested Prescriptions     Pending Prescriptions Disp Refills    amLODIPine (NORVASC) 5 mg tablet 90 Tab 1     Sig: Take 1 Tab by mouth daily. Exam  Vitals and nursing note reviewed.   Constitutional:       Appearance: Normal appearance.   HENT:      Head: Normocephalic and atraumatic.      Right Ear: External ear normal.      Left Ear: External ear normal.      Nose: Nose normal.      Neck: Normal range of motion.   Eyes:      Extraocular Movements: Extraocular movements intact.      Conjunctiva/sclera: Conjunctivae normal.   Pulmonary:      Effort: Pulmonary effort is normal. No tachypnea or respiratory distress.   Abdominal:      General: There is no distension.   Musculoskeletal:         General: Normal range of motion.        Hands:       Right lower leg: No edema.      Left lower leg: No edema.      Comments: Isolated tenderness to the ulnar aspect of right wrist.  Full flexion and extension of right wrist.  Full radial/ulnar deviation.  Pain with ulnar deviation.  No erythema or warmth.  No lymphatic streaking up arm.  Brisk cap refill of fingers.  No areas of decreased sensation.   Skin:     General: Skin is warm and dry.   Neurological:      General: No focal deficit present.      Mental Status: He is alert.   Psychiatric:         Mood and Affect: Mood normal.         Behavior: Behavior normal.         Thought Content: Thought content normal.         Judgment: Judgment normal.         ED Course          Procedures  Velcro splint applied to right wrist.  CMS intact s/p application.    ED Medication Orders (From admission, onward)    Ordered Start     Status Ordering Provider    01/25/23 1716 01/25/23 1730  ibuprofen (MOTRIN) tablet 600 mg  ONCE         Last MAR action: Given NATTY ESCALERA          Vitals:    01/25/23 1657   BP: 124/82   Pulse: 90   Resp: 18   Temp: 97.5 °F (36.4 °C)   SpO2: 97%   Weight: 80.1 kg (176 lb 9.4 oz)   Height: 5' 3\" (1.6 m)       Lab Results     No results found for this visit on 01/25/23.    EKG Results         Radiology Results     Imaging Results          XR WRIST 3 OR MORE VIEWS RIGHT (Final result)  Result  time 01/25/23 17:42:49    Final result                 Impression:    RESULTS/IMPRESSION: The osseous structures and interosseous spaces of the  right wrists are unremarkable.     Electronically Signed by: RUFUS MARTINEZ MD   Signed on: 1/25/2023 5:42 PM                Narrative:    Procedure: XR WRIST 3 OR MORE VIEWS RIGHT    COMPARISON: None.    INDICATIONS: Pain to ulnar aspect    TECHNIQUES: Four views of right wrists were obtained.                                Medical Decision Making      Medical Decision Making  Hand fracture, wrist fracture, dislocation, sprain, strain, contusion, gout, septic joint, tendinitis, carpal tunnel    Patient is a 48-year-old male with history and PE as above.  Patient presents with several days of pain to the ulnar aspect of the right wrist after bending wrist awkwardly when opening a door.  No direct trauma to the wrist.  Patient has no motor or sensory deficits.  Brisk cap refill in all fingers.  No areas of decreased sensation.  No erythema or warmth of the wrist.  No pain with passive range of motion.  No history of any rheumatologic conditions.  X-rays do not show any fracture or dislocation.  I suspect patient's symptoms are likely secondary to a right wrist sprain.  Patient was placed in a Velcro wrist splint.  Patient discharged home with Rx naproxen and tramadol.  Patient given orthopedics for follow-up.  Patient given copy of x-ray disc and x-ray report for follow-up with orthopedics.    I explained to the patient that in the emergency department evaluation is not exhaustive it is important to follow-up with a primary care physician or specialist as discussed, and to return to the emergency department if symptoms are not improving or are worsening.  The patient verbalized understanding of these follow-up instructions and return precautions.        Limitations to history/exam/care: none  Co-morbidities impacting care: DM  Social factors impacting care: none known    External records reviewed: none available  Tests considered but not performed: N/A      The patient was masked, and I was wearing a LPR mask, gloves, and face shield during entire patient encounter.    Clinical Impression     ED Diagnosis   1. Sprain of right wrist, initial encounter  traMADol (ULTRAM) 50 MG tablet          Disposition        Discharge 1/25/2023  5:55 PM  Alex Luo discharge to home/self care.          Catracho Stockton PA-C   1/25/2023 5:22 PM          Catracho Stockton PA-C  01/25/23 1812

## 2023-01-30 PROCEDURE — 93296 REM INTERROG EVL PM/IDS: CPT | Performed by: INTERNAL MEDICINE

## 2023-01-30 PROCEDURE — 93295 DEV INTERROG REMOTE 1/2/MLT: CPT | Performed by: INTERNAL MEDICINE

## 2023-01-30 NOTE — PROGRESS NOTES
I have personally seen and evaluated the device findings. Interrogation reviewed and I agree with assessment.     Madan Velez

## 2023-01-31 NOTE — PROGRESS NOTES
I have personally seen and evaluated the device findings. Interrogation reviewed and I agree with assessment.     Altagracia Umaña

## 2023-03-08 ENCOUNTER — TELEPHONE (OUTPATIENT)
Age: 83
End: 2023-03-08

## 2023-03-08 DIAGNOSIS — R07.9 CHEST PAIN, UNSPECIFIED TYPE: Primary | ICD-10-CM

## 2023-03-08 NOTE — TELEPHONE ENCOUNTER
Pt called in with symptoms of chest pain. 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 He did stop his working and sat down to catch his breath and it went away. Chest felt heavy and he said breathing was difficult and it lasted for 5-10 minutes. He has not had these recurrent episodes but does frequently experience SOB from going up and down his steps at home.   Pt has fu with Dr. Khan on 4/12.

## 2023-03-10 ENCOUNTER — APPOINTMENT (OUTPATIENT)
Facility: HOSPITAL | Age: 83
End: 2023-03-10
Payer: MEDICARE

## 2023-03-10 ENCOUNTER — HOSPITAL ENCOUNTER (EMERGENCY)
Facility: HOSPITAL | Age: 83
Discharge: HOME | End: 2023-03-10
Attending: STUDENT IN AN ORGANIZED HEALTH CARE EDUCATION/TRAINING PROGRAM
Payer: MEDICARE

## 2023-03-10 VITALS
OXYGEN SATURATION: 97 % | RESPIRATION RATE: 12 BRPM | TEMPERATURE: 97.8 F | BODY MASS INDEX: 27.09 KG/M2 | HEIGHT: 72 IN | HEART RATE: 71 BPM | WEIGHT: 200 LBS | SYSTOLIC BLOOD PRESSURE: 121 MMHG | DIASTOLIC BLOOD PRESSURE: 63 MMHG

## 2023-03-10 DIAGNOSIS — R07.9 CHEST PAIN, UNSPECIFIED TYPE: Primary | ICD-10-CM

## 2023-03-10 LAB
ALBUMIN SERPL-MCNC: 3.6 G/DL (ref 3.4–5)
ALBUMIN/GLOB SERPL: 0.9 (ref 0.8–1.7)
ALP SERPL-CCNC: 135 U/L (ref 45–117)
ALT SERPL-CCNC: 27 U/L (ref 16–61)
ANION GAP SERPL CALC-SCNC: 5 MMOL/L (ref 3–18)
AST SERPL-CCNC: 30 U/L (ref 10–38)
BASOPHILS # BLD: 0 K/UL (ref 0–0.1)
BASOPHILS NFR BLD: 1 % (ref 0–2)
BILIRUB SERPL-MCNC: 0.8 MG/DL (ref 0.2–1)
BUN SERPL-MCNC: 21 MG/DL (ref 7–18)
BUN/CREAT SERPL: 18 (ref 12–20)
CALCIUM SERPL-MCNC: 8.5 MG/DL (ref 8.5–10.1)
CHLORIDE SERPL-SCNC: 106 MMOL/L (ref 100–111)
CO2 SERPL-SCNC: 29 MMOL/L (ref 21–32)
CREAT SERPL-MCNC: 1.14 MG/DL (ref 0.6–1.3)
DIFFERENTIAL METHOD BLD: ABNORMAL
EKG ATRIAL RATE: 39 BPM
EKG DIAGNOSIS: NORMAL
EKG Q-T INTERVAL: 446 MS
EKG QRS DURATION: 184 MS
EKG QTC CALCULATION (BAZETT): 545 MS
EKG R AXIS: -77 DEGREES
EKG T AXIS: 80 DEGREES
EKG VENTRICULAR RATE: 90 BPM
EOSINOPHIL # BLD: 0.1 K/UL (ref 0–0.4)
EOSINOPHIL NFR BLD: 1 % (ref 0–5)
ERYTHROCYTE [DISTWIDTH] IN BLOOD BY AUTOMATED COUNT: 12.6 % (ref 11.6–14.5)
GLOBULIN SER CALC-MCNC: 3.8 G/DL (ref 2–4)
GLUCOSE SERPL-MCNC: 105 MG/DL (ref 74–99)
HCT VFR BLD AUTO: 44.5 % (ref 36–48)
HGB BLD-MCNC: 15.1 G/DL (ref 13–16)
IMM GRANULOCYTES # BLD AUTO: 0 K/UL (ref 0–0.04)
IMM GRANULOCYTES NFR BLD AUTO: 0 % (ref 0–0.5)
LYMPHOCYTES # BLD: 2.1 K/UL (ref 0.9–3.6)
LYMPHOCYTES NFR BLD: 26 % (ref 21–52)
MCH RBC QN AUTO: 32.7 PG (ref 24–34)
MCHC RBC AUTO-ENTMCNC: 33.9 G/DL (ref 31–37)
MCV RBC AUTO: 96.3 FL (ref 78–100)
MONOCYTES # BLD: 0.9 K/UL (ref 0.05–1.2)
MONOCYTES NFR BLD: 11 % (ref 3–10)
NEUTS SEG # BLD: 4.7 K/UL (ref 1.8–8)
NEUTS SEG NFR BLD: 60 % (ref 40–73)
NRBC # BLD: 0 K/UL (ref 0–0.01)
NRBC BLD-RTO: 0 PER 100 WBC
NT PRO BNP: 2124 PG/ML (ref 0–1800)
PLATELET # BLD AUTO: 241 K/UL (ref 135–420)
PMV BLD AUTO: 10.5 FL (ref 9.2–11.8)
POTASSIUM SERPL-SCNC: 4.1 MMOL/L (ref 3.5–5.5)
PROT SERPL-MCNC: 7.4 G/DL (ref 6.4–8.2)
RBC # BLD AUTO: 4.62 M/UL (ref 4.35–5.65)
SODIUM SERPL-SCNC: 140 MMOL/L (ref 136–145)
TROPONIN I SERPL HS-MCNC: 15 NG/L (ref 0–78)
WBC # BLD AUTO: 7.9 K/UL (ref 4.6–13.2)

## 2023-03-10 PROCEDURE — 84484 ASSAY OF TROPONIN QUANT: CPT

## 2023-03-10 PROCEDURE — 93010 ELECTROCARDIOGRAM REPORT: CPT | Performed by: INTERNAL MEDICINE

## 2023-03-10 PROCEDURE — 83880 ASSAY OF NATRIURETIC PEPTIDE: CPT

## 2023-03-10 PROCEDURE — 85025 COMPLETE CBC W/AUTO DIFF WBC: CPT

## 2023-03-10 PROCEDURE — 80053 COMPREHEN METABOLIC PANEL: CPT

## 2023-03-10 PROCEDURE — 71045 X-RAY EXAM CHEST 1 VIEW: CPT

## 2023-03-10 PROCEDURE — 99285 EMERGENCY DEPT VISIT HI MDM: CPT

## 2023-03-10 PROCEDURE — 93005 ELECTROCARDIOGRAM TRACING: CPT | Performed by: STUDENT IN AN ORGANIZED HEALTH CARE EDUCATION/TRAINING PROGRAM

## 2023-03-10 RX ORDER — NITROGLYCERIN 0.4 MG/1
0.4 TABLET SUBLINGUAL EVERY 5 MIN PRN
Qty: 25 TABLET | Refills: 3 | Status: SHIPPED | OUTPATIENT
Start: 2023-03-10

## 2023-03-10 ASSESSMENT — ENCOUNTER SYMPTOMS
DIARRHEA: 0
SORE THROAT: 0
CHEST TIGHTNESS: 0
NAUSEA: 0
ABDOMINAL PAIN: 0
VOMITING: 0
SHORTNESS OF BREATH: 0

## 2023-03-10 ASSESSMENT — PAIN SCALES - GENERAL: PAINLEVEL_OUTOF10: 6

## 2023-03-10 ASSESSMENT — PAIN - FUNCTIONAL ASSESSMENT: PAIN_FUNCTIONAL_ASSESSMENT: 0-10

## 2023-03-10 ASSESSMENT — LIFESTYLE VARIABLES
HOW MANY STANDARD DRINKS CONTAINING ALCOHOL DO YOU HAVE ON A TYPICAL DAY: 1 OR 2
HOW OFTEN DO YOU HAVE A DRINK CONTAINING ALCOHOL: MONTHLY OR LESS

## 2023-03-10 ASSESSMENT — PAIN DESCRIPTION - LOCATION: LOCATION: CHEST

## 2023-03-10 NOTE — ED NOTES
EMERGENCY DEPARTMENT HISTORY AND PHYSICAL EXAM      Date: 3/10/2023  Patient Name: Eliza Ackerman    History of Presenting Illness     Chief Complaint   Patient presents with    Chest Pain    Shortness of Breath       History Provided By: patient    The patient is an 68-year-old male with history of CAD, CHF status post AICD, hypertension presenting to the emergency department for evaluation of chest pain. Patient states that he was out in the garden doing some yard work when he started developing some pain in the center of his chest.  He sat down to rest and the pain subsided within 5 minutes. States that he has the symptoms on and off now especially with exertion. Denies having fevers or chills, cough, abdominal pain, N/V/D        PCP: Maylin Duran MD    No current facility-administered medications for this encounter. Current Outpatient Medications   Medication Sig Dispense Refill    nitroGLYCERIN (NITROSTAT) 0.4 MG SL tablet Place 1 tablet under the tongue every 5 minutes as needed for Chest pain up to max of 3 total doses. If no relief after 1 dose, call 911. 25 tablet 3    amLODIPine (NORVASC) 2.5 MG tablet 1 tablet daily (For BP).   (Stop Benazepril)      aspirin 81 MG EC tablet Take by mouth daily      brinzolamide (AZOPT) 1 % ophthalmic suspension Apply 1 drop to eye 2 times daily      coenzyme Q10 200 MG CAPS capsule Take by mouth daily      cyanocobalamin 1000 MCG/ML injection inject 1 milliliter ( 1000 MCG ) intramuscularly EVERY 3 WEEKS      Evolocumab 140 MG/ML SOAJ Inject 140 mg into the skin every 14 days      ezetimibe (ZETIA) 10 MG tablet take 1 tablet by mouth once daily      furosemide (LASIX) 40 MG tablet TAKE 1 TABLET DAILY  Indications: visible water retention      metoprolol tartrate (LOPRESSOR) 50 MG tablet Take 50 mg by mouth 2 times daily      netarsudil-Latanoprost (ROCKLATAN) 0.02-0.005 % ophthalmic solution ceived the following from [facility]: Outside name: Rocklatan 0.02-0.005 % drop      omeprazole (PRILOSEC) 40 MG delayed release capsule take 1 capsule by mouth every morning      triamcinolone (KENALOG) 0.1 % cream ceived the following from [facility]: Outside name: triamcinolone acetonide (KENALOG) 0.1 % topical cream      warfarin (COUMADIN) 5 MG tablet 1 tab on Mon, Wed, Fri.   1/2 tab on the other days         Past History     Past Medical History:  Past Medical History:   Diagnosis Date    AICD (automatic cardioverter/defibrillator) present     Medtronic  upgrade from pacer in 2007 due to VT    Arthritis     Atrial fibrillation ([facility] [address]     Cancer ([facility] [address] 02/2019    melenoma on left leg    Cardio-chalasia 8/31/2011    Successful defibrillation threshold testing at 18 joules. Patient also had conversion to atrial ventricular pacing. CHF (congestive heart failure) ([facility])     Cobalamin deficiency     Dupuytren contracture 02/08/2010    on the right ring finger     Edema     GERD (gastroesophageal reflux disease)     History of echocardiogram 03/20/2014    A-fib. EF 65-70%. No RWMA. No RWMA. Mild conc LVH. Mild RVE. RVSP 40-45 mmHg. Mild LAE.  IVETH. Mild MR. Mod TR. History of myocardial perfusion scan 07/29/2014    No ischemia or prior infarction. EF 68%. Nondiagnostic EKG due to V pacing on pharm stress test.  Low risk.     Hypertension     Hypertrophy of prostate with urinary obstruction and other lower urinary tract symptoms (LUTS)     Ill-defined condition 2007    AICD    Impotence of organic origin     Intolerance of drug     Intolerant high doses of sotalol due to prolonged QT    Mastodynia     Paroxysmal VT     Pure hypercholesterolemia     S/P ablation of atrial fibrillation 05/31/2011    S/P ablation of atrial fibrillation 12/18/2012    Dr. Virginia Lizarraga at [facility]    S/P ablation operation for arrhythmia     VT ablation by Dr. Jose Mcdowell near [address] [place] [address] Street [address]    S/P cardiac cath 03/19/2007    LM 25% ostial.  Otherwise patent coronaries. LVEDP 20.  EF 50%. Dyssynch. mid anterior contraction. Pacemaker. Sick sinus syndrome [facility])        Past Surgical History:  Past Surgical History:   Procedure Laterality Date    ABLATION OF DYSRHYTHMIC FOCUS      [facility] by Dr. Lorrie Henderson  2/2008 & 3/2008    bilateral    CATARACT REMOVAL  02/08-03/08    bilateral cataract removed    COLONOSCOPY N/A 9/12/2018    COLONOSCOPY with Polypectomies and Clip Placement performed by Terry Combs MD at [facility]  2/8/10    release of Dupuytren's contraction on ring finger of right hand    OTHER SURGICAL HISTORY  10/12/2012    cardioversion    OTHER SURGICAL HISTORY  6/5/2009    Cardioversion    OTHER SURGICAL HISTORY  6/2000    atrial lead placement    PACEMAKER  3/27/07    removal of pacemaker & placement of dual chamber defib generator and leads    PACEMAKER  6/2000    DDD    PACEMAKER  1998    placement    TONSILLECTOMY AND ADENOIDECTOMY      UPPER GASTROINTESTINAL ENDOSCOPY  09/12/2018    Dr. Ed Valadez - pathology indicates changes of acid reflux and Rodriguez's esophagus, repeat EGD in 2 years       Family History:  Family History   Problem Relation Age of Onset    Hypertension Other     COPD Father     No Known Problems Mother        Social History:  Social History     Tobacco Use    Smoking status: Never    Smokeless tobacco: Never   Substance Use Topics    Alcohol use: Yes     Alcohol/week: 10.0 - 12.0 standard drinks    Drug use: No       Allergies: Allergies   Allergen Reactions    Cephalexin Hives and Itching    Atorvastatin Myalgia    Pitavastatin Other (See Comments)     Extreme fatigue    5/29/14   PATIENT DENIES    Simvastatin Myalgia     5/29/14 PATIENT DENIES          Review of Systems       Review of Systems   Constitutional:  Negative for activity change, appetite change, fatigue and fever. HENT:  Negative for congestion and sore throat. Respiratory:  Negative for chest tightness and shortness of breath. Cardiovascular:  Positive for chest pain. Gastrointestinal:  Negative for abdominal pain, diarrhea, nausea and vomiting. Genitourinary:  Negative for difficulty urinating, dysuria, flank pain, hematuria and urgency. Musculoskeletal:  Negative for arthralgias. Skin:  Negative for rash. Neurological:  Negative for dizziness, weakness, light-headedness, numbness and headaches. Psychiatric/Behavioral:  Negative for agitation. Physical Exam   /77   Pulse 76   Temp 97.8 °F (36.6 °C) (Oral)   Resp 19   Ht 6' (1.829 m)   Wt 200 lb (90.7 kg)   SpO2 99%   BMI 27.12 kg/m²       Physical Exam  Constitutional:       General: He is not in acute distress. Appearance: He is not toxic-appearing. HENT:      Head: Normocephalic and atraumatic. Mouth/Throat:      Mouth: Mucous membranes are moist.   Eyes:      Extraocular Movements: Extraocular movements intact. Pupils: Pupils are equal, round, and reactive to light. Cardiovascular:      Rate and Rhythm: Normal rate and regular rhythm. Pulses: Normal pulses. Heart sounds: Normal heart sounds. Comments: Paced rhythm  Pulmonary:      Effort: Pulmonary effort is normal.      Breath sounds: Normal breath sounds. Abdominal:      General: Abdomen is flat. Palpations: Abdomen is soft. Tenderness: There is no abdominal tenderness. Musculoskeletal:         General: No swelling or deformity. Normal range of motion. Cervical back: Normal range of motion and neck supple. Skin:     General: Skin is warm and dry. Capillary Refill: Capillary refill takes less than 2 seconds. Neurological:      General: No focal deficit present. Mental Status: He is alert and oriented to person, place, and time.    Psychiatric:         Mood and Affect: Mood normal.         Diagnostic Study Results     Labs -  Recent Results (from the past 12 hour(s)) EKG 12 Lead    Collection Time: 03/10/23 11:57 AM   Result Value Ref Range    Ventricular Rate 90 BPM    Atrial Rate 39 BPM    QRS Duration 184 ms    Q-T Interval 446 ms    QTc Calculation (Bazett) 545 ms    R Axis -77 degrees    T Axis 80 degrees    Diagnosis       Wide QRS rhythm with occasional premature ventricular complexes   CBC with Auto Differential    Collection Time: 03/10/23 12:00 PM   Result Value Ref Range    WBC 7.9 4.6 - 13.2 K/uL    RBC 4.62 4.35 - 5.65 M/uL    Hemoglobin 15.1 13.0 - 16.0 g/dL    Hematocrit 44.5 36.0 - 48.0 %    MCV 96.3 78.0 - 100.0 FL    MCH 32.7 24.0 - 34.0 PG    MCHC 33.9 31.0 - 37.0 g/dL    RDW 12.6 11.6 - 14.5 %    Platelets 663 482 - 444 K/uL    MPV 10.5 9.2 - 11.8 FL    Nucleated RBCs 0.0 0  WBC    nRBC 0.00 0.00 - 0.01 K/uL    Seg Neutrophils 60 40 - 73 %    Lymphocytes 26 21 - 52 %    Monocytes 11 (H) 3 - 10 %    Eosinophils % 1 0 - 5 %    Basophils 1 0 - 2 %    Immature Granulocytes 0 0.0 - 0.5 %    Segs Absolute 4.7 1.8 - 8.0 K/UL    Absolute Lymph # 2.1 0.9 - 3.6 K/UL    Absolute Mono # 0.9 0.05 - 1.2 K/UL    Absolute Eos # 0.1 0.0 - 0.4 K/UL    Basophils Absolute 0.0 0.0 - 0.1 K/UL    Absolute Immature Granulocyte 0.0 0.00 - 0.04 K/UL    Differential Type AUTOMATED     Comprehensive Metabolic Panel    Collection Time: 03/10/23 12:00 PM   Result Value Ref Range    Sodium 140 136 - 145 mmol/L    Potassium 4.1 3.5 - 5.5 mmol/L    Chloride 106 100 - 111 mmol/L    CO2 29 21 - 32 mmol/L    Anion Gap 5 3.0 - 18 mmol/L    Glucose 105 (H) 74 - 99 mg/dL    BUN 21 (H) 7.0 - 18 MG/DL    Creatinine 1.14 0.6 - 1.3 MG/DL    Bun/Cre Ratio 18 12 - 20      Est, Glom Filt Rate >60 >60 ml/min/1.73m2    Calcium 8.5 8.5 - 10.1 MG/DL    Total Bilirubin 0.8 0.2 - 1.0 MG/DL    ALT 27 16 - 61 U/L    AST 30 10 - 38 U/L    Alk Phosphatase 135 (H) 45 - 117 U/L    Total Protein 7.4 6.4 - 8.2 g/dL    Albumin 3.6 3.4 - 5.0 g/dL    Globulin 3.8 2.0 - 4.0 g/dL    Albumin/Globulin Ratio 0.9 0.8 - 1.7     Troponin    Collection Time: 03/10/23 12:00 PM   Result Value Ref Range    Troponin, High Sensitivity 15 0 - 78 ng/L   Brain Natriuretic Peptide    Collection Time: 03/10/23 12:00 PM   Result Value Ref Range    NT Pro-BNP 2,124 (H) 0 - 1,800 PG/ML       Radiologic Studies -   XR CHEST PORTABLE   Final Result   Stable mildly enlarged cardiac contours. No new acute cardiopulmonary findings. Medical Decision Making   I am the first provider for this patient. I reviewed the vital signs, available nursing notes, past medical history, past surgical history, family history and social history. Vital Signs-Reviewed the patient's vital signs. EKG: Wide QRS with occasional PVC. Paced rhythm at 90 bpm.    ED Course: Progress Notes, Reevaluation, and Consults:    Provider Notes (Medical Decision Making):       MDM  Number of Diagnoses or Management Options  Chest pain, unspecified type  Diagnosis management comments: 81-year-old male presenting to the emergency department for evaluation of chest pain. He is well-appearing currently. Vital signs are stable. His EKG demonstrating a paced rhythm. Screening lab work obtained is grossly unremarkable. Troponin is negative. BNP elevated at 2100. Patient remains chest pain-free here. Patient's pacemaker/AICD was interrogated and shown to be functioning appropriately without any arrhythmias recorded. Patient does have a stress test scheduled for Monday. Will discharge at this time and have him follow-up on Monday. He has been given strict return precautions for any recurrent chest pain not resolved with rest.  Is also been prescribed sublingual nitro at home for his chest pain. He verbalizes good understanding and agreement with this plan. Stable for discharge.           ED Course as of 03/10/23 1339   Fri Mar 10, 2023   1243 XR CHEST PORTABLE [AS]      ED Course User Index  [AS] Marilee Gutierrez DO         Procedures          Diagnosis Clinical Impression:   1. Chest pain, unspecified type        Disposition: discharged    @[facility]@     @[facility]@  Disclaimer: Sections of this note are dictated using utilizing voice recognition software. Minor typographical errors may be present. If questions arise, please do not hesitate to contact me or call our department.          Lokesh Dominguezite, DO  03/10/23 8940

## 2023-03-10 NOTE — DISCHARGE INSTRUCTIONS
Make sure to make your scheduled stress test on Monday.   Return to the emergency department sooner for any worsening chest pain not relieved by rest

## 2023-03-10 NOTE — ED NOTES
Medication List        START taking these medications      nitroGLYCERIN 0.4 MG SL tablet  Commonly known as: NITROSTAT  Place 1 tablet under the tongue every 5 minutes as needed for Chest pain up to max of 3 total doses. If no relief after 1 dose, call 911. ASK your doctor about these medications      amLODIPine 2.5 MG tablet  Commonly known as: NORVASC     aspirin 81 MG EC tablet     Azopt 1 % ophthalmic suspension  Generic drug: brinzolamide     coenzyme Q10 200 MG Caps capsule     cyanocobalamin 1000 MCG/ML injection     Evolocumab 140 MG/ML Soaj     ezetimibe 10 MG tablet  Commonly known as: ZETIA     furosemide 40 MG tablet  Commonly known as: LASIX     metoprolol tartrate 50 MG tablet  Commonly known as: LOPRESSOR     omeprazole 40 MG delayed release capsule  Commonly known as: PRILOSEC     Rocklatan 0.02-0.005 % ophthalmic solution  Generic drug: netarsudil-Latanoprost     triamcinolone 0.1 % cream  Commonly known as: KENALOG     warfarin 5 MG tablet  Commonly known as: COUMADIN               Where to Get Your Medications        These medications were sent to [address] [facility], [address] [facility] [address]  [address]      Phone: [phone]   nitroGLYCERIN 0.4 MG SL tablet      Discussed with the patient and all questioned fully answered. He will call me if any problems arise.       Amy Nicolas RN  03/10/23 8917

## 2023-03-22 RX ORDER — AMLODIPINE BESYLATE 2.5 MG/1
TABLET ORAL
Qty: 90 TABLET | Refills: 2 | Status: SHIPPED | OUTPATIENT
Start: 2023-03-22

## 2023-04-12 ENCOUNTER — NURSE ONLY (OUTPATIENT)
Age: 83
End: 2023-04-12

## 2023-04-12 DIAGNOSIS — Z95.810 AICD (AUTOMATIC CARDIOVERTER/DEFIBRILLATOR) PRESENT: ICD-10-CM

## 2023-04-12 DIAGNOSIS — I47.20 VENTRICULAR TACHYCARDIA (HCC): Primary | ICD-10-CM

## 2023-04-12 DIAGNOSIS — Z86.79 S/P ABLATION OPERATION FOR ARRHYTHMIA: ICD-10-CM

## 2023-04-12 DIAGNOSIS — Z98.890 S/P ABLATION OPERATION FOR ARRHYTHMIA: ICD-10-CM

## 2023-07-19 ENCOUNTER — OFFICE VISIT (OUTPATIENT)
Age: 83
End: 2023-07-19
Payer: MEDICARE

## 2023-07-19 ENCOUNTER — NURSE ONLY (OUTPATIENT)
Age: 83
End: 2023-07-19
Payer: MEDICARE

## 2023-07-19 VITALS
HEART RATE: 94 BPM | OXYGEN SATURATION: 99 % | DIASTOLIC BLOOD PRESSURE: 70 MMHG | BODY MASS INDEX: 26.58 KG/M2 | WEIGHT: 196 LBS | SYSTOLIC BLOOD PRESSURE: 110 MMHG

## 2023-07-19 DIAGNOSIS — I47.20 VENTRICULAR TACHYCARDIA (HCC): ICD-10-CM

## 2023-07-19 DIAGNOSIS — Z95.810 AICD (AUTOMATIC CARDIOVERTER/DEFIBRILLATOR) PRESENT: Primary | ICD-10-CM

## 2023-07-19 PROCEDURE — 1123F ACP DISCUSS/DSCN MKR DOCD: CPT | Performed by: INTERNAL MEDICINE

## 2023-07-19 PROCEDURE — 93283 PRGRMG EVAL IMPLANTABLE DFB: CPT | Performed by: INTERNAL MEDICINE

## 2023-07-19 PROCEDURE — 3078F DIAST BP <80 MM HG: CPT | Performed by: INTERNAL MEDICINE

## 2023-07-19 PROCEDURE — 99214 OFFICE O/P EST MOD 30 MIN: CPT | Performed by: INTERNAL MEDICINE

## 2023-07-19 PROCEDURE — 1036F TOBACCO NON-USER: CPT | Performed by: INTERNAL MEDICINE

## 2023-07-19 PROCEDURE — G8428 CUR MEDS NOT DOCUMENT: HCPCS | Performed by: INTERNAL MEDICINE

## 2023-07-19 PROCEDURE — 3074F SYST BP LT 130 MM HG: CPT | Performed by: INTERNAL MEDICINE

## 2023-07-19 PROCEDURE — G8417 CALC BMI ABV UP PARAM F/U: HCPCS | Performed by: INTERNAL MEDICINE

## 2023-07-19 NOTE — PROGRESS NOTES
No obvious jugular venous distention     No obvious carotid pulsations. No evidence of xanthelasma. Lungs:   No respiratory distress. Clear bilaterally. Heart:  Regular rate and rhythm. Normal S1/S2. Palpation grossly normal.    No significant murmurs, rubs or gallops. Abdomen:   Non-acute abdomen. No obvious pulsations. Extremities:   Intact peripheral pulses. No significant edema. Neurological:   Alert and oriented to person, place, time. No focal neurological deficit visually. Skin:   No obvious rash    Blood Pressure Metric:  Monitor recommended and adjustments stated if needed.

## 2023-07-24 RX ORDER — METOPROLOL TARTRATE 50 MG/1
TABLET, FILM COATED ORAL
Qty: 180 TABLET | Refills: 3 | Status: SHIPPED | OUTPATIENT
Start: 2023-07-24

## 2023-08-08 ENCOUNTER — TELEPHONE (OUTPATIENT)
Age: 83
End: 2023-08-08

## 2023-08-08 NOTE — TELEPHONE ENCOUNTER
Pt called in regarding carelink transmission, he mentioned feeling an episode with increased sweatiness and felt like something wasn't right.

## 2023-10-19 ENCOUNTER — PROCEDURE VISIT (OUTPATIENT)
Age: 83
End: 2023-10-19

## 2023-10-19 DIAGNOSIS — I47.20 VENTRICULAR TACHYCARDIA (HCC): ICD-10-CM

## 2023-10-19 DIAGNOSIS — Z95.810 AICD (AUTOMATIC CARDIOVERTER/DEFIBRILLATOR) PRESENT: Primary | ICD-10-CM

## 2023-11-10 RX ORDER — EZETIMIBE 10 MG/1
TABLET ORAL
Qty: 90 TABLET | Refills: 5 | Status: SHIPPED | OUTPATIENT
Start: 2023-11-10

## 2023-12-01 ENCOUNTER — TELEPHONE (OUTPATIENT)
Age: 83
End: 2023-12-01

## 2023-12-04 RX ORDER — AMLODIPINE BESYLATE 2.5 MG/1
TABLET ORAL
Qty: 90 TABLET | Refills: 2 | Status: SHIPPED | OUTPATIENT
Start: 2023-12-04

## 2023-12-04 RX ORDER — FUROSEMIDE 40 MG/1
40 TABLET ORAL DAILY
Qty: 90 TABLET | Refills: 3 | Status: SHIPPED | OUTPATIENT
Start: 2023-12-04

## 2023-12-26 ENCOUNTER — ANESTHESIA EVENT (OUTPATIENT)
Facility: HOSPITAL | Age: 83
End: 2023-12-26
Payer: MEDICARE

## 2023-12-27 ENCOUNTER — ANESTHESIA (OUTPATIENT)
Facility: HOSPITAL | Age: 83
End: 2023-12-27
Payer: MEDICARE

## 2023-12-27 ENCOUNTER — HOSPITAL ENCOUNTER (OUTPATIENT)
Facility: HOSPITAL | Age: 83
Setting detail: OUTPATIENT SURGERY
Discharge: HOME | End: 2023-12-27
Attending: INTERNAL MEDICINE | Admitting: INTERNAL MEDICINE
Payer: MEDICARE

## 2023-12-27 VITALS
BODY MASS INDEX: 26.01 KG/M2 | SYSTOLIC BLOOD PRESSURE: 113 MMHG | DIASTOLIC BLOOD PRESSURE: 65 MMHG | HEIGHT: 72 IN | TEMPERATURE: 97.8 F | RESPIRATION RATE: 16 BRPM | HEART RATE: 64 BPM | WEIGHT: 192 LBS | OXYGEN SATURATION: 98 %

## 2023-12-27 PROCEDURE — 3700000000 HC ANESTHESIA ATTENDED CARE: Performed by: INTERNAL MEDICINE

## 2023-12-27 PROCEDURE — 2580000003 HC RX 258: Performed by: NURSE ANESTHETIST, CERTIFIED REGISTERED

## 2023-12-27 PROCEDURE — 3600007502: Performed by: INTERNAL MEDICINE

## 2023-12-27 PROCEDURE — 2709999900 HC NON-CHARGEABLE SUPPLY: Performed by: INTERNAL MEDICINE

## 2023-12-27 PROCEDURE — 88305 TISSUE EXAM BY PATHOLOGIST: CPT

## 2023-12-27 PROCEDURE — 2500000003 HC RX 250 WO HCPCS: Performed by: NURSE ANESTHETIST, CERTIFIED REGISTERED

## 2023-12-27 PROCEDURE — 6360000002 HC RX W HCPCS: Performed by: NURSE ANESTHETIST, CERTIFIED REGISTERED

## 2023-12-27 PROCEDURE — 7100000000 HC PACU RECOVERY - FIRST 15 MIN: Performed by: INTERNAL MEDICINE

## 2023-12-27 PROCEDURE — 7100000010 HC PHASE II RECOVERY - FIRST 15 MIN: Performed by: INTERNAL MEDICINE

## 2023-12-27 RX ORDER — PROPOFOL 10 MG/ML
INJECTION, EMULSION INTRAVENOUS PRN
Status: DISCONTINUED | OUTPATIENT
Start: 2023-12-27 | End: 2023-12-27 | Stop reason: SDUPTHER

## 2023-12-27 RX ORDER — SODIUM CHLORIDE, SODIUM LACTATE, POTASSIUM CHLORIDE, CALCIUM CHLORIDE 600; 310; 30; 20 MG/100ML; MG/100ML; MG/100ML; MG/100ML
INJECTION, SOLUTION INTRAVENOUS CONTINUOUS
Status: DISCONTINUED | OUTPATIENT
Start: 2023-12-27 | End: 2023-12-27 | Stop reason: HOSPADM

## 2023-12-27 RX ORDER — LIDOCAINE HYDROCHLORIDE 10 MG/ML
1 INJECTION, SOLUTION EPIDURAL; INFILTRATION; INTRACAUDAL; PERINEURAL
Status: DISCONTINUED | OUTPATIENT
Start: 2023-12-27 | End: 2023-12-27 | Stop reason: HOSPADM

## 2023-12-27 RX ORDER — SODIUM CHLORIDE 0.9 % (FLUSH) 0.9 %
5-40 SYRINGE (ML) INJECTION PRN
Status: DISCONTINUED | OUTPATIENT
Start: 2023-12-27 | End: 2023-12-27 | Stop reason: HOSPADM

## 2023-12-27 RX ADMIN — LIDOCAINE HYDROCHLORIDE 40 MG: 20 INJECTION, SOLUTION EPIDURAL; INFILTRATION; INTRACAUDAL; PERINEURAL at 11:25

## 2023-12-27 RX ADMIN — PROPOFOL 60 MG: 10 INJECTION, EMULSION INTRAVENOUS at 11:25

## 2023-12-27 RX ADMIN — SODIUM CHLORIDE, POTASSIUM CHLORIDE, SODIUM LACTATE AND CALCIUM CHLORIDE: 600; 310; 30; 20 INJECTION, SOLUTION INTRAVENOUS at 10:35

## 2023-12-27 NOTE — H&P
[employer]  [phone]      History and Physical    Patient: Joel Villagomez MRN: [id number]  SSN: [id number]    YOB: 1940  Age: 82 y.o. Sex: male      Subjective:      Nikkie Shay is an 82-year-old male who is seen today for a follow up visit. 1. GERD/Barretts esophagus - SSNDBE - GERD previously well controlled on PPI, pathologically resolved on biopsies 12/2020. Due for surveillance EGD 12/2023. Today reports 5 episodes in the past 3 weeks with severe pyrosis and burning to the throat. Associated w/ nausea, but no vomiting. Used OTC Pepto w/ relief. He continues to take omeprazole once per day. 2. Dysphagia - He notes improvement in his dysphagia since EGD 12/2020. Modified barium swallow completed 6/9/21 and showed minimal oropharyngeal dysphagia, normal diet, aspiration precautions and compensatory techniques were recommended by SLP. Today reports issues are isolated to when he drinks while sitting in a reclined position. 3. CRCS - non adenomatous polyps on last colonoscopy, 10 year recall due 9/2028. Discussed timing of stopping screening vs cologuard. .     Past Medical History:   Diagnosis Date    AICD (automatic cardioverter/defibrillator) present     Medtronic  upgrade from pacer in 2007 due to VT    Arthritis     Atrial fibrillation ([address])     Cancer ([address]) 02/2019    melenoma on left leg    Cardio-chalasia 8/31/2011    Successful defibrillation threshold testing at 18 joules. Patient also had conversion to atrial ventricular pacing. CHF (congestive heart failure) ([facility])     Cobalamin deficiency     Dupuytren contracture 02/08/2010    on the right ring finger     Edema     GERD (gastroesophageal reflux disease)     History of echocardiogram 03/20/2014    A-fib. EF 65-70%. No RWMA. No RWMA. Mild conc LVH. Mild RVE. RVSP 40-45 mmHg. Mild LAE.  IVETH. Mild MR. Mod TR. History of myocardial perfusion scan 07/29/2014    No ischemia or prior infarction.   EF

## 2023-12-27 NOTE — DISCHARGE INSTRUCTIONS
Upper GI Endoscopy: What to Expect at [address] [place] [address] Orinda had an upper GI endoscopy. Your doctor used a thin, lighted tube that bends to look at the inside of your esophagus, your stomach, and the first part of the small intestine, called the duodenum. After you have an endoscopy, you will stay at the hospital or clinic for 1 to 2 hours. This will allow the medicine to wear off. You will be able to go home after your doctor or nurse checks to make sure that you're not having any problems. You may have to stay overnight if you had treatment during the test. You may have a sore throat for a day or two after the test.  This care sheet gives you a general idea about what to expect after the test.  How can you care for yourself at home? Activity   Rest as much as you need to after you go home. You should be able to go back to your usual activities the day after the test.  Diet   Follow your doctor's directions for eating after the test.  Drink plenty of fluids (unless your doctor has told you not to). Medications   If you have a sore throat the day after the test, use an over-the-counter spray to numb your throat. Follow-up care is a key part of your treatment and safety. Be sure to make and go to all appointments, and call your doctor if you are having problems. It's also a good idea to know your test results and keep a list of the medicines you take. When should you call for help? Call 911 anytime you think you may need emergency care. For example, call if:    You passed out (lost consciousness). You have trouble breathing. You pass maroon or bloody stools. Call your doctor now or seek immediate medical care if:    You have pain that does not get better after your take pain medicine. You have new or worse belly pain. You have blood in your stools. You are sick to your stomach and cannot keep fluids down. You have a fever. You cannot pass stools or gas.    Watch closely

## 2023-12-27 NOTE — ANESTHESIA POSTPROCEDURE EVALUATION
Department of Anesthesiology  Postprocedure Note    Patient: Yousif Pinedo  MRN: [id number]  YOB: 1940  Date of evaluation: 12/27/2023    Procedure Summary       Date: 12/27/23 Room / Location: [facility] ENDO 02 / [facility] ENDOSCOPY    Anesthesia Start: 1122 Anesthesia Stop: 1131    Procedure: EGD ESOPHAGOGASTRODUODENOSCOPY with bxs (Upper GI Region) Diagnosis:       Rodriguez's esophagus without dysplasia      Gastroesophageal reflux disease, unspecified whether esophagitis present      Over weight      Personal history of colonic polyps      (Rodriguez's esophagus without dysplasia [K22.70])      (Gastroesophageal reflux disease, unspecified whether esophagitis present [K21.9])      (Over weight [E66.3])      (Personal history of colonic polyps [Z86.010])    Surgeons: Adrianna Boateng MD Responsible Provider: Tyrell Swann MD    Anesthesia Type: MAC ASA Status: 3            Anesthesia Type: MAC    Bryan Phase I: Bryan Score: 10    Bryan Phase II: Bryan Score: 10    Anesthesia Post Evaluation    Patient location during evaluation: PACU  Patient participation: complete - patient participated  Level of consciousness: awake and alert  Pain score: 0  Airway patency: patent  Nausea & Vomiting: no nausea and no vomiting  Cardiovascular status: hemodynamically stable  Respiratory status: acceptable  Hydration status: euvolemic  Multimodal analgesia pain management approach  Pain management: adequate    No notable events documented.

## 2023-12-27 NOTE — ANESTHESIA PRE PROCEDURE
Department of Anesthesiology  Preprocedure Note       Name:  Carmita Leggett   Age:  82 y.o.  :  1940                                          MRN:  [id number]         Date:  2023      Surgeon: Km Lima):  Rayray [place] MD    Procedure: Procedure(s):  EGD ESOPHAGOGASTRODUODENOSCOPY    Medications prior to admission:   Prior to Admission medications    Medication Sig Start Date End Date Taking? Authorizing Provider   furosemide (LASIX) 40 MG tablet take 1 tablet by mouth once daily 23   Jarrett Khan MD   amLODIPine (NORVASC) 2.5 MG tablet take 1 tablet by mouth once daily for blood pressure STOP BENAZEPRIL 23   Jarrett Khan MD   ezetimibe (ZETIA) 10 MG tablet take 1 tablet by mouth once daily 11/10/23   Jarrett Khan MD   metoprolol tartrate (LOPRESSOR) 50 MG tablet take 1 tablet by mouth twice a day 23   Charli Gupta PA-C   Evolocumab 140 MG/ML SOAJ Inject 140 mg into the skin every 14 days 23   Jon Lebron MD   famotidine (PEPCID) 40 MG tablet Take 1 tablet by mouth nightly 23   Provider, MD Francisco   nitroGLYCERIN (NITROSTAT) 0.4 MG SL tablet Place 1 tablet under the tongue every 5 minutes as needed for Chest pain up to max of 3 total doses.  If no relief after 1 dose, call 911. 3/10/23   Ofelia Began, DO   aspirin 81 MG EC tablet Take by mouth daily    Automatic Reconciliation, Ar   brinzolamide (AZOPT) 1 % ophthalmic suspension Apply 1 drop to eye 2 times daily 9/21/15   Automatic Reconciliation, Ar   coenzyme Q10 200 MG CAPS capsule Take by mouth daily    Automatic Reconciliation, Ar   cyanocobalamin 1000 MCG/ML injection inject 1 milliliter ( 1000 MCG ) intramuscularly EVERY 3 WEEKS 11/15/21   Automatic Reconciliation, Ar   netarsudil-Latanoprost (ROCKLATAN) 0.02-0.005 % ophthalmic solution ceived the following from [facility]: Outside name: Rocklatan 0.02-0.005 % drop 22   Automatic Reconciliation, Ar   omeprazole

## 2023-12-27 NOTE — PROGRESS NOTES
Patient may resume warfarin in 48 hours. Aisha Dalton MD  [facility] [address] - [phone]  www. [facility]. [facility]

## 2024-01-17 ENCOUNTER — OFFICE VISIT (OUTPATIENT)
Age: 84
End: 2024-01-17

## 2024-01-17 ENCOUNTER — PROCEDURE VISIT (OUTPATIENT)
Age: 84
End: 2024-01-17

## 2024-01-17 VITALS
DIASTOLIC BLOOD PRESSURE: 82 MMHG | BODY MASS INDEX: 26.82 KG/M2 | HEART RATE: 103 BPM | WEIGHT: 198 LBS | SYSTOLIC BLOOD PRESSURE: 124 MMHG | HEIGHT: 72 IN | OXYGEN SATURATION: 98 %

## 2024-01-17 DIAGNOSIS — Z95.810 AICD (AUTOMATIC CARDIOVERTER/DEFIBRILLATOR) PRESENT: Primary | ICD-10-CM

## 2024-01-17 DIAGNOSIS — I50.9 ACUTE ON CHRONIC CONGESTIVE HEART FAILURE, UNSPECIFIED HEART FAILURE TYPE (HCC): ICD-10-CM

## 2024-01-17 DIAGNOSIS — I47.20 VENTRICULAR TACHYCARDIA (HCC): ICD-10-CM

## 2024-01-17 NOTE — PROGRESS NOTES
History of Present Illness:  83 year-old male here for followup.  Recently, he was diagnosed with left upper lobe lung cancer and is planning to see oncology tomorrow for treatment plan.  Occasional dyspnea at times, but no new chest pain or syncope.  No PND, orthopnea or edema.      Impression:   Recent diagnosis of left upper lobe lung cancer with recent biopsy awaiting treatment options with oncology appointment later this week.    Chronic dyspnea with chronic diastolic heart failure.  Last echocardiogram 09/2020 with EF of 55%.    Nuclear stress test 03/2023 without ischemia.    Chronic atrial fibrillation, ablation in 2011 and AV node ablation on Coumadin, followed by Dr. Pineda.    Dual chamber Medtronic ICD, ventricularly paced.    Remote VT and ablation in 2007 and regular pacemaker prior to this.    Remote melanoma of left leg.    Hypertension, controlled.      Plan:  INR recently was 2.6.  He has chronic diastolic heart failure uncompensated.  He has an AICD in place with about ten months remaining.  He was recently diagnosed with lung cancer awaiting treatment options.  I am going to obtain a follow-up echocardiogram to make sure there is no change in heart function in anticipation of possible procedures or cancer treatment.  I will see him back in six months.          Wt Readings from Last 3 Encounters:   01/17/24 89.8 kg (198 lb)   12/27/23 87.1 kg (192 lb)   07/19/23 88.9 kg (196 lb)     Past Medical History:   Diagnosis Date    AICD (automatic cardioverter/defibrillator) present     Medtronic  upgrade from pacer in 2007 due to VT    Arthritis     Atrial fibrillation (HCC)     Cancer ([facility]) 02/2019    melenoma on left leg    Cardio-chalasia 8/31/2011    Successful defibrillation threshold testing at 18 joules. Patient also had conversion to atrial ventricular pacing.     CHF (congestive heart failure) ([facility])     Cobalamin deficiency     Dupuytren contracture 02/08/2010    on the right ring finger     Edema

## 2024-01-30 ENCOUNTER — HOSPITAL ENCOUNTER (OUTPATIENT)
Facility: HOSPITAL | Age: 84
Discharge: HOME | End: 2024-02-01
Attending: INTERNAL MEDICINE
Payer: MEDICARE

## 2024-01-30 VITALS
BODY MASS INDEX: 25.73 KG/M2 | HEIGHT: 72 IN | DIASTOLIC BLOOD PRESSURE: 75 MMHG | SYSTOLIC BLOOD PRESSURE: 130 MMHG | WEIGHT: 190 LBS

## 2024-01-30 DIAGNOSIS — Z95.810 AICD (AUTOMATIC CARDIOVERTER/DEFIBRILLATOR) PRESENT: ICD-10-CM

## 2024-01-30 DIAGNOSIS — I50.9 ACUTE ON CHRONIC CONGESTIVE HEART FAILURE, UNSPECIFIED HEART FAILURE TYPE (HCC): ICD-10-CM

## 2024-01-30 PROCEDURE — 93306 TTE W/DOPPLER COMPLETE: CPT

## 2024-01-31 LAB
ECHO AO ASC DIAM: 3.2 CM
ECHO AO ASCENDING AORTA INDEX: 1.54 CM/M2
ECHO AO ROOT DIAM: 3.4 CM
ECHO AO ROOT INDEX: 1.63 CM/M2
ECHO AR MAX VEL PISA: 3.8 M/S
ECHO AV AREA PEAK VELOCITY: 1.6 CM2
ECHO AV AREA VTI: 1.7 CM2
ECHO AV AREA/BSA PEAK VELOCITY: 0.8 CM2/M2
ECHO AV AREA/BSA VTI: 0.8 CM2/M2
ECHO AV MEAN GRADIENT: 7 MMHG
ECHO AV MEAN VELOCITY: 1.2 M/S
ECHO AV PEAK GRADIENT: 10 MMHG
ECHO AV PEAK VELOCITY: 1.6 M/S
ECHO AV REGURGITANT PHT: 502.4 MILLISECOND
ECHO AV VELOCITY RATIO: 0.5
ECHO AV VTI: 30.1 CM
ECHO BSA: 2.09 M2
ECHO LA VOL A-L A2C: 103 ML (ref 18–58)
ECHO LA VOL A-L A4C: 107 ML (ref 18–58)
ECHO LA VOL MOD A2C: 98 ML (ref 18–58)
ECHO LA VOL MOD A4C: 101 ML (ref 18–58)
ECHO LA VOLUME AREA LENGTH: 105 ML
ECHO LA VOLUME INDEX A-L A2C: 50 ML/M2 (ref 16–34)
ECHO LA VOLUME INDEX A-L A4C: 51 ML/M2 (ref 16–34)
ECHO LA VOLUME INDEX AREA LENGTH: 50 ML/M2 (ref 16–34)
ECHO LA VOLUME INDEX MOD A2C: 47 ML/M2 (ref 16–34)
ECHO LA VOLUME INDEX MOD A4C: 49 ML/M2 (ref 16–34)
ECHO LV EDV A2C: 60 ML
ECHO LV EDV A4C: 54 ML
ECHO LV EDV BP: 60 ML (ref 67–155)
ECHO LV EDV INDEX A4C: 26 ML/M2
ECHO LV EDV INDEX BP: 29 ML/M2
ECHO LV EDV NDEX A2C: 29 ML/M2
ECHO LV EJECTION FRACTION A2C: 69 %
ECHO LV EJECTION FRACTION A4C: 49 %
ECHO LV EJECTION FRACTION BIPLANE: 61 % (ref 55–100)
ECHO LV ESV A2C: 19 ML
ECHO LV ESV A4C: 27 ML
ECHO LV ESV BP: 23 ML (ref 22–58)
ECHO LV ESV INDEX A2C: 9 ML/M2
ECHO LV ESV INDEX A4C: 13 ML/M2
ECHO LV ESV INDEX BP: 11 ML/M2
ECHO LV FRACTIONAL SHORTENING: 35 % (ref 28–44)
ECHO LV INTERNAL DIMENSION DIASTOLE INDEX: 2.36 CM/M2
ECHO LV INTERNAL DIMENSION DIASTOLIC: 4.9 CM (ref 4.2–5.9)
ECHO LV INTERNAL DIMENSION SYSTOLIC INDEX: 1.54 CM/M2
ECHO LV INTERNAL DIMENSION SYSTOLIC: 3.2 CM
ECHO LV IVSD: 1.3 CM (ref 0.6–1)
ECHO LV MASS 2D: 267.9 G (ref 88–224)
ECHO LV MASS INDEX 2D: 128.8 G/M2 (ref 49–115)
ECHO LV POSTERIOR WALL DIASTOLIC: 1.4 CM (ref 0.6–1)
ECHO LV RELATIVE WALL THICKNESS RATIO: 0.57
ECHO LVOT AREA: 3.1 CM2
ECHO LVOT AV VTI INDEX: 0.53
ECHO LVOT DIAM: 2 CM
ECHO LVOT MEAN GRADIENT: 1 MMHG
ECHO LVOT PEAK GRADIENT: 2 MMHG
ECHO LVOT PEAK VELOCITY: 0.8 M/S
ECHO LVOT STROKE VOLUME INDEX: 24 ML/M2
ECHO LVOT SV: 49.9 ML
ECHO LVOT VTI: 15.9 CM
ECHO MV A VELOCITY: 0.19 M/S
ECHO MV E DECELERATION TIME (DT): 176.5 MS
ECHO MV E VELOCITY: 0.7 M/S
ECHO MV E/A RATIO: 3.68
ECHO TV REGURGITANT MAX VELOCITY: 1.44 M/S
ECHO TV REGURGITANT PEAK GRADIENT: 8 MMHG

## 2024-02-01 RX ORDER — WARFARIN SODIUM 5 MG/1
TABLET ORAL
Qty: 135 TABLET | Refills: 5 | Status: SHIPPED | OUTPATIENT
Start: 2024-02-01

## 2024-02-05 ENCOUNTER — TELEPHONE (OUTPATIENT)
Age: 84
End: 2024-02-05

## 2024-02-05 NOTE — TELEPHONE ENCOUNTER
NP, Camilla called from [facility] wanting us to find out while he undergoing his left sided lung cancer surgery if they could go ahead and clip his left atrial appendage. Below is your message regarding his echo.       Donovan Khan MD   to Marybeth Valencia         1/31/24  4:28 PM  Hello,  Your echocardiogram was reviewed.   Good heart function but some mild valve disease.  The right side of the heart a little enlarged as well, likely due to leaky valve.  No changes for now.  Thanks  Dr. Khan

## 2024-02-06 NOTE — TELEPHONE ENCOUNTER
Doonvan Khan MD  You18 hours ago (3:19 PM)       Absolutely.  If they are going to do surgery, ok by me to clip the left atrial appendage, please let them know. Thanks

## 2024-03-25 ENCOUNTER — TELEPHONE (OUTPATIENT)
Age: 84
End: 2024-03-25

## 2024-03-25 NOTE — TELEPHONE ENCOUNTER
Patient called reporting increased swelling in bilateral lower extremities.  Requesting to be seen by in office.  Patient Scheduled for Wednesday, March 27, 2024 with PA.

## 2024-03-27 ENCOUNTER — NURSE ONLY (OUTPATIENT)
Age: 84
End: 2024-03-27

## 2024-03-27 ENCOUNTER — OFFICE VISIT (OUTPATIENT)
Age: 84
End: 2024-03-27
Payer: MEDICARE

## 2024-03-27 VITALS
OXYGEN SATURATION: 93 % | HEIGHT: 72 IN | DIASTOLIC BLOOD PRESSURE: 80 MMHG | SYSTOLIC BLOOD PRESSURE: 114 MMHG | WEIGHT: 189 LBS | BODY MASS INDEX: 25.6 KG/M2 | HEART RATE: 105 BPM

## 2024-03-27 DIAGNOSIS — Z95.810 AICD (AUTOMATIC CARDIOVERTER/DEFIBRILLATOR) PRESENT: Primary | ICD-10-CM

## 2024-03-27 DIAGNOSIS — I50.32 CHRONIC DIASTOLIC CONGESTIVE HEART FAILURE (HCC): ICD-10-CM

## 2024-03-27 PROCEDURE — G8428 CUR MEDS NOT DOCUMENT: HCPCS | Performed by: INTERNAL MEDICINE

## 2024-03-27 PROCEDURE — 1123F ACP DISCUSS/DSCN MKR DOCD: CPT | Performed by: INTERNAL MEDICINE

## 2024-03-27 PROCEDURE — G8417 CALC BMI ABV UP PARAM F/U: HCPCS | Performed by: INTERNAL MEDICINE

## 2024-03-27 PROCEDURE — 3074F SYST BP LT 130 MM HG: CPT | Performed by: INTERNAL MEDICINE

## 2024-03-27 PROCEDURE — G8484 FLU IMMUNIZE NO ADMIN: HCPCS | Performed by: INTERNAL MEDICINE

## 2024-03-27 PROCEDURE — 99214 OFFICE O/P EST MOD 30 MIN: CPT | Performed by: INTERNAL MEDICINE

## 2024-03-27 PROCEDURE — 3079F DIAST BP 80-89 MM HG: CPT | Performed by: INTERNAL MEDICINE

## 2024-03-27 PROCEDURE — 1036F TOBACCO NON-USER: CPT | Performed by: INTERNAL MEDICINE

## 2024-03-27 RX ORDER — FUROSEMIDE 20 MG/1
60 TABLET ORAL DAILY
Qty: 180 TABLET | Refills: 2 | Status: SHIPPED | OUTPATIENT
Start: 2024-03-27 | End: 2024-03-27

## 2024-03-27 RX ORDER — FUROSEMIDE 20 MG/1
60 TABLET ORAL DAILY
Qty: 180 TABLET | Refills: 2 | Status: SHIPPED | OUTPATIENT
Start: 2024-03-27

## 2024-03-27 NOTE — PROGRESS NOTES
History of Present Illness:  84 year-old male here with lower extremity edema.  He had right upper lobe lung resection 03/06/2024.  In the workup, he had a CT scan looking at his left atrial appendage and there was noted to be thrombus.  He is back on his Coumadin with an INR last checked about 3.  He has had lower extremity edema that was quite significant in the hospital and has gradually improved on Lasix 40 mg daily, but not completely resolved.  Minimal dyspnea, but he has generalized fatigue.  He is also worried about using Coumadin long term and has sore fingers at times from checking his INR.  There is no planned chemotherapy or radiation for his cancer and all margins were clear.      Impression:   Recent lung cancer with lobe resection with perioperative edema, placed on Lasix 40 mg daily.   Chronic diastolic heart failure with recent exacerbation, last echocardiogram 01/2024 with EF of 50-55%.   Nuclear stress test 03/2023 without ischemia.    Chronic atrial fibrillation in 2011, AV node at that time.    Dual chamber Medtronic AICD ventricularly paced.   Remote VT and ablation in 2007.   Hypertension, controlled.    Recent findings of left atrial appendage thrombus by CT scan done earlier this month.      Plan:  Obviously given his left atrial appendage thrombus, WATCHMAN is not a possibility at this point, but I am going to switch him from Coumadin to Eliquis and repeat an echocardiogram and consider DESEAN in three months.  Given his lower extremity edema, I am going to increase his Lasix from 40 to 60 mg daily and check a BMP in the next three to four days and see him back next week.  I am going to stop his Norvasc as well and obtain a limited echocardiogram to rule out any effusion.  All questions were answered.          Wt Readings from Last 3 Encounters:   03/27/24 85.7 kg (189 lb)   03/27/24 86.2 kg (190 lb)   01/30/24 86.2 kg (190 lb)     Past Medical History:   Diagnosis Date    AICD (automatic

## 2024-03-27 NOTE — PATIENT INSTRUCTIONS
Increase lasix to 60mg    Obtain bmp and cbc on Monday , April 1st  Limited echo to r/o effusion  Stop coumadin  Two days after stopping coumadin , start eliquis 5mg twice daily  Stop amlodipine

## 2024-03-29 ENCOUNTER — HOSPITAL ENCOUNTER (OUTPATIENT)
Facility: HOSPITAL | Age: 84
End: 2024-03-29
Payer: MEDICARE

## 2024-03-29 DIAGNOSIS — Z95.810 AICD (AUTOMATIC CARDIOVERTER/DEFIBRILLATOR) PRESENT: ICD-10-CM

## 2024-03-29 DIAGNOSIS — I50.32 CHRONIC DIASTOLIC CONGESTIVE HEART FAILURE (HCC): ICD-10-CM

## 2024-03-29 LAB
ANION GAP SERPL CALC-SCNC: 8 MMOL/L (ref 3–18)
BUN SERPL-MCNC: 14 MG/DL (ref 7–18)
BUN/CREAT SERPL: 15 (ref 12–20)
CALCIUM SERPL-MCNC: 8.2 MG/DL (ref 8.5–10.1)
CHLORIDE SERPL-SCNC: 104 MMOL/L (ref 100–111)
CO2 SERPL-SCNC: 30 MMOL/L (ref 21–32)
CREAT SERPL-MCNC: 0.95 MG/DL (ref 0.6–1.3)
ERYTHROCYTE [DISTWIDTH] IN BLOOD BY AUTOMATED COUNT: 13.2 % (ref 11.6–14.5)
GLUCOSE SERPL-MCNC: 105 MG/DL (ref 74–99)
HCT VFR BLD AUTO: 41.6 % (ref 36–48)
HGB BLD-MCNC: 13.7 G/DL (ref 13–16)
MCH RBC QN AUTO: 32.2 PG (ref 24–34)
MCHC RBC AUTO-ENTMCNC: 32.9 G/DL (ref 31–37)
MCV RBC AUTO: 97.7 FL (ref 78–100)
NRBC # BLD: 0 K/UL (ref 0–0.01)
NRBC BLD-RTO: 0 PER 100 WBC
PLATELET # BLD AUTO: 306 K/UL (ref 135–420)
PMV BLD AUTO: 10.6 FL (ref 9.2–11.8)
POTASSIUM SERPL-SCNC: 3 MMOL/L (ref 3.5–5.5)
RBC # BLD AUTO: 4.26 M/UL (ref 4.35–5.65)
SODIUM SERPL-SCNC: 142 MMOL/L (ref 136–145)
WBC # BLD AUTO: 7.4 K/UL (ref 4.6–13.2)

## 2024-03-29 PROCEDURE — 80048 BASIC METABOLIC PNL TOTAL CA: CPT

## 2024-03-29 PROCEDURE — 36415 COLL VENOUS BLD VENIPUNCTURE: CPT

## 2024-03-29 PROCEDURE — 85027 COMPLETE CBC AUTOMATED: CPT

## 2024-04-16 ENCOUNTER — TELEPHONE (OUTPATIENT)
Age: 84
End: 2024-04-16

## 2024-04-16 NOTE — TELEPHONE ENCOUNTER
Pt called in with concerns for his INR. He was taken off of coumadin and started eliquis.     He said his recent INR reading this passed Friday was 1.5 and is concerned about that. I explained to him that we don't check INR values when taking eliquis. Pt is still concerned for his values.     Please advise.

## 2024-04-17 DIAGNOSIS — I50.32 CHRONIC DIASTOLIC CONGESTIVE HEART FAILURE (HCC): Primary | ICD-10-CM

## 2024-04-22 ENCOUNTER — HOSPITAL ENCOUNTER (OUTPATIENT)
Facility: HOSPITAL | Age: 84
Discharge: HOME | End: 2024-04-25
Payer: MEDICARE

## 2024-04-22 DIAGNOSIS — I50.32 CHRONIC DIASTOLIC CONGESTIVE HEART FAILURE (HCC): ICD-10-CM

## 2024-04-22 LAB
ANION GAP SERPL CALC-SCNC: 5 MMOL/L (ref 3–18)
BUN SERPL-MCNC: 11 MG/DL (ref 7–18)
BUN/CREAT SERPL: 11 (ref 12–20)
CALCIUM SERPL-MCNC: 8.9 MG/DL (ref 8.5–10.1)
CHLORIDE SERPL-SCNC: 103 MMOL/L (ref 100–111)
CO2 SERPL-SCNC: 32 MMOL/L (ref 21–32)
CREAT SERPL-MCNC: 1.02 MG/DL (ref 0.6–1.3)
GLUCOSE SERPL-MCNC: 100 MG/DL (ref 74–99)
POTASSIUM SERPL-SCNC: 3.5 MMOL/L (ref 3.5–5.5)
SODIUM SERPL-SCNC: 140 MMOL/L (ref 136–145)

## 2024-04-22 PROCEDURE — 80048 BASIC METABOLIC PNL TOTAL CA: CPT

## 2024-04-22 PROCEDURE — 36415 COLL VENOUS BLD VENIPUNCTURE: CPT

## 2024-04-23 ENCOUNTER — HOSPITAL ENCOUNTER (OUTPATIENT)
Facility: HOSPITAL | Age: 84
Discharge: HOME | End: 2024-04-26
Payer: MEDICARE

## 2024-04-23 DIAGNOSIS — I50.32 CHRONIC DIASTOLIC CONGESTIVE HEART FAILURE (HCC): ICD-10-CM

## 2024-04-23 DIAGNOSIS — I50.32 CHRONIC DIASTOLIC CONGESTIVE HEART FAILURE (HCC): Primary | ICD-10-CM

## 2024-04-23 DIAGNOSIS — Z95.810 AICD (AUTOMATIC CARDIOVERTER/DEFIBRILLATOR) PRESENT: ICD-10-CM

## 2024-04-23 DIAGNOSIS — R06.02 SHORTNESS OF BREATH: Primary | ICD-10-CM

## 2024-04-23 DIAGNOSIS — R06.02 SHORTNESS OF BREATH: ICD-10-CM

## 2024-04-23 PROCEDURE — 71046 X-RAY EXAM CHEST 2 VIEWS: CPT

## 2024-04-23 RX ORDER — POTASSIUM CHLORIDE 20 MEQ/1
40 TABLET, EXTENDED RELEASE ORAL DAILY
Qty: 180 TABLET | Refills: 3 | Status: SHIPPED | OUTPATIENT
Start: 2024-04-23

## 2024-04-23 RX ORDER — FUROSEMIDE 40 MG/1
40 TABLET ORAL 2 TIMES DAILY
Qty: 180 TABLET | Refills: 3 | Status: CANCELLED | OUTPATIENT
Start: 2024-04-23 | End: 2025-04-23

## 2024-04-23 RX ORDER — FUROSEMIDE 40 MG/1
40 TABLET ORAL 2 TIMES DAILY
Qty: 180 TABLET | Refills: 3 | Status: SHIPPED | OUTPATIENT
Start: 2024-04-23 | End: 2025-04-23

## 2024-04-23 RX ORDER — FUROSEMIDE 20 MG/1
40 TABLET ORAL 2 TIMES DAILY
Qty: 180 TABLET | Refills: 2 | Status: CANCELLED | OUTPATIENT
Start: 2024-04-23

## 2024-04-23 RX ORDER — POTASSIUM CHLORIDE 20 MEQ/1
40 TABLET, EXTENDED RELEASE ORAL DAILY
Qty: 180 TABLET | Refills: 1 | Status: CANCELLED | OUTPATIENT
Start: 2024-04-23

## 2024-04-24 ENCOUNTER — OFFICE VISIT (OUTPATIENT)
Age: 84
End: 2024-04-24
Payer: MEDICARE

## 2024-04-24 ENCOUNTER — NURSE ONLY (OUTPATIENT)
Age: 84
End: 2024-04-24

## 2024-04-24 VITALS
HEART RATE: 96 BPM | WEIGHT: 186 LBS | BODY MASS INDEX: 25.23 KG/M2 | SYSTOLIC BLOOD PRESSURE: 120 MMHG | DIASTOLIC BLOOD PRESSURE: 80 MMHG | OXYGEN SATURATION: 97 %

## 2024-04-24 DIAGNOSIS — I50.32 CHRONIC DIASTOLIC CONGESTIVE HEART FAILURE (HCC): ICD-10-CM

## 2024-04-24 DIAGNOSIS — R06.00 DYSPNEA, UNSPECIFIED TYPE: Primary | ICD-10-CM

## 2024-04-24 DIAGNOSIS — R60.0 LOCALIZED EDEMA: ICD-10-CM

## 2024-04-24 DIAGNOSIS — Z95.810 AICD (AUTOMATIC CARDIOVERTER/DEFIBRILLATOR) PRESENT: Primary | ICD-10-CM

## 2024-04-24 DIAGNOSIS — I07.1 TRICUSPID VALVE INSUFFICIENCY, UNSPECIFIED ETIOLOGY: ICD-10-CM

## 2024-04-24 DIAGNOSIS — I27.20 PULMONARY HYPERTENSION (HCC): ICD-10-CM

## 2024-04-24 DIAGNOSIS — Z85.118 HISTORY OF LUNG CANCER: ICD-10-CM

## 2024-04-24 DIAGNOSIS — I50.33 ACUTE ON CHRONIC DIASTOLIC CONGESTIVE HEART FAILURE (HCC): ICD-10-CM

## 2024-04-24 PROCEDURE — G8417 CALC BMI ABV UP PARAM F/U: HCPCS | Performed by: INTERNAL MEDICINE

## 2024-04-24 PROCEDURE — 3074F SYST BP LT 130 MM HG: CPT | Performed by: INTERNAL MEDICINE

## 2024-04-24 PROCEDURE — G8428 CUR MEDS NOT DOCUMENT: HCPCS | Performed by: INTERNAL MEDICINE

## 2024-04-24 PROCEDURE — 99214 OFFICE O/P EST MOD 30 MIN: CPT | Performed by: INTERNAL MEDICINE

## 2024-04-24 PROCEDURE — 3079F DIAST BP 80-89 MM HG: CPT | Performed by: INTERNAL MEDICINE

## 2024-04-24 PROCEDURE — 1036F TOBACCO NON-USER: CPT | Performed by: INTERNAL MEDICINE

## 2024-04-24 PROCEDURE — 1123F ACP DISCUSS/DSCN MKR DOCD: CPT | Performed by: INTERNAL MEDICINE

## 2024-04-24 NOTE — PROGRESS NOTES
History of Present Illness:  84 year-old male here with concerns for dyspnea and edema.  I saw him at the end of March.  He had right upper lobe resection 03/06/2024 at [facility].  He also had a CT scan around that time showing left atrial appendage thrombus.  He was on Coumadin and switched to Eliquis.  His edema started right after his surgery and he was taking Lasix 40 mg daily, but not completely resolved.  He had reached out to the office stating his breathing was getting worse, as well as edema.  He was drinking a six pack of Gatorade regularly, which he has now cut down on.  His edema seems to be doing a little better and I increased his Lasix to 40 mg twice daily.  BMP earlier this week was normal.     Impression:   Acute/chronic heart failure, appearing to be more right sided, now on Lasix 40 mg twice daily.    Recent right upper lobe lung resection with perioperative edema, initially placed on Lasix 40 mg daily.   Chronic diastolic heart failure with recent exacerbation, EF of 50-55% historically.    Dual chamber Medtronic AICD with normal function.   Chronic atrial fibrillation with AV node ablation 2011.   Nuclear stress test 03/2023 without ischemia.   RV failure with worsening tricuspid regurgitation by echocardiogram 04/2024.  It had been more moderate and then mild over the past few years.    Recent findings of left atrial appendage thrombus by CT scan 03/2024, now on Eliquis from Coumadin.   Remote VT and ablation in 2007.    Hypertension, controlled.      Plan:  We will continue with Lasix 40 mg twice daily and then check a BMP next week.  I recommended he fluid restrict two liters.  I am going to check a BMP and see him back next week.  He is going to monitor his blood pressure daily as well.  My plan would be to obtain a transesophageal echocardiogram in the next couple of months to more fully evaluate his tricuspid valve, as well as pulmonary pressures, as well as left atrial appendage.  It may be

## 2024-04-29 ENCOUNTER — HOSPITAL ENCOUNTER (OUTPATIENT)
Facility: HOSPITAL | Age: 84
Discharge: HOME | End: 2024-05-02
Payer: MEDICARE

## 2024-04-29 DIAGNOSIS — I50.33 ACUTE ON CHRONIC DIASTOLIC CONGESTIVE HEART FAILURE (HCC): ICD-10-CM

## 2024-04-29 LAB
ANION GAP SERPL CALC-SCNC: 6 MMOL/L (ref 3–18)
BUN SERPL-MCNC: 12 MG/DL (ref 7–18)
BUN/CREAT SERPL: 12 (ref 12–20)
CALCIUM SERPL-MCNC: 8.8 MG/DL (ref 8.5–10.1)
CHLORIDE SERPL-SCNC: 103 MMOL/L (ref 100–111)
CO2 SERPL-SCNC: 30 MMOL/L (ref 21–32)
CREAT SERPL-MCNC: 1.04 MG/DL (ref 0.6–1.3)
GLUCOSE SERPL-MCNC: 94 MG/DL (ref 74–99)
POTASSIUM SERPL-SCNC: 3.3 MMOL/L (ref 3.5–5.5)
SODIUM SERPL-SCNC: 139 MMOL/L (ref 136–145)

## 2024-04-29 PROCEDURE — 36415 COLL VENOUS BLD VENIPUNCTURE: CPT

## 2024-04-29 PROCEDURE — 80048 BASIC METABOLIC PNL TOTAL CA: CPT

## 2024-05-01 ENCOUNTER — OFFICE VISIT (OUTPATIENT)
Age: 84
End: 2024-05-01
Payer: MEDICARE

## 2024-05-01 ENCOUNTER — NURSE ONLY (OUTPATIENT)
Age: 84
End: 2024-05-01
Payer: MEDICARE

## 2024-05-01 VITALS
SYSTOLIC BLOOD PRESSURE: 130 MMHG | OXYGEN SATURATION: 95 % | DIASTOLIC BLOOD PRESSURE: 60 MMHG | WEIGHT: 186 LBS | HEART RATE: 105 BPM | BODY MASS INDEX: 25.19 KG/M2 | HEIGHT: 72 IN

## 2024-05-01 DIAGNOSIS — I48.20 CHRONIC ATRIAL FIBRILLATION, UNSPECIFIED (HCC): ICD-10-CM

## 2024-05-01 DIAGNOSIS — I47.20 VENTRICULAR TACHYCARDIA (HCC): ICD-10-CM

## 2024-05-01 DIAGNOSIS — Z95.810 AICD (AUTOMATIC CARDIOVERTER/DEFIBRILLATOR) PRESENT: Primary | ICD-10-CM

## 2024-05-01 DIAGNOSIS — I50.33 ACUTE ON CHRONIC DIASTOLIC CONGESTIVE HEART FAILURE (HCC): Primary | ICD-10-CM

## 2024-05-01 DIAGNOSIS — Z95.810 AICD (AUTOMATIC CARDIOVERTER/DEFIBRILLATOR) PRESENT: ICD-10-CM

## 2024-05-01 PROCEDURE — 99214 OFFICE O/P EST MOD 30 MIN: CPT | Performed by: INTERNAL MEDICINE

## 2024-05-01 PROCEDURE — G8417 CALC BMI ABV UP PARAM F/U: HCPCS | Performed by: INTERNAL MEDICINE

## 2024-05-01 PROCEDURE — 99212 OFFICE O/P EST SF 10 MIN: CPT | Performed by: INTERNAL MEDICINE

## 2024-05-01 PROCEDURE — 1123F ACP DISCUSS/DSCN MKR DOCD: CPT | Performed by: INTERNAL MEDICINE

## 2024-05-01 PROCEDURE — 1036F TOBACCO NON-USER: CPT | Performed by: INTERNAL MEDICINE

## 2024-05-01 PROCEDURE — G8428 CUR MEDS NOT DOCUMENT: HCPCS | Performed by: INTERNAL MEDICINE

## 2024-05-01 PROCEDURE — 3075F SYST BP GE 130 - 139MM HG: CPT | Performed by: INTERNAL MEDICINE

## 2024-05-01 PROCEDURE — 3078F DIAST BP <80 MM HG: CPT | Performed by: INTERNAL MEDICINE

## 2024-05-01 NOTE — PROGRESS NOTES
which he read about a bad experience.  My plan would be to see him back in four weeks and consider repeat BNP at that time.  Ultimately, I want to perform follow-up transesophageal echocardiogram in about two months to reevaluate his tricuspid valve as well as PA pressure, left atrial appendage.  All questions were answered.          Wt Readings from Last 3 Encounters:   05/01/24 84.4 kg (186 lb)   04/24/24 84.4 kg (186 lb)   04/08/24 81.6 kg (180 lb)     Past Medical History:   Diagnosis Date    AICD (automatic cardioverter/defibrillator) present     Medtronic  upgrade from pacer in 2007 due to VT    Arthritis     Atrial fibrillation ([facility])     Cancer ([facility]) 02/2019    melenoma on left leg    Cardio-chalasia 8/31/2011    Successful defibrillation threshold testing at 18 joules. Patient also had conversion to atrial ventricular pacing.     CHF (congestive heart failure) ([facility])     Cobalamin deficiency     Dupuytren contracture 02/08/2010    on the right ring finger     Edema     GERD (gastroesophageal reflux disease)     History of echocardiogram 03/20/2014    A-fib.  EF 65-70%.  No RWMA.  No RWMA.  Mild conc LVH.  Mild RVE.  RVSP 40-45 mmHg.  Mild LAE.  IVETH.  Mild MR.  Mod TR.      History of myocardial perfusion scan 07/29/2014    No ischemia or prior infarction.  EF 68%.  Nondiagnostic EKG due to V pacing on pharm stress test.  Low risk.    Hypertension     Hypertrophy of prostate with urinary obstruction and other lower urinary tract symptoms (LUTS)     Ill-defined condition 2007    AICD    Impotence of organic origin     Intolerance of drug     Intolerant high doses of sotalol due to prolonged QT    Mastodynia     Paroxysmal VT ([facility])     Pure hypercholesterolemia     S/P ablation of atrial fibrillation 05/31/2011    S/P ablation of atrial fibrillation 12/18/2012    Dr. Luis Gonzalez at [facility]    S/P ablation operation for arrhythmia     VT ablation by Dr. Henry near AVN 2/3007    S/P cardiac cath

## 2024-05-14 ENCOUNTER — TELEPHONE (OUTPATIENT)
Age: 84
End: 2024-05-14

## 2024-05-14 RX ORDER — NITROGLYCERIN 0.4 MG/1
0.4 TABLET SUBLINGUAL EVERY 5 MIN PRN
Qty: 25 TABLET | Refills: 3 | Status: SHIPPED | OUTPATIENT
Start: 2024-05-14

## 2024-05-14 NOTE — TELEPHONE ENCOUNTER
Pt called in with complaints of chest pain for the last 45min - 1hour and wanted to know if he could come in for a check up.     He does not have an rx for nitroglycerin.     I let him know we don't have providers in office today and if he needed help immediately he would need to go to the ER for evaluation. Pt denied going to the ER.    Please advise.

## 2024-05-24 DIAGNOSIS — I50.32 CHRONIC DIASTOLIC CONGESTIVE HEART FAILURE (HCC): ICD-10-CM

## 2024-05-24 DIAGNOSIS — Z95.810 AICD (AUTOMATIC CARDIOVERTER/DEFIBRILLATOR) PRESENT: ICD-10-CM

## 2024-05-30 ENCOUNTER — OFFICE VISIT (OUTPATIENT)
Age: 84
End: 2024-05-30
Payer: MEDICARE

## 2024-05-30 VITALS
HEART RATE: 101 BPM | BODY MASS INDEX: 22.78 KG/M2 | WEIGHT: 168 LBS | DIASTOLIC BLOOD PRESSURE: 80 MMHG | OXYGEN SATURATION: 98 % | SYSTOLIC BLOOD PRESSURE: 130 MMHG

## 2024-05-30 DIAGNOSIS — Z95.810 AICD (AUTOMATIC CARDIOVERTER/DEFIBRILLATOR) PRESENT: ICD-10-CM

## 2024-05-30 DIAGNOSIS — R06.00 DYSPNEA, UNSPECIFIED TYPE: ICD-10-CM

## 2024-05-30 DIAGNOSIS — I50.32 CHRONIC DIASTOLIC CONGESTIVE HEART FAILURE (HCC): Primary | ICD-10-CM

## 2024-05-30 PROCEDURE — 3079F DIAST BP 80-89 MM HG: CPT | Performed by: PHYSICIAN ASSISTANT

## 2024-05-30 PROCEDURE — G8420 CALC BMI NORM PARAMETERS: HCPCS | Performed by: PHYSICIAN ASSISTANT

## 2024-05-30 PROCEDURE — G8428 CUR MEDS NOT DOCUMENT: HCPCS | Performed by: PHYSICIAN ASSISTANT

## 2024-05-30 PROCEDURE — 1036F TOBACCO NON-USER: CPT | Performed by: PHYSICIAN ASSISTANT

## 2024-05-30 PROCEDURE — 1123F ACP DISCUSS/DSCN MKR DOCD: CPT | Performed by: PHYSICIAN ASSISTANT

## 2024-05-30 PROCEDURE — 3075F SYST BP GE 130 - 139MM HG: CPT | Performed by: PHYSICIAN ASSISTANT

## 2024-05-30 PROCEDURE — 99214 OFFICE O/P EST MOD 30 MIN: CPT | Performed by: PHYSICIAN ASSISTANT

## 2024-05-30 NOTE — PATIENT INSTRUCTIONS
Heart Catheterization Instructions    Patient’s Name:  Marybeth Vaelncia    You are scheduled to have a Left and Right heart cath on June 19, 2024  at 8:00 Please check in at 6:45AM.     Please go to [facility] and park in the outpatient parking lot that is located around to the back of the hospital and enter through the [employer] building.  Once you enter through the [employer] check in with the  there. The  will either give you directions or assist you in getting to the cath holding area.          You are not to eat or drink anything after midnight the night before your procedure. Small sips of water to take your medications is ok.     If you are diabetic, do not take your insulin/sugar pill the morning of the procedure.    MEDICATION INSTRUCTIONS:   Please take your morning medications with the following special instructions:    [x]          Please make sure to take your Blood pressure medication : Metoprolol.    [x]          Take your Aspirin - HOLD ELIQUIS 48 HOURS     We encourage families to wait in the waiting room on the first floor while the procedure is being done.  The Doctor will come out and talk with you as soon as the procedure is over.    There is the possibility that you may spend the night in the hospital, depending on the results of the procedure.  This will be determined after the procedure is done.  If angioplasty or stent is planned, you will stay at least one day.    If you or your family have any questions, please call our office Monday -Friday, 9:00 a.m.-4:30 p.m.,  At [phone], and ask to speak to one of the nurses.

## 2024-05-30 NOTE — PROGRESS NOTES
History of Present Illness:     Marybeth Valencia is a 84 y.o. year old male here for follow up for heart failure.     Pt was last seen by Dr. Khan 5/1/2024 after seeing him week prior with more shortness of breath and edema; he reported improvement in breathing and swelling at the time but was not back to baseline.  He had a RUL resection 3/6/2024 at [facility] for lung cancer.  He was found to have a left atrial thrombus and switched from Coumadin to Eliquis.       He returns to the office today for follow-up.  He reports his swelling has resolved.  He reports he had some hypotension over the past week, as low as 91/59 > 85/58 on 5/25/2024, at which time his Metoprolol 50 mg BID was discontinued.  His BP has been gradually improving over the course over last several days, BP's 113/53 > 120/69 > 127/74 yesterday and 105/61 > 126/74 today.  He also reports two-week history chest pain with exercise, started at physical therapy, without associated shortness of breath, nausea/vomiting or diaphoresis.  He has chronic diarrhea and reports he is s/p treatment for C. Diff.  He has lost approximately 30-40 pounds over the last several months, since his surgery for lung cancer.  He denies any bleeding complaints.     Impression/Plan:     Chronic diastolic heart failure/right-sided heart failure.  Lasix recently switched from 20 mg TID to 40 mg BID.  Pt reports swelling has resolved.  Pt has been becoming more hypotensive and had his Metoprolol d/c'ed 5 days ago.  Will decrease Lasix to 40 mg QAM and 20 mg QPM, resume PO Lopressor at lower dose 25 mg BID.  Chest pain, started 2 weeks ago, occurring with exercise, continue PRN SL NTG, discussed nuclear stress test vs cardiac catheterization with patient; pt would like definitive diagnosis, would prefer to proceed with cardiac catheterization.  Will arrange R/LHC with Dr. Randall Alexander.  Dual chamber Medtronic AICD with normal function, 9 months remaining on battery.  Repeat

## 2024-06-05 ENCOUNTER — HOSPITAL ENCOUNTER (OUTPATIENT)
Facility: HOSPITAL | Age: 84
Discharge: HOME | End: 2024-06-08
Payer: MEDICARE

## 2024-06-05 DIAGNOSIS — Z95.810 AICD (AUTOMATIC CARDIOVERTER/DEFIBRILLATOR) PRESENT: ICD-10-CM

## 2024-06-05 DIAGNOSIS — R06.00 DYSPNEA, UNSPECIFIED TYPE: ICD-10-CM

## 2024-06-05 DIAGNOSIS — I50.32 CHRONIC DIASTOLIC CONGESTIVE HEART FAILURE (HCC): ICD-10-CM

## 2024-06-05 LAB
ALBUMIN SERPL-MCNC: 3.2 G/DL (ref 3.4–5)
ALBUMIN/GLOB SERPL: 0.8 (ref 0.8–1.7)
ALP SERPL-CCNC: 154 U/L (ref 45–117)
ALT SERPL-CCNC: 21 U/L (ref 16–61)
ANION GAP SERPL CALC-SCNC: 5 MMOL/L (ref 3–18)
AST SERPL-CCNC: 24 U/L (ref 10–38)
BILIRUB SERPL-MCNC: 1 MG/DL (ref 0.2–1)
BUN SERPL-MCNC: 15 MG/DL (ref 7–18)
BUN/CREAT SERPL: 14 (ref 12–20)
CALCIUM SERPL-MCNC: 9.1 MG/DL (ref 8.5–10.1)
CHLORIDE SERPL-SCNC: 105 MMOL/L (ref 100–111)
CO2 SERPL-SCNC: 29 MMOL/L (ref 21–32)
CREAT SERPL-MCNC: 1.07 MG/DL (ref 0.6–1.3)
ERYTHROCYTE [DISTWIDTH] IN BLOOD BY AUTOMATED COUNT: 13.6 % (ref 11.6–14.5)
GLOBULIN SER CALC-MCNC: 3.8 G/DL (ref 2–4)
GLUCOSE SERPL-MCNC: 76 MG/DL (ref 74–99)
HCT VFR BLD AUTO: 41.7 % (ref 36–48)
HGB BLD-MCNC: 14 G/DL (ref 13–16)
INR PPP: 1.5 (ref 0.9–1.1)
MCH RBC QN AUTO: 33.8 PG (ref 24–34)
MCHC RBC AUTO-ENTMCNC: 33.6 G/DL (ref 31–37)
MCV RBC AUTO: 100.7 FL (ref 78–100)
NRBC # BLD: 0 K/UL (ref 0–0.01)
NRBC BLD-RTO: 0 PER 100 WBC
PLATELET # BLD AUTO: 205 K/UL (ref 135–420)
PMV BLD AUTO: 11.6 FL (ref 9.2–11.8)
POTASSIUM SERPL-SCNC: 4.2 MMOL/L (ref 3.5–5.5)
PROT SERPL-MCNC: 7 G/DL (ref 6.4–8.2)
PROTHROMBIN TIME: 18.6 SEC (ref 11.9–14.9)
RBC # BLD AUTO: 4.14 M/UL (ref 4.35–5.65)
SODIUM SERPL-SCNC: 139 MMOL/L (ref 136–145)
WBC # BLD AUTO: 6.4 K/UL (ref 4.6–13.2)

## 2024-06-05 PROCEDURE — 85610 PROTHROMBIN TIME: CPT

## 2024-06-05 PROCEDURE — 36415 COLL VENOUS BLD VENIPUNCTURE: CPT

## 2024-06-05 PROCEDURE — 85027 COMPLETE CBC AUTOMATED: CPT

## 2024-06-05 PROCEDURE — 80053 COMPREHEN METABOLIC PANEL: CPT

## 2024-06-19 ENCOUNTER — ANESTHESIA EVENT (OUTPATIENT)
Facility: HOSPITAL | Age: 84
End: 2024-06-19
Payer: MEDICARE

## 2024-06-19 ENCOUNTER — HOSPITAL ENCOUNTER (OUTPATIENT)
Facility: HOSPITAL | Age: 84
Discharge: HOME | End: 2024-06-21
Attending: INTERNAL MEDICINE
Payer: MEDICARE

## 2024-06-19 ENCOUNTER — ANESTHESIA (OUTPATIENT)
Facility: HOSPITAL | Age: 84
End: 2024-06-19
Payer: MEDICARE

## 2024-06-19 ENCOUNTER — HOSPITAL ENCOUNTER (OUTPATIENT)
Facility: HOSPITAL | Age: 84
Setting detail: OUTPATIENT SURGERY
Discharge: HOME | End: 2024-06-19
Attending: INTERNAL MEDICINE
Payer: MEDICARE

## 2024-06-19 VITALS
HEART RATE: 70 BPM | TEMPERATURE: 98.9 F | WEIGHT: 165 LBS | BODY MASS INDEX: 22.35 KG/M2 | RESPIRATION RATE: 13 BRPM | SYSTOLIC BLOOD PRESSURE: 113 MMHG | HEIGHT: 72 IN | DIASTOLIC BLOOD PRESSURE: 66 MMHG | OXYGEN SATURATION: 100 %

## 2024-06-19 VITALS
WEIGHT: 165 LBS | BODY MASS INDEX: 22.35 KG/M2 | HEIGHT: 72 IN | DIASTOLIC BLOOD PRESSURE: 51 MMHG | HEART RATE: 79 BPM | TEMPERATURE: 97 F | RESPIRATION RATE: 16 BRPM | OXYGEN SATURATION: 98 % | SYSTOLIC BLOOD PRESSURE: 97 MMHG

## 2024-06-19 DIAGNOSIS — I51.3 THROMBUS OF LEFT ATRIAL APPENDAGE: ICD-10-CM

## 2024-06-19 DIAGNOSIS — R07.9 CHEST PAIN, UNSPECIFIED: ICD-10-CM

## 2024-06-19 DIAGNOSIS — I48.19 PERSISTENT ATRIAL FIBRILLATION (HCC): Primary | ICD-10-CM

## 2024-06-19 LAB
ECHO BSA: 1.95 M2
ECHO BSA: 1.95 M2

## 2024-06-19 PROCEDURE — 6360000004 HC RX CONTRAST MEDICATION: Performed by: INTERNAL MEDICINE

## 2024-06-19 PROCEDURE — 99152 MOD SED SAME PHYS/QHP 5/>YRS: CPT | Performed by: INTERNAL MEDICINE

## 2024-06-19 PROCEDURE — C1725 CATH, TRANSLUMIN NON-LASER: HCPCS | Performed by: INTERNAL MEDICINE

## 2024-06-19 PROCEDURE — C1769 GUIDE WIRE: HCPCS | Performed by: INTERNAL MEDICINE

## 2024-06-19 PROCEDURE — 2709999900 HC NON-CHARGEABLE SUPPLY: Performed by: INTERNAL MEDICINE

## 2024-06-19 PROCEDURE — 99153 MOD SED SAME PHYS/QHP EA: CPT | Performed by: INTERNAL MEDICINE

## 2024-06-19 PROCEDURE — 93461 R&L HRT ART/VENTRICLE ANGIO: CPT | Performed by: INTERNAL MEDICINE

## 2024-06-19 PROCEDURE — C1713 ANCHOR/SCREW BN/BN,TIS/BN: HCPCS | Performed by: INTERNAL MEDICINE

## 2024-06-19 PROCEDURE — C1894 INTRO/SHEATH, NON-LASER: HCPCS | Performed by: INTERNAL MEDICINE

## 2024-06-19 PROCEDURE — 99152 MOD SED SAME PHYS/QHP 5/>YRS: CPT

## 2024-06-19 PROCEDURE — 6360000002 HC RX W HCPCS: Performed by: INTERNAL MEDICINE

## 2024-06-19 PROCEDURE — 93312 ECHO TRANSESOPHAGEAL: CPT

## 2024-06-19 PROCEDURE — 76000 FLUOROSCOPY <1 HR PHYS/QHP: CPT | Performed by: INTERNAL MEDICINE

## 2024-06-19 PROCEDURE — 3700000001 HC ADD 15 MINUTES (ANESTHESIA)

## 2024-06-19 PROCEDURE — 2500000003 HC RX 250 WO HCPCS: Performed by: INTERNAL MEDICINE

## 2024-06-19 PROCEDURE — 2580000003 HC RX 258: Performed by: NURSE ANESTHETIST, CERTIFIED REGISTERED

## 2024-06-19 PROCEDURE — 6360000002 HC RX W HCPCS: Performed by: NURSE ANESTHETIST, CERTIFIED REGISTERED

## 2024-06-19 PROCEDURE — 93460 R&L HRT ART/VENTRICLE ANGIO: CPT | Performed by: INTERNAL MEDICINE

## 2024-06-19 PROCEDURE — 3700000000 HC ANESTHESIA ATTENDED CARE

## 2024-06-19 PROCEDURE — 6370000000 HC RX 637 (ALT 250 FOR IP): Performed by: INTERNAL MEDICINE

## 2024-06-19 RX ORDER — SODIUM CHLORIDE 9 MG/ML
INJECTION, SOLUTION INTRAVENOUS PRN
Status: ACTIVE | OUTPATIENT
Start: 2024-06-19

## 2024-06-19 RX ORDER — PROPOFOL 10 MG/ML
INJECTION, EMULSION INTRAVENOUS PRN
Status: DISCONTINUED | OUTPATIENT
Start: 2024-06-19 | End: 2024-06-19 | Stop reason: SDUPTHER

## 2024-06-19 RX ORDER — SODIUM CHLORIDE 9 MG/ML
INJECTION, SOLUTION INTRAVENOUS CONTINUOUS PRN
Status: DISCONTINUED | OUTPATIENT
Start: 2024-06-19 | End: 2024-06-19 | Stop reason: SDUPTHER

## 2024-06-19 RX ORDER — SODIUM CHLORIDE 0.9 % (FLUSH) 0.9 %
5-40 SYRINGE (ML) INJECTION EVERY 12 HOURS SCHEDULED
Status: DISPENSED | OUTPATIENT
Start: 2024-06-19

## 2024-06-19 RX ORDER — BACTERIOSTATIC WATER 1 ML/ML
10 INJECTION, SOLUTION INTRAMUSCULAR; INTRAVENOUS; SUBCUTANEOUS ONCE
Status: DISCONTINUED | OUTPATIENT
Start: 2024-06-19 | End: 2024-06-19

## 2024-06-19 RX ORDER — SODIUM CHLORIDE 9 MG/ML
INJECTION, SOLUTION INTRAVENOUS CONTINUOUS
Status: DISPENSED | OUTPATIENT
Start: 2024-06-19

## 2024-06-19 RX ORDER — ACYCLOVIR 200 MG/1
10 CAPSULE ORAL ONCE
Status: COMPLETED | OUTPATIENT
Start: 2024-06-19 | End: 2024-06-19

## 2024-06-19 RX ORDER — MIDAZOLAM HYDROCHLORIDE 1 MG/ML
INJECTION INTRAMUSCULAR; INTRAVENOUS PRN
Status: DISCONTINUED | OUTPATIENT
Start: 2024-06-19 | End: 2024-06-19 | Stop reason: HOSPADM

## 2024-06-19 RX ORDER — ACETAMINOPHEN 325 MG/1
650 TABLET ORAL EVERY 4 HOURS PRN
Status: ACTIVE | OUTPATIENT
Start: 2024-06-19

## 2024-06-19 RX ORDER — ASPIRIN 81 MG/1
TABLET, CHEWABLE ORAL PRN
Status: DISCONTINUED | OUTPATIENT
Start: 2024-06-19 | End: 2024-06-19 | Stop reason: HOSPADM

## 2024-06-19 RX ORDER — SODIUM CHLORIDE 0.9 % (FLUSH) 0.9 %
5-40 SYRINGE (ML) INJECTION PRN
Status: ACTIVE | OUTPATIENT
Start: 2024-06-19

## 2024-06-19 RX ORDER — FENTANYL CITRATE 50 UG/ML
INJECTION, SOLUTION INTRAMUSCULAR; INTRAVENOUS PRN
Status: DISCONTINUED | OUTPATIENT
Start: 2024-06-19 | End: 2024-06-19 | Stop reason: HOSPADM

## 2024-06-19 RX ADMIN — SODIUM CHLORIDE 10 ML: 9 INJECTION INTRAMUSCULAR; INTRAVENOUS; SUBCUTANEOUS at 08:08

## 2024-06-19 RX ADMIN — PROPOFOL 20 MG: 10 INJECTION, EMULSION INTRAVENOUS at 07:54

## 2024-06-19 RX ADMIN — PROPOFOL 50 MG: 10 INJECTION, EMULSION INTRAVENOUS at 07:52

## 2024-06-19 RX ADMIN — PROPOFOL 20 MG: 10 INJECTION, EMULSION INTRAVENOUS at 08:02

## 2024-06-19 RX ADMIN — SODIUM CHLORIDE: 9 INJECTION, SOLUTION INTRAVENOUS at 07:47

## 2024-06-19 RX ADMIN — PROPOFOL 20 MG: 10 INJECTION, EMULSION INTRAVENOUS at 08:05

## 2024-06-19 RX ADMIN — PROPOFOL 20 MG: 10 INJECTION, EMULSION INTRAVENOUS at 07:58

## 2024-06-19 NOTE — PRE SEDATION
Sedation Plan  ASA: class 2 - patient with mild systemic disease     Mallampati class: II - soft palate, uvula, fauces visible.    Sedation plan: local anesthesia, minimal sedation and moderate (conscious sedation)    Risks, benefits, and alternatives discussed with patient and spouse.  Use of blood products discussed with patient and spouse who.

## 2024-06-19 NOTE — H&P
Please see clinic note from 5/30/24 for detail.  I saw and examined patient and confirmed above.  No interval change.  Labs reviewed.   Procedure explained to patient and all risk and benefit discussed with patient.  Risk, benefit, complication of LHC and possible PCI ( including but not limited to bleeding, infection, heart failure, stroke, MI, emergent bypass surgery, kidney failure, dialysis and death ) were discussed with patient and willing to proceed with procedure.  Proceed as planned.    History and physical has been reviewed. There have been no significant clinical changes since the completion of the originally dated History and Physical.  Will be using moderate sedation.    ------------------------------------------------------------------------------------------------------------------

## 2024-06-19 NOTE — PROGRESS NOTES
Cath holding summary:    0647: Patient ambulated from waiting area without difficulty, placed on monitor. A&O x 4, no c/o pain. NPO since midnight, ID and allergies verified. H&P reviewed, med rec completed. PIV x 2 inserted. Groin prep completed, consent ready for signature.    0820: Verbal report given to Tom ARVIZU on Marybeth Valencia being transferred to Cath Lab 1 for ordered procedure. Report consisted of patient's Situation, Background, Assessment and Recommendations (SBAR). Information from the following report(s) Nurse Handoff Report, MAR, Neuro Assessment, and Event Log was reviewed with the receiving nurse. Opportunity for questions and clarification was provided.    0940: Verbal report received from Eusebia ARVIZU on Marybeth Valencia being received from Cath Lab for routine post-op. Report consisted of patient's Situation, Background, Assessment and Recommendations (SBAR). Information from the following report(s) MAR, Neuro Assessment, and Event Log was reviewed with the receiving nurse. Pt A&O x 4, no c/o pain. Opportunity for questions and clarification provided.  Procedure: Cardiac Cath  Intervention: No  Site: Right, Arm, Groin      1000: R band removed from right wrist per protocol. No bleeding or hematoma noted, site covered with hemostatic dressing and tegaderm. Patient denies pain, pulse palpable and brisk cap refill distal to site.  Cath site instructions and post care including s/s of bleeding and methods of bleeding prevention reviewed with patient who verbalized understanding.      1302: AVS Discharge instructions reviewed with patient and copy given to patient.  All questions answered.  Patient verbalized understanding to all discharge instructions.  PIV removed. Procedural site within normal limits.  No hematoma or bleeding noted from procedural and PIV site. No pain noted at discharge. Patient back to neurological baseline, A&O x. Patient discharged in the presence of a responsible adult (Wife, Jessika)

## 2024-06-19 NOTE — ANESTHESIA POSTPROCEDURE EVALUATION
Department of Anesthesiology  Postprocedure Note    Patient: Marybeth Valencia  MRN: [id number]  YOB: 1940  Date of evaluation: 6/19/2024    Procedure Summary       Date: 06/19/24 Room / Location: [facility] CARDIAC CATH LAB; [facility] NON-INVASIVE CARDIOLOGY    Anesthesia Start: 0746 Anesthesia Stop: 0809    Procedure: DESEAN (PRN CONTRAST/BUBBLE/3D) Diagnosis: Thrombus of left atrial appendage    Scheduled Providers: Randall Alexander MD; Carson Rhodes MD Responsible Provider: Carson Rhodes MD    Anesthesia Type: MAC ASA Status: 3            Anesthesia Type: MAC    Bryan Phase I: Bryan Score: 10    Bryan Phase II:      Anesthesia Post Evaluation    Patient location during evaluation: PACU  Patient participation: complete - patient participated  Level of consciousness: sleepy but conscious  Pain score: 0  Airway patency: patent  Nausea & Vomiting: no nausea and no vomiting  Cardiovascular status: blood pressure returned to baseline  Respiratory status: acceptable  Hydration status: euvolemic  Pain management: adequate    No notable events documented.

## 2024-06-19 NOTE — PROGRESS NOTES
TRANSFER - OUT REPORT:    Verbal report given to Tom(name) on Marybeth Valencia being transferred to Cath Lab (unit) for ordered procedure       Report consisted of patient's Situation, Background, Assessment and   Recommendations(SBAR).     Information from the following report(s) Nurse Handoff Report, MAR, Neuro Assessment, and Event Log was reviewed with the receiving nurse.    Opportunity for questions and clarification was provided.      Patient transported with:   Registered Nurse

## 2024-06-19 NOTE — PROGRESS NOTES
R femoral vein sheath removed and pressure held until 0948. No redness, bleeding, or hematoma noted. Pt denies pain.

## 2024-06-19 NOTE — DISCHARGE INSTRUCTIONS
DISCHARGE SUMMARY from Nurse    PATIENT INSTRUCTIONS:    After general anesthesia or intravenous sedation, for 24 hours or while taking prescription Narcotics:  Limit your activities  Do not drive and operate hazardous machinery  Do not make important personal or business decisions  Do  not drink alcoholic beverages  If you have not urinated within 8 hours after discharge, please contact your surgeon on call.    Report the following to your surgeon:  Excessive pain, swelling, redness or odor of or around the surgical area  Temperature over 100.5  Nausea and vomiting lasting longer than 4 hours or if unable to take medications  Any signs of decreased circulation or nerve impairment to extremity: change in color, persistent  numbness, tingling, coldness or increase pain  Any questions    What to do at Home:  Recommended activity: activity as tolerated and no driving for today.    These are general instructions for a healthy lifestyle:    No smoking/ No tobacco products/ Avoid exposure to second hand smoke  Surgeon General's Warning:  Quitting smoking now greatly reduces serious risk to your health.    Obesity, smoking, and sedentary lifestyle greatly increases your risk for illness    A healthy diet, regular physical exercise & weight monitoring are important for maintaining a healthy lifestyle    You may be retaining fluid if you have a history of heart failure or if you experience any of the following symptoms:  Weight gain of 3 pounds or more overnight or 5 pounds in a week, increased swelling in our hands or feet or shortness of breath while lying flat in bed.  Please call your doctor as soon as you notice any of these symptoms; do not wait until your next office visit.        The discharge information has been reviewed with the patient and spouse.  The patient and spouse verbalized understanding.  Discharge medications reviewed with the patient and spouse and appropriate educational materials and side effects

## 2024-06-19 NOTE — ANESTHESIA PRE PROCEDURE
Value Date/Time    WBC 6.4 06/05/2024 12:35 PM    RBC 4.14 06/05/2024 12:35 PM    HGB 14.0 06/05/2024 12:35 PM    HCT 41.7 06/05/2024 12:35 PM    .7 06/05/2024 12:35 PM    RDW 13.6 06/05/2024 12:35 PM     06/05/2024 12:35 PM       CMP:   Lab Results   Component Value Date/Time     06/05/2024 12:35 PM    K 4.2 06/05/2024 12:35 PM     06/05/2024 12:35 PM    CO2 29 06/05/2024 12:35 PM    BUN 15 06/05/2024 12:35 PM    CREATININE 1.07 06/05/2024 12:35 PM    GFRAA >60 08/05/2022 01:42 PM    AGRATIO 0.9 12/14/2022 11:41 AM    LABGLOM 69 06/05/2024 12:35 PM    LABGLOM 72 04/29/2024 02:10 PM    LABGLOM >60 12/14/2022 11:41 AM    GLUCOSE 76 06/05/2024 12:35 PM    CALCIUM 9.1 06/05/2024 12:35 PM    BILITOT 1.0 06/05/2024 12:35 PM    ALKPHOS 154 06/05/2024 12:35 PM    ALKPHOS 120 12/14/2022 11:41 AM    AST 24 06/05/2024 12:35 PM    ALT 21 06/05/2024 12:35 PM       POC Tests: No results for input(s): \"POCGLU\", \"POCNA\", \"POCK\", \"POCCL\", \"POCBUN\", \"POCHEMO\", \"POCHCT\" in the last 72 hours.    Coags:   Lab Results   Component Value Date/Time    PROTIME 18.6 06/05/2024 12:35 PM    INR 1.5 06/05/2024 12:35 PM    INR 2.8 11/13/2019 10:44 AM       HCG (If Applicable): No results found for: \"PREGTESTUR\", \"PREGSERUM\", \"HCG\", \"HCGQUANT\"     ABGs: No results found for: \"PHART\", \"PO2ART\", \"IVR6WHF\", \"VRW7LGC\", \"BEART\", \"K7YIMHYO\"     Type & Screen (If Applicable):  No results found for: \"LABABO\"    Drug/Infectious Status (If Applicable):  No results found for: \"HIV\", \"HEPCAB\"    COVID-19 Screening (If Applicable):   Lab Results   Component Value Date/Time    COVID19 Not Detected 12/23/2020 02:33 PM           Anesthesia Evaluation  Patient summary reviewed and Nursing notes reviewed   no history of anesthetic complications:   Airway: Mallampati: II  TM distance: <3 FB   Neck ROM: full  Mouth opening: > = 3 FB   Dental:      Comment: Upper plate, will remove    Pulmonary:normal exam                              ROS

## 2024-07-24 ENCOUNTER — OFFICE VISIT (OUTPATIENT)
Age: 84
End: 2024-07-24

## 2024-07-24 ENCOUNTER — NURSE ONLY (OUTPATIENT)
Age: 84
End: 2024-07-24

## 2024-07-24 VITALS
BODY MASS INDEX: 23.46 KG/M2 | OXYGEN SATURATION: 99 % | DIASTOLIC BLOOD PRESSURE: 80 MMHG | SYSTOLIC BLOOD PRESSURE: 120 MMHG | HEART RATE: 91 BPM | WEIGHT: 173 LBS

## 2024-07-24 DIAGNOSIS — Z95.810 AICD (AUTOMATIC CARDIOVERTER/DEFIBRILLATOR) PRESENT: ICD-10-CM

## 2024-07-24 DIAGNOSIS — Z95.810 PRESENCE OF AUTOMATIC (IMPLANTABLE) CARDIAC DEFIBRILLATOR: Primary | ICD-10-CM

## 2024-07-24 DIAGNOSIS — I42.9 CARDIOMYOPATHY, UNSPECIFIED TYPE (HCC): Primary | ICD-10-CM

## 2024-07-24 DIAGNOSIS — I48.19 PERSISTENT ATRIAL FIBRILLATION (HCC): ICD-10-CM

## 2024-07-24 DIAGNOSIS — I50.22 CHRONIC SYSTOLIC CONGESTIVE HEART FAILURE (HCC): ICD-10-CM

## 2024-07-24 DIAGNOSIS — I42.9 CARDIOMYOPATHY, UNSPECIFIED TYPE (HCC): ICD-10-CM

## 2024-07-24 NOTE — PROGRESS NOTES
History of Present Illness:  This is an 84 year-old male here for followup.  Ever since he had his right upper lobe lung resection for lung cancer in 03/2024 he is having more issues with his congestive heart failure and fluid status.  He is now taking Lasix 40 mg twice daily.  He had a right and left heart catheterization in June by Dr. Alexander with stable LVEDP and PA pressure was not significantly elevated.  He has known significant tricuspid regurgitation as well.  There were initial findings of incidental left atrial appendage thrombus while on Coumadin during his workup earlier this year and he has been switched to Eliquis.  Follow-up DESEAN was reviewed.      Impression:   Recent left atrial appendage thrombus by CT scan earlier this year at [facility] with follow-up DESEAN 06/2024 with resolution.  Now taking Eliquis in place of Coumadin.    Recent lung cancer in right upper lobe, good margins, no METS 03/2024 at [facility].    Dual chamber Medtronic AICD with normal function.    Chronic atrial fibrillation with AV node ablation 2011.   Chronic diastolic heart failure, now taking Lasix 40 mg twice daily.    History of worsening tricuspid regurgitation and some RV failure 04/2024, gradually improving.  Given reasonable response to diuretics and improvement of fluid status, I would not pursue tricuspid surgery.    Remote VT and ablation 2007.   Hypertension.     Plan:  He now has chronic heart failure with Lasix 40 mg twice daily and his OptiVol is stable.  AICD is functioning normally with about seven months.  I would continue the Eliquis in place of Coumadin indefinitely due to transient left atrial thrombus seen on CT scan 03/2024.  I would not pursue tricuspid valve surgery at this point or clipping, as clinically he is doing well.  I will see him back in three months.        Wt Readings from Last 3 Encounters:   07/24/24 78.5 kg (173 lb)   06/19/24 74.8 kg (165 lb)   06/19/24 74.8 kg (165 lb)     Past Medical

## 2024-09-03 RX ORDER — AMLODIPINE BESYLATE 2.5 MG/1
TABLET ORAL
Qty: 90 TABLET | Refills: 3 | Status: SHIPPED | OUTPATIENT
Start: 2024-09-03

## 2024-09-11 NOTE — TELEPHONE ENCOUNTER
Patient want to know if she can get a referral to a PO, toe nail turning dark   TC to pt, INR 1.7 on 1/12/22 he had been off Coumadin for five days due to surgery. He is currently taking 5 mg Mon/Wed/Fri and 2 mg all other days. Pt should continue same dose and repeat in 1 week. He verbalized his understanding.

## 2024-09-19 DIAGNOSIS — Z95.810 AICD (AUTOMATIC CARDIOVERTER/DEFIBRILLATOR) PRESENT: ICD-10-CM

## 2024-09-19 DIAGNOSIS — I50.32 CHRONIC DIASTOLIC CONGESTIVE HEART FAILURE (HCC): ICD-10-CM

## 2024-09-19 RX ORDER — FUROSEMIDE 20 MG
60 TABLET ORAL DAILY
Qty: 270 TABLET | Refills: 3 | Status: SHIPPED | OUTPATIENT
Start: 2024-09-19

## 2024-09-25 NOTE — TELEPHONE ENCOUNTER
2 pt identifiers confirmed. Patient INR is 2.8 on 08/11/21. He confirmed taking Coumadin 5 mg Mon/Wed/Fri and 2 1/2 mg all other days. Provider stated patient should continue same dose and recheck in 1 week. Patient verbalized his understanding. show

## 2024-10-04 ENCOUNTER — NURSE ONLY (OUTPATIENT)
Age: 84
End: 2024-10-04

## 2024-10-04 DIAGNOSIS — I48.19 PERSISTENT ATRIAL FIBRILLATION (HCC): ICD-10-CM

## 2024-10-04 DIAGNOSIS — I50.32 CHRONIC DIASTOLIC CONGESTIVE HEART FAILURE (HCC): ICD-10-CM

## 2024-10-04 DIAGNOSIS — Z95.810 AICD (AUTOMATIC CARDIOVERTER/DEFIBRILLATOR) PRESENT: Primary | ICD-10-CM

## 2024-10-04 DIAGNOSIS — I50.22 CHRONIC SYSTOLIC CONGESTIVE HEART FAILURE (HCC): ICD-10-CM

## 2024-10-04 DIAGNOSIS — I42.9 CARDIOMYOPATHY, UNSPECIFIED TYPE (HCC): ICD-10-CM

## 2024-10-18 ENCOUNTER — TELEPHONE (OUTPATIENT)
Age: 84
End: 2024-10-18

## 2024-10-18 NOTE — TELEPHONE ENCOUNTER
Patient called asking if he needs to stay on his Potassium because it is almost out. Last potassium check was in June 2024.

## 2024-10-21 RX ORDER — POTASSIUM CHLORIDE 1500 MG/1
40 TABLET, EXTENDED RELEASE ORAL DAILY
Qty: 180 TABLET | Refills: 3 | Status: SHIPPED | OUTPATIENT
Start: 2024-10-21

## 2024-10-21 NOTE — TELEPHONE ENCOUNTER
Donovan Khan MD  You3 days ago       Confirm if still on lasix, please continue the potassium and refill for him. Thanks

## 2024-11-20 ENCOUNTER — NURSE ONLY (OUTPATIENT)
Age: 84
End: 2024-11-20

## 2024-11-20 ENCOUNTER — OFFICE VISIT (OUTPATIENT)
Age: 84
End: 2024-11-20

## 2024-11-20 VITALS
HEART RATE: 90 BPM | SYSTOLIC BLOOD PRESSURE: 120 MMHG | WEIGHT: 168 LBS | BODY MASS INDEX: 22.75 KG/M2 | DIASTOLIC BLOOD PRESSURE: 70 MMHG | OXYGEN SATURATION: 97 % | HEIGHT: 72 IN

## 2024-11-20 DIAGNOSIS — Z95.810 AICD (AUTOMATIC CARDIOVERTER/DEFIBRILLATOR) PRESENT: Primary | ICD-10-CM

## 2024-11-20 DIAGNOSIS — I48.20 CHRONIC ATRIAL FIBRILLATION, UNSPECIFIED (HCC): ICD-10-CM

## 2024-11-20 DIAGNOSIS — I07.1 TRICUSPID VALVE INSUFFICIENCY, UNSPECIFIED ETIOLOGY: ICD-10-CM

## 2024-11-20 DIAGNOSIS — I42.9 CARDIOMYOPATHY, UNSPECIFIED TYPE (HCC): ICD-10-CM

## 2024-11-20 DIAGNOSIS — Z98.890 S/P ABLATION OPERATION FOR ARRHYTHMIA: ICD-10-CM

## 2024-11-20 DIAGNOSIS — I50.32 CHRONIC DIASTOLIC CONGESTIVE HEART FAILURE (HCC): ICD-10-CM

## 2024-11-20 DIAGNOSIS — Z86.79 S/P ABLATION OPERATION FOR ARRHYTHMIA: ICD-10-CM

## 2024-11-20 NOTE — PROGRESS NOTES
History of Present Illness:  84 year-old male here for followup.  He has some mild chronic dyspnea, but no chest pain or syncope.  No PND, orthopnea or edema.     Impression:   Dual chamber Medtronic AICD, not utilizing the atrial lead.  He does have a 69-49 lead in place.    History of lung cancer, right upper lobe with good margins and no METS 03/2024, following up with oncology.    History of left atrial appendage thrombus by CT scan earlier this year at [facility] with follow-up DESEAN in 06/2024 with resolution, on Eliquis in place of Coumadin.    Chronic atrial fibrillation with AV node ablation in 2011.   Chronic diastolic heart failure on Lasix.   History of worsening tricuspid regurgitation and RV failure 04/2024.   Remote VT and ablation in 2007.   Hypertension.   Heart catheterization 06/2024 with stable LVEDP and PA pressure.     Plan:  His heart failure is compensated on Lasix and OptiVol is stable.  AICD is functioning normally with about three months on the device.  He is not using his atrial lead.  He does have a 69-49 lead.  He also has old leads on the right side.  His left atrial thrombus resolved with Eliquis.  He will see our device nurse back in two months and ultimately will need a generator change out likely within the next three to four months.        Wt Readings from Last 3 Encounters:   11/20/24 76.2 kg (168 lb)   07/24/24 78.5 kg (173 lb)   06/19/24 74.8 kg (165 lb)     Past Medical History:   Diagnosis Date    AICD (automatic cardioverter/defibrillator) present     Medtronic  upgrade from pacer in 2007 due to VT    Arthritis     Atrial fibrillation (HCC)     Cancer ([facility]) 02/2019    melenoma on left leg    Cardio-chalasia 8/31/2011    Successful defibrillation threshold testing at 18 joules. Patient also had conversion to atrial ventricular pacing.     CHF (congestive heart failure) ([facility])     Cobalamin deficiency     Dupuytren contracture 02/08/2010    on the right ring finger     Edema

## 2025-01-22 ENCOUNTER — NURSE ONLY (OUTPATIENT)
Age: 85
End: 2025-01-22

## 2025-01-22 ENCOUNTER — OFFICE VISIT (OUTPATIENT)
Age: 85
End: 2025-01-22
Payer: MEDICARE

## 2025-01-22 ENCOUNTER — TELEPHONE (OUTPATIENT)
Age: 85
End: 2025-01-22

## 2025-01-22 DIAGNOSIS — Z95.810 AICD (AUTOMATIC CARDIOVERTER/DEFIBRILLATOR) PRESENT: Primary | ICD-10-CM

## 2025-01-22 DIAGNOSIS — I50.32 CHRONIC DIASTOLIC CONGESTIVE HEART FAILURE (HCC): ICD-10-CM

## 2025-01-22 DIAGNOSIS — I48.91 ATRIAL FIBRILLATION, UNSPECIFIED TYPE (HCC): ICD-10-CM

## 2025-01-22 DIAGNOSIS — I48.20 CHRONIC ATRIAL FIBRILLATION, UNSPECIFIED (HCC): ICD-10-CM

## 2025-01-22 DIAGNOSIS — I42.9 CARDIOMYOPATHY, UNSPECIFIED TYPE (HCC): ICD-10-CM

## 2025-01-22 PROCEDURE — 1123F ACP DISCUSS/DSCN MKR DOCD: CPT | Performed by: INTERNAL MEDICINE

## 2025-01-22 PROCEDURE — G8420 CALC BMI NORM PARAMETERS: HCPCS | Performed by: INTERNAL MEDICINE

## 2025-01-22 PROCEDURE — 1036F TOBACCO NON-USER: CPT | Performed by: INTERNAL MEDICINE

## 2025-01-22 PROCEDURE — 99214 OFFICE O/P EST MOD 30 MIN: CPT | Performed by: INTERNAL MEDICINE

## 2025-01-22 PROCEDURE — G8428 CUR MEDS NOT DOCUMENT: HCPCS | Performed by: INTERNAL MEDICINE

## 2025-01-22 NOTE — TELEPHONE ENCOUNTER
Called patient and left message to call surgery scheduling department and speak with Rayne @ [phone]. To schedule Dual AICD Gen Change with Dr Khan.

## 2025-01-22 NOTE — PROGRESS NOTES
hours as needed      B-D 3CC LUER-JESE SYR 23GX1\" 23G X 1\" 3 ML MISC USE ONE  SYRINGE FOR INJECTION EVERY 3 WEEKS 90      metoprolol tartrate (LOPRESSOR) 50 MG tablet take 1 tablet by mouth twice a day (Patient taking differently: Take 12.5 mg by mouth 2 times daily) 180 tablet 3    famotidine (PEPCID) 40 MG tablet Take 1 tablet by mouth nightly      brinzolamide (AZOPT) 1 % ophthalmic suspension Apply 1 drop to eye 2 times daily      coenzyme Q10 200 MG CAPS capsule Take by mouth daily      cyanocobalamin 1000 MCG/ML injection inject 1 milliliter ( 1000 MCG ) intramuscularly EVERY 3 WEEKS      netarsudil-Latanoprost (ROCKLATAN) 0.02-0.005 % ophthalmic solution ceived the following from [facility]: Outside name: Rocklatan 0.02-0.005 % drop      omeprazole (PRILOSEC) 40 MG delayed release capsule take 1 capsule by mouth every morning      triamcinolone (KENALOG) 0.1 % cream ceived the following from [facility]: Outside name: triamcinolone acetonide (KENALOG) 0.1 % topical cream       No current facility-administered medications for this visit.       Social History   reports that he has never smoked. He has never used smokeless tobacco.   reports current alcohol use of about 10.0 - 12.0 standard drinks of alcohol per week.    Family History  family history includes COPD in his father; Hypertension in an other family member; No Known Problems in his mother.    Review of Systems  Except as stated above include:  Constitutional: Negative for fever, chills and malaise/fatigue.   HEENT: No congestion or recent URI.  Gastrointestinal: No nausea, vomiting, abdominal pain, bloody stools.  Pulmonary:  Negative except as stated above.  Cardiac:  Negative except as stated above.  Musculoskeletal: Negative except as stated above.  Neurological:  No localized symptoms.  Endocrine:  Negative except as stated above.    PHYSICAL EXAM  BP Readings from Last 3 Encounters:   11/20/24 120/70   07/24/24

## 2025-02-14 NOTE — FLOWSHEET NOTE
Called to verify arrival time of 0815 for 0915 procedure on 2/21. Pre-procedure instructions verified including NPO after midnight and which meds to take and/or hold. All questions answered, patient verbalized understanding.

## 2025-02-19 NOTE — H&P
WEEKS 90        metoprolol tartrate (LOPRESSOR) 50 MG tablet take 1 tablet by mouth twice a day (Patient taking differently: Take 12.5 mg by mouth 2 times daily) 180 tablet 3    famotidine (PEPCID) 40 MG tablet Take 1 tablet by mouth nightly        brinzolamide (AZOPT) 1 % ophthalmic suspension Apply 1 drop to eye 2 times daily        coenzyme Q10 200 MG CAPS capsule Take by mouth daily        cyanocobalamin 1000 MCG/ML injection inject 1 milliliter ( 1000 MCG ) intramuscularly EVERY 3 WEEKS        netarsudil-Latanoprost (ROCKLATAN) 0.02-0.005 % ophthalmic solution ceived the following from [facility]: Outside name: Rocklatan 0.02-0.005 % drop        omeprazole (PRILOSEC) 40 MG delayed release capsule take 1 capsule by mouth every morning        triamcinolone (KENALOG) 0.1 % cream ceived the following from [facility]: Outside name: triamcinolone acetonide (KENALOG) 0.1 % topical cream          No current facility-administered medications for this visit.            Social History   reports that he has never smoked. He has never used smokeless tobacco.   reports current alcohol use of about 10.0 - 12.0 standard drinks of alcohol per week.     Family History  family history includes COPD in his father; Hypertension in an other family member; No Known Problems in his mother.     Review of Systems  Except as stated above include:  Constitutional: Negative for fever, chills and malaise/fatigue.   HEENT: No congestion or recent URI.  Gastrointestinal: No nausea, vomiting, abdominal pain, bloody stools.  Pulmonary:  Negative except as stated above.  Cardiac:  Negative except as stated above.  Musculoskeletal: Negative except as stated above.  Neurological:  No localized symptoms.  Endocrine:  Negative except as stated above.     PHYSICAL EXAM      BP Readings from Last 3 Encounters:   11/20/24 120/70   07/24/24 120/80   06/19/24 (!) 97/51          Pulse Readings from Last 3 Encounters:

## 2025-02-21 ENCOUNTER — ANESTHESIA EVENT (OUTPATIENT)
Facility: HOSPITAL | Age: 85
End: 2025-02-21
Payer: MEDICARE

## 2025-02-21 ENCOUNTER — ANESTHESIA (OUTPATIENT)
Facility: HOSPITAL | Age: 85
End: 2025-02-21
Payer: MEDICARE

## 2025-02-21 ENCOUNTER — HOSPITAL ENCOUNTER (OUTPATIENT)
Facility: HOSPITAL | Age: 85
Setting detail: OUTPATIENT SURGERY
Discharge: HOME | End: 2025-02-21
Attending: INTERNAL MEDICINE
Payer: MEDICARE

## 2025-02-21 VITALS
TEMPERATURE: 98.1 F | WEIGHT: 168 LBS | BODY MASS INDEX: 22.75 KG/M2 | DIASTOLIC BLOOD PRESSURE: 75 MMHG | RESPIRATION RATE: 15 BRPM | HEIGHT: 72 IN | OXYGEN SATURATION: 100 % | SYSTOLIC BLOOD PRESSURE: 136 MMHG | HEART RATE: 70 BPM

## 2025-02-21 DIAGNOSIS — I50.32 CHRONIC DIASTOLIC CONGESTIVE HEART FAILURE (HCC): ICD-10-CM

## 2025-02-21 DIAGNOSIS — Z45.010 ENCOUNTER FOR PACEMAKER AT END OF BATTERY LIFE: ICD-10-CM

## 2025-02-21 DIAGNOSIS — Z95.810 AICD (AUTOMATIC CARDIOVERTER/DEFIBRILLATOR) PRESENT: ICD-10-CM

## 2025-02-21 LAB
ANION GAP SERPL CALC-SCNC: 4 MMOL/L (ref 3–18)
BASOPHILS # BLD: 0.06 K/UL (ref 0–0.1)
BASOPHILS NFR BLD: 0.6 % (ref 0–2)
BUN SERPL-MCNC: 24 MG/DL (ref 7–18)
BUN/CREAT SERPL: 23 (ref 12–20)
CALCIUM SERPL-MCNC: 8.6 MG/DL (ref 8.5–10.1)
CHLORIDE SERPL-SCNC: 103 MMOL/L (ref 100–111)
CO2 SERPL-SCNC: 31 MMOL/L (ref 21–32)
CREAT SERPL-MCNC: 1.05 MG/DL (ref 0.6–1.3)
DIFFERENTIAL METHOD BLD: ABNORMAL
ECHO BSA: 1.97 M2
EOSINOPHIL # BLD: 0.17 K/UL (ref 0–0.4)
EOSINOPHIL NFR BLD: 1.7 % (ref 0–5)
ERYTHROCYTE [DISTWIDTH] IN BLOOD BY AUTOMATED COUNT: 12.2 % (ref 11.6–14.5)
GLUCOSE SERPL-MCNC: 103 MG/DL (ref 74–99)
HCT VFR BLD AUTO: 47.5 % (ref 36–48)
HGB BLD-MCNC: 16.1 G/DL (ref 13–16)
IMM GRANULOCYTES # BLD AUTO: 0.08 K/UL (ref 0–0.04)
IMM GRANULOCYTES NFR BLD AUTO: 0.8 % (ref 0–0.5)
LYMPHOCYTES # BLD: 2.7 K/UL (ref 0.9–3.6)
LYMPHOCYTES NFR BLD: 27.4 % (ref 21–52)
MCH RBC QN AUTO: 34.5 PG (ref 24–34)
MCHC RBC AUTO-ENTMCNC: 33.9 G/DL (ref 31–37)
MCV RBC AUTO: 101.7 FL (ref 78–100)
MONOCYTES # BLD: 1.06 K/UL (ref 0.05–1.2)
MONOCYTES NFR BLD: 10.8 % (ref 3–10)
NEUTS SEG # BLD: 5.77 K/UL (ref 1.8–8)
NEUTS SEG NFR BLD: 58.7 % (ref 40–73)
NRBC # BLD: 0 K/UL (ref 0–0.01)
NRBC BLD-RTO: 0 PER 100 WBC
PLATELET # BLD AUTO: 249 K/UL (ref 135–420)
PMV BLD AUTO: 10.2 FL (ref 9.2–11.8)
POTASSIUM SERPL-SCNC: 3.9 MMOL/L (ref 3.5–5.5)
RBC # BLD AUTO: 4.67 M/UL (ref 4.35–5.65)
SODIUM SERPL-SCNC: 138 MMOL/L (ref 136–145)
WBC # BLD AUTO: 9.8 K/UL (ref 4.6–13.2)

## 2025-02-21 PROCEDURE — 3700000001 HC ADD 15 MINUTES (ANESTHESIA): Performed by: INTERNAL MEDICINE

## 2025-02-21 PROCEDURE — 7100000011 HC PHASE II RECOVERY - ADDTL 15 MIN: Performed by: INTERNAL MEDICINE

## 2025-02-21 PROCEDURE — 7100000010 HC PHASE II RECOVERY - FIRST 15 MIN: Performed by: INTERNAL MEDICINE

## 2025-02-21 PROCEDURE — 80048 BASIC METABOLIC PNL TOTAL CA: CPT

## 2025-02-21 PROCEDURE — 6360000002 HC RX W HCPCS: Performed by: INTERNAL MEDICINE

## 2025-02-21 PROCEDURE — 33264 RMVL & RPLCMT DFB GEN MLT LD: CPT | Performed by: INTERNAL MEDICINE

## 2025-02-21 PROCEDURE — 85025 COMPLETE CBC W/AUTO DIFF WBC: CPT

## 2025-02-21 PROCEDURE — 2500000003 HC RX 250 WO HCPCS: Performed by: NURSE ANESTHETIST, CERTIFIED REGISTERED

## 2025-02-21 PROCEDURE — 7100000001 HC PACU RECOVERY - ADDTL 15 MIN: Performed by: INTERNAL MEDICINE

## 2025-02-21 PROCEDURE — 33229 REMV&REPLC PM GEN MULT LEADS: CPT | Performed by: INTERNAL MEDICINE

## 2025-02-21 PROCEDURE — 3700000000 HC ANESTHESIA ATTENDED CARE: Performed by: INTERNAL MEDICINE

## 2025-02-21 PROCEDURE — 7100000000 HC PACU RECOVERY - FIRST 15 MIN: Performed by: INTERNAL MEDICINE

## 2025-02-21 PROCEDURE — 33263 RMVL & RPLCMT DFB GEN 2 LEAD: CPT | Performed by: INTERNAL MEDICINE

## 2025-02-21 PROCEDURE — 2720000010 HC SURG SUPPLY STERILE: Performed by: INTERNAL MEDICINE

## 2025-02-21 PROCEDURE — C1721 AICD, DUAL CHAMBER: HCPCS | Performed by: INTERNAL MEDICINE

## 2025-02-21 PROCEDURE — 6360000002 HC RX W HCPCS: Performed by: NURSE ANESTHETIST, CERTIFIED REGISTERED

## 2025-02-21 PROCEDURE — 2709999900 HC NON-CHARGEABLE SUPPLY: Performed by: INTERNAL MEDICINE

## 2025-02-21 PROCEDURE — C1892 INTRO/SHEATH,FIXED,PEEL-AWAY: HCPCS | Performed by: INTERNAL MEDICINE

## 2025-02-21 DEVICE — ICD DDPA2D1 COBALT XT DR MRI DF1 USA
Type: IMPLANTABLE DEVICE | Status: FUNCTIONAL
Brand: COBALT™ XT DR MRI SURESCAN™

## 2025-02-21 RX ORDER — SODIUM CHLORIDE 9 MG/ML
INJECTION, SOLUTION INTRAVENOUS PRN
Status: DISCONTINUED | OUTPATIENT
Start: 2025-02-21 | End: 2025-02-24 | Stop reason: HOSPADM

## 2025-02-21 RX ORDER — SODIUM CHLORIDE 0.9 % (FLUSH) 0.9 %
5-40 SYRINGE (ML) INJECTION PRN
Status: DISCONTINUED | OUTPATIENT
Start: 2025-02-21 | End: 2025-02-24 | Stop reason: HOSPADM

## 2025-02-21 RX ORDER — NALOXONE HYDROCHLORIDE 0.4 MG/ML
INJECTION, SOLUTION INTRAMUSCULAR; INTRAVENOUS; SUBCUTANEOUS PRN
Status: DISCONTINUED | OUTPATIENT
Start: 2025-02-21 | End: 2025-02-24 | Stop reason: HOSPADM

## 2025-02-21 RX ORDER — FENTANYL CITRATE 50 UG/ML
25 INJECTION, SOLUTION INTRAMUSCULAR; INTRAVENOUS EVERY 5 MIN PRN
Status: DISCONTINUED | OUTPATIENT
Start: 2025-02-21 | End: 2025-02-24 | Stop reason: HOSPADM

## 2025-02-21 RX ORDER — DEXMEDETOMIDINE HYDROCHLORIDE 100 UG/ML
INJECTION, SOLUTION INTRAVENOUS
Status: DISCONTINUED | OUTPATIENT
Start: 2025-02-21 | End: 2025-02-21 | Stop reason: SDUPTHER

## 2025-02-21 RX ORDER — PROPOFOL 10 MG/ML
INJECTION, EMULSION INTRAVENOUS
Status: DISCONTINUED | OUTPATIENT
Start: 2025-02-21 | End: 2025-02-21 | Stop reason: SDUPTHER

## 2025-02-21 RX ORDER — CEFAZOLIN SODIUM 1 G/3ML
INJECTION, POWDER, FOR SOLUTION INTRAMUSCULAR; INTRAVENOUS
Status: DISCONTINUED | OUTPATIENT
Start: 2025-02-21 | End: 2025-02-21 | Stop reason: SDUPTHER

## 2025-02-21 RX ORDER — OXYCODONE AND ACETAMINOPHEN 5; 325 MG/1; MG/1
1 TABLET ORAL EVERY 6 HOURS PRN
Qty: 12 TABLET | Refills: 0 | Status: SHIPPED | OUTPATIENT
Start: 2025-02-21 | End: 2025-02-24

## 2025-02-21 RX ORDER — SODIUM CHLORIDE 0.9 % (FLUSH) 0.9 %
5-40 SYRINGE (ML) INJECTION EVERY 12 HOURS SCHEDULED
Status: DISCONTINUED | OUTPATIENT
Start: 2025-02-21 | End: 2025-02-24 | Stop reason: HOSPADM

## 2025-02-21 RX ORDER — LIDOCAINE HYDROCHLORIDE 10 MG/ML
INJECTION, SOLUTION EPIDURAL; INFILTRATION; INTRACAUDAL; PERINEURAL PRN
Status: DISCONTINUED | OUTPATIENT
Start: 2025-02-21 | End: 2025-02-21 | Stop reason: HOSPADM

## 2025-02-21 RX ORDER — DIPHENHYDRAMINE HYDROCHLORIDE 50 MG/ML
12.5 INJECTION INTRAMUSCULAR; INTRAVENOUS
Status: DISCONTINUED | OUTPATIENT
Start: 2025-02-21 | End: 2025-02-22 | Stop reason: HOSPADM

## 2025-02-21 RX ORDER — ONDANSETRON 2 MG/ML
4 INJECTION INTRAMUSCULAR; INTRAVENOUS
Status: DISCONTINUED | OUTPATIENT
Start: 2025-02-21 | End: 2025-02-22 | Stop reason: HOSPADM

## 2025-02-21 RX ADMIN — PROPOFOL 180 MCG/KG/MIN: 10 INJECTION, EMULSION INTRAVENOUS at 08:15

## 2025-02-21 RX ADMIN — DEXMEDETOMIDINE 4 MCG: 200 INJECTION, SOLUTION INTRAVENOUS at 08:32

## 2025-02-21 RX ADMIN — CEFAZOLIN 2 G: 1 INJECTION, POWDER, FOR SOLUTION INTRAMUSCULAR; INTRAVENOUS at 08:19

## 2025-02-21 NOTE — DISCHARGE INSTRUCTIONS
Disposition:  Will need follow-up with device/wound check in 7 days in my office.  Please contact office at [phone] to confirm appointment.  Main Office:    [address], [address]  [place]  [address]    Restrictions:  For affected arm:  No lifting greater than 10 lbs or lifting elbow above shoulder for 4 weeks.  Keep incision clean and dry for a total of 72 hours after procedure.  Remove dressing in 7 days if not already removed.  No hot tubs or pools for 2 weeks.  OK to shower with \"pat\" dry incision after 72 hours.  Absolutely no driving for 24 hours, minimize ideally for 1 week due to concern for airbag.      DISCHARGE SUMMARY from Nurse    PATIENT INSTRUCTIONS:    After general anesthesia or intravenous sedation, for 24 hours or while taking prescription Narcotics:  Limit your activities  Do not drive and operate hazardous machinery  Do not make important personal or business decisions  Do  not drink alcoholic beverages  If you have not urinated within 8 hours after discharge, please contact your surgeon on call.    Report the following to your surgeon:  Excessive pain, swelling, redness or odor of or around the surgical area  Temperature over 100.5  Nausea and vomiting lasting longer than 4 hours or if unable to take medications  Any signs of decreased circulation or nerve impairment to extremity: change in color, persistent  numbness, tingling, coldness or increase pain  Any questions    What to do at Home:  Recommended activity: activity as tolerated and no driving for today    *  Please give a list of your current medications to your Primary Care Provider.    *  Please update this list whenever your medications are discontinued, doses are      changed, or new medications (including over-the-counter products) are added.    *  Please carry medication information at all times in case of emergency situations.    These are general instructions for a healthy lifestyle:    No smoking/ No tobacco

## 2025-02-21 NOTE — PROGRESS NOTES
Cath holding summary     Patient escorted to cath holding from waiting area ambulatory, alert and oriented x 4, voicing no complaints.  Changed into gown and placed on monitor.   NPO since MN.  Lab results, med rec and H&P reviewed on chart.  PIV x 1 inserted without difficulty. Family at bedside.      0800  TRANSFER - OUT REPORT:  Verbal report given to Geronimo (name) on [facility]  being transferred to EP Lab (unit) for ordered procedure  Report consisted of patient’s Situation, Background, Assessment and   Recommendations(SBAR).   Information from the following report(s) SBAR, Intake/Output, MAR, and Pre Procedure Checklist was reviewed with the receiving nurse.  Lines:   Peripheral IV 02/21/25 Right Antecubital (Active)     Opportunity for questions and clarification was provided.    Patient transported with:  Tech      TRANSFER - IN REPORT:  0915: Verbal report received from Tiara on [facility] being received from EP Lab for ordered procedure. Report consisted of patient's Situation, Background, Assessment and Recommendations (SBAR). Information from the following report(s) Nurse Handoff Report, Surgery Report, Intake/Output, and MAR was reviewed with the receiving nurse. Opportunity for questions and clarification was provided. Assessment completed upon patient's arrival to unit and care assumed. A/Ox4 and VSS.  Procedure: Pacemaker/ICD - Gen change  Intervention: N/A    If yes, antiplatelet administered:   Site: Left, Chest  Pain: 0/10

## 2025-02-21 NOTE — ANESTHESIA PRE PROCEDURE
Department of Anesthesiology  Preprocedure Note       Name:  Marybeth Valencia   Age:  84 y.o.  :  1940                                          MRN:  [id number]         Date:  2025      Surgeon: Surgeon(s):  Donovan Khan MD    Procedure: Procedure(s):  Dual AICD Gen Change  Remove & replace PPM gen dual lead    Medications prior to admission:   Prior to Admission medications    Medication Sig Start Date End Date Taking? Authorizing Provider   potassium chloride (KLOR-CON M) 20 MEQ extended release tablet Take 2 tablets by mouth daily 10/21/24  Yes Donovan Khan MD   furosemide (LASIX) 20 MG tablet take 3 tablets by mouth daily 24  Yes Donovan Khan MD   amLODIPine (NORVASC) 2.5 MG tablet take 1 tablet by mouth once daily for blood pressure STOP BENAZEPRIL 9/3/24  Yes Donovan Khan MD   Evolocumab 140 MG/ML SOAJ Inject 140 mg into the skin every 14 days 24  Yes Janine Melvin PA-C   apixaban (ELIQUIS) 5 MG TABS tablet Take 1 tablet by mouth 2 times daily 24  Yes Donovan Khan MD   nitroGLYCERIN (NITROSTAT) 0.4 MG SL tablet Place 1 tablet under the tongue every 5 minutes as needed for Chest pain up to max of 3 total doses. If no relief after 1 dose, call 911. 24  Yes Donovan Khan MD   furosemide (LASIX) 40 MG tablet Take 1 tablet by mouth 2 times daily 24 Yes Donovan Khan MD   tamsulosin (FLOMAX) 0.4 MG capsule Take 1 capsule by mouth daily 24  Yes Garcia Zapien MD   acetaminophen (TYLENOL) 500 MG tablet Take 2 tablets by mouth every 6 hours as needed 3/7/24  Yes ProviderFrancisco MD B-D 3CC LUER-JESE SYR 23GX1\" 23G X 1\" 3 ML MISC USE ONE  SYRINGE FOR INJECTION EVERY 3 WEEKS 90 24  Yes Francisco Rubalcava MD   metoprolol tartrate (LOPRESSOR) 50 MG tablet take 1 tablet by mouth twice a day  Patient taking differently: Take 12.5 mg by mouth 2 times daily 23  Yes Janine Melvin PA-C   famotidine (PEPCID) 40 MG tablet Take 1

## 2025-02-21 NOTE — ANESTHESIA POSTPROCEDURE EVALUATION
Department of Anesthesiology  Postprocedure Note    Patient: Marybeth Valencia  MRN: [id number]  YOB: 1940  Date of evaluation: 2/21/2025    Procedure Summary       Date: 02/21/25 Room / Location: [facility] EP LAB 1 / [facility] CARDIAC CATH LAB    Anesthesia Start: 0804 Anesthesia Stop: 0857    Procedures:       Dual AICD Gen Change      Remove & replace PPM gen dual lead Diagnosis: Encounter for pacemaker at end of battery life    Providers: Donovan Khan MD Responsible Provider: Bert Jerez MD    Anesthesia Type: MAC ASA Status: 3            Anesthesia Type: MAC    Bryan Phase I: Bryan Score: 10    Bryan Phase II:      Anesthesia Post Evaluation    Patient location during evaluation: bedside  Patient participation: complete - patient participated  Level of consciousness: awake  Pain score: 0  Airway patency: patent  Nausea & Vomiting: no vomiting and no nausea  Cardiovascular status: blood pressure returned to baseline  Respiratory status: acceptable  Hydration status: stable    There were no known notable events for this encounter.

## 2025-02-27 ENCOUNTER — NURSE ONLY (OUTPATIENT)
Age: 85
End: 2025-02-27

## 2025-02-27 DIAGNOSIS — I50.32 CHRONIC DIASTOLIC CONGESTIVE HEART FAILURE (HCC): ICD-10-CM

## 2025-02-27 DIAGNOSIS — I48.91 ATRIAL FIBRILLATION, UNSPECIFIED TYPE (HCC): ICD-10-CM

## 2025-02-27 DIAGNOSIS — I42.9 CARDIOMYOPATHY, UNSPECIFIED TYPE (HCC): ICD-10-CM

## 2025-02-27 DIAGNOSIS — Z95.810 AICD (AUTOMATIC CARDIOVERTER/DEFIBRILLATOR) PRESENT: ICD-10-CM

## 2025-02-27 DIAGNOSIS — Z95.810 AICD (AUTOMATIC CARDIOVERTER/DEFIBRILLATOR) PRESENT: Primary | ICD-10-CM

## 2025-03-27 ENCOUNTER — TELEPHONE (OUTPATIENT)
Age: 85
End: 2025-03-27

## 2025-03-27 ENCOUNTER — RESULTS FOLLOW-UP (OUTPATIENT)
Dept: CARDIOLOGY | Facility: HOSPITAL | Age: 85
End: 2025-03-27

## 2025-03-27 ENCOUNTER — CLINICAL SUPPORT (OUTPATIENT)
Age: 85
End: 2025-03-27
Payer: MEDICARE

## 2025-03-27 DIAGNOSIS — I50.32 CHRONIC DIASTOLIC CONGESTIVE HEART FAILURE (HCC): ICD-10-CM

## 2025-03-27 DIAGNOSIS — I48.20 CHRONIC ATRIAL FIBRILLATION, UNSPECIFIED (HCC): ICD-10-CM

## 2025-03-27 DIAGNOSIS — Z95.810 AICD (AUTOMATIC CARDIOVERTER/DEFIBRILLATOR) PRESENT: Primary | ICD-10-CM

## 2025-03-27 DIAGNOSIS — I42.9 CARDIOMYOPATHY, UNSPECIFIED TYPE (HCC): ICD-10-CM

## 2025-03-27 DIAGNOSIS — I48.91 ATRIAL FIBRILLATION, UNSPECIFIED TYPE (HCC): ICD-10-CM

## 2025-03-27 DIAGNOSIS — I50.22 CHRONIC SYSTOLIC CONGESTIVE HEART FAILURE (HCC): ICD-10-CM

## 2025-03-27 PROCEDURE — 93283 PRGRMG EVAL IMPLANTABLE DFB: CPT | Performed by: INTERNAL MEDICINE

## 2025-04-07 NOTE — PROGRESS NOTES
Zbigniew Chavarria is a 70 y.o. male presenting today for Knee Swelling  . HPI:  Zbigniew Chavarria presents to the office today for right knee injury. 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 Patient reports later that afternoon he started have anterior and posterior knee pain. He is complaining of increased pain with prolong walking or standing throughout the day. In the early morning he is usually pain free. He has a history of bilateral osteoarthritis in his knees. Review of Systems   Respiratory: Negative for cough. Cardiovascular: Negative for chest pain and palpitations. Musculoskeletal: Positive for joint pain (right knee) and myalgias. Allergies   Allergen Reactions    Zetia [Ezetimibe] Other (comments)     Back pain resolved off Zetia    Lipitor [Atorvastatin] Myalgia    Livalo [Pitavastatin] Other (comments)     Extreme fatigue    5/29/14   PATIENT DENIES    Zocor [Simvastatin] Myalgia     5/29/14 PATIENT DENIES        Current Outpatient Prescriptions   Medication Sig Dispense Refill    predniSONE (DELTASONE) 20 mg tablet 2 tabs daily x 2 days, 1 tab daily x 2 days, 1/2 tab daily x 4 days 8 Tab 0    cyanocobalamin (VITAMIN B-12) 1,000 mcg/mL injection 1 mL by IntraMUSCular route once for 1 dose. 1 mL 0    potassium chloride (KLOR-CON) 10 mEq tablet 1 tablet twice per day (stop Spirolactone) 60 Tab 2    amLODIPine (NORVASC) 5 mg tablet Take 1 Tab by mouth daily. 90 Tab 1    cyanocobalamin (VITAMIN B12) 1,000 mcg/mL injection inject 1ml into the muscle every 3 weeks 1 mL 12    furosemide (LASIX) 40 mg tablet TAKE 1 TABLET DAILY 90 Tab 3    warfarin (COUMADIN) 5 mg tablet 1 tablet daily 30 Tab 6    atorvastatin (LIPITOR) 10 mg tablet Take 1 Tab by mouth daily. 90 Tab 3    metoprolol tartrate (LOPRESSOR) 50 mg tablet Take 1 Tab by mouth two (2) times a day.  180 Tab 3    clobetasol (TEMOVATE) 0.05 % topical cream Apply  to affected area two (2) times a day.  white petrolatum-mineral oil (ABSORBASE) oint Apply  to affected area as needed.  diclofenac (VOLTAREN) 1 % gel Apply 4 g to affected area four (4) times daily. 1 Each 0    pantoprazole (PROTONIX) 40 mg tablet 1 tablet each AM 90 Tab 3    AZOPT 1 % ophthalmic suspension Administer 1 Drop to both eyes two (2) times a day. 3    multivitamin (ONE A DAY) tablet Take 1 Tab by mouth daily.  cyanocobalamin (VITAMIN B12) 1,000 mcg/mL injection inject 1 ml into the muscle once every three weeks 1 mL 12    spironolactone (ALDACTONE) 25 mg tablet Take 1 Tab by mouth daily. 80 Tab 3       Past Medical History:   Diagnosis Date    AICD (automatic cardioverter/defibrillator) present     Medtronic  upgrade from pacer in 2007 due to VT    Atrial fibrillation ([facility] [address]     Cardiac cath 03/19/2007    LM 25% ostial.  Otherwise patent coronaries. LVEDP 20.  EF 50%. Dyssynch. mid anterior contraction. Pacemaker.  Cardiac echocardiogram 03/20/2014    A-fib. EF 65-70%. No RWMA. No RWMA. Mild conc LVH. Mild RVE. RVSP 40-45 mmHg. Mild LAE.  HERNANDEZ. Mild MR. Mod TR.  Cardiac nuclear imaging test 07/29/2014    No ischemia or prior infarction. EF 68%. Nondiagnostic EKG due to V pacing on pharm stress test.  Low risk.  Cardioversion 8/31/2011    Successful defibrillation threshold testing at 18 joules. Patient also had conversion to atrial ventricular pacing.      CHF (congestive heart failure) (HCC)     Cobalamin deficiency     Dupuytren contracture 02/08/2010    on the right ring finger     Edema     Hypertension     Hypertrophy of prostate with urinary obstruction and other lower urinary tract symptoms (LUTS)     Impotence of organic origin     Intolerance of drug     Intolerant high doses of sotalol due to prolonged QT    Mastodynia     Paroxysmal VT ([address]     Pure hypercholesterolemia     S/P ablation of atrial fibrillation 05/31/2011    S/P ablation of atrial fibrillation 12/18/2012    Dr. Mima Barajas at [facility] S/P ablation operation for arrhythmia     VT ablation by Dr. Ck Smith near [address] [place] [address] [address]    Sick sinus syndrome [facility])        Past Surgical History:   Procedure Laterality Date    HX AFIB ABLATION  12/18/2012    [place] by Dr. Shanel Landry  02/08-03/08    bilateral cataract removed    HX CATARACT REMOVAL  2/2008 & 3/2008    bilateral    HX ORTHOPAEDIC  2/8/10    release of Dupuytren's contraction on ring finger of right hand    HX OTHER SURGICAL  6/2000    atrial lead placement    HX OTHER SURGICAL  6/5/2009    Cardioversion    HX OTHER SURGICAL  10/12/2012    cardioversion    HX PACEMAKER  1998    placement    HX PACEMAKER  6/2000    DDD    HX PACEMAKER  3/27/07    removal of pacemaker & placement of dual chamber defib generator and leads    HX TONSIL AND ADENOIDECTOMY         Social History     Social History    Marital status:      Spouse name: N/A    Number of children: N/A    Years of education: N/A     Occupational History    Not on file. Social History Main Topics    Smoking status: Never Smoker    Smokeless tobacco: Never Used    Alcohol use Yes      Comment: 2-3 glasses of white wine frequently    Drug use: No    Sexual activity: Not Currently     Partners: Female     Other Topics Concern    Not on file     Social History Narrative       Patient does not have an advanced directive on file    Vitals:    05/14/18 1035   BP: 118/60   Pulse: 94   Resp: 16   Temp: 97.8 °F (36.6 °C)   TempSrc: Tympanic   SpO2: 97%   Weight: 221 lb (100.2 kg)   Height: 6' (1.829 m)   PainSc:   0 - No pain       Physical Exam   Constitutional: No distress. Cardiovascular: Normal rate, regular rhythm and normal heart sounds. Pulmonary/Chest: Effort normal and breath sounds normal.   Musculoskeletal: He exhibits edema (trace of edema in the right knee).  He exhibits no tenderness or deformity. Legs:  Skin: Skin is warm and dry. Nursing note and vitals reviewed. Hospital Outpatient Visit on 05/10/2018   Component Date Value Ref Range Status    WBC 05/10/2018 6.6  4.6 - 13.2 K/uL Final    RBC 05/10/2018 4.46* 4.70 - 5.50 M/uL Final    HGB 05/10/2018 14.7  13.0 - 16.0 g/dL Final    HCT 05/10/2018 43.2  36.0 - 48.0 % Final    MCV 05/10/2018 96.9  74.0 - 97.0 FL Final    MCH 05/10/2018 33.0  24.0 - 34.0 PG Final    MCHC 05/10/2018 34.0  31.0 - 37.0 g/dL Final    RDW 05/10/2018 13.2  11.6 - 14.5 % Final    PLATELET 38/60/4025 542  135 - 420 K/uL Final    MPV 05/10/2018 11.6  9.2 - 11.8 FL Final    NEUTROPHILS 05/10/2018 59  40 - 73 % Final    LYMPHOCYTES 05/10/2018 24  21 - 52 % Final    MONOCYTES 05/10/2018 13* 3 - 10 % Final    EOSINOPHILS 05/10/2018 3  0 - 5 % Final    BASOPHILS 05/10/2018 1  0 - 2 % Final    ABS. NEUTROPHILS 05/10/2018 3.9  1.8 - 8.0 K/UL Final    ABS. LYMPHOCYTES 05/10/2018 1.6  0.9 - 3.6 K/UL Final    ABS. MONOCYTES 05/10/2018 0.9  0.05 - 1.2 K/UL Final    ABS. EOSINOPHILS 05/10/2018 0.2  0.0 - 0.4 K/UL Final    ABS. BASOPHILS 05/10/2018 0.0  0.0 - 0.06 K/UL Final    DF 05/10/2018 AUTOMATED    Final    LIPID PROFILE 05/10/2018        Final    Cholesterol, total 05/10/2018 193  <200 MG/DL Final    Triglyceride 05/10/2018 80  <150 MG/DL Final    Comment: The drugs N-acetylcysteine (NAC) and  Metamiszole have been found to cause falsely  low results in this chemical assay. Please  be sure to submit blood samples obtained  BEFORE administration of either of these  drugs to assure correct results.       HDL Cholesterol 05/10/2018 49  40 - 60 MG/DL Final    LDL, calculated 05/10/2018 128* 0 - 100 MG/DL Final    VLDL, calculated 05/10/2018 16  MG/DL Final    CHOL/HDL Ratio 05/10/2018 3.9  0 - 5.0   Final    Sodium 05/10/2018 140  136 - 145 mmol/L Final    Potassium 05/10/2018 4.0  3.5 - 5.5 mmol/L Final    Chloride 05/10/2018 107  100 - 108 mmol/L Final    CO2 05/10/2018 22  21 - 32 mmol/L Final    Anion gap 05/10/2018 11  3.0 - 18 mmol/L Final    Glucose 05/10/2018 117* 74 - 99 mg/dL Final    BUN 05/10/2018 17  7.0 - 18 MG/DL Final    Creatinine 05/10/2018 1.06  0.6 - 1.3 MG/DL Final    BUN/Creatinine ratio 05/10/2018 16  12 - 20   Final    GFR est AA 05/10/2018 >60  >60 ml/min/1.73m2 Final    GFR est non-AA 05/10/2018 >60  >60 ml/min/1.73m2 Final    Comment: (NOTE)  Estimated GFR is calculated using the Modification of Diet in Renal   Disease (MDRD) Study equation, reported for both  Americans   (GFRAA) and non- Americans (GFRNA), and normalized to 1.73m2   body surface area. The physician must decide which value applies to   the patient. The MDRD study equation should only be used in   individuals age 25 or older. It has not been validated for the   following: pregnant women, patients with serious comorbid conditions,   or on certain medications, or persons with extremes of body size,   muscle mass, or nutritional status.  Calcium 05/10/2018 8.5  8.5 - 10.1 MG/DL Final    Bilirubin, total 05/10/2018 0.6  0.2 - 1.0 MG/DL Final    ALT (SGPT) 05/10/2018 20  16 - 61 U/L Final    AST (SGOT) 05/10/2018 17  15 - 37 U/L Final    Alk.  phosphatase 05/10/2018 78  45 - 117 U/L Final    Protein, total 05/10/2018 7.1  6.4 - 8.2 g/dL Final    Albumin 05/10/2018 3.5  3.4 - 5.0 g/dL Final    Globulin 05/10/2018 3.6  2.0 - 4.0 g/dL Final    A-G Ratio 05/10/2018 1.0  0.8 - 1.7   Final   Telephone Anticoag on 05/02/2018   Component Date Value Ref Range Status    INR, External 05/02/2018 2.2   Final   Telephone Anticoag on 04/25/2018   Component Date Value Ref Range Status    INR, External 04/25/2018 2.0   Final   Telephone Anticoag on 04/18/2018   Component Date Value Ref Range Status    INR, External 04/18/2018 2.2   Final   Telephone Anticoag on 04/04/2018   Component Date Value Ref Range Status    INR, External 04/04/2018 2.2 Final   Telephone Anticoag on 03/30/2018   Component Date Value Ref Range Status    INR, External 03/28/2018 2.0   Final   Telephone Anticoag on 03/21/2018   Component Date Value Ref Range Status    INR, External 03/21/2018 2.0   Final   Telephone Anticoag on 03/16/2018   Component Date Value Ref Range Status    INR, External 03/14/2018 3.4   Final   Telephone Anticoag on 03/08/2018   Component Date Value Ref Range Status    INR, External 03/08/2018 2.6   Final   Telephone Anticoag on 02/28/2018   Component Date Value Ref Range Status    INR, External 02/28/2018 2.4   Final   There may be more visits with results that are not included. .No results found for any visits on 05/14/18. Assessment / Plan:      ICD-10-CM ICD-9-CM    1. Acute pain of right knee M25.561 719.46 predniSONE (DELTASONE) 20 mg tablet   2. Overuse injury of lower leg S89.80XA 959.7 predniSONE (DELTASONE) 20 mg tablet   3. Essential hypertension I10 401.9    4. B12 deficiency E53.8 266.2 VITAMIN B12 INJECTION      THER/PROPH/DIAG INJECTION, SUBCUT/IM     Right knee pain-Prednisone rx  HTN- controlled  F/u if no improvement in Right knee pain  If not improved with refer to Dr. Emanuel Russ     Follow-up Disposition:  Return if symptoms worsen or fail to improve. I asked the patient if he  had any questions and answered his  questions.   The patient stated that he understands the treatment plan and agrees with the treatment plan Patient 184.9

## 2025-04-09 RX ORDER — FUROSEMIDE 40 MG/1
40 TABLET ORAL 2 TIMES DAILY
Qty: 180 TABLET | Refills: 3 | Status: SHIPPED | OUTPATIENT
Start: 2025-04-09

## 2025-05-21 ENCOUNTER — OFFICE VISIT (OUTPATIENT)
Age: 85
End: 2025-05-21
Payer: MEDICARE

## 2025-05-21 ENCOUNTER — CLINICAL SUPPORT (OUTPATIENT)
Age: 85
End: 2025-05-21
Payer: MEDICARE

## 2025-05-21 ENCOUNTER — RESULTS FOLLOW-UP (OUTPATIENT)
Dept: CARDIOLOGY | Facility: HOSPITAL | Age: 85
End: 2025-05-21

## 2025-05-21 VITALS
HEIGHT: 72 IN | SYSTOLIC BLOOD PRESSURE: 110 MMHG | HEART RATE: 87 BPM | BODY MASS INDEX: 23.16 KG/M2 | OXYGEN SATURATION: 97 % | DIASTOLIC BLOOD PRESSURE: 62 MMHG | WEIGHT: 171 LBS

## 2025-05-21 DIAGNOSIS — I48.91 ATRIAL FIBRILLATION, UNSPECIFIED TYPE (HCC): ICD-10-CM

## 2025-05-21 DIAGNOSIS — I50.22 CHRONIC SYSTOLIC CONGESTIVE HEART FAILURE (HCC): ICD-10-CM

## 2025-05-21 DIAGNOSIS — I10 PRIMARY HYPERTENSION: ICD-10-CM

## 2025-05-21 DIAGNOSIS — I42.9 CARDIOMYOPATHY, UNSPECIFIED TYPE (HCC): ICD-10-CM

## 2025-05-21 DIAGNOSIS — Z95.810 AICD (AUTOMATIC CARDIOVERTER/DEFIBRILLATOR) PRESENT: Primary | ICD-10-CM

## 2025-05-21 DIAGNOSIS — I50.32 CHRONIC DIASTOLIC CONGESTIVE HEART FAILURE (HCC): ICD-10-CM

## 2025-05-21 PROCEDURE — 1123F ACP DISCUSS/DSCN MKR DOCD: CPT | Performed by: INTERNAL MEDICINE

## 2025-05-21 PROCEDURE — 99214 OFFICE O/P EST MOD 30 MIN: CPT | Performed by: INTERNAL MEDICINE

## 2025-05-21 PROCEDURE — G8428 CUR MEDS NOT DOCUMENT: HCPCS | Performed by: INTERNAL MEDICINE

## 2025-05-21 PROCEDURE — G8420 CALC BMI NORM PARAMETERS: HCPCS | Performed by: INTERNAL MEDICINE

## 2025-05-21 PROCEDURE — 3078F DIAST BP <80 MM HG: CPT | Performed by: INTERNAL MEDICINE

## 2025-05-21 PROCEDURE — 93283 PRGRMG EVAL IMPLANTABLE DFB: CPT | Performed by: INTERNAL MEDICINE

## 2025-05-21 PROCEDURE — 1126F AMNT PAIN NOTED NONE PRSNT: CPT | Performed by: INTERNAL MEDICINE

## 2025-05-21 PROCEDURE — 3074F SYST BP LT 130 MM HG: CPT | Performed by: INTERNAL MEDICINE

## 2025-05-21 PROCEDURE — 1036F TOBACCO NON-USER: CPT | Performed by: INTERNAL MEDICINE

## 2025-05-21 NOTE — PROGRESS NOTES
HPI:  85-year-old male here for followup. He had generator changeout February 2025. He has been doing well. He does note some easy bruising in his upper extremities. He is on Eliquis. No chest pain, syncope, PND, orthopnea or edema.     Impression:  1. Dual-chamber Medtronic AICD, not utilizing the atrial lead, status post generator changeout February 2025.  2. 6949 lead in place.  3. History of lung cancer, right upper lobe resection, no mets, March 2024 followed by Oncology.  4. History of left atrial appendage thrombus by CT scan 2024 with DESEAN June 2024 with resolution, on Eliquis in place of Coumadin.   5. Atrial fibrillation with AV node ablation 2011.  6. History of tricuspid regurgitation, RV failure April 2024, improving.  7. Remote VT ablation 2007.  8. Hypertension.  9. Heart catheterization June 2024, stable LVEDP and normal PA pressure.    Plan:  He has chronic diastolic heart failure, compensated. His PA pressures improved last year. He has known chronic atrial fibrillation with AV node ablation, on Eliquis in place of Coumadin. He had previous left atrial appendage thrombus, resolved by DESEAN June 2024. We did talk briefly about WATCHMAN as he bruises easily in his upper extremities. He would like to think about it. I will plan to see back in 6 months.         Wt Readings from Last 3 Encounters:   05/21/25 77.6 kg (171 lb)   02/21/25 76.2 kg (168 lb)   11/20/24 76.2 kg (168 lb)     Past Medical History:   Diagnosis Date    AICD (automatic cardioverter/defibrillator) present     Medtronic  upgrade from pacer in 2007 due to VT    Arthritis     Atrial fibrillation (HCC)     Cancer (HCC) 02/2019    melenoma on left leg    Cardio-chalasia 8/31/2011    Successful defibrillation threshold testing at 18 joules. Patient also had conversion to atrial ventricular pacing.     CH[facility]congestive heart failure) ([facility])     Cobalamin deficiency     Dupuytren contracture 02/08/2010    on the right ring finger     Edema

## 2025-06-02 DIAGNOSIS — Z95.810 AICD (AUTOMATIC CARDIOVERTER/DEFIBRILLATOR) PRESENT: ICD-10-CM

## 2025-06-02 DIAGNOSIS — I50.32 CHRONIC DIASTOLIC CONGESTIVE HEART FAILURE (HCC): ICD-10-CM

## 2025-08-21 ENCOUNTER — HOSPITAL ENCOUNTER (OUTPATIENT)
Age: 85
Discharge: HOME | End: 2025-08-24
Payer: MEDICARE

## 2025-08-21 DIAGNOSIS — M54.2 CERVICALGIA: ICD-10-CM

## 2025-08-21 PROCEDURE — 72050 X-RAY EXAM NECK SPINE 4/5VWS: CPT

## (undated) DEVICE — LIMB HOLDER, WRIST/ANKLE: Brand: DEROYAL

## (undated) DEVICE — SYRINGE MED 25GA 3ML L5/8IN SUBQ PLAS W/ DETACH NDL SFTY

## (undated) DEVICE — MEDI-VAC NON-CONDUCTIVE SUCTION TUBING: Brand: CARDINAL HEALTH

## (undated) DEVICE — GOWN ISOL IMPERV UNIV, DISP, OPEN BACK, BLUE --

## (undated) DEVICE — SUTURE ABSORBABLE BRAIDED 2-0 CT-1 27 IN UD VICRYL J259H

## (undated) DEVICE — INTRODUCER SHTH L13CM OD7FR SH ORNG HUB SEAMLESS SAFSHTH

## (undated) DEVICE — CLIP HEMO ENDOSCP 235CM BX/10 -- RESOLUTION 360

## (undated) DEVICE — GLIDESHEATH SLENDER STAINLESS STEEL KIT: Brand: GLIDESHEATH SLENDER

## (undated) DEVICE — COPILOT KIT INCLUDES BLEEDBACK CONTROL VALVE 20/30 INDEFLATOR INFLATION DEVICE 30 ATM 20 CC / GUIDE WIRE INTRODUCER / TORQUE DEVICE: Brand: INDEFLATOR

## (undated) DEVICE — FCPS RAD JAW 4LC 240CM W/NDL -- BX/20 RADIAL JAW 4

## (undated) DEVICE — ENDOSCOPY PUMP TUBING/ CAP SET: Brand: ERBE

## (undated) DEVICE — BITE BLOCK ENDOSCP UNIV AD 6 TO 9.4 MM

## (undated) DEVICE — GAUZE SPONGES,16 PLY: Brand: CURITY

## (undated) DEVICE — BASIN EMESIS 500CC ROSE 250/CS 60/PLT: Brand: MEDEGEN MEDICAL PRODUCTS, LLC

## (undated) DEVICE — VASCULAR CATH-PACK

## (undated) DEVICE — ELECTRODE PT RET AD L9FT HI MOIST COND ADH HYDRGEL CORDED

## (undated) DEVICE — PACEMAKER PACK: Brand: MEDLINE INDUSTRIES, INC.

## (undated) DEVICE — COVER US PRB W15XL120CM W/ GEL RUBBERBAND TAPE STRP FLD GEN

## (undated) DEVICE — MEDI-TRACE CADENCE ADULT, DEFIBRILLATION ELECTRODE -RTS  (10 PR/PK) - PHYSIO-CONTROL: Brand: MEDI-TRACE CADENCE

## (undated) DEVICE — SOLUTION IRRIG 1000ML H2O STRL BLT

## (undated) DEVICE — SYR 50ML SLIP TIP NSAF LF STRL --

## (undated) DEVICE — DRESSING FOAM 4X6 DISP POSTOP MEPILEX BORD AG

## (undated) DEVICE — ANGIOGRAPHY KIT CUST VASC

## (undated) DEVICE — SYRINGE MED 50ML LUERSLIP TIP

## (undated) DEVICE — BAND COMPR L24CM REG CLR PLAS HEMSTAT EXT HK AND LOOP RETEN

## (undated) DEVICE — (D)GLOVE EXAM LG NITRL NS -- DISC BY MFR NO SUB

## (undated) DEVICE — GAUZE,SPONGE,4"X4",16PLY,STRL,LF,10/TRAY: Brand: MEDLINE

## (undated) DEVICE — HI-TORQUE VERSACORE FLOPPY GUIDE WIRE SYSTEM 145 CM: Brand: HI-TORQUE VERSACORE

## (undated) DEVICE — MEDI-VAC SUCTION HIGH CAPACITY: Brand: CARDINAL HEALTH

## (undated) DEVICE — SYR 10ML LUER LOK 1/5ML GRAD --

## (undated) DEVICE — PRESSURE MONITORING SET: Brand: TRUWAVE

## (undated) DEVICE — LINER SUCT CANSTR 3000CC PLAS SFT PRE ASSEMB W/OUT TBNG W/

## (undated) DEVICE — RADIFOCUS OPTITORQUE ANGIOGRAPHIC CATHETER: Brand: OPTITORQUE

## (undated) DEVICE — PROCEDURE KIT FLUID MGMT 10 FR CUST MAINFOLD

## (undated) DEVICE — WIRE .025 SWAN J 3MM 150 CM

## (undated) DEVICE — STERILE POLYISOPRENE POWDER-FREE SURGICAL GLOVES: Brand: PROTEXIS

## (undated) DEVICE — FORCEPS BX L240CM JAW DIA2.8MM L CAP W/ NDL MIC MESH TOOTH

## (undated) DEVICE — CANNULA NSL AD TBNG L14FT STD PVC O2 CRV CONN NONFLARED NSL

## (undated) DEVICE — CATHETER SUCT TR FL TIP 14FR W/ O CTRL

## (undated) DEVICE — FLUFF AND POLYMER UNDERPAD,EXTRA HEAVY: Brand: WINGS

## (undated) DEVICE — SUTURE MONOCRYL SZ 4 0 L18IN ABSRB VLT PS 1 L24MM 3 8 CIR REV Y682H

## (undated) DEVICE — 3M™ IOBAN™ 2 ANTIMICROBIAL INCISE DRAPE 6640EZ: Brand: IOBAN™ 2

## (undated) DEVICE — DISPOSABLE DISTAL ATTACHMENT: Brand: DISPOSABLE DISTAL ATTACHMENT

## (undated) DEVICE — KENDALL RADIOLUCENT FOAM MONITORING ELECTRODE RECTANGULAR SHAPE: Brand: KENDALL

## (undated) DEVICE — FLEX ADVANTAGE 3000CC: Brand: FLEX ADVANTAGE

## (undated) DEVICE — FORCEPS BX L240CM JAW DIA2.4MM ORNG L CAP W/ NDL DISP RAD

## (undated) DEVICE — SYRINGE IRRIG 60ML SFT PLIABLE BLB EZ TO GRP 1 HND USE W/

## (undated) DEVICE — AIRLIFE™ NASAL OXYGEN CANNULA CURVED, FLARED TIP WITH 14 FOOT (4.3 M) CRUSH-RESISTANT TUBING, OVER-THE-EAR STYLE: Brand: AIRLIFE™

## (undated) DEVICE — ARROW 6 FR BALLOON

## (undated) DEVICE — UNDERPAD INCONT W23XL36IN STD BLU POLYPR BK FLUF SFT

## (undated) DEVICE — PLASMABLADE X PS210-030S-LIGHT 3.0SL: Brand: PLASMABLADE™ X

## (undated) DEVICE — BASIN EMSIS 16OZ GRAPHITE PLAS KID SHP MOLD GRAD FOR ORAL

## (undated) DEVICE — DRAPE,ANGIO,BRACH,STERILE,38X44: Brand: MEDLINE

## (undated) DEVICE — AIRLIFE™ NASAL OXYGEN CANNULA CURVED, NONFLARED TIP WITH 14 FOOT (4.3 M) CRUSH-RESISTANT TUBING, OVER-THE-EAR STYLE: Brand: AIRLIFE™

## (undated) DEVICE — YANKAUER,SMOOTH HANDLE,HIGH CAPACITY: Brand: MEDLINE INDUSTRIES, INC.

## (undated) DEVICE — SYRINGE MED 20ML STD CLR PLAS LUERLOCK TIP N CTRL DISP

## (undated) DEVICE — CARDIAC CATH LAB PACK LF

## (undated) DEVICE — SUTURE PERMA HND SZ 0 L18IN NONABSORBABLE BLK L30MM PSL REV 580H

## (undated) DEVICE — SHEATH 6FR 11CM BRITETIP

## (undated) DEVICE — SET FLD ADMIN 3 W STPCOCK FIX FEM L BOR 1IN